# Patient Record
Sex: FEMALE | Race: WHITE | NOT HISPANIC OR LATINO | Employment: OTHER | URBAN - METROPOLITAN AREA
[De-identification: names, ages, dates, MRNs, and addresses within clinical notes are randomized per-mention and may not be internally consistent; named-entity substitution may affect disease eponyms.]

---

## 2017-01-19 ENCOUNTER — GENERIC CONVERSION - ENCOUNTER (OUTPATIENT)
Dept: OTHER | Facility: OTHER | Age: 57
End: 2017-01-19

## 2017-02-08 ENCOUNTER — ALLSCRIPTS OFFICE VISIT (OUTPATIENT)
Dept: OTHER | Facility: OTHER | Age: 57
End: 2017-02-08

## 2017-02-08 DIAGNOSIS — Z00.00 ENCOUNTER FOR GENERAL ADULT MEDICAL EXAMINATION WITHOUT ABNORMAL FINDINGS: ICD-10-CM

## 2017-02-08 DIAGNOSIS — K91.2 POSTSURGICAL MALABSORPTION, NOT ELSEWHERE CLASSIFIED: ICD-10-CM

## 2017-02-08 DIAGNOSIS — Z98.84 BARIATRIC SURGERY STATUS: ICD-10-CM

## 2017-02-08 DIAGNOSIS — E66.01 MORBID (SEVERE) OBESITY DUE TO EXCESS CALORIES (HCC): ICD-10-CM

## 2017-03-27 ENCOUNTER — APPOINTMENT (OUTPATIENT)
Dept: LAB | Facility: CLINIC | Age: 57
End: 2017-03-27
Payer: MEDICARE

## 2017-03-27 ENCOUNTER — TRANSCRIBE ORDERS (OUTPATIENT)
Dept: URGENT CARE | Facility: CLINIC | Age: 57
End: 2017-03-27

## 2017-03-27 DIAGNOSIS — Z98.84 BARIATRIC SURGERY STATUS: ICD-10-CM

## 2017-03-27 DIAGNOSIS — Z00.00 ENCOUNTER FOR GENERAL ADULT MEDICAL EXAMINATION WITHOUT ABNORMAL FINDINGS: ICD-10-CM

## 2017-03-27 DIAGNOSIS — E66.01 MORBID (SEVERE) OBESITY DUE TO EXCESS CALORIES (HCC): ICD-10-CM

## 2017-03-27 DIAGNOSIS — K91.2 POSTSURGICAL MALABSORPTION, NOT ELSEWHERE CLASSIFIED: ICD-10-CM

## 2017-03-27 LAB
25(OH)D3 SERPL-MCNC: 5.1 NG/ML (ref 30–100)
ALBUMIN SERPL BCP-MCNC: 2.4 G/DL (ref 3.5–5)
ALP SERPL-CCNC: 163 U/L (ref 46–116)
ALT SERPL W P-5'-P-CCNC: 17 U/L (ref 12–78)
ANION GAP SERPL CALCULATED.3IONS-SCNC: 8 MMOL/L (ref 4–13)
AST SERPL W P-5'-P-CCNC: 14 U/L (ref 5–45)
BILIRUB SERPL-MCNC: 0.71 MG/DL (ref 0.2–1)
BUN SERPL-MCNC: 8 MG/DL (ref 5–25)
CALCIUM SERPL-MCNC: 8.2 MG/DL (ref 8.3–10.1)
CHLORIDE SERPL-SCNC: 104 MMOL/L (ref 100–108)
CHOLEST SERPL-MCNC: 117 MG/DL (ref 50–200)
CO2 SERPL-SCNC: 27 MMOL/L (ref 21–32)
CREAT SERPL-MCNC: 0.92 MG/DL (ref 0.6–1.3)
ERYTHROCYTE [DISTWIDTH] IN BLOOD BY AUTOMATED COUNT: 12.6 % (ref 11.6–15.1)
FERRITIN SERPL-MCNC: 216 NG/ML (ref 8–388)
FOLATE SERPL-MCNC: 8.4 NG/ML (ref 3.1–17.5)
GFR SERPL CREATININE-BSD FRML MDRD: >60 ML/MIN/1.73SQ M
GLUCOSE P FAST SERPL-MCNC: 73 MG/DL (ref 65–99)
HCT VFR BLD AUTO: 42.6 % (ref 34.8–46.1)
HDLC SERPL-MCNC: 44 MG/DL (ref 40–60)
HGB BLD-MCNC: 14.4 G/DL (ref 11.5–15.4)
LDLC SERPL CALC-MCNC: 54 MG/DL (ref 0–100)
MCH RBC QN AUTO: 33 PG (ref 26.8–34.3)
MCHC RBC AUTO-ENTMCNC: 33.8 G/DL (ref 31.4–37.4)
MCV RBC AUTO: 98 FL (ref 82–98)
PLATELET # BLD AUTO: 273 THOUSANDS/UL (ref 149–390)
PMV BLD AUTO: 13.4 FL (ref 8.9–12.7)
POTASSIUM SERPL-SCNC: 3.6 MMOL/L (ref 3.5–5.3)
PREALB SERPL-MCNC: 10 MG/DL (ref 18–40)
PROT SERPL-MCNC: 6.4 G/DL (ref 6.4–8.2)
PTH-INTACT SERPL-MCNC: 87.2 PG/ML (ref 14–72)
RBC # BLD AUTO: 4.37 MILLION/UL (ref 3.81–5.12)
SODIUM SERPL-SCNC: 139 MMOL/L (ref 136–145)
TRIGL SERPL-MCNC: 95 MG/DL
VIT B12 SERPL-MCNC: 668 PG/ML (ref 100–900)
WBC # BLD AUTO: 7.43 THOUSAND/UL (ref 4.31–10.16)

## 2017-03-27 PROCEDURE — 82746 ASSAY OF FOLIC ACID SERUM: CPT

## 2017-03-27 PROCEDURE — 82607 VITAMIN B-12: CPT

## 2017-03-27 PROCEDURE — 82306 VITAMIN D 25 HYDROXY: CPT

## 2017-03-27 PROCEDURE — 84590 ASSAY OF VITAMIN A: CPT

## 2017-03-27 PROCEDURE — 36415 COLL VENOUS BLD VENIPUNCTURE: CPT

## 2017-03-27 PROCEDURE — 82728 ASSAY OF FERRITIN: CPT

## 2017-03-27 PROCEDURE — 83970 ASSAY OF PARATHORMONE: CPT

## 2017-03-27 PROCEDURE — 80053 COMPREHEN METABOLIC PANEL: CPT

## 2017-03-27 PROCEDURE — 80061 LIPID PANEL: CPT

## 2017-03-27 PROCEDURE — 84134 ASSAY OF PREALBUMIN: CPT

## 2017-03-27 PROCEDURE — 84425 ASSAY OF VITAMIN B-1: CPT

## 2017-03-27 PROCEDURE — 85027 COMPLETE CBC AUTOMATED: CPT

## 2017-03-30 LAB
VIT A SERPL-MCNC: 19 UG/DL (ref 20–65)
VIT B1 BLD-SCNC: 67 NMOL/L (ref 66.5–200)

## 2017-03-31 ENCOUNTER — ALLSCRIPTS OFFICE VISIT (OUTPATIENT)
Dept: OTHER | Facility: OTHER | Age: 57
End: 2017-03-31

## 2017-03-31 DIAGNOSIS — R63.8 OTHER SYMPTOMS AND SIGNS CONCERNING FOOD AND FLUID INTAKE: ICD-10-CM

## 2017-04-10 ENCOUNTER — TRANSCRIBE ORDERS (OUTPATIENT)
Dept: URGENT CARE | Facility: CLINIC | Age: 57
End: 2017-04-10

## 2017-04-10 ENCOUNTER — APPOINTMENT (OUTPATIENT)
Dept: LAB | Facility: CLINIC | Age: 57
End: 2017-04-10
Payer: MEDICARE

## 2017-04-10 DIAGNOSIS — E03.9 UNSPECIFIED HYPOTHYROIDISM: Primary | ICD-10-CM

## 2017-04-10 DIAGNOSIS — E03.9 UNSPECIFIED HYPOTHYROIDISM: ICD-10-CM

## 2017-04-10 DIAGNOSIS — R63.8 OTHER SYMPTOMS AND SIGNS CONCERNING FOOD AND FLUID INTAKE: ICD-10-CM

## 2017-04-10 LAB
PREALB SERPL-MCNC: 11.1 MG/DL (ref 18–40)
T4 FREE SERPL-MCNC: 1.3 NG/DL (ref 0.76–1.46)
TSH SERPL DL<=0.05 MIU/L-ACNC: 0.69 UIU/ML (ref 0.36–3.74)

## 2017-04-10 PROCEDURE — 84439 ASSAY OF FREE THYROXINE: CPT

## 2017-04-10 PROCEDURE — 84443 ASSAY THYROID STIM HORMONE: CPT

## 2017-04-10 PROCEDURE — 36415 COLL VENOUS BLD VENIPUNCTURE: CPT

## 2017-04-10 PROCEDURE — 84134 ASSAY OF PREALBUMIN: CPT

## 2017-05-05 ENCOUNTER — GENERIC CONVERSION - ENCOUNTER (OUTPATIENT)
Dept: OTHER | Facility: OTHER | Age: 57
End: 2017-05-05

## 2017-07-28 ENCOUNTER — ALLSCRIPTS OFFICE VISIT (OUTPATIENT)
Dept: OTHER | Facility: OTHER | Age: 57
End: 2017-07-28

## 2017-08-31 ENCOUNTER — GENERIC CONVERSION - ENCOUNTER (OUTPATIENT)
Dept: OTHER | Facility: OTHER | Age: 57
End: 2017-08-31

## 2017-08-31 DIAGNOSIS — K91.2 POSTSURGICAL MALABSORPTION, NOT ELSEWHERE CLASSIFIED: ICD-10-CM

## 2017-08-31 DIAGNOSIS — E55.9 VITAMIN D DEFICIENCY: ICD-10-CM

## 2017-08-31 DIAGNOSIS — E50.9 VITAMIN A DEFICIENCY: ICD-10-CM

## 2017-08-31 DIAGNOSIS — Z00.00 ENCOUNTER FOR GENERAL ADULT MEDICAL EXAMINATION WITHOUT ABNORMAL FINDINGS: ICD-10-CM

## 2017-08-31 DIAGNOSIS — E21.1 SECONDARY HYPERPARATHYROIDISM, NOT ELSEWHERE CLASSIFIED (HCC): ICD-10-CM

## 2017-08-31 DIAGNOSIS — Z98.84 BARIATRIC SURGERY STATUS: ICD-10-CM

## 2017-09-19 ENCOUNTER — ANESTHESIA EVENT (OUTPATIENT)
Dept: GASTROENTEROLOGY | Facility: HOSPITAL | Age: 57
End: 2017-09-19
Payer: MEDICARE

## 2017-09-19 RX ORDER — MELATONIN
1000 2 TIMES DAILY
COMMUNITY

## 2017-09-19 RX ORDER — SUCRALFATE ORAL 1 G/10ML
1 SUSPENSION ORAL 4 TIMES DAILY
COMMUNITY
End: 2019-06-17 | Stop reason: ALTCHOICE

## 2017-09-19 RX ORDER — PANTOPRAZOLE SODIUM 20 MG/1
20 TABLET, DELAYED RELEASE ORAL DAILY
COMMUNITY
End: 2018-04-26 | Stop reason: SDUPTHER

## 2017-09-19 RX ORDER — OXYCODONE HYDROCHLORIDE 30 MG/1
30 TABLET, FILM COATED, EXTENDED RELEASE ORAL EVERY 12 HOURS SCHEDULED
COMMUNITY
End: 2017-12-22 | Stop reason: HOSPADM

## 2017-09-19 RX ORDER — LEVOTHYROXINE SODIUM 0.1 MG/1
100 TABLET ORAL DAILY
COMMUNITY

## 2017-09-19 RX ORDER — ALPRAZOLAM 1 MG/1
1 TABLET ORAL
COMMUNITY
End: 2019-06-17 | Stop reason: ALTCHOICE

## 2017-09-20 ENCOUNTER — HOSPITAL ENCOUNTER (OUTPATIENT)
Facility: HOSPITAL | Age: 57
Setting detail: OUTPATIENT SURGERY
Discharge: HOME | End: 2017-09-20
Attending: SURGERY | Admitting: SURGERY
Payer: MEDICARE

## 2017-09-20 ENCOUNTER — ANESTHESIA (OUTPATIENT)
Dept: GASTROENTEROLOGY | Facility: HOSPITAL | Age: 57
End: 2017-09-20
Payer: MEDICARE

## 2017-09-20 VITALS
WEIGHT: 184 LBS | HEART RATE: 66 BPM | OXYGEN SATURATION: 100 % | BODY MASS INDEX: 33.86 KG/M2 | HEIGHT: 62 IN | SYSTOLIC BLOOD PRESSURE: 139 MMHG | TEMPERATURE: 96.1 F | RESPIRATION RATE: 16 BRPM | DIASTOLIC BLOOD PRESSURE: 67 MMHG

## 2017-09-20 DIAGNOSIS — Z98.84 BARIATRIC SURGERY STATUS: ICD-10-CM

## 2017-09-20 PROCEDURE — 88305 TISSUE EXAM BY PATHOLOGIST: CPT | Performed by: SURGERY

## 2017-09-20 PROCEDURE — 88342 IMHCHEM/IMCYTCHM 1ST ANTB: CPT | Performed by: SURGERY

## 2017-09-20 RX ORDER — PREDNISONE 20 MG/1
TABLET ORAL DAILY
Status: ON HOLD | COMMUNITY
End: 2017-10-04 | Stop reason: ALTCHOICE

## 2017-09-20 RX ORDER — AZITHROMYCIN 250 MG/1
500 TABLET, FILM COATED ORAL EVERY 24 HOURS
Status: ON HOLD | COMMUNITY
End: 2017-10-04 | Stop reason: ALTCHOICE

## 2017-09-20 RX ORDER — GLYCOPYRROLATE 0.2 MG/ML
INJECTION INTRAMUSCULAR; INTRAVENOUS AS NEEDED
Status: DISCONTINUED | OUTPATIENT
Start: 2017-09-20 | End: 2017-09-20 | Stop reason: SURG

## 2017-09-20 RX ORDER — SODIUM CHLORIDE 9 MG/ML
125 INJECTION, SOLUTION INTRAVENOUS CONTINUOUS
Status: DISCONTINUED | OUTPATIENT
Start: 2017-09-20 | End: 2017-09-20 | Stop reason: HOSPADM

## 2017-09-20 RX ORDER — ONDANSETRON 4 MG/1
4 TABLET, FILM COATED ORAL EVERY 12 HOURS PRN
COMMUNITY

## 2017-09-20 RX ORDER — MECLIZINE HYDROCHLORIDE 25 MG/1
25 TABLET ORAL 3 TIMES DAILY
COMMUNITY
End: 2017-12-07 | Stop reason: ALTCHOICE

## 2017-09-20 RX ORDER — PROPOFOL 10 MG/ML
INJECTION, EMULSION INTRAVENOUS AS NEEDED
Status: DISCONTINUED | OUTPATIENT
Start: 2017-09-20 | End: 2017-09-20 | Stop reason: SURG

## 2017-09-20 RX ORDER — OXYBUTYNIN CHLORIDE 5 MG/1
5 TABLET ORAL DAILY
COMMUNITY
End: 2018-04-26 | Stop reason: SDUPTHER

## 2017-09-20 RX ADMIN — PROPOFOL 50 MG: 10 INJECTION, EMULSION INTRAVENOUS at 07:48

## 2017-09-20 RX ADMIN — SODIUM CHLORIDE 125 ML/HR: 0.9 INJECTION, SOLUTION INTRAVENOUS at 06:33

## 2017-09-20 RX ADMIN — PROPOFOL 50 MG: 10 INJECTION, EMULSION INTRAVENOUS at 07:45

## 2017-09-20 RX ADMIN — GLYCOPYRROLATE 0.4 MG: 0.2 INJECTION, SOLUTION INTRAMUSCULAR; INTRAVENOUS at 07:35

## 2017-09-20 RX ADMIN — PROPOFOL 150 MG: 10 INJECTION, EMULSION INTRAVENOUS at 07:39

## 2017-09-20 RX ADMIN — PROPOFOL 100 MG: 10 INJECTION, EMULSION INTRAVENOUS at 07:50

## 2017-09-29 ENCOUNTER — ALLSCRIPTS OFFICE VISIT (OUTPATIENT)
Dept: OTHER | Facility: OTHER | Age: 57
End: 2017-09-29

## 2017-10-03 ENCOUNTER — ANESTHESIA EVENT (OUTPATIENT)
Dept: GASTROENTEROLOGY | Facility: HOSPITAL | Age: 57
End: 2017-10-03
Payer: MEDICARE

## 2017-10-04 ENCOUNTER — HOSPITAL ENCOUNTER (OUTPATIENT)
Facility: HOSPITAL | Age: 57
Setting detail: OUTPATIENT SURGERY
Discharge: HOME | End: 2017-10-04
Attending: SURGERY | Admitting: SURGERY
Payer: MEDICARE

## 2017-10-04 ENCOUNTER — ANESTHESIA (OUTPATIENT)
Dept: GASTROENTEROLOGY | Facility: HOSPITAL | Age: 57
End: 2017-10-04
Payer: MEDICARE

## 2017-10-04 VITALS
TEMPERATURE: 96.7 F | DIASTOLIC BLOOD PRESSURE: 57 MMHG | HEART RATE: 66 BPM | SYSTOLIC BLOOD PRESSURE: 114 MMHG | RESPIRATION RATE: 18 BRPM | OXYGEN SATURATION: 60 % | BODY MASS INDEX: 35.33 KG/M2 | WEIGHT: 192 LBS | HEIGHT: 62 IN

## 2017-10-04 DIAGNOSIS — E66.01 MORBID OBESITY DUE TO EXCESS CALORIES (HCC): Primary | ICD-10-CM

## 2017-10-04 PROCEDURE — C1726 CATH, BAL DIL, NON-VASCULAR: HCPCS | Performed by: SURGERY

## 2017-10-04 RX ORDER — ONDANSETRON 2 MG/ML
4 INJECTION INTRAMUSCULAR; INTRAVENOUS EVERY 6 HOURS PRN
Status: DISCONTINUED | OUTPATIENT
Start: 2017-10-04 | End: 2017-10-04 | Stop reason: HOSPADM

## 2017-10-04 RX ORDER — SCOLOPAMINE TRANSDERMAL SYSTEM 1 MG/1
1 PATCH, EXTENDED RELEASE TRANSDERMAL ONCE AS NEEDED
Status: DISCONTINUED | OUTPATIENT
Start: 2017-10-04 | End: 2017-10-04 | Stop reason: HOSPADM

## 2017-10-04 RX ORDER — PROPOFOL 10 MG/ML
INJECTION, EMULSION INTRAVENOUS AS NEEDED
Status: DISCONTINUED | OUTPATIENT
Start: 2017-10-04 | End: 2017-10-04 | Stop reason: SURG

## 2017-10-04 RX ORDER — SODIUM CHLORIDE 9 MG/ML
125 INJECTION, SOLUTION INTRAVENOUS CONTINUOUS
Status: DISCONTINUED | OUTPATIENT
Start: 2017-10-04 | End: 2017-10-04 | Stop reason: HOSPADM

## 2017-10-04 RX ADMIN — PROPOFOL 40 MG: 10 INJECTION, EMULSION INTRAVENOUS at 10:12

## 2017-10-04 RX ADMIN — PROPOFOL 40 MG: 10 INJECTION, EMULSION INTRAVENOUS at 10:14

## 2017-10-04 RX ADMIN — SODIUM CHLORIDE 125 ML/HR: 0.9 INJECTION, SOLUTION INTRAVENOUS at 09:28

## 2017-10-04 RX ADMIN — PROPOFOL 40 MG: 10 INJECTION, EMULSION INTRAVENOUS at 10:16

## 2017-10-04 RX ADMIN — LIDOCAINE HYDROCHLORIDE 100 MG: 20 INJECTION, SOLUTION INTRAVENOUS at 10:09

## 2017-10-04 RX ADMIN — SCOPALAMINE 1 PATCH: 1 PATCH, EXTENDED RELEASE TRANSDERMAL at 09:28

## 2017-10-04 RX ADMIN — PROPOFOL 80 MG: 10 INJECTION, EMULSION INTRAVENOUS at 10:09

## 2017-10-04 NOTE — DISCHARGE INSTRUCTIONS
Upper Endoscopy   WHAT YOU NEED TO KNOW:   An upper endoscopy is also called an upper gastrointestinal (GI) endoscopy, or an esophagogastroduodenoscopy (EGD)  You may feel bloated, gassy, or have some abdominal discomfort after your procedure  Your throat may be sore for 24 to 36 hours  You may burp or pass gas from air that is still inside your body  DISCHARGE INSTRUCTIONS:   Call 911 if:   · You have sudden chest pain or trouble breathing  Seek care immediately if:   · You feel dizzy or faint  · You have trouble swallowing  · You have severe throat pain  · Your bowel movements are very dark or black  · Your abdomen is hard and firm and you have severe pain  · You vomit blood  Contact your healthcare provider if:   · You feel full or bloated and cannot burp or pass gas  · You have not had a bowel movement for 3 days after your procedure  · You have neck pain  · You have a fever or chills  · You have nausea or are vomiting  · You have a rash or hives  · You have questions or concerns about your endoscopy  Relieve a sore throat:  Suck on throat lozenges or crushed ice  Gargle with a small amount of warm salt water  Mix 1 teaspoon of salt and 1 cup of warm water to make salt water  Relieve gas and discomfort from bloating:  Lie on your right side with a heating pad on your abdomen  Take short walks to help pass gas  Eat small meals until bloating is relieved  Rest after your procedure:  Do not drive or make important decisions until the day after your procedure  Return to your normal activity as directed  You can usually return to work the day after your procedure  Follow up with your healthcare provider as directed:  Write down your questions so you remember to ask them during your visits  © 2017 [employer]  Information is for End User's use only and may not be sold, redistributed or otherwise used for commercial purposes   All illustrations and images included in Nick are the copyrighted property of A D A M [employer] Inc  or Jose Hartman  The above information is an  only  It is not intended as medical advice for individual conditions or treatments  Talk to your doctor, nurse or pharmacist before following any medical regimen to see if it is safe and effective for you

## 2017-10-04 NOTE — ANESTHESIA PREPROCEDURE EVALUATION
Review of Systems/Medical History  Patient summary reviewed    History of anesthetic complications PONV    Cardiovascular  Hyperlipidemia,    Pulmonary       GI/Hepatic    GERD ,             Endo/Other  History of thyroid disease , hypothyroidism, Arthritis     GYN       Hematology  Negative hematology ROS      Musculoskeletal  Negative musculoskeletal ROS        Neurology  Negative neurology ROS      Psychology   Anxiety, Depression ,            Physical Exam    Airway    Mallampati score: II  TM Distance: >3 FB  Neck ROM: full     Dental   lower dentures and upper dentures,     Cardiovascular  Rhythm: regular, Rate: normal, Cardiovascular exam normal    Pulmonary  Pulmonary exam normal Breath sounds clear to auscultation,     Other Findings        Anesthesia Plan  ASA Score- 2       Anesthesia Type- IV sedation with anesthesia  Comment: History of severe PONV after EGD's  Will order scopolamine patch        Induction-       Informed Consent  Anesthetic plan and risks discussed with patient

## 2017-10-04 NOTE — PERIOPERATIVE NURSING NOTE
Patient no complaints of nausea at present no vomiting  Information and gloves given to patient to remove scopalamine patch in 72 hours

## 2017-10-04 NOTE — H&P
H&P EXAM - Outpatient Endoscopy  [facility] [address] [facility] INTRA   Fernando Fajardo 64 y o  female MRN: [id number]  Unit/Bed#: ENDO POOL Encounter: [id number]        Impression: Morbid obesity with recently diagnosed GJ stricture  s/p Naheed en Y Gastric Bypass    Plan:Upper endoscopy possible dilatation    Chief Complaint: Morbid obesity and GJ stricture  s/p RYGB    Physical Exam: Normal not in acute distress   Chest: Clear to auscultation   Heart: Normal S1 and S2

## 2017-10-04 NOTE — OP NOTE
OPERATIVE REPORT  PATIENT NAME: Kesha Baldwin    :  1960  MRN: [id number]  Pt Location: [facility] ROOM 01    SURGERY DATE: 10/4/2017    Surgeon(s) and Role:     * Carrie Carranza MD - Primary    Preop Diagnosis:  Bariatric surgery status [Z98 84]    Post-Op Diagnosis Codes: * Bariatric surgery status [Z98 84]    Procedure(s) (LRB):  ESOPHAGOGASTRODUODENOSCOPY (EGD) (N/A)    Specimen(s):  * No specimens in log *    Estimated Blood Loss:   Minimal    Drains:       Anesthesia Type:   IV Sedation with Anesthesia    Operative Indications:  Bariatric surgery status [Z98 84]      Operative Findings:  Gastrojejunostomy stricture less than 0 5 cm in size    Complications:   None    Procedure and Technique:  Upper endoscopy and balloon dilatation of gastrojejunostomy stricture   I was present for the entire procedure    Patient Disposition:  hemodynamically stable Indication:   Patient suffers from morbid obesity s/p RYGB presenting with recently diagnosed gastrojejunostomy stricture    Description of the procedure: The patient was brought to the endoscopy suite and was placed in  left lateral decubitus position  A time-out was called and the patient was identified by name and by armband  The patient received IV  Propofol under closed monitoring  by the anesthesiologist and nurse anesthesist     Upper endoscopy was performed under direct visualization  Esophagus was visualized and showed normal findings   The GE junction was normal   The stomach pouch was then inspected and showed normal findings  Gastrojejunostomy was inspected and showed a stricture that measured less than 0 5 cm size, using a balloon dilator I was able to serially dilate the stricture to 1 2 cm    Blind end and Naheed limbs were both inspected and showed normal findings  Biopsy was taken with a punch biopsy forceps from the gastric pouch and sent to pathology to rule out H  Pylori     Gastro-gastric Fistula was not identified      The patient tolerated the procedure well and was transferred to the recovery unit in stable condition           SIGNATURE: Yonathan Dickerson MD  DATE: October 4, 2017  TIME: 10:20 AM

## 2017-10-26 ENCOUNTER — HOSPITAL ENCOUNTER (OUTPATIENT)
Dept: INFUSION CENTER | Facility: CLINIC | Age: 57
Discharge: HOME | DRG: 388 | End: 2017-10-26
Payer: MEDICARE

## 2017-10-26 ENCOUNTER — GENERIC CONVERSION - ENCOUNTER (OUTPATIENT)
Dept: OTHER | Facility: OTHER | Age: 57
End: 2017-10-26

## 2017-10-26 VITALS
HEART RATE: 69 BPM | DIASTOLIC BLOOD PRESSURE: 74 MMHG | RESPIRATION RATE: 18 BRPM | SYSTOLIC BLOOD PRESSURE: 120 MMHG | TEMPERATURE: 96.8 F

## 2017-10-26 DIAGNOSIS — K91.2 POSTSURGICAL MALABSORPTION, NOT ELSEWHERE CLASSIFIED (CODE): ICD-10-CM

## 2017-10-26 DIAGNOSIS — K92.9 DISEASE OF DIGESTIVE SYSTEM: ICD-10-CM

## 2017-10-26 LAB
PREALB SERPL-MCNC: 9.8 MG/DL (ref 18–40)
VIT B12 SERPL-MCNC: 1777 PG/ML (ref 100–900)

## 2017-10-26 PROCEDURE — 96366 THER/PROPH/DIAG IV INF ADDON: CPT

## 2017-10-26 PROCEDURE — 84134 ASSAY OF PREALBUMIN: CPT | Performed by: PHYSICIAN ASSISTANT

## 2017-10-26 PROCEDURE — 82607 VITAMIN B-12: CPT | Performed by: PHYSICIAN ASSISTANT

## 2017-10-26 PROCEDURE — 96368 THER/DIAG CONCURRENT INF: CPT

## 2017-10-26 PROCEDURE — 96365 THER/PROPH/DIAG IV INF INIT: CPT

## 2017-10-26 PROCEDURE — 96372 THER/PROPH/DIAG INJ SC/IM: CPT

## 2017-10-26 PROCEDURE — 84425 ASSAY OF VITAMIN B-1: CPT | Performed by: PHYSICIAN ASSISTANT

## 2017-10-26 RX ORDER — CYANOCOBALAMIN 1000 UG/ML
1000 INJECTION INTRAMUSCULAR; SUBCUTANEOUS ONCE
Status: COMPLETED | OUTPATIENT
Start: 2017-10-26 | End: 2017-10-26

## 2017-10-26 RX ADMIN — ASCORBIC ACID, VITAMIN A PALMITATE, CHOLECALCIFEROL, THIAMINE HYDROCHLORIDE, RIBOFLAVIN-5 PHOSPHATE SODIUM, PYRIDOXINE HYDROCHLORIDE, NIACINAMIDE, DEXPANTHENOL, ALPHA-TOCOPHEROL ACETATE, VITAMIN K1, FOLIC ACID, BIOTIN, CYANOCOBALAMIN: 200; 3300; 200; 6; 3.6; 6; 40; 15; 10; 150; 600; 60; 5 INJECTION, SOLUTION INTRAVENOUS at 12:43

## 2017-10-26 RX ADMIN — CYANOCOBALAMIN 1000 MCG: 1000 INJECTION, SOLUTION INTRAMUSCULAR at 15:15

## 2017-10-26 RX ADMIN — THIAMINE HYDROCHLORIDE: 100 INJECTION, SOLUTION INTRAMUSCULAR; INTRAVENOUS at 12:43

## 2017-10-26 NOTE — PROGRESS NOTES
Patient arrived today experiencing diarrhea  She was having trouble concentrating and very weak  Her bp was 93/61  After her infusion she was able to ambulate without feeling dizzy and her mentation was much better  She is on the schedule for tomorrow and will figure out transportation with her daughters for next week  She was a very difficult stick and requested to keep her iv in  She was advised not to get it wet

## 2017-10-26 NOTE — PLAN OF CARE
Problem: Potential for Falls  Goal: Patient will remain free of falls  INTERVENTIONS:  - Assess patient frequently for physical needs  -  Identify cognitive and physical deficits and behaviors that affect risk of falls    -  [place] fall precautions as indicated by assessment   - Educate patient/family on patient safety including physical limitations  - Instruct patient to call for assistance with activity based on assessment  - Modify environment to reduce risk of injury  - Consider OT/PT consult to assist with strengthening/mobility   Outcome: Progressing

## 2017-10-27 ENCOUNTER — GENERIC CONVERSION - ENCOUNTER (OUTPATIENT)
Dept: OTHER | Facility: OTHER | Age: 57
End: 2017-10-27

## 2017-10-27 ENCOUNTER — HOSPITAL ENCOUNTER (INPATIENT)
Facility: HOSPITAL | Age: 57
LOS: 5 days | Discharge: HOME HEALTH | DRG: 388 | End: 2017-11-01
Attending: SURGERY | Admitting: SURGERY
Payer: MEDICARE

## 2017-10-27 ENCOUNTER — HOSPITAL ENCOUNTER (OUTPATIENT)
Dept: RADIOLOGY | Facility: HOSPITAL | Age: 57
Discharge: HOME | End: 2017-10-27
Attending: SURGERY

## 2017-10-27 ENCOUNTER — HOSPITAL ENCOUNTER (OUTPATIENT)
Dept: INFUSION CENTER | Facility: CLINIC | Age: 57
Discharge: HOME | DRG: 388 | End: 2017-10-27
Payer: MEDICARE

## 2017-10-27 VITALS
RESPIRATION RATE: 16 BRPM | DIASTOLIC BLOOD PRESSURE: 72 MMHG | SYSTOLIC BLOOD PRESSURE: 92 MMHG | TEMPERATURE: 97.2 F | HEART RATE: 73 BPM

## 2017-10-27 DIAGNOSIS — K91.89 ANASTOMOTIC STRICTURE OF GASTROJEJUNOSTOMY: ICD-10-CM

## 2017-10-27 DIAGNOSIS — I48.0 PAROXYSMAL ATRIAL FIBRILLATION (HCC): ICD-10-CM

## 2017-10-27 DIAGNOSIS — E63.8 IMBALANCED NUTRITION: ICD-10-CM

## 2017-10-27 DIAGNOSIS — E03.9 ACQUIRED HYPOTHYROIDISM: ICD-10-CM

## 2017-10-27 DIAGNOSIS — E43 SEVERE PROTEIN-CALORIE MALNUTRITION (HCC): Primary | ICD-10-CM

## 2017-10-27 PROBLEM — E46 PROTEIN CALORIE MALNUTRITION (HCC): Status: ACTIVE | Noted: 2017-10-27

## 2017-10-27 PROCEDURE — 36569 INSJ PICC 5 YR+ W/O IMAGING: CPT

## 2017-10-27 PROCEDURE — C9113 INJ PANTOPRAZOLE SODIUM, VIA: HCPCS | Performed by: SURGERY

## 2017-10-27 PROCEDURE — 96366 THER/PROPH/DIAG IV INF ADDON: CPT

## 2017-10-27 PROCEDURE — 76937 US GUIDE VASCULAR ACCESS: CPT

## 2017-10-27 PROCEDURE — 77001 FLUOROGUIDE FOR VEIN DEVICE: CPT

## 2017-10-27 PROCEDURE — 96365 THER/PROPH/DIAG IV INF INIT: CPT

## 2017-10-27 PROCEDURE — 02HV33Z INSERTION OF INFUSION DEVICE INTO SUPERIOR VENA CAVA, PERCUTANEOUS APPROACH: ICD-10-PCS | Performed by: RADIOLOGY

## 2017-10-27 RX ORDER — SODIUM CHLORIDE 9 MG/ML
100 INJECTION, SOLUTION INTRAVENOUS CONTINUOUS
Status: DISCONTINUED | OUTPATIENT
Start: 2017-10-28 | End: 2017-10-29

## 2017-10-27 RX ORDER — PANTOPRAZOLE SODIUM 40 MG/1
40 INJECTION, POWDER, FOR SOLUTION INTRAVENOUS
Status: DISCONTINUED | OUTPATIENT
Start: 2017-10-27 | End: 2017-11-01 | Stop reason: HOSPADM

## 2017-10-27 RX ADMIN — ASCORBIC ACID, VITAMIN A PALMITATE, CHOLECALCIFEROL, THIAMINE HYDROCHLORIDE, RIBOFLAVIN-5 PHOSPHATE SODIUM, PYRIDOXINE HYDROCHLORIDE, NIACINAMIDE, DEXPANTHENOL, ALPHA-TOCOPHEROL ACETATE, VITAMIN K1, FOLIC ACID, BIOTIN, CYANOCOBALAMIN: 200; 3300; 200; 6; 3.6; 6; 40; 15; 10; 150; 600; 60; 5 INJECTION, SOLUTION INTRAVENOUS at 17:35

## 2017-10-27 RX ADMIN — THIAMINE HYDROCHLORIDE: 100 INJECTION, SOLUTION INTRAMUSCULAR; INTRAVENOUS at 09:41

## 2017-10-27 RX ADMIN — PANTOPRAZOLE SODIUM 40 MG: 40 INJECTION, POWDER, FOR SOLUTION INTRAVENOUS at 17:48

## 2017-10-27 NOTE — PLAN OF CARE
Problem: Potential for Falls  Goal: Patient will remain free of falls  INTERVENTIONS:  - Assess patient frequently for physical needs  -  Identify cognitive and physical deficits and behaviors that affect risk of falls    -  [place] fall precautions as indicated by assessment   - Educate patient/family on patient safety including physical limitations  - Instruct patient to call for assistance with activity based on assessment  - Modify environment to reduce risk of injury  - Consider OT/PT consult to assist with strengthening/mobility   Outcome: Progressing

## 2017-10-27 NOTE — PROGRESS NOTES
Ms Tatiana Babar was instructed to present to [facility] for admission, IV fluid resuscitation, PICC line insertion, and TPN  The patient access center, interventional radiology team, and pharmacy were preemptively contacted

## 2017-10-27 NOTE — PROCEDURES
PICC Line Insertion  Date/Time: 10/27/2017 4:37 PM  Performed by: Judie Nickerson by: Melvin Cerda     Patient location:  IR  Other Assisting Provider: No    Consent:     Consent obtained:  Written    Consent given by:  Patient    Risks discussed:  Arterial puncture, incorrect placement, infection, bleeding, nerve damage and pneumothorax    Alternatives discussed:  Alternative treatment, no treatment, delayed treatment, observation and referral  Universal protocol:     Procedure explained and questions answered to patient or proxy's satisfaction: yes      Relevant documents present and verified: yes      Test results available and properly labeled: yes      Imaging studies available: yes      Required blood products, implants, devices, and special equipment available: yes      Site/side marked: yes      Immediately prior to procedure, a time out was called: yes      Patient identity confirmed:  Verbally with patient  Pre-procedure details:     Hand hygiene: Hand hygiene performed prior to insertion      Sterile barrier technique: All elements of maximal sterile technique followed      Skin preparation:  ChloraPrep  Indications:     PICC line indications: total parenteral nutrition    Anesthesia (see MAR for exact dosages):      Anesthesia method:  Local infiltration    Local anesthetic:  Lidocaine 1% w/o epi  Procedure details:     Location:  Brachial    Vessel type: vein      Laterality:  Right    Approach: percutaneous technique used      Patient position:  Flat    Procedural supplies:  Double lumen    Catheter size:  5 Fr    Landmarks identified: yes      Ultrasound guidance: yes      Sterile ultrasound techniques: Sterile gel and sterile probe covers were used      Number of attempts:  1    Successful placement: yes      Total catheter length (cm):  44    Catheter out on skin (cm):  0    Arm circumference:  29 5  Post-procedure details:     Post-procedure:  Securement device placed    Assessment: Blood return through all ports and free fluid flow    Post-procedure complications: none      Patient tolerance of procedure:   Tolerated well, no immediate complications

## 2017-10-28 PROBLEM — K91.89 ANASTOMOTIC STRICTURE OF GASTROJEJUNOSTOMY: Status: ACTIVE | Noted: 2017-10-28

## 2017-10-28 LAB
ANION GAP SERPL CALCULATED.3IONS-SCNC: 7 MMOL/L (ref 4–13)
BASOPHILS # BLD AUTO: 0.06 THOUSANDS/ΜL (ref 0–0.1)
BASOPHILS NFR BLD AUTO: 1 % (ref 0–1)
BUN SERPL-MCNC: 6 MG/DL (ref 5–25)
CALCIUM SERPL-MCNC: 7.1 MG/DL (ref 8.3–10.1)
CHLORIDE SERPL-SCNC: 104 MMOL/L (ref 100–108)
CO2 SERPL-SCNC: 34 MMOL/L (ref 21–32)
CREAT SERPL-MCNC: 0.76 MG/DL (ref 0.6–1.3)
EOSINOPHIL # BLD AUTO: 0.12 THOUSAND/ΜL (ref 0–0.61)
EOSINOPHIL NFR BLD AUTO: 2 % (ref 0–6)
ERYTHROCYTE [DISTWIDTH] IN BLOOD BY AUTOMATED COUNT: 14.8 % (ref 11.6–15.1)
GFR SERPL CREATININE-BSD FRML MDRD: 88 ML/MIN/1.73SQ M
GLUCOSE SERPL-MCNC: 75 MG/DL (ref 65–140)
HCT VFR BLD AUTO: 34.3 % (ref 34.8–46.1)
HGB BLD-MCNC: 11.8 G/DL (ref 11.5–15.4)
LYMPHOCYTES # BLD AUTO: 3.98 THOUSANDS/ΜL (ref 0.6–4.47)
LYMPHOCYTES NFR BLD AUTO: 55 % (ref 14–44)
MAGNESIUM SERPL-MCNC: 1.8 MG/DL (ref 1.6–2.6)
MCH RBC QN AUTO: 33.9 PG (ref 26.8–34.3)
MCHC RBC AUTO-ENTMCNC: 34.4 G/DL (ref 31.4–37.4)
MCV RBC AUTO: 99 FL (ref 82–98)
MONOCYTES # BLD AUTO: 0.66 THOUSAND/ΜL (ref 0.17–1.22)
MONOCYTES NFR BLD AUTO: 9 % (ref 4–12)
NEUTROPHILS # BLD AUTO: 2.38 THOUSANDS/ΜL (ref 1.85–7.62)
NEUTS SEG NFR BLD AUTO: 33 % (ref 43–75)
NRBC BLD AUTO-RTO: 0 /100 WBCS
PHOSPHATE SERPL-MCNC: 3.5 MG/DL (ref 2.7–4.5)
PLATELET # BLD AUTO: 227 THOUSANDS/UL (ref 149–390)
PMV BLD AUTO: 12.9 FL (ref 8.9–12.7)
POTASSIUM SERPL-SCNC: 2.1 MMOL/L (ref 3.5–5.3)
RBC # BLD AUTO: 3.48 MILLION/UL (ref 3.81–5.12)
SODIUM SERPL-SCNC: 145 MMOL/L (ref 136–145)
WBC # BLD AUTO: 7.2 THOUSAND/UL (ref 4.31–10.16)

## 2017-10-28 PROCEDURE — C9113 INJ PANTOPRAZOLE SODIUM, VIA: HCPCS | Performed by: SURGERY

## 2017-10-28 PROCEDURE — 83735 ASSAY OF MAGNESIUM: CPT | Performed by: SURGERY

## 2017-10-28 PROCEDURE — 84100 ASSAY OF PHOSPHORUS: CPT | Performed by: SURGERY

## 2017-10-28 PROCEDURE — 80048 BASIC METABOLIC PNL TOTAL CA: CPT | Performed by: SURGERY

## 2017-10-28 PROCEDURE — 85025 COMPLETE CBC W/AUTO DIFF WBC: CPT | Performed by: SURGERY

## 2017-10-28 RX ORDER — DIPHENHYDRAMINE HYDROCHLORIDE 50 MG/ML
12.5 INJECTION INTRAMUSCULAR; INTRAVENOUS EVERY 4 HOURS PRN
Status: DISCONTINUED | OUTPATIENT
Start: 2017-10-28 | End: 2017-11-01 | Stop reason: HOSPADM

## 2017-10-28 RX ORDER — DIAZEPAM 5 MG/ML
2.5 INJECTION, SOLUTION INTRAMUSCULAR; INTRAVENOUS EVERY 8 HOURS PRN
Status: DISCONTINUED | OUTPATIENT
Start: 2017-10-28 | End: 2017-11-01 | Stop reason: HOSPADM

## 2017-10-28 RX ORDER — METOCLOPRAMIDE HYDROCHLORIDE 5 MG/ML
10 INJECTION INTRAMUSCULAR; INTRAVENOUS EVERY 6 HOURS PRN
Status: DISCONTINUED | OUTPATIENT
Start: 2017-10-28 | End: 2017-11-01 | Stop reason: HOSPADM

## 2017-10-28 RX ORDER — MORPHINE SULFATE 2 MG/ML
1 INJECTION, SOLUTION INTRAMUSCULAR; INTRAVENOUS EVERY 4 HOURS PRN
Status: DISCONTINUED | OUTPATIENT
Start: 2017-10-28 | End: 2017-11-01 | Stop reason: HOSPADM

## 2017-10-28 RX ORDER — DIAZEPAM 5 MG/ML
2.5 INJECTION, SOLUTION INTRAMUSCULAR; INTRAVENOUS EVERY 8 HOURS PRN
Status: DISCONTINUED | OUTPATIENT
Start: 2017-10-28 | End: 2017-10-28

## 2017-10-28 RX ORDER — POTASSIUM CHLORIDE 29.8 MG/ML
40 INJECTION INTRAVENOUS EVERY 4 HOURS
Status: COMPLETED | OUTPATIENT
Start: 2017-10-28 | End: 2017-10-30

## 2017-10-28 RX ADMIN — MORPHINE SULFATE 1 MG: 2 INJECTION, SOLUTION INTRAMUSCULAR; INTRAVENOUS at 20:27

## 2017-10-28 RX ADMIN — SODIUM CHLORIDE 100 ML/HR: 0.9 INJECTION, SOLUTION INTRAVENOUS at 13:36

## 2017-10-28 RX ADMIN — POTASSIUM CHLORIDE 40 MEQ: 400 INJECTION, SOLUTION INTRAVENOUS at 15:10

## 2017-10-28 RX ADMIN — PANTOPRAZOLE SODIUM 40 MG: 40 INJECTION, POWDER, FOR SOLUTION INTRAVENOUS at 09:32

## 2017-10-28 RX ADMIN — MORPHINE SULFATE 1 MG: 2 INJECTION, SOLUTION INTRAMUSCULAR; INTRAVENOUS at 12:48

## 2017-10-28 RX ADMIN — CALCIUM GLUCONATE: 94 INJECTION, SOLUTION INTRAVENOUS at 21:04

## 2017-10-28 RX ADMIN — THIAMINE HYDROCHLORIDE 500 MG: 100 INJECTION, SOLUTION INTRAMUSCULAR; INTRAVENOUS at 10:26

## 2017-10-28 RX ADMIN — POTASSIUM CHLORIDE 40 MEQ: 400 INJECTION, SOLUTION INTRAVENOUS at 11:00

## 2017-10-28 RX ADMIN — SODIUM CHLORIDE 100 ML/HR: 0.9 INJECTION, SOLUTION INTRAVENOUS at 03:00

## 2017-10-28 NOTE — H&P
Bariatric H&P    CC: malnutrition    HPI:  The patient is a 19-year-old woman who, after a complicated bariatric surgical history reviewed below, has developed a severe anastomotic stricture at her gastrojejunostomy  Her partially obstructive symptoms from this stricture became exacerbated this past June following an anastomotic bleed while on vacation was surgery  Upon return to the [facility] she has been managed with serial dilatations, Carafate, and PPI  However, after being seen in clinic on October 26, 2017 it was clear that she was becoming progressively dehydrated and malnourished, including reporting peripheral neuropathies likely due to vitamin deficiency  Home thiamin infusions were arranged, but the patient's progressive symptoms and low pre-albumin warranted direct admission  She had her PICC line placed yesterday by Interventional Radiology  Overnight she reports moderate ongoing nausea occasional retching, but is able to tolerate some amount of liquids  Bariatric Surgical History:  Patient is status post open Naheed-en-Y gastric bypass that was performed in 2001 at [facility] patient initially was 598 pounds and her lowest was 235 lbs  She experienced weight regain due to large non-excluded fundus and a gastro-gastric fistula that was operatively revised in April, 2016 by Dr Joselyn Barbosa  She developed a post-operative leak that required drainage, antibiotics, and TPN  The drain and TPN were removed/stopped in June, 2016  She subsequently experienced good weight loss but also developed persistent reflux symptoms requiring twice daily PPI that was eventually advanced to Dexilant      ROS  Denies fever or chills  Reports dry scaly skin  Denies skin rashes  Denies hair loss  Reports unintentional weight loss  Reports that solids and pills exacerbate her symptoms more than liquids  Denies shortness of breath or cough  Denies chest pain or palpitations  Reports chronic back pain, mild  Denies abdominal pain or bloating  Decreased urine output and stool frequency  Peripheral paresthesias of hands and feet    Past Medical History:   Diagnosis Date    Anxiety     Back pain     DDD (degenerative disc disease), lumbar     Depression     Edema extremities     Elevated cholesterol     Full dentures     GERD (gastroesophageal reflux disease)     History of gastric ulcer     "Years ago    History of heartburn     Hypothyroid     Hypothyroidism    Morbid obesity ([address] )     Postgastrectomy malabsorption     Skin abnormality     Edema BLE- uses boots at home  Has oozing area back of lower right leg  Was instructed to see PCP prior to surgery for assessment    Stress incontinence     Stricture esophagus     Wears glasses      Past Surgical History:   Procedure Laterality Date    ABDOMINAL SURGERY       SECTION      x3    CHOLECYSTECTOMY      COSMETIC SURGERY      tummy tuck    ESOPHAGOGASTRODUODENOSCOPY N/A 2016    Procedure: ESOPHAGOGASTRODUODENOSCOPY (EGD); Surgeon: Farheen Roy MD;  Location: [facility]; Service:     GASTRIC BYPASS      PANNICULECTOMY      DE EGD TRANSORAL BIOPSY SINGLE/MULTIPLE N/A 2017    Procedure: ESOPHAGOGASTRODUODENOSCOPY (EGD) with biopsy;  Surgeon: Deloris Muniz MD;  Location: [facility]; Service: Bariatrics    DE EGD TRANSORAL BIOPSY SINGLE/MULTIPLE N/A 10/4/2017    Procedure: ESOPHAGOGASTRODUODENOSCOPY (EGD) with balloon dilatation;  Surgeon: Farheen Roy MD;  Location: [facility];   Service: Bariatrics    DE LAP, SAADIA RESTRICT PROC, LONGITUDINAL GASTRECTOMY N/A 2016    Procedure:   LAPAROSCOPIC GASTROGASTIC FISTULA TAKEDOWN, PARTIAL GASTRECTOMY, GASTRO JEJUNOSTOMY, EXTENSIVE LYSIS OF ADHESIONS;  Surgeon: Farheen Roy MD;  Location: [facility];  Service: Bariatrics     Scheduled Meds:  pantoprazole 40 mg Intravenous Q24H [address]   potassium chloride 40 mEq Intravenous Q4H   [START ON 10/29/2017] thiamine 250 mg Intravenous Daily thiamine 500 mg Intravenous Once     Continuous Infusions:  Adult TPN (STANDARD BASE/STANDARD ELECTROLYTE)     sodium chloride 100 mL/hr Last Rate: 100 mL/hr (10/28/17 0300)     Current Facility-Administered Medications on File Prior to Encounter   Medication Dose Route Frequency Provider Last Rate Last Dose    [COMPLETED] thiamine (VITAMIN B1) 500 mg in sodium chloride 0 9 % 1,000 mL IVPB   Intravenous Once Teresa Saul MD   Stopped at 10/27/17 1200     Current Outpatient Prescriptions on File Prior to Encounter   Medication Sig Dispense Refill    ALPRAZolam (XANAX) 1 mg tablet Take 1 mg by mouth daily at bedtime as needed for anxiety      cholecalciferol (VITAMIN D3) 1,000 units tablet Take 1,000 Units by mouth daily      levothyroxine 100 mcg tablet Take 100 mcg by mouth daily      meclizine (ANTIVERT) 25 mg tablet Take 25 mg by mouth 3 (three) times a day      ondansetron (ZOFRAN) 4 mg tablet Take 4 mg by mouth every 12 (twelve) hours as needed for nausea or vomiting      oxybutynin (DITROPAN) 5 mg tablet Take 5 mg by mouth daily      OxyCODONE HCl ER (OxyCONTIN) 30 MG T12A Take 30 mg by mouth every 12 (twelve) hours      pantoprazole (PROTONIX) 20 mg tablet Take 20 mg by mouth daily      sucralfate (CARAFATE) 1 g/10 mL suspension Take 1 g by mouth 4 (four) times a day       Exam  NAD, alert, pleasant  Pale, grey skin  No jaundice, no icterus  No JVD  Normal inspiratory effort  RRR  Abdomen soft, NT/ND  Incisions healed well  Mild peripheral edema    Labs  K 2 1, BMP otherwise normal  Prealbumin 9 8  Vitamin B-12 1777  WBC 7 2  H/H 11 8/34 3    Assessment  64year-old woman with severe protein calorie malnutrition with systemic manifestations secondary to low intake due to her anastomotic stricture    Plan  - appreciate IR placement of PICC line  - replete potassium this morning  - resume thiamine infusion regimen  - TPN - standard order today, appreciate Nutrition recommendations   Once reviewed, will arrange outpatient TPN  - prn Reglan, morphine, valium  - recheck labs in AM

## 2017-10-28 NOTE — NUTRITION
10/28/17 1556   PES Statement   Problem Intake   Oral or Nutritional Support Intake (2) Inadequate oral intake NI-2 1   Related to Inability for PO;Nausea;Vomiting; Increased Energy Needs;GI distress/pain; Other (comment)  (gastric bleed and stricture)   As evidenced by: Per patient interview; Malnutrition; Intake < 25%   Patient Nutrition Goals   Goal tolerate enteral/parenteral goal;other (comment)  (eventually to tolerate po diet)   Goal Status initiated   Timeframe to complete goal by next f/u   Recommendations/Interventions   Summary See [facility] for current TPN  Standard bag will provide only 67% of estimated protein and 60% of estimated kcal   Pt may need to rely solely on PN, d/t inability of po intake  Interventions PN change admixture   Nutrition Recommendations Tube Feeding Recommendation provided  (Recommend goal of 750ml 10% amino acids, 200ml 20% lipid, 600ml 40% dextrose    This will provide 1516kcal/75g protein/ 40g fat and 1 94 mg CHO/kg/min)   Nutrition Complexity Risk   Nutrition complexity level High risk   Nutrition review: 10/30/17  (advancement of PN, toleration, po intake)   Follow up date 11/02/17

## 2017-10-28 NOTE — CONSULTS
Recommend goal of 750ml 10% amino acids, 200ml 20% lipid, 600ml 40% dextrose    This will provide 1516kcal/75g protein/ 40g fat and 1 94 mg CHO/kg/min

## 2017-10-28 NOTE — CASE MANAGEMENT
Initial Clinical Review    Admission: Date/Time/Statement: 10/27/17 @ 1652     Orders Placed This Encounter   Procedures    Inpatient Admission     Standing Status:   Standing     Number of Occurrences:   1     Order Specific Question:   Admitting Physician     Answer:   PRANEETH Umanzor     Order Specific Question:   Level of Care     Answer:   Med Surg [16]     Order Specific Question:   Estimated length of stay     Answer:   More than 2 Midnights     Order Specific Question:   Certification     Answer:   I certify that inpatient services are medically necessary for this patient for a duration of greater than two midnights  See H&P and MD Progress Notes for additional information about the patient's course of treatment  ED: Date/Time/Mode of Arrival:   ED Arrival Information     Patient not seen in ED                       Chief Complaint: No chief complaint on file  History of Illness: The patient is a 80-year-old woman who, after a complicated bariatric surgical history reviewed below, has developed a severe anastomotic stricture at her gastrojejunostomy  Her partially obstructive symptoms from this stricture became exacerbated this past June following an anastomotic bleed while on vacation was surgery  Upon return to the [facility] she has been managed with serial dilatations, Carafate, and PPI  However, after being seen in clinic on October 26, 2017 it was clear that she was becoming progressively dehydrated and malnourished, including reporting peripheral neuropathies likely due to vitamin deficiency  Home thiamin infusions were arranged, but the patient's progressive symptoms and low pre-albumin warranted direct admission  She had her PICC line placed yesterday by Interventional Radiology    Overnight she reports moderate ongoing nausea occasional retching, but is able to tolerate some amount of liquids      Bariatric Surgical History:  Patient is status post open Naheed-en-Y gastric bypass that was performed in 2001 at [facility] patient initially was 598 pounds and her lowest was 235 lbs  She experienced weight regain due to large non-excluded fundus and a gastro-gastric fistula that was operatively revised in April, 2016 by Dr Robbie Vaughn  She developed a post-operative leak that required drainage, antibiotics, and TPN  The drain and TPN were removed/stopped in June, 2016  She subsequently experienced good weight loss but also developed persistent reflux symptoms requiring twice daily PPI that was eventually advanced to Dexilant  ED Vital Signs:   ED Triage Vitals   Temperature Pulse Respirations Blood Pressure SpO2   10/27/17 1656 10/27/17 1656 10/27/17 1656 10/27/17 1656 10/27/17 1656   97 8 °F (36 6 °C) 78 18 113/73 96 %      Temp Source Heart Rate Source Patient Position - Orthostatic VS BP Location FiO2 (%)   10/27/17 1656 10/27/17 2253 10/27/17 1656 10/27/17 1656 --   Tympanic Monitor Sitting Left arm       Pain Score       10/27/17 1703       8        Wt Readings from Last 1 Encounters:   10/27/17 85 8 kg (189 lb 2 5 oz)       Vital Signs (abnormal): Abnormal Labs/Diagnostic Test Results:     ED Treatment:   Medication Administration - No Administrations Displayed (No Start Event Found)     None          Past Medical/Surgical History:    Active Ambulatory Problems     Diagnosis Date Noted    Morbid obesity due to excess calories [address] [address] ) 04/21/2016    Hypercholesterolemia 04/21/2016    Hypothyroidism 04/21/2016    Acquired hypothyroidism 04/21/2016    Hypercholesterolemia 04/21/2016    Lumbar disc disease 04/21/2016    Paroxysmal atrial fibrillation ([facility] [address] ) 04/22/2016    Acquired hypothyroidism 05/02/2016    Hypercholesterolemia 05/02/2016    Gastric leak 05/02/2016     Resolved Ambulatory Problems     Diagnosis Date Noted    No Resolved Ambulatory Problems     Past Medical History:   Diagnosis Date    Anxiety     Back pain     DDD (degenerative disc disease), lumbar  Depression     Edema extremities     Elevated cholesterol     Full dentures     GERD (gastroesophageal reflux disease)     History of gastric ulcer     History of heartburn     Hypothyroid     Morbid obesity (HCC)     Postgastrectomy malabsorption     Skin abnormality     Stress incontinence     Stricture esophagus     Wears glasses        Admitting Diagnosis: [facility] ) [E46]    Age/Sex: 64 y o  female    Assessment/Plan:   Assessment  59-year-old woman with severe protein calorie malnutrition with systemic manifestations secondary to low intake due to her anastomotic stricture     Plan  - appreciate IR placement of PICC line  - replete potassium this morning  - resume thiamine infusion regimen  - TPN - standard order today, appreciate Nutrition recommendations   Once reviewed, will arrange outpatient TPN  - prn Reglan, morphine, valium  - recheck labs in AM  Admission Orders:  [email or web address]  BARIATRIC DIET  PICC LINE    Scheduled Meds:   pantoprazole 40 mg Intravenous Q24H [address]   potassium chloride 40 mEq Intravenous Q4H   [START ON 10/29/2017] thiamine 250 mg Intravenous Daily     Continuous Infusions:   Adult TPN (STANDARD BASE/STANDARD ELECTROLYTE)     sodium chloride 100 mL/hr Last Rate: 100 mL/hr (10/28/17 1336)     PRN Meds: diazepam    diphenhydrAMINE    influenza vaccine    metoclopromide    morphine injection X1

## 2017-10-29 LAB
ANION GAP SERPL CALCULATED.3IONS-SCNC: 1 MMOL/L (ref 4–13)
ANION GAP SERPL CALCULATED.3IONS-SCNC: 4 MMOL/L (ref 4–13)
BUN SERPL-MCNC: 4 MG/DL (ref 5–25)
BUN SERPL-MCNC: 5 MG/DL (ref 5–25)
CALCIUM SERPL-MCNC: 6.5 MG/DL (ref 8.3–10.1)
CALCIUM SERPL-MCNC: 6.8 MG/DL (ref 8.3–10.1)
CHLORIDE SERPL-SCNC: 103 MMOL/L (ref 100–108)
CHLORIDE SERPL-SCNC: 107 MMOL/L (ref 100–108)
CO2 SERPL-SCNC: 32 MMOL/L (ref 21–32)
CO2 SERPL-SCNC: 33 MMOL/L (ref 21–32)
CREAT SERPL-MCNC: 0.65 MG/DL (ref 0.6–1.3)
CREAT SERPL-MCNC: 0.85 MG/DL (ref 0.6–1.3)
GFR SERPL CREATININE-BSD FRML MDRD: 100 ML/MIN/1.73SQ M
GFR SERPL CREATININE-BSD FRML MDRD: 77 ML/MIN/1.73SQ M
GLUCOSE SERPL-MCNC: 104 MG/DL (ref 65–140)
GLUCOSE SERPL-MCNC: 119 MG/DL (ref 65–140)
GLUCOSE SERPL-MCNC: 716 MG/DL (ref 65–140)
MAGNESIUM SERPL-MCNC: 1.6 MG/DL (ref 1.6–2.6)
MAGNESIUM SERPL-MCNC: 2.3 MG/DL (ref 1.6–2.6)
PHOSPHATE SERPL-MCNC: 3 MG/DL (ref 2.7–4.5)
PHOSPHATE SERPL-MCNC: 3.9 MG/DL (ref 2.7–4.5)
POTASSIUM SERPL-SCNC: 2.1 MMOL/L (ref 3.5–5.3)
POTASSIUM SERPL-SCNC: 3.2 MMOL/L (ref 3.5–5.3)
SODIUM SERPL-SCNC: 137 MMOL/L (ref 136–145)
SODIUM SERPL-SCNC: 143 MMOL/L (ref 136–145)

## 2017-10-29 PROCEDURE — 83735 ASSAY OF MAGNESIUM: CPT | Performed by: SURGERY

## 2017-10-29 PROCEDURE — 80048 BASIC METABOLIC PNL TOTAL CA: CPT | Performed by: SURGERY

## 2017-10-29 PROCEDURE — C9113 INJ PANTOPRAZOLE SODIUM, VIA: HCPCS | Performed by: SURGERY

## 2017-10-29 PROCEDURE — 82948 REAGENT STRIP/BLOOD GLUCOSE: CPT

## 2017-10-29 PROCEDURE — 84100 ASSAY OF PHOSPHORUS: CPT | Performed by: SURGERY

## 2017-10-29 RX ORDER — SODIUM CHLORIDE, SODIUM LACTATE, POTASSIUM CHLORIDE, CALCIUM CHLORIDE 600; 310; 30; 20 MG/100ML; MG/100ML; MG/100ML; MG/100ML
100 INJECTION, SOLUTION INTRAVENOUS CONTINUOUS
Status: DISCONTINUED | OUTPATIENT
Start: 2017-10-29 | End: 2017-10-31

## 2017-10-29 RX ORDER — POTASSIUM CHLORIDE 29.8 MG/ML
40 INJECTION INTRAVENOUS EVERY 4 HOURS
Status: COMPLETED | OUTPATIENT
Start: 2017-10-29 | End: 2017-10-29

## 2017-10-29 RX ADMIN — ASCORBIC ACID: 500 INJECTION, SOLUTION INTRAMUSCULAR; INTRAVENOUS; SUBCUTANEOUS at 20:53

## 2017-10-29 RX ADMIN — POTASSIUM CHLORIDE 40 MEQ: 400 INJECTION, SOLUTION INTRAVENOUS at 10:28

## 2017-10-29 RX ADMIN — MORPHINE SULFATE 1 MG: 2 INJECTION, SOLUTION INTRAMUSCULAR; INTRAVENOUS at 18:10

## 2017-10-29 RX ADMIN — SODIUM CHLORIDE, SODIUM LACTATE, POTASSIUM CHLORIDE, AND CALCIUM CHLORIDE 100 ML/HR: .6; .31; .03; .02 INJECTION, SOLUTION INTRAVENOUS at 10:21

## 2017-10-29 RX ADMIN — THIAMINE HYDROCHLORIDE 250 MG: 100 INJECTION, SOLUTION INTRAMUSCULAR; INTRAVENOUS at 08:19

## 2017-10-29 RX ADMIN — SODIUM CHLORIDE 100 ML/HR: 0.9 INJECTION, SOLUTION INTRAVENOUS at 03:04

## 2017-10-29 RX ADMIN — MORPHINE SULFATE 1 MG: 2 INJECTION, SOLUTION INTRAMUSCULAR; INTRAVENOUS at 13:23

## 2017-10-29 RX ADMIN — MORPHINE SULFATE 1 MG: 2 INJECTION, SOLUTION INTRAMUSCULAR; INTRAVENOUS at 02:59

## 2017-10-29 RX ADMIN — PANTOPRAZOLE SODIUM 40 MG: 40 INJECTION, POWDER, FOR SOLUTION INTRAVENOUS at 08:19

## 2017-10-29 RX ADMIN — POTASSIUM CHLORIDE 40 MEQ: 400 INJECTION, SOLUTION INTRAVENOUS at 14:17

## 2017-10-29 RX ADMIN — MORPHINE SULFATE 1 MG: 2 INJECTION, SOLUTION INTRAMUSCULAR; INTRAVENOUS at 08:19

## 2017-10-29 RX ADMIN — MORPHINE SULFATE 1 MG: 2 INJECTION, SOLUTION INTRAMUSCULAR; INTRAVENOUS at 23:43

## 2017-10-29 NOTE — PROGRESS NOTES
Bariatric Progress Note     Subjective  Pain/sleep much improved  Able to tolerate small volumes of thin liquids  + ambulation      Objective  Vitals:    10/29/17 0700   BP: 114/58   Pulse: 62   Resp: 20   Temp: 97 9 °F (36 6 °C)   SpO2: 96%     NAD, alert  Normal inspiratory effort  Abdomen soft, non-distended, non-tender     Labs  K 2 1 (from 2 1 yesterday, received 80 meq IV KCl)  Corrected calcium 7 8     Assessment  64year-old woman with severe protein calorie malnutrition with systemic manifestations secondary to low intake due to her anastomotic stricture     Plan  - continue IVF  - appreciate dietician recommendations, will custom-order TPN today  - case management consult for home TPN and to relocate outpatient thiamine infusions to [address] (closer to her home)  - additional potassium chloride supplementation in addition to TPN   Will replete calcium through TPN  - continue liquid diet, Carafate, and PPI

## 2017-10-29 NOTE — PLAN OF CARE
DISCHARGE PLANNING     Discharge to home or other facility with appropriate resources Progressing        GASTROINTESTINAL - ADULT     Maintains adequate nutritional intake Progressing        INFECTION - ADULT     Absence or prevention of progression during hospitalization Progressing        Knowledge Deficit     Patient/family/caregiver demonstrates understanding of disease process, treatment plan, medications, and discharge instructions Progressing        METABOLIC, FLUID AND ELECTROLYTES - ADULT     Electrolytes maintained within normal limits Progressing     Fluid balance maintained Progressing        MUSCULOSKELETAL - ADULT     Maintain or return mobility to safest level of function Progressing        Nutrition/Hydration-ADULT     Nutrient/Hydration intake appropriate for improving, restoring or maintaining nutritional needs Progressing        PAIN - ADULT     Verbalizes/displays adequate comfort level or baseline comfort level Progressing        Potential for Falls     Patient will remain free of falls Progressing        Prexisting or High Potential for Compromised Skin Integrity     Skin integrity is maintained or improved Progressing        SAFETY ADULT     Maintain or return to baseline ADL function Progressing     Maintain or return mobility status to optimal level Progressing

## 2017-10-30 ENCOUNTER — ANESTHESIA EVENT (OUTPATIENT)
Dept: GASTROENTEROLOGY | Facility: HOSPITAL | Age: 57
End: 2017-10-30

## 2017-10-30 ENCOUNTER — HOSPITAL ENCOUNTER (OUTPATIENT)
Dept: INFUSION CENTER | Facility: CLINIC | Age: 57
Discharge: HOME | End: 2017-10-30
Payer: MEDICARE

## 2017-10-30 ENCOUNTER — ANESTHESIA (OUTPATIENT)
Dept: GASTROENTEROLOGY | Facility: HOSPITAL | Age: 57
End: 2017-10-30

## 2017-10-30 LAB
ANION GAP SERPL CALCULATED.3IONS-SCNC: 2 MMOL/L (ref 4–13)
BUN SERPL-MCNC: 4 MG/DL (ref 5–25)
CALCIUM SERPL-MCNC: 7.3 MG/DL (ref 8.3–10.1)
CHLORIDE SERPL-SCNC: 107 MMOL/L (ref 100–108)
CO2 SERPL-SCNC: 33 MMOL/L (ref 21–32)
CREAT SERPL-MCNC: 0.64 MG/DL (ref 0.6–1.3)
GFR SERPL CREATININE-BSD FRML MDRD: 100 ML/MIN/1.73SQ M
GLUCOSE SERPL-MCNC: 106 MG/DL (ref 65–140)
MAGNESIUM SERPL-MCNC: 1.9 MG/DL (ref 1.6–2.6)
PHOSPHATE SERPL-MCNC: 3 MG/DL (ref 2.7–4.5)
POTASSIUM SERPL-SCNC: 2.9 MMOL/L (ref 3.5–5.3)
SODIUM SERPL-SCNC: 142 MMOL/L (ref 136–145)

## 2017-10-30 PROCEDURE — 84100 ASSAY OF PHOSPHORUS: CPT | Performed by: SURGERY

## 2017-10-30 PROCEDURE — 80048 BASIC METABOLIC PNL TOTAL CA: CPT | Performed by: SURGERY

## 2017-10-30 PROCEDURE — 83735 ASSAY OF MAGNESIUM: CPT | Performed by: SURGERY

## 2017-10-30 PROCEDURE — C9113 INJ PANTOPRAZOLE SODIUM, VIA: HCPCS | Performed by: SURGERY

## 2017-10-30 RX ORDER — POTASSIUM CHLORIDE 29.8 MG/ML
40 INJECTION INTRAVENOUS EVERY 4 HOURS
Status: COMPLETED | OUTPATIENT
Start: 2017-10-30 | End: 2017-10-30

## 2017-10-30 RX ORDER — SODIUM CHLORIDE 9 MG/ML
125 INJECTION, SOLUTION INTRAVENOUS CONTINUOUS
Status: CANCELLED | OUTPATIENT
Start: 2017-10-30

## 2017-10-30 RX ORDER — OXYCODONE HCL 5 MG/5 ML
5 SOLUTION, ORAL ORAL EVERY 4 HOURS PRN
Qty: 100 ML | Refills: 0 | Status: CANCELLED | OUTPATIENT
Start: 2017-10-30 | End: 2017-11-09

## 2017-10-30 RX ORDER — SCOLOPAMINE TRANSDERMAL SYSTEM 1 MG/1
1 PATCH, EXTENDED RELEASE TRANSDERMAL ONCE AS NEEDED
Status: CANCELLED | OUTPATIENT
Start: 2017-10-30

## 2017-10-30 RX ADMIN — THIAMINE HYDROCHLORIDE 250 MG: 100 INJECTION, SOLUTION INTRAMUSCULAR; INTRAVENOUS at 09:08

## 2017-10-30 RX ADMIN — MORPHINE SULFATE 1 MG: 2 INJECTION, SOLUTION INTRAMUSCULAR; INTRAVENOUS at 04:18

## 2017-10-30 RX ADMIN — SODIUM CHLORIDE, SODIUM LACTATE, POTASSIUM CHLORIDE, AND CALCIUM CHLORIDE 100 ML/HR: .6; .31; .03; .02 INJECTION, SOLUTION INTRAVENOUS at 13:00

## 2017-10-30 RX ADMIN — MORPHINE SULFATE 1 MG: 2 INJECTION, SOLUTION INTRAMUSCULAR; INTRAVENOUS at 20:44

## 2017-10-30 RX ADMIN — MORPHINE SULFATE 1 MG: 2 INJECTION, SOLUTION INTRAMUSCULAR; INTRAVENOUS at 14:26

## 2017-10-30 RX ADMIN — MORPHINE SULFATE 1 MG: 2 INJECTION, SOLUTION INTRAMUSCULAR; INTRAVENOUS at 09:07

## 2017-10-30 RX ADMIN — PANTOPRAZOLE SODIUM 40 MG: 40 INJECTION, POWDER, FOR SOLUTION INTRAVENOUS at 09:07

## 2017-10-30 RX ADMIN — POTASSIUM CHLORIDE 40 MEQ: 400 INJECTION, SOLUTION INTRAVENOUS at 09:08

## 2017-10-30 RX ADMIN — ASCORBIC ACID: 500 INJECTION, SOLUTION INTRAMUSCULAR; INTRAVENOUS; SUBCUTANEOUS at 21:11

## 2017-10-30 RX ADMIN — POTASSIUM CHLORIDE 40 MEQ: 400 INJECTION, SOLUTION INTRAVENOUS at 12:58

## 2017-10-30 NOTE — PROGRESS NOTES
TPN Goal Rate:   D40 750 ml, AA10 850 ml, 20% Lipids 250 ml  Provides 22 kcal/kg,1860 kcal, 85 gm protein, 1850 ml total fluid  When pt d/c'd can run TPN cyclic overnight  Suggest 14 hrs @ 132 ml/hr

## 2017-10-30 NOTE — MALNUTRITION/BMI
This medical record reflects one or more clinical indicators suggestive of malnutrition and/or morbid obesity  Please indicate the one diagnosis below which you feel best reflects the clinical picture  Malnutrition Findings:   acute illness  other severe protein calorie malnutrition    Acute Severe Protein Calorie Malnutrition as evidenced by 4% involuntary wt loss in 2weeks, <50% po intake compared to estimated needs for greater than or equal to 2 weeks, and orbitals slightly hollow with dark circles  See Nutrition note dated 10/30/2017 for additional details  Completed nutrition assessment is viewable in the nutrition documentation

## 2017-10-30 NOTE — PROGRESS NOTES
picc assessment  Arm circumference measured 37cm  Initial arm circumference 29 5  No redness no tenderness insertion site and R picc wnl  Will continue to monitor   Rn Elvia notified to continue to monitor

## 2017-10-30 NOTE — PLAN OF CARE
Problem: Potential for Falls  Goal: Patient will remain free of falls  INTERVENTIONS:  - Assess patient frequently for physical needs  -  Identify cognitive and physical deficits and behaviors that affect risk of falls  -  [place] fall precautions as indicated by assessment   - Educate patient/family on patient safety including physical limitations  - Instruct patient to call for assistance with activity based on assessment  - Modify environment to reduce risk of injury  - Consider OT/PT consult to assist with strengthening/mobility   Outcome: Progressing      Problem: Prexisting or High Potential for Compromised Skin Integrity  Goal: Skin integrity is maintained or improved  INTERVENTIONS:  - Identify patients at risk for skin breakdown  - Assess and monitor skin integrity  - Assess and monitor nutrition and hydration status  - Monitor labs (i e  albumin)  - Assess for incontinence   - Turn and reposition patient  - Assist with mobility/ambulation  - Relieve pressure over bony prominences  - Avoid friction and shearing  - Provide appropriate hygiene as needed including keeping skin clean and dry  - Evaluate need for skin moisturizer/barrier cream  - Collaborate with interdisciplinary team (i e  Nutrition, Rehabilitation, etc )   - Patient/family teaching   Outcome: Progressing      Problem: Nutrition/Hydration-ADULT  Goal: Nutrient/Hydration intake appropriate for improving, restoring or maintaining nutritional needs  Monitor and assess patient's nutrition/hydration status for malnutrition (ex- brittle hair, bruises, dry skin, pale skin and conjunctiva, muscle wasting, smooth red tongue, and disorientation)  Collaborate with interdisciplinary team and initiate plan and interventions as ordered  Monitor patient's weight and dietary intake as ordered or per policy  Utilize nutrition screening tool and intervene per policy   Determine patient's food preferences and provide high-protein, high-caloric foods as appropriate       INTERVENTIONS:  - Monitor oral intake, urinary output, labs, and treatment plans  - Assess nutrition and hydration status and recommend course of action  - Evaluate amount of meals eaten  - Assist patient with eating if necessary   - Allow adequate time for meals  - Recommend/ encourage appropriate diets, oral nutritional supplements, and vitamin/mineral supplements  - Order, calculate, and assess calorie counts as needed  - Recommend, monitor, and adjust tube feedings and TPN/PPN based on assessed needs  - Assess need for intravenous fluids  - Provide specific nutrition/hydration education as appropriate  - Include patient/family/caregiver in decisions related to nutrition   Outcome: Progressing      Problem: PAIN - ADULT  Goal: Verbalizes/displays adequate comfort level or baseline comfort level  Interventions:  - Encourage patient to monitor pain and request assistance  - Assess pain using appropriate pain scale  - Administer analgesics based on type and severity of pain and evaluate response  - Implement non-pharmacological measures as appropriate and evaluate response  - Consider cultural and social influences on pain and pain management  - Notify physician/advanced practitioner if interventions unsuccessful or patient reports new pain   Outcome: Progressing      Problem: INFECTION - ADULT  Goal: Absence or prevention of progression during hospitalization  INTERVENTIONS:  - Assess and monitor for signs and symptoms of infection  - Monitor lab/diagnostic results  - Monitor all insertion sites, i e  indwelling lines, tubes, and drains  - Monitor endotracheal (as able) and nasal secretions for changes in amount and color  - Pierce appropriate cooling/warming therapies per order  - Administer medications as ordered  - Instruct and encourage patient and family to use good hand hygiene technique  - Identify and instruct in appropriate isolation precautions for identified infection/condition Outcome: Progressing      Problem: SAFETY ADULT  Goal: Maintain or return to baseline ADL function  INTERVENTIONS:  -  Assess patient's ability to carry out ADLs; assess patient's baseline for ADL function and identify physical deficits which impact ability to perform ADLs (bathing, care of mouth/teeth, toileting, grooming, dressing, etc )  - Assess/evaluate cause of self-care deficits   - Assess range of motion  - Assess patient's mobility; develop plan if impaired  - Assess patient's need for assistive devices and provide as appropriate  - Encourage maximum independence but intervene and supervise when necessary  ¯ Involve family in performance of ADLs  ¯ Assess for home care needs following discharge   ¯ Request OT consult to assist with ADL evaluation and planning for discharge  ¯ Provide patient education as appropriate   Outcome: Progressing    Goal: Maintain or return mobility status to optimal level  INTERVENTIONS:  - Assess patient's baseline mobility status (ambulation, transfers, stairs, etc )    - Identify cognitive and physical deficits and behaviors that affect mobility  - Identify mobility aids required to assist with transfers and/or ambulation (gait belt, sit-to-stand, lift, walker, cane, etc )  - [place] fall precautions as indicated by assessment  - Record patient progress and toleration of activity level on Mobility SBAR; progress patient to next Phase/Stage  - Instruct patient to call for assistance with activity based on assessment  - Request Rehabilitation consult to assist with strengthening/weightbearing, etc    Outcome: Progressing      Problem: DISCHARGE PLANNING  Goal: Discharge to home or other facility with appropriate resources  INTERVENTIONS:  - Identify barriers to discharge w/patient and caregiver  - Arrange for needed discharge resources and transportation as appropriate  - Identify discharge learning needs (meds, wound care, etc )  - Arrange for interpretive services to assist at discharge as needed  - Refer to Case Management Department for coordinating discharge planning if the patient needs post-hospital services based on physician/advanced practitioner order or complex needs related to functional status, cognitive ability, or social support system   Outcome: Progressing      Problem: Knowledge Deficit  Goal: Patient/family/caregiver demonstrates understanding of disease process, treatment plan, medications, and discharge instructions  Complete learning assessment and assess knowledge base    Interventions:  - Provide teaching at level of understanding  - Provide teaching via preferred learning methods   Outcome: Progressing      Problem: GASTROINTESTINAL - ADULT  Goal: Maintains adequate nutritional intake  INTERVENTIONS:  - Monitor percentage of each meal consumed  - Identify factors contributing to decreased intake, treat as appropriate  - Assist with meals as needed  - Monitor I&O, WT and lab values  - Obtain nutrition services referral as needed   Outcome: Progressing      Problem: METABOLIC, FLUID AND ELECTROLYTES - ADULT  Goal: Electrolytes maintained within normal limits  INTERVENTIONS:  - Monitor labs and assess patient for signs and symptoms of electrolyte imbalances  - Administer electrolyte replacement as ordered  - Monitor response to electrolyte replacements, including repeat lab results as appropriate  - Instruct patient on fluid and nutrition as appropriate   Outcome: Progressing    Goal: Fluid balance maintained  INTERVENTIONS:  - Monitor labs and assess for signs and symptoms of volume excess or deficit  - Monitor I/O and WT  - Instruct patient on fluid and nutrition as appropriate   Outcome: Progressing      Problem: MUSCULOSKELETAL - ADULT  Goal: Maintain or return mobility to safest level of function  INTERVENTIONS:  - Assess patient's ability to carry out ADLs; assess patient's baseline for ADL function and identify physical deficits which impact ability to perform ADLs (bathing, care of mouth/teeth, toileting, grooming, dressing, etc )  - Assess/evaluate cause of self-care deficits   - Assess range of motion  - Assess patient's mobility; develop plan if impaired  - Assess patient's need for assistive devices and provide as appropriate  - Encourage maximum independence but intervene and supervise when necessary  - Involve family in performance of ADLs  - Assess for home care needs following discharge   - Request OT consult to assist with ADL evaluation and planning for discharge  - Provide patient education as appropriate   Outcome: Progressing

## 2017-10-30 NOTE — ANESTHESIA PREPROCEDURE EVALUATION
Review of Systems/Medical History  Patient summary reviewed  Chart reviewed  History of anesthetic complications PONV    Cardiovascular  Hyperlipidemia,    Pulmonary       GI/Hepatic    GERD poorly controlled,             Endo/Other  History of thyroid disease , hypothyroidism, Arthritis     GYN       Hematology  Negative hematology ROS      Musculoskeletal  Negative musculoskeletal ROS        Neurology  Negative neurology ROS      Psychology   Anxiety, Depression , being treated for depression,            Physical Exam    Airway    Mallampati score: II  TM Distance: >3 FB  Neck ROM: full     Dental   lower dentures and upper dentures,     Cardiovascular  Rhythm: regular, Rate: normal, Cardiovascular exam normal    Pulmonary  Pulmonary exam normal Breath sounds clear to auscultation,     Other Findings        Anesthesia Plan  ASA Score- 2       Anesthesia Type- IV sedation with anesthesia with ASA Monitors  Additional Monitors:   Airway Plan:     Comment: History of severe PONV after EGD's  Will order scopolamine patch          Induction-       Informed Consent- Anesthetic plan and risks discussed with patient

## 2017-10-30 NOTE — SOCIAL WORK
CM met with patient at bedside to address d/c needs  Patient lives in a home with her daughters and granddaughter  She is independent with ADLs, uses a cane and has a hx of VNA through [employer]  Patient drives and has transportation available at time of d/c  Has support in the home  Has rx coverage and uses [facility]  PCP is Dr Bañuelos December  Consult for home TPN and Picc maintenance- NAYELI Clemente to take over case

## 2017-10-30 NOTE — PROGRESS NOTES
Bariatric Progress Note     Subjective  No acute events overnight  PO tolerance stable  Symptoms controlled      Objective  Vitals:    10/30/17 0657   BP: 111/73   Pulse: 66   Resp: 19   Temp: (!) 96 8 °F (36 °C)   SpO2: 98%     NAD, alert  Normal inspiratory effort  Abdomen soft, non-distended, non-tender     Labs  K 2 9 (from 2 1)  Calcium 7 3 (8 6 correct for low serum albumin)     Assessment  64year-old woman with severe protein calorie malnutrition with systemic manifestations secondary to low intake due to her anastomotic stricture  Hypokalemia also due to above, improving with hydration and electrolyte repletion     Plan  - continue IVF/TPN as ordered  - will defer repeat endoscopy until next week as elective outpatient procedure  - case management consult for home TPN and to relocate outpatient thiamine infusions to [address] [facility] (closer to her home)  Patient appropriate for discharge once these are arranged  - continue liquid diet, Carafate, and PPI  - continue to monitor potassium  No cardiac sequelae from lower levels or from repletion   Expect to normalize as nutrition improves

## 2017-10-30 NOTE — PROGRESS NOTES
Tube feeds are contraindicated due to extremely complex post gastric bypass anatomy, revision anatomy and subtotal gastrectomy and current gastrojejunal anastomotic stricture which would make passage of nasal feeding tube difficult and risk compromising stricture  Patient has tolerated TPN feeds last year to successfully heal gastrogastric fistula and post op anastomotic leak, and this is preferred method of providing supportive nutrition per her bariatric surgeon

## 2017-10-31 ENCOUNTER — HOSPITAL ENCOUNTER (OUTPATIENT)
Dept: INFUSION CENTER | Facility: CLINIC | Age: 57
Discharge: HOME | End: 2017-10-31
Payer: MEDICARE

## 2017-10-31 LAB
ANION GAP SERPL CALCULATED.3IONS-SCNC: 4 MMOL/L (ref 4–13)
BUN SERPL-MCNC: 9 MG/DL (ref 5–25)
CALCIUM SERPL-MCNC: 7.7 MG/DL (ref 8.3–10.1)
CHLORIDE SERPL-SCNC: 108 MMOL/L (ref 100–108)
CO2 SERPL-SCNC: 28 MMOL/L (ref 21–32)
CREAT SERPL-MCNC: 0.63 MG/DL (ref 0.6–1.3)
GFR SERPL CREATININE-BSD FRML MDRD: 101 ML/MIN/1.73SQ M
GLUCOSE SERPL-MCNC: 88 MG/DL (ref 65–140)
POTASSIUM SERPL-SCNC: 5.6 MMOL/L (ref 3.5–5.3)
SODIUM SERPL-SCNC: 140 MMOL/L (ref 136–145)

## 2017-10-31 PROCEDURE — C9113 INJ PANTOPRAZOLE SODIUM, VIA: HCPCS | Performed by: SURGERY

## 2017-10-31 PROCEDURE — 80048 BASIC METABOLIC PNL TOTAL CA: CPT | Performed by: PHYSICIAN ASSISTANT

## 2017-10-31 RX ADMIN — THIAMINE HYDROCHLORIDE 250 MG: 100 INJECTION, SOLUTION INTRAMUSCULAR; INTRAVENOUS at 09:42

## 2017-10-31 RX ADMIN — MORPHINE SULFATE 1 MG: 2 INJECTION, SOLUTION INTRAMUSCULAR; INTRAVENOUS at 06:31

## 2017-10-31 RX ADMIN — MORPHINE SULFATE 1 MG: 2 INJECTION, SOLUTION INTRAMUSCULAR; INTRAVENOUS at 10:58

## 2017-10-31 RX ADMIN — ASCORBIC ACID: 500 INJECTION, SOLUTION INTRAMUSCULAR; INTRAVENOUS; SUBCUTANEOUS at 21:49

## 2017-10-31 RX ADMIN — MORPHINE SULFATE 1 MG: 2 INJECTION, SOLUTION INTRAMUSCULAR; INTRAVENOUS at 01:21

## 2017-10-31 RX ADMIN — MORPHINE SULFATE 1 MG: 2 INJECTION, SOLUTION INTRAMUSCULAR; INTRAVENOUS at 15:14

## 2017-10-31 RX ADMIN — MORPHINE SULFATE 1 MG: 2 INJECTION, SOLUTION INTRAMUSCULAR; INTRAVENOUS at 21:57

## 2017-10-31 RX ADMIN — PANTOPRAZOLE SODIUM 40 MG: 40 INJECTION, POWDER, FOR SOLUTION INTRAVENOUS at 08:06

## 2017-10-31 NOTE — PROGRESS NOTES
Spoke with Dr Miranda Banegas about double order TPN -- to continue to run TPN at 70 1 and LR at 29 9

## 2017-10-31 NOTE — PROGRESS NOTES
Progress Note - General Surgery   Adeline Mao 64 y o  female MRN: [id number]  Unit/Bed#: [place] [id number] Encounter: [id number]      Subjective/Objective     Subjective: Pt is HD #4 for thiamine deficiency, severe protein calorie malnutrition and anastomotic stricture  Today she feels well  She had some diarrhea overnight likely related to TPN initiation  It is not prohibitive of her ambulation or ADLs  She denies abdominal pain  She notes decreased peripheral numbness or tingling  No new issues  Objective:     BP 95/65   Pulse 71   Temp (!) 96 3 °F (35 7 °C) (Tympanic)   Resp 18   Ht 5' 2" (1 575 m)   Wt 85 8 kg (189 lb 2 5 oz)   SpO2 98%   BMI 34 60 kg/m²     No intake or output data in the 24 hours ending 10/31/17 1013    Invasive Devices     Peripherally Inserted Central Catheter Line            PICC Line 10/27/17 Right Brachial 3 days                Physical Exam:       General Appearance:    Alert, cooperative, no distress, appears stated age   Head:    Normocephalic, without obvious abnormality, atraumatic   Nose:   Nares normal   Throat:   Lips, mucosa normal, pharynx clear   Neck:   Supple, symmetrical   Chest/Breast:   Def   Lungs:     Clear to auscultation bilaterally, respirations unlabored   Heart:    Regular rate and rhythm, S1 and S2 normal, no murmur, rub    or gallop   Abdomen:     Soft, non-tender, non distended, bowel sounds active all four quadrants   Back:  Full range of motion, no flank or CVA tenderness B/L   Genitalia:    Def   Rectal:    Def   Extremities:   UE: RUE PICC with some clot at entrance site, no erythema or induration  Extremities normal, atraumatic, no cyanosis or edema   Skin:   Skin color, texture, turgor normal, no rashes or lesions   Neurologic:   CNII-XII grossly intact  Normal strength, sensation                 Lab, Imaging and other studies:I have personally reviewed pertinent lab results   Labs pending this am    VTE Mechanical Prophylaxis:  Sequential compression device (Venodyne)           Assessment/Plan:  #1  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  Symptomatic, causing decreased oral intake  # 2  Severe protein calorie malnutrition:  Continue TPN  #3   Electrolyte deficiency:  Completion of 5 days high dose thiamine replacement protocol (2 outpt, 3 inpt doses)  Will transition to standard bariatric TPN dose of thiamine in anticipation of discharge tomorrow  #4   Bariatric surgery status:  Status post gastric bypass, gastrogastric fistula takedown and revision of GJ anastomosis, history of leak, now healed with postoperative GJ stricture  Plan for optimization of nutrition an outpatient stricture dilation in future  Plan of care was discussed with patient's nurse    This text is generated with voice recognition software  There may be translation, syntax,  or grammatical errors  If you have any questions, please contact the dictating provider

## 2017-10-31 NOTE — SOCIAL WORK
Spoke with Jennifer Marquez about TPN orders; will be updated later today; plan for patient to start TPN this evening and d/c home tomorrow  Will send updated TPN orders to [facility] and [facility] when available

## 2017-11-01 VITALS
WEIGHT: 189.15 LBS | RESPIRATION RATE: 18 BRPM | BODY MASS INDEX: 34.81 KG/M2 | HEART RATE: 82 BPM | HEIGHT: 62 IN | DIASTOLIC BLOOD PRESSURE: 58 MMHG | TEMPERATURE: 97.7 F | OXYGEN SATURATION: 98 % | SYSTOLIC BLOOD PRESSURE: 94 MMHG

## 2017-11-01 LAB
ALBUMIN SERPL BCP-MCNC: 1.7 G/DL (ref 3.5–5)
ALP SERPL-CCNC: 81 U/L (ref 46–116)
ALT SERPL W P-5'-P-CCNC: 18 U/L (ref 12–78)
ANION GAP SERPL CALCULATED.3IONS-SCNC: 4 MMOL/L (ref 4–13)
AST SERPL W P-5'-P-CCNC: 30 U/L (ref 5–45)
BILIRUB SERPL-MCNC: 0.57 MG/DL (ref 0.2–1)
BUN SERPL-MCNC: 11 MG/DL (ref 5–25)
CALCIUM SERPL-MCNC: 7.9 MG/DL (ref 8.3–10.1)
CHLORIDE SERPL-SCNC: 106 MMOL/L (ref 100–108)
CHOLEST SERPL-MCNC: 126 MG/DL (ref 50–200)
CO2 SERPL-SCNC: 27 MMOL/L (ref 21–32)
CREAT SERPL-MCNC: 0.64 MG/DL (ref 0.6–1.3)
GFR SERPL CREATININE-BSD FRML MDRD: 100 ML/MIN/1.73SQ M
GLUCOSE SERPL-MCNC: 95 MG/DL (ref 65–140)
HDLC SERPL-MCNC: 26 MG/DL (ref 40–60)
LDLC SERPL CALC-MCNC: 65 MG/DL (ref 0–100)
MAGNESIUM SERPL-MCNC: 2 MG/DL (ref 1.6–2.6)
PHOSPHATE SERPL-MCNC: 4.3 MG/DL (ref 2.7–4.5)
POTASSIUM SERPL-SCNC: 5.3 MMOL/L (ref 3.5–5.3)
PROT SERPL-MCNC: 5.2 G/DL (ref 6.4–8.2)
SODIUM SERPL-SCNC: 137 MMOL/L (ref 136–145)
TRIGL SERPL-MCNC: 173 MG/DL
VIT B1 BLD-SCNC: 120.8 NMOL/L (ref 66.5–200)

## 2017-11-01 PROCEDURE — 84100 ASSAY OF PHOSPHORUS: CPT | Performed by: PHYSICIAN ASSISTANT

## 2017-11-01 PROCEDURE — C9113 INJ PANTOPRAZOLE SODIUM, VIA: HCPCS | Performed by: SURGERY

## 2017-11-01 PROCEDURE — 80061 LIPID PANEL: CPT | Performed by: PHYSICIAN ASSISTANT

## 2017-11-01 PROCEDURE — 80053 COMPREHEN METABOLIC PANEL: CPT | Performed by: PHYSICIAN ASSISTANT

## 2017-11-01 PROCEDURE — 83735 ASSAY OF MAGNESIUM: CPT | Performed by: PHYSICIAN ASSISTANT

## 2017-11-01 PROCEDURE — G0008 ADMIN INFLUENZA VIRUS VAC: HCPCS | Performed by: SURGERY

## 2017-11-01 PROCEDURE — 90686 IIV4 VACC NO PRSV 0.5 ML IM: CPT | Performed by: SURGERY

## 2017-11-01 RX ADMIN — THIAMINE HYDROCHLORIDE 500 MG: 100 INJECTION, SOLUTION INTRAMUSCULAR; INTRAVENOUS at 09:22

## 2017-11-01 RX ADMIN — INFLUENZA VIRUS VACCINE 0.5 ML: 15; 15; 15; 15 SUSPENSION INTRAMUSCULAR at 14:31

## 2017-11-01 RX ADMIN — PANTOPRAZOLE SODIUM 40 MG: 40 INJECTION, POWDER, FOR SOLUTION INTRAVENOUS at 09:13

## 2017-11-01 RX ADMIN — MORPHINE SULFATE 1 MG: 2 INJECTION, SOLUTION INTRAMUSCULAR; INTRAVENOUS at 04:07

## 2017-11-01 RX ADMIN — MORPHINE SULFATE 1 MG: 2 INJECTION, SOLUTION INTRAMUSCULAR; INTRAVENOUS at 13:58

## 2017-11-01 RX ADMIN — MORPHINE SULFATE 1 MG: 2 INJECTION, SOLUTION INTRAMUSCULAR; INTRAVENOUS at 09:13

## 2017-11-01 NOTE — DISCHARGE INSTRUCTIONS
Check labs as ordered  Parenteral Nutrition   WHAT YOU NEED TO KNOW:   Parenteral nutrition (PN) gives your body nutrients when you are not able to eat or cannot absorb nutrition from the food you eat  PN is given through an IV catheter (thin tube) placed in a vein in your arm, upper chest, or neck  PN provides you with water, protein, sugar, fats, vitamins, minerals, and electrolytes  You may need PN for several days or longer  DISCHARGE INSTRUCTIONS:   Risks:   · You may get an infection at the catheter site, and it can spread throughout your body  A blood clot may form in your catheter, and it can travel through your bloodstream  These problems may become life-threatening  Your catheter may move out of place, or it may break  Your catheter may become blocked if it kinks, or if there is a buildup of PN solution  · You may become dehydrated  Your blood sugar level may rise too high  You may develop problems with your liver, gallbladder, or bones  When you leave the hospital:  If you need PN after you leave the hospital, healthcare providers will teach you or someone close to you how to give PN at home  You will learn how to take care of your catheter, and change the dressing that covers your catheter site  Healthcare providers will make sure the correct supplies are delivered to your home, and remind you to keep your PN solution in the refrigerator  Healthcare providers will visit you regularly to monitor your health while you are getting PN  Self-care while you are using PN:   · Weigh yourself 3 to 4 times a week to monitor any weight changes  · Check your catheter site for signs of infection such as redness, swelling, tenderness, warmth, and discharge  Take your temperature as directed  · Keep track of how much PN and other IV liquids you take in, and how much you urinate  · Test your blood sugar level  · Follow up with your healthcare provider or specialist as directed   You will need to have your blood tested regularly  Blood tests may show how your organs are working, or if you have an infection  Contact your healthcare provider or specialist if:   · You have pain or swelling in your chest     · You have pain or discomfort in your neck or shoulder  · You are urinating less than your healthcare provider says you should  · You weigh more or less than your healthcare provider says you should  · Your skin or the whites of your eyes turn yellow  · Veins appear on your chest that you did not have before  · You have questions or concerns about your condition, care, or PN equipment  Seek immediate care or call 911 if:   · You have a fever, or a fever with chills  · Your catheter site is red, swollen, tender, or warm, or there is a discharge from the catheter site  · Blood or PN solution leaks from your catheter  · You feel lightheaded, short of breath, and you have chest pain  · You cough up blood  · You have pain or swelling in your arm  · Your armpit feels tender  © 2017 [facility] Information is for End User's use only and may not be sold, redistributed or otherwise used for commercial purposes  All illustrations and images included in CareNotes® are the copyrighted property of A D A M , Inc  or Jose Hartman  The above information is an  only  It is not intended as medical advice for individual conditions or treatments  Talk to your doctor, nurse or pharmacist before following any medical regimen to see if it is safe and effective for you

## 2017-11-01 NOTE — SOCIAL WORK
Patient ready to d/c home today; TPN set up with [facility]; [facility] notified of d/c  Daughter will transport home  IMM explained; signed by patient

## 2017-11-01 NOTE — PROGRESS NOTES
Subjective/Objective        Subjective: Pt is HD #5 for thiamine deficiency, severe protein calorie malnutrition and anastomotic stricture  Today she feels well  She still had nocturnal diarrhea likely related to TPN initiation  It is not prohibitive of her ambulation or ADLs  She denies abdominal pain  She notes decreased peripheral numbness or tingling  No new issues      Objective:     BP 95/65   Pulse 71   Temp (!) 96 3 °F (35 7 °C) (Tympanic)   Resp 18   Ht 5' 2" (1 575 m)   Wt 85 8 kg (189 lb 2 5 oz)   SpO2 98%   BMI 34 60 kg/m²      No intake or output data in the 24 hours ending 10/31/17 1013         Invasive Devices            Peripherally Inserted Central Catheter Line                     PICC Line 10/27/17 Right Brachial 3 days                      Physical Exam:         General Appearance:    Alert, cooperative, no distress, appears stated age   Head:    Normocephalic, without obvious abnormality, atraumatic   Nose:   Nares normal   Throat:   Lips, mucosa normal, pharynx clear   Neck:   Supple, symmetrical   Chest/Breast:   Def   Lungs:     Clear to auscultation bilaterally, respirations unlabored   Heart:    Regular rate and rhythm, S1 and S2 normal, no murmur, rub    or gallop   Abdomen:     Soft, non-tender, non distended, bowel sounds active all four quadrants   Back:  Full range of motion, no flank or CVA tenderness B/L   Genitalia:    Def   Rectal:    Def   Extremities:   UE: RUE PICC with some clot at entrance site, no erythema or induration  Extremities normal, atraumatic, no cyanosis or edema   Skin:   Skin color, texture, turgor normal, no rashes or lesions   Neurologic:   CNII-XII grossly intact  Normal strength, sensation                     Lab, Imaging and other studies:I have personally reviewed pertinent lab results  Labs pending this am     VTE Mechanical Prophylaxis:  Sequential compression device (Venodyne)               Assessment/Plan:  #1    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  Symptomatic, causing decreased oral intake  # 2  Severe protein calorie malnutrition:  Continue TPN  #3   Electrolyte deficiency:  Completion of 5 days high dose thiamine replacement protocol (2 outpt, 3 inpt doses)  One more dose 500mg thiamine infused today after discussion with bariatric office PA/dietician Phi Eddy)  #4   Bariatric surgery status:  Status post gastric bypass, gastrogastric fistula takedown and revision of GJ anastomosis, history of leak, now healed with postoperative GJ stricture  Plan for optimization of nutrition an outpatient stricture dilation in future            Plan of care was discussed with patient's nurse     This text is generated with voice recognition software  There may be translation, syntax,  or grammatical errors   If you have any questions, please contact the dictating provider

## 2017-11-01 NOTE — DISCHARGE SUMMARY
Discharge Summary - Karina Hutson 64 y o  female MRN: [id number]    Unit/Bed#: [place] [address] -01 Encounter: [id number]    Admission Date: 10/27/2017     Discharge Date: 11/01/17    Admitting Diagnosis: Malnutrition ([facility] [address] ) [E46]    Secondary Diagnosis:   Past Medical History:   Diagnosis Date    Anxiety     Back pain     DDD (degenerative disc disease), lumbar     Depression     Edema extremities     Elevated cholesterol     Full dentures     GERD (gastroesophageal reflux disease)     History of gastric ulcer     "Years ago    History of heartburn     Hypothyroid     Hypothyroidism    Morbid obesity ([address] )     Postgastrectomy malabsorption     Skin abnormality     Edema BLE- uses boots at home  Has oozing area back of lower right leg  Was instructed to see PCP prior to surgery for assessment    Stress incontinence     Stricture esophagus     Wears glasses        Discharge Diagnosis: Same, and  Hypokalemia- repleted with IV potassium chloride    Procedures Performed: Procedure(s): none    Consults: none    Hospital Course: This is a 64year old female who is well known to the bariatric service for complicated past surgical history including remote RYGB, revision of Bypass who has developed subsequent stricture  This is made it difficult for her to tolerate adequate po nutrients  Secondarily, she developed nutritional deficiencies, especially marked hypokalemia and Vitamin b1 deficiency  She was set up for outpatient thiamine infusion by the bariatric office, and PICC was placed outpt, however her condition worsened and she admitted to the hospital for initiation of TPN to combat her malnutrition and in anticipation of outpt EGD with stricture dilation  Over the next few days she improved in her symptoms and was cleared for discharge on 11/1/17 with home TPN and f/u outpt for EGD/dilation        Disposition: The patient should follow up with Dr Gardenia Valderrama in 2 weeks for a post op check and with Isidro Armenta Jeanette Barrera MD as needed for medical management  The patient should refer to the handout "Discharge Instructions" for further information  Call physician for temperature over 101, wound redness or discharge, vomiting, or intolerance to diet  Discharge Medications:  See after visit summary for reconciled discharge medications provided to patient and family  This text is generated with voice recognition software  There may be translation, syntax,  or grammatical errors  If you have any questions, please contact the dictating provider

## 2017-11-01 NOTE — NURSING NOTE
Patient provided and educated on discharge instructions  All questions answered  Patient verbalized understanding of discharge instructions  Pt brought via wheelchair by nursing staff and exited with daughter in personal car

## 2017-11-01 NOTE — CASE MANAGEMENT
Continued Stay Review    Date: 11/1/2017    Vital Signs: BP 94/58   Pulse 82   Temp 97 7 °F (36 5 °C) (Tympanic)   Resp 18   Ht 5' 2" (1 575 m)   Wt 85 8 kg (189 lb 2 5 oz)   SpO2 98%   BMI 34 60 kg/m²     Medications:   Scheduled Meds:   pantoprazole 40 mg Intravenous Q24H [address]     Thiamine IV x 1    Continuous Infusions:    PRN Meds:   diazepam    diphenhydrAMINE    metoclopramide    morphine injection IV x 3    Abnormal Labs/Diagnostic Results:  Ca 7 9,   t pro 5 2,   Alb 1 7    Age/Sex: 64 y o  female     Assessment/Plan: 1   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  Symptomatic, causing decreased oral intake  # 2Lorraine Covarrubias protein calorie malnutrition:  Continue TPN  #3   Electrolyte deficiency:  Completion of 5 days high dose thiamine replacement protocol (2 outpt, 3 inpt doses)  One more dose 500mg thiamine infused today after discussion with bariatric office PA/dietician Anay Rodriguez)  #4   Bariatric surgery status:  Status post gastric bypass, gastrogastric fistula takedown and revision of GJ anastomosis, history of leak, now healed with postoperative GJ stricture   Plan for optimization of nutrition an outpatient stricture dilation in future        Discharge Plan: per MSW: Patient ready to d/c home today; TPN set up with [facility]; RASHMI GOLD notified of d/c  Daughter will transport home  IMM explained; signed by patient

## 2017-11-02 ENCOUNTER — GENERIC CONVERSION - ENCOUNTER (OUTPATIENT)
Dept: OTHER | Facility: OTHER | Age: 57
End: 2017-11-02

## 2017-11-07 ENCOUNTER — ANESTHESIA EVENT (OUTPATIENT)
Dept: GASTROENTEROLOGY | Facility: HOSPITAL | Age: 57
End: 2017-11-07
Payer: MEDICARE

## 2017-11-07 ENCOUNTER — LAB REQUISITION (OUTPATIENT)
Dept: LAB | Facility: HOSPITAL | Age: 57
End: 2017-11-07
Payer: MEDICARE

## 2017-11-07 DIAGNOSIS — E46 PROTEIN-CALORIE MALNUTRITION (HCC): ICD-10-CM

## 2017-11-07 LAB
ALBUMIN SERPL BCP-MCNC: 1.8 G/DL (ref 3.5–5)
ALBUMIN SERPL BCP-MCNC: 1.8 G/DL (ref 3.5–5)
ALP SERPL-CCNC: 150 U/L (ref 46–116)
ALT SERPL W P-5'-P-CCNC: 73 U/L (ref 12–78)
ANION GAP SERPL CALCULATED.3IONS-SCNC: 10 MMOL/L (ref 4–13)
AST SERPL W P-5'-P-CCNC: 67 U/L (ref 5–45)
BILIRUB SERPL-MCNC: 0.6 MG/DL (ref 0.2–1)
BUN SERPL-MCNC: 12 MG/DL (ref 5–25)
CALCIUM SERPL-MCNC: 8 MG/DL (ref 8.3–10.1)
CHLORIDE SERPL-SCNC: 105 MMOL/L (ref 100–108)
CHOLEST SERPL-MCNC: 149 MG/DL (ref 50–200)
CO2 SERPL-SCNC: 22 MMOL/L (ref 21–32)
CREAT SERPL-MCNC: 0.61 MG/DL (ref 0.6–1.3)
GFR SERPL CREATININE-BSD FRML MDRD: 102 ML/MIN/1.73SQ M
GLUCOSE P FAST SERPL-MCNC: 81 MG/DL (ref 65–99)
HDLC SERPL-MCNC: 49 MG/DL (ref 40–60)
LDLC SERPL CALC-MCNC: 84 MG/DL (ref 0–100)
MAGNESIUM SERPL-MCNC: 1.9 MG/DL (ref 1.6–2.6)
PHOSPHATE SERPL-MCNC: 3.9 MG/DL (ref 2.7–4.5)
POTASSIUM SERPL-SCNC: 4 MMOL/L (ref 3.5–5.3)
PROT SERPL-MCNC: 5.8 G/DL (ref 6.4–8.2)
SODIUM SERPL-SCNC: 137 MMOL/L (ref 136–145)
TRIGL SERPL-MCNC: 79 MG/DL

## 2017-11-07 PROCEDURE — 82040 ASSAY OF SERUM ALBUMIN: CPT | Performed by: SURGERY

## 2017-11-07 PROCEDURE — 84100 ASSAY OF PHOSPHORUS: CPT | Performed by: SURGERY

## 2017-11-07 PROCEDURE — 80061 LIPID PANEL: CPT | Performed by: SURGERY

## 2017-11-07 PROCEDURE — 80053 COMPREHEN METABOLIC PANEL: CPT | Performed by: SURGERY

## 2017-11-07 PROCEDURE — 83735 ASSAY OF MAGNESIUM: CPT | Performed by: SURGERY

## 2017-11-08 ENCOUNTER — HOSPITAL ENCOUNTER (OUTPATIENT)
Facility: HOSPITAL | Age: 57
Setting detail: OUTPATIENT SURGERY
Discharge: HOME | End: 2017-11-08
Attending: SURGERY | Admitting: SURGERY
Payer: MEDICARE

## 2017-11-08 ENCOUNTER — ANESTHESIA (OUTPATIENT)
Dept: GASTROENTEROLOGY | Facility: HOSPITAL | Age: 57
End: 2017-11-08
Payer: MEDICARE

## 2017-11-08 VITALS
HEIGHT: 62 IN | RESPIRATION RATE: 20 BRPM | BODY MASS INDEX: 34.04 KG/M2 | HEART RATE: 80 BPM | TEMPERATURE: 96.6 F | SYSTOLIC BLOOD PRESSURE: 91 MMHG | OXYGEN SATURATION: 99 % | WEIGHT: 185 LBS | DIASTOLIC BLOOD PRESSURE: 60 MMHG

## 2017-11-08 PROCEDURE — C1726 CATH, BAL DIL, NON-VASCULAR: HCPCS | Performed by: SURGERY

## 2017-11-08 RX ORDER — SCOLOPAMINE TRANSDERMAL SYSTEM 1 MG/1
1 PATCH, EXTENDED RELEASE TRANSDERMAL ONCE AS NEEDED
Status: DISCONTINUED | OUTPATIENT
Start: 2017-11-08 | End: 2017-11-08 | Stop reason: HOSPADM

## 2017-11-08 RX ORDER — SCOLOPAMINE TRANSDERMAL SYSTEM 1 MG/1
1 PATCH, EXTENDED RELEASE TRANSDERMAL ONCE AS NEEDED
Status: DISCONTINUED | OUTPATIENT
Start: 2017-11-08 | End: 2017-11-08 | Stop reason: SDUPTHER

## 2017-11-08 RX ORDER — ONDANSETRON 2 MG/ML
INJECTION INTRAMUSCULAR; INTRAVENOUS AS NEEDED
Status: DISCONTINUED | OUTPATIENT
Start: 2017-11-08 | End: 2017-11-08 | Stop reason: SURG

## 2017-11-08 RX ORDER — SODIUM CHLORIDE 9 MG/ML
125 INJECTION, SOLUTION INTRAVENOUS CONTINUOUS
Status: DISCONTINUED | OUTPATIENT
Start: 2017-11-08 | End: 2017-11-08 | Stop reason: SDUPTHER

## 2017-11-08 RX ORDER — LIDOCAINE HYDROCHLORIDE 10 MG/ML
INJECTION, SOLUTION INFILTRATION; PERINEURAL AS NEEDED
Status: DISCONTINUED | OUTPATIENT
Start: 2017-11-08 | End: 2017-11-08 | Stop reason: SURG

## 2017-11-08 RX ORDER — SODIUM CHLORIDE 9 MG/ML
125 INJECTION, SOLUTION INTRAVENOUS CONTINUOUS
Status: DISCONTINUED | OUTPATIENT
Start: 2017-11-08 | End: 2017-11-08 | Stop reason: HOSPADM

## 2017-11-08 RX ORDER — PROPOFOL 10 MG/ML
INJECTION, EMULSION INTRAVENOUS AS NEEDED
Status: DISCONTINUED | OUTPATIENT
Start: 2017-11-08 | End: 2017-11-08 | Stop reason: SURG

## 2017-11-08 RX ORDER — ONDANSETRON 2 MG/ML
4 INJECTION INTRAMUSCULAR; INTRAVENOUS EVERY 6 HOURS PRN
Status: DISCONTINUED | OUTPATIENT
Start: 2017-11-08 | End: 2017-11-08 | Stop reason: HOSPADM

## 2017-11-08 RX ADMIN — SCOPALAMINE 1 PATCH: 1 PATCH, EXTENDED RELEASE TRANSDERMAL at 09:51

## 2017-11-08 RX ADMIN — PROPOFOL 30 MG: 10 INJECTION, EMULSION INTRAVENOUS at 11:33

## 2017-11-08 RX ADMIN — ONDANSETRON HYDROCHLORIDE 4 MG: 2 INJECTION, SOLUTION INTRAVENOUS at 11:53

## 2017-11-08 RX ADMIN — SODIUM CHLORIDE 125 ML/HR: 0.9 INJECTION, SOLUTION INTRAVENOUS at 10:01

## 2017-11-08 RX ADMIN — PROPOFOL 30 MG: 10 INJECTION, EMULSION INTRAVENOUS at 11:46

## 2017-11-08 RX ADMIN — PROPOFOL 100 MG: 10 INJECTION, EMULSION INTRAVENOUS at 11:27

## 2017-11-08 RX ADMIN — PROPOFOL 20 MG: 10 INJECTION, EMULSION INTRAVENOUS at 11:35

## 2017-11-08 RX ADMIN — LIDOCAINE HYDROCHLORIDE 100 MG: 10 INJECTION, SOLUTION INFILTRATION; PERINEURAL at 11:27

## 2017-11-08 RX ADMIN — PROPOFOL 20 MG: 10 INJECTION, EMULSION INTRAVENOUS at 11:31

## 2017-11-08 RX ADMIN — PROPOFOL 10 MG: 10 INJECTION, EMULSION INTRAVENOUS at 11:39

## 2017-11-08 RX ADMIN — PROPOFOL 20 MG: 10 INJECTION, EMULSION INTRAVENOUS at 11:37

## 2017-11-08 NOTE — PRE-PROCEDURE INSTRUCTIONS
Endo process reviewed with pt  And daughter  Call bell in reach  Pt  Comfortable with warm blanket applied

## 2017-11-08 NOTE — ANESTHESIA POSTPROCEDURE EVALUATION
Post-Op Assessment Note      CV Status:  Stable    Mental Status:  Alert and awake    Hydration Status:  Euvolemic    PONV Controlled:  Controlled    Airway Patency:  Patent    Post Op Vitals Reviewed: Yes          Staff: AnesthesiologistVICENTE           BP 94/68 (11/08/17 1204)    Temp     Pulse 80 (11/08/17 1204)   Resp 18 (11/08/17 1204)    SpO2 99 % (11/08/17 1204)

## 2017-11-08 NOTE — ANESTHESIA PREPROCEDURE EVALUATION
Review of Systems/Medical History  Patient summary reviewed  Chart reviewed  History of anesthetic complications PONV    Cardiovascular  No Hyperlipidemia,    Pulmonary  Smoker ex-smoker fewer than 5 packs per year , ,        GI/Hepatic  Dysphagia, Dysphagia type: solids and liquids  GERD poorly controlled,        Negative  ROS        Endo/Other  History of thyroid disease , hypothyroidism, Arthritis     GYN  Negative gynecology ROS          Hematology  Negative hematology ROS      Musculoskeletal  Negative musculoskeletal ROS Back pain , chronic back pain and lumbar pain,        Neurology  Negative neurology ROS      Psychology   Anxiety, Depression ,            Physical Exam    Airway    Mallampati score: II  TM Distance: >3 FB  Neck ROM: limited     Dental   upper dentures and lower dentures,     Cardiovascular  Rhythm: regular, Rate: normal, Cardiovascular exam normal    Pulmonary  Pulmonary exam normal Breath sounds clear to auscultation,     Other Findings        Anesthesia Plan  ASA Score- 3       Anesthesia Type- general and IV sedation with anesthesia with ASA Monitors  Additional Monitors:   Airway Plan:     Comment: Patient states that she suffers from PONV even with Endoscopy  Scopolamine patch ordered  Induction- intravenous  Informed Consent- Anesthetic plan and risks discussed with patient

## 2017-11-13 ENCOUNTER — GENERIC CONVERSION - ENCOUNTER (OUTPATIENT)
Dept: OTHER | Facility: OTHER | Age: 57
End: 2017-11-13

## 2017-11-14 ENCOUNTER — LAB REQUISITION (OUTPATIENT)
Dept: LAB | Facility: HOSPITAL | Age: 57
End: 2017-11-14
Payer: MEDICARE

## 2017-11-14 DIAGNOSIS — Z51.81 ENCOUNTER FOR THERAPEUTIC DRUG LEVEL MONITORING: ICD-10-CM

## 2017-11-14 DIAGNOSIS — K91.89 OTHER POSTPROCEDURAL COMPLICATIONS AND DISORDERS OF DIGESTIVE SYSTEM: ICD-10-CM

## 2017-11-14 DIAGNOSIS — Z79.899 OTHER LONG TERM (CURRENT) DRUG THERAPY: ICD-10-CM

## 2017-11-14 DIAGNOSIS — K95.89 OTHER COMPLICATIONS OF OTHER BARIATRIC PROCEDURE: ICD-10-CM

## 2017-11-14 DIAGNOSIS — E46 PROTEIN-CALORIE MALNUTRITION (HCC): ICD-10-CM

## 2017-11-14 LAB
ALBUMIN SERPL BCP-MCNC: 2.2 G/DL (ref 3.5–5)
ALP SERPL-CCNC: 158 U/L (ref 46–116)
ALT SERPL W P-5'-P-CCNC: 30 U/L (ref 12–78)
ANION GAP SERPL CALCULATED.3IONS-SCNC: 9 MMOL/L (ref 4–13)
AST SERPL W P-5'-P-CCNC: 34 U/L (ref 5–45)
BILIRUB SERPL-MCNC: 0.51 MG/DL (ref 0.2–1)
BUN SERPL-MCNC: 9 MG/DL (ref 5–25)
CALCIUM SERPL-MCNC: 8.4 MG/DL (ref 8.3–10.1)
CHLORIDE SERPL-SCNC: 107 MMOL/L (ref 100–108)
CHOLEST SERPL-MCNC: 149 MG/DL (ref 50–200)
CO2 SERPL-SCNC: 24 MMOL/L (ref 21–32)
CREAT SERPL-MCNC: 0.49 MG/DL (ref 0.6–1.3)
GFR SERPL CREATININE-BSD FRML MDRD: 109 ML/MIN/1.73SQ M
GLUCOSE P FAST SERPL-MCNC: 76 MG/DL (ref 65–99)
HDLC SERPL-MCNC: 41 MG/DL (ref 40–60)
LDLC SERPL CALC-MCNC: 87 MG/DL (ref 0–100)
MAGNESIUM SERPL-MCNC: 2 MG/DL (ref 1.6–2.6)
PHOSPHATE SERPL-MCNC: 4.5 MG/DL (ref 2.7–4.5)
POTASSIUM SERPL-SCNC: 4.7 MMOL/L (ref 3.5–5.3)
PREALB SERPL-MCNC: 19.8 MG/DL (ref 18–40)
PROT SERPL-MCNC: 5.6 G/DL (ref 6.4–8.2)
SODIUM SERPL-SCNC: 140 MMOL/L (ref 136–145)
TRIGL SERPL-MCNC: 104 MG/DL

## 2017-11-14 PROCEDURE — 84100 ASSAY OF PHOSPHORUS: CPT | Performed by: SURGERY

## 2017-11-14 PROCEDURE — 83735 ASSAY OF MAGNESIUM: CPT | Performed by: SURGERY

## 2017-11-14 PROCEDURE — 84134 ASSAY OF PREALBUMIN: CPT | Performed by: SURGERY

## 2017-11-14 PROCEDURE — 80053 COMPREHEN METABOLIC PANEL: CPT | Performed by: SURGERY

## 2017-11-14 PROCEDURE — 80061 LIPID PANEL: CPT | Performed by: SURGERY

## 2017-11-15 ENCOUNTER — GENERIC CONVERSION - ENCOUNTER (OUTPATIENT)
Dept: OTHER | Facility: OTHER | Age: 57
End: 2017-11-15

## 2017-11-21 ENCOUNTER — LAB REQUISITION (OUTPATIENT)
Dept: LAB | Facility: HOSPITAL | Age: 57
End: 2017-11-21
Payer: MEDICARE

## 2017-11-21 DIAGNOSIS — K95.89 OTHER COMPLICATIONS OF OTHER BARIATRIC PROCEDURE: ICD-10-CM

## 2017-11-21 DIAGNOSIS — Z51.81 ENCOUNTER FOR THERAPEUTIC DRUG LEVEL MONITORING: ICD-10-CM

## 2017-11-21 DIAGNOSIS — E46 PROTEIN-CALORIE MALNUTRITION (HCC): ICD-10-CM

## 2017-11-21 DIAGNOSIS — K91.89 OTHER POSTPROCEDURAL COMPLICATIONS AND DISORDERS OF DIGESTIVE SYSTEM: ICD-10-CM

## 2017-11-21 LAB
ALBUMIN SERPL BCP-MCNC: 2.7 G/DL (ref 3.5–5)
ALP SERPL-CCNC: 133 U/L (ref 46–116)
ALT SERPL W P-5'-P-CCNC: 22 U/L (ref 12–78)
ANION GAP SERPL CALCULATED.3IONS-SCNC: 10 MMOL/L (ref 4–13)
AST SERPL W P-5'-P-CCNC: 25 U/L (ref 5–45)
BILIRUB SERPL-MCNC: 0.49 MG/DL (ref 0.2–1)
BUN SERPL-MCNC: 14 MG/DL (ref 5–25)
CALCIUM SERPL-MCNC: 8.8 MG/DL (ref 8.3–10.1)
CHLORIDE SERPL-SCNC: 107 MMOL/L (ref 100–108)
CHOLEST SERPL-MCNC: 158 MG/DL (ref 50–200)
CO2 SERPL-SCNC: 22 MMOL/L (ref 21–32)
CREAT SERPL-MCNC: 0.53 MG/DL (ref 0.6–1.3)
GFR SERPL CREATININE-BSD FRML MDRD: 106 ML/MIN/1.73SQ M
GLUCOSE SERPL-MCNC: 89 MG/DL (ref 65–140)
HDLC SERPL-MCNC: 46 MG/DL (ref 40–60)
LDLC SERPL CALC-MCNC: 97 MG/DL (ref 0–100)
MAGNESIUM SERPL-MCNC: 2.2 MG/DL (ref 1.6–2.6)
PHOSPHATE SERPL-MCNC: 4.9 MG/DL (ref 2.7–4.5)
POTASSIUM SERPL-SCNC: 4.8 MMOL/L (ref 3.5–5.3)
PREALB SERPL-MCNC: 25.7 MG/DL (ref 18–40)
PROT SERPL-MCNC: 6.6 G/DL (ref 6.4–8.2)
SODIUM SERPL-SCNC: 139 MMOL/L (ref 136–145)
TRIGL SERPL-MCNC: 77 MG/DL

## 2017-11-21 PROCEDURE — 80061 LIPID PANEL: CPT | Performed by: SURGERY

## 2017-11-21 PROCEDURE — 80053 COMPREHEN METABOLIC PANEL: CPT | Performed by: SURGERY

## 2017-11-21 PROCEDURE — 84134 ASSAY OF PREALBUMIN: CPT | Performed by: SURGERY

## 2017-11-21 PROCEDURE — 84100 ASSAY OF PHOSPHORUS: CPT | Performed by: SURGERY

## 2017-11-21 PROCEDURE — 83735 ASSAY OF MAGNESIUM: CPT | Performed by: SURGERY

## 2017-11-28 ENCOUNTER — LAB REQUISITION (OUTPATIENT)
Dept: LAB | Facility: HOSPITAL | Age: 57
End: 2017-11-28
Payer: MEDICARE

## 2017-11-28 DIAGNOSIS — E46 PROTEIN-CALORIE MALNUTRITION (HCC): ICD-10-CM

## 2017-11-28 LAB
ALBUMIN SERPL BCP-MCNC: 2.7 G/DL (ref 3.5–5)
ALP SERPL-CCNC: 129 U/L (ref 46–116)
ALT SERPL W P-5'-P-CCNC: 35 U/L (ref 12–78)
ANION GAP SERPL CALCULATED.3IONS-SCNC: 8 MMOL/L (ref 4–13)
AST SERPL W P-5'-P-CCNC: 25 U/L (ref 5–45)
BILIRUB SERPL-MCNC: 0.47 MG/DL (ref 0.2–1)
BUN SERPL-MCNC: 24 MG/DL (ref 5–25)
CALCIUM SERPL-MCNC: 8.7 MG/DL (ref 8.3–10.1)
CHLORIDE SERPL-SCNC: 110 MMOL/L (ref 100–108)
CHOLEST SERPL-MCNC: 127 MG/DL (ref 50–200)
CO2 SERPL-SCNC: 21 MMOL/L (ref 21–32)
CREAT SERPL-MCNC: 0.58 MG/DL (ref 0.6–1.3)
GFR SERPL CREATININE-BSD FRML MDRD: 103 ML/MIN/1.73SQ M
GLUCOSE P FAST SERPL-MCNC: 80 MG/DL (ref 65–99)
HDLC SERPL-MCNC: 44 MG/DL (ref 40–60)
LDLC SERPL CALC-MCNC: 69 MG/DL (ref 0–100)
MAGNESIUM SERPL-MCNC: 2.1 MG/DL (ref 1.6–2.6)
PHOSPHATE SERPL-MCNC: 4 MG/DL (ref 2.7–4.5)
POTASSIUM SERPL-SCNC: 3.8 MMOL/L (ref 3.5–5.3)
PREALB SERPL-MCNC: 18.5 MG/DL (ref 18–40)
PROT SERPL-MCNC: 7 G/DL (ref 6.4–8.2)
SODIUM SERPL-SCNC: 139 MMOL/L (ref 136–145)
TRIGL SERPL-MCNC: 68 MG/DL

## 2017-11-28 PROCEDURE — 84134 ASSAY OF PREALBUMIN: CPT | Performed by: SURGERY

## 2017-11-28 PROCEDURE — 83735 ASSAY OF MAGNESIUM: CPT | Performed by: SURGERY

## 2017-11-28 PROCEDURE — 80061 LIPID PANEL: CPT | Performed by: SURGERY

## 2017-11-28 PROCEDURE — 80053 COMPREHEN METABOLIC PANEL: CPT | Performed by: SURGERY

## 2017-11-28 PROCEDURE — 84100 ASSAY OF PHOSPHORUS: CPT | Performed by: SURGERY

## 2017-12-05 ENCOUNTER — LAB REQUISITION (OUTPATIENT)
Dept: LAB | Facility: HOSPITAL | Age: 57
End: 2017-12-05
Payer: MEDICARE

## 2017-12-05 ENCOUNTER — ALLSCRIPTS OFFICE VISIT (OUTPATIENT)
Dept: OTHER | Facility: OTHER | Age: 57
End: 2017-12-05

## 2017-12-05 DIAGNOSIS — K95.89 OTHER COMPLICATIONS OF OTHER BARIATRIC PROCEDURE: ICD-10-CM

## 2017-12-05 DIAGNOSIS — E46 PROTEIN-CALORIE MALNUTRITION (HCC): ICD-10-CM

## 2017-12-05 DIAGNOSIS — K91.89 OTHER POSTPROCEDURAL COMPLICATIONS AND DISORDERS OF DIGESTIVE SYSTEM: ICD-10-CM

## 2017-12-05 LAB
ALBUMIN SERPL BCP-MCNC: 2.7 G/DL (ref 3.5–5)
ALP SERPL-CCNC: 133 U/L (ref 46–116)
ALT SERPL W P-5'-P-CCNC: 29 U/L (ref 12–78)
ANION GAP SERPL CALCULATED.3IONS-SCNC: 12 MMOL/L (ref 4–13)
AST SERPL W P-5'-P-CCNC: 30 U/L (ref 5–45)
BILIRUB SERPL-MCNC: 0.57 MG/DL (ref 0.2–1)
BUN SERPL-MCNC: 25 MG/DL (ref 5–25)
CALCIUM SERPL-MCNC: 9 MG/DL (ref 8.3–10.1)
CHLORIDE SERPL-SCNC: 108 MMOL/L (ref 100–108)
CHOLEST SERPL-MCNC: 101 MG/DL (ref 50–200)
CO2 SERPL-SCNC: 20 MMOL/L (ref 21–32)
CREAT SERPL-MCNC: 0.63 MG/DL (ref 0.6–1.3)
GFR SERPL CREATININE-BSD FRML MDRD: 100 ML/MIN/1.73SQ M
GLUCOSE SERPL-MCNC: 80 MG/DL (ref 65–140)
HDLC SERPL-MCNC: 40 MG/DL (ref 40–60)
MAGNESIUM SERPL-MCNC: 2 MG/DL (ref 1.6–2.6)
NONHDLC SERPL-MCNC: 61 MG/DL
PHOSPHATE SERPL-MCNC: 5.8 MG/DL (ref 2.7–4.5)
POTASSIUM SERPL-SCNC: 3.9 MMOL/L (ref 3.5–5.3)
PREALB SERPL-MCNC: 16.4 MG/DL (ref 18–40)
PROT SERPL-MCNC: 6.9 G/DL (ref 6.4–8.2)
SODIUM SERPL-SCNC: 140 MMOL/L (ref 136–145)

## 2017-12-05 PROCEDURE — 84134 ASSAY OF PREALBUMIN: CPT | Performed by: SURGERY

## 2017-12-05 PROCEDURE — 83718 ASSAY OF LIPOPROTEIN: CPT | Performed by: SURGERY

## 2017-12-05 PROCEDURE — 82465 ASSAY BLD/SERUM CHOLESTEROL: CPT | Performed by: SURGERY

## 2017-12-05 PROCEDURE — 80053 COMPREHEN METABOLIC PANEL: CPT | Performed by: SURGERY

## 2017-12-05 PROCEDURE — 83735 ASSAY OF MAGNESIUM: CPT | Performed by: SURGERY

## 2017-12-05 PROCEDURE — 84100 ASSAY OF PHOSPHORUS: CPT | Performed by: SURGERY

## 2017-12-06 ENCOUNTER — ANESTHESIA EVENT (OUTPATIENT)
Dept: PERIOP | Facility: HOSPITAL | Age: 57
DRG: 326 | End: 2017-12-06
Payer: MEDICARE

## 2017-12-06 NOTE — OP NOTE
OPERATIVE REPORT  PATIENT NAME: Anastasia Cox    :  1960  MRN: [id number]  Pt Location: [facility] ROOM 01    SURGERY DATE: 2017    Surgeon(s) and Role:     * Garry Cornejo MD - Primary     * Toribio Mccarthy - Assisting    Preop Diagnosis:  Bariatric surgery status [Z98 84]    Post-Op Diagnosis Codes: * Bariatric surgery status [Z98 84]    Procedure(s) (LRB):  ESOPHAGOGASTRODUODENOSCOPY (EGD) with balloon dilation (N/A)    Specimen(s):  * No specimens in log *    Estimated Blood Loss:   Minimal    Drains:       Anesthesia Type:   IV Sedation with Anesthesia    Operative Indications:  Bariatric surgery status [Z98 84]      Operative Findings:  Gastrojejunostomy stricture    Complications:   None    Procedure and Technique:  Upper endoscopy and dilatation   I was present for the entire procedure    Patient Disposition:  hemodynamically stable     Indication:   Patient suffers from morbid obesity s/p RYGB presenting with chronic nausea and vomiting with history of gastrojejunostomy stricture    Description of the procedure: The patient was brought to the endoscopy suite and was placed in  left lateral decubitus position  A time-out was called and the patient was identified by name and by armband  The patient received IV  Propofol under closed monitoring  by the anesthesiologist and nurse anesthesist     Upper endoscopy was performed under direct visualization     Esophagus was visualized and showed normal findings   The GE junction was normal   The stomach pouch was then inspected and showed normal findings  Gastrojejunostomy was inspected and showed gastrojejunostomy stricture at less than 0 5 cm in size, stricture was dilated with a balloon dilator in a serial fashion up to 10 mm in size, gastrojejunostomy appeared to be twisted, results after dilatation were suboptimal    Blind end and Naheed limbs were both inspected and showed normal findings    Gastro-gastric Fistula was not identified      The patient tolerated the procedure well and was transferred to the recovery unit in stable condition   Patient will require most likely a revision of her gastrojejunostomy        SIGNATURE: Nidhi Ardon MD  DATE: December 6, 2017  TIME: 7:46 AM

## 2017-12-06 NOTE — PROGRESS NOTES
Assessment  1  Obesity (278 00) (E66 9)   2  Preop cardiovascular exam (V72 81) (Z01 810)   3  Edema extremities (782 3) (R60 0)    Plan    1  Follow-up PRN Evaluation and Treatment  Follow-up  Status: Complete  Done: [id number]    2  EKG/ECG- POC; Status:Complete;   Done: [id number]    Discussion/Summary  Patient Clearance: Surgical Clearance: She is at a LOW risk from a cardiovascular standpoint at this time without any additional cardiac testing  Reevaluation needed, if she should present with symptoms prior to surgery/procedure  Discussion Summary:   Chronic edema of the lower extremities, with previous evaluation with echocardiogram in April, 2016  No significant abnormalities found at that time  reports that [place] was told she was bradycardic  This was while she was sleeping  She had an evaluation at that time with Cardiology as well  She says repeat testing was done, no significant abnormalities found  She denies any dizziness, lightheadedness, or other symptoms attributable to bradycardia evidence of this on EKG today  continue to follow on an as-needed basis  She has concerns or problems that arise in the future, she can be seen at the [facility], [place], operated by [facility], which is closer to her home  Chief Complaint  Chief Complaint Free Text Note Form: Pt here today for preparative clearance for esophageal surgery with Dr Maira Infante on December 20/2017      History of Present Illness  HPI: 62year old female  Denies any cardiac history, but does have lymphedema in the past has history of morbid obesity, underwent gastric bypass surgery in the past  Then with complications, including a fistula  was seen last by cardiology in march, 2016  that time, an echo was done to evalaute the edema, and there were no significant abnormalities  tolerated the surgery well without any complications  had fortunately now has an esophageal stricture  This was unable to be dilated to EGDs   She is now planned for repeat surgery  she is currently walking with a walker, she denies any cardiac complaints  She is currently requiring TPN through a PICC line to supplement her malnutrition because she is unable to eat or drink  Review of Systems  Cardiology Female ROS:    Cardiac: has swelling in the , but-- no chest pain,-- no rhythm problems,-- no fainting/blackouts,-- no heart murmur present-- and-- no palpitations present  Skin: No complaints of nonhealing sores or skin rash  Genitourinary: No complaints of recurrent urinary tract infections, frequent urination at night, difficult urination, blood in urine, kidney stones, loss of bladder control, kidney problems, denies any birth control or hormone replacement, is not post menopausal, not currently pregnant  Psychological: No complaints of feeling depressed, anxiety, panic attacks, or difficulty concentrating  General: lack of energy/fatigue, but-- no trouble sleeping,-- no appetite changes,-- no changes in weight,-- no fever,-- no night sweats-- and-- no frequent infections  Respiratory: No complaints of shortness of breath, cough with sputum, or wheezing  HEENT: No complaints of serious problems, hearing problems, nose problems, throat problems, or snoring  Gastrointestinal: No complaints of liver problems, nausea, vomiting, heartburn, constipation, bloody stools, diarrhea, problems swallowing, adbominal pain, or rectal bleeding  Hematologic: No complaints of bleeding disorders, anemia, blood clots, or excessive brusing  Neurological: No complaints of numbness, tingling, dizziness, weakness, seizures, headaches, syncope or fainting, AM fatigue, daytime sleepiness, no witnessed apnea episodes  Musculoskeletal: No complaints of arthritis, back pain, or painfull swelling  ROS Reviewed:   ROS reviewed  Active Problems    1  Adjustment disorder with mixed anxiety and depressed mood (309 28) (F43 23)   2  Anxiety disorder (300 00) (F41 9)   3   Arthritis (716 90) (M19 90)   4  Bereavement (V62 82) (Z63 4)   5  Chronic pain (338 29) (G89 29)   6  Decreased oral intake (783 9) (R63 8)   7  Depression (311) (F32 9)   8  Edema extremities (782 3) (R60 0)   9  Gastric anastomotic stricture (786 41,980 13) (K92 9,K31 89)   10  Gastric anastomotic stricture (969 33,070 62) (K92 9,K31 89)   11  Heart burn (787 1) (R12)   12  Hypercholesterolemia (272 0) (E78 00)   13  Hypothyroidism (244 9) (E03 9)   14  Insomnia (780 52) (G47 00)   15  Marginal ulcer (534 90) (K28 9)   16  Morbid obesity (278 01) (E66 01)   17  Obesity (278 00) (E66 9)   18  Postgastrectomy malabsorption (579 3) (K91 2,Z90 3)   19  Preop cardiovascular exam (V72 81) (Z01 810)   20  Pre-op testing (V72 84) (Z01 818)   21  Secondary hyperparathyroidism, non-renal (252 02) (E21 1)   22  Status post gastric bypass for obesity (V45 86) (Z98 84)   23  Vitamin A deficiency (264 9) (E50 9)   24  Vitamin D deficiency (268 9) (E55 9)    Past Medical History  1  History of 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)   2  History of weight gain (V15 89) (Z59 314)  Active Problems And Past Medical History Reviewed: The active problems and past medical history were reviewed and updated today  Surgical History  1  History of Abdominoplasty   2  History of  Section   3  History of Gallbladder Surgery   4  History of Gastric Surgery For Morbid Obesity Bypass With Naheed-en-Y  Surgical History Reviewed: The surgical history was reviewed and updated today  Family History  Mother    1  Family history of Alzheimer's disease (V17 2) (Z82 0)  Father    2  Family history of diabetes mellitus (V18 0) (Z83 3)   3  Family history of hypertension (V17 49) (Z82 49)  Daughter    4  Family history of Anxiety  Brother    5  Family history of Mild alcohol abuse in sustained remission in controlled environment  Family History    6  Family history of diabetes mellitus (V18 0) (Z83 3)   7   No family history of suicide  Family History Reviewed: The family history was reviewed and updated today  Social History     · Bereavement (T21 61) (Z63 4)   · Denied: History of Drug use   · Former smoker (V15 82) (F38 854)   · No alcohol use   · Single  Social History Reviewed: The social history was reviewed and is unchanged  Current Meds   1  ALPRAZolam 1 MG Oral Tablet; TAKE 1 TABLET AT BEDTIME; Therapy: 71DUE8246 to (Evaluate:31Aqs0329); Last Rx:10Nov2015 Ordered   2  Azithromycin 250 MG Oral Tablet; Therapy: ([place]) to Recorded   3  Carafate 1 GM/10ML Oral Suspension; Therapy: ([place]ilah [place]) to Recorded   4  Ditropan XL 5 MG TBCR; Therapy: ([place]) to Recorded   5  Levothyroxine Sodium 100 MCG Oral Tablet; Therapy: (Recorded:04Nov2016) to Recorded   6  Optisource Post Bariatric Surg Oral Tablet Chewable; CHEW AND SWALLOW 1 TABLET DAILY; Therapy: 51UHE0800 to (Evaluate:58Fsm0846); Last Rx:10Nov2015 Ordered   7  Oxybutynin Chloride ER 5 MG Oral Tablet Extended Release 24 Hour; Therapy: ([place] Ivelisse) to Recorded   8  OxyCODONE HCl - 30 MG Oral Tablet; TAKE 1 TABLET EVERY 6 HOURS AS NEEDED FOR PAIN; Therapy: 46ZBT0313 to (Evaluate:64Iws2683); Last Rx:10Nov2015 Ordered   9  Oxycodone-Acetaminophen 5-325 MG Oral Tablet; TAKE 1 TABLET EVERY 4 TO 6 HOURS AS NEEDED FOR PAIN; Therapy: 77DNZ5857 to (Evaluate:01Yun5985); Last Rx:53Lsc0954; Status: ACTIVE - Retrospective By Protocol Authorization Ordered   10  Pantoprazole Sodium 40 MG Oral Tablet Delayed Release; Therapy: ([place]ilah [place]) to Recorded   11  Protonix 20 MG Oral Tablet Delayed Release; Therapy: (Recorded:36Pxr2652) to Recorded   12  TobraDex 0 3-0 1 % Ophthalmic Ointment; Therapy: (Akilah Ivelisse) to Recorded   13  Vitamin D TABS; Therapy: (Recorded:94Sgm2525) to Recorded    Allergies    1   No Known Drug Allergies    Vitals  Signs   Recorded: 57LQW0828 03:21PM   Heart Rate: 95  Systolic: 907, LUE, Sitting  Diastolic: 80, LUE, Sitting  Height: 5 ft 2 5 in  Weight: 197 lb 8 oz  BMI Calculated: 35 55  BSA Calculated: 1 91  O2 Saturation: 97    Physical Exam   Constitutional  General appearance: No acute distress, well appearing and well nourished  Eyes  Conjunctiva and Sclera examination: Conjunctiva pink, sclera anicteric  Ears, Nose, Mouth, and Throat - Oropharynx: Clear, nares are clear, mucous membranes are moist   Neck  Neck and thyroid: Normal, supple, trachea midline, no thyromegaly  Pulmonary  Respiratory effort: No increased work of breathing or signs of respiratory distress  Auscultation of lungs: Clear to auscultation, no rales, no rhonchi, no wheezing, good air movement  Cardiovascular  Auscultation of heart: Normal rate and rhythm, normal S1 and S2, no murmurs  Carotid pulses: Normal, 2+ bilaterally  Peripheral vascular exam: Normal pulses throughout, no tenderness, erythema or swelling  Pedal pulses: Normal, 2+ bilaterally  -- Non pitting edema bilateral lower extremities  Abdomen  Abdomen: Non-tender and no distention  Liver and spleen: No hepatomegaly or splenomegaly  Musculoskeletal Walks with walker  -- Digits and nails: Normal without clubbing or cyanosis  -- Inspection/palpation of joints, bones, and muscles: Normal, ROM normal    Skin - Chronic skin discoloration  Neurologic - Cranial nerves: II - XII intact  -- Speech: Normal    Psychiatric - Orientation to person, place, and time: Normal -- Mood and affect: Normal       Results/Data  ECG Report: Sinus rhythm 86 beats per minute  Nonspecific anterior T-wave abnormality  Poor anterior R-wave progression  Future Appointments    Date/Time Provider Specialty Site   01/04/2018 10:00 AM KIM Olsen  [address] [place] [address] [facility]   12/07/2017 03:00 PM KIM Finnegan   General Surgery [facility]   04/20/2018 10:30 AM Kory Munoz [facility] General Surgery [facility] Signatures   Electronically signed by : KIM Madrid ; Dec  5 2017  3:51PM EST                       (Author)

## 2017-12-07 ENCOUNTER — GENERIC CONVERSION - ENCOUNTER (OUTPATIENT)
Dept: OTHER | Facility: OTHER | Age: 57
End: 2017-12-07

## 2017-12-07 NOTE — ANESTHESIA PREPROCEDURE EVALUATION
Review of Systems/Medical History  Patient summary reviewed  Chart reviewed  History of anesthetic complications PONV    Cardiovascular  No Hyperlipidemia,    Pulmonary  Smoker ex-smoker fewer than 5 packs per year , Shortness of breath, ,        GI/Hepatic  Dysphagia, Dysphagia type: solids and liquids  GERD poorly controlled, Bariatric surgery,        Negative  ROS        Endo/Other  History of thyroid disease , hypothyroidism, Arthritis     GYN  Negative gynecology ROS          Hematology  Negative hematology ROS      Musculoskeletal  Obesity  morbid obesity, Back pain , chronic back pain and lumbar pain, Sciatica,        Neurology  Negative neurology ROS   No neuromuscular disease ,    Psychology   Anxiety, Depression , being treated for depression,            Physical Exam    Airway    Mallampati score: II  TM Distance: >3 FB  Neck ROM: limited     Dental   upper dentures and lower dentures,     Cardiovascular  Rhythm: regular, Rate: normal, Cardiovascular exam normal    Pulmonary  Pulmonary exam normal Breath sounds clear to auscultation,     Other Findings        Anesthesia Plan  ASA Score- 3       Anesthesia Type- general with ASA Monitors  Additional Monitors:   Airway Plan: ETT  Comment:  Scopolamine patch ordered  Induction- intravenous  Informed Consent- Anesthetic plan and risks discussed with patient

## 2017-12-08 ENCOUNTER — GENERIC CONVERSION - ENCOUNTER (OUTPATIENT)
Dept: OTHER | Facility: OTHER | Age: 57
End: 2017-12-08

## 2017-12-11 ENCOUNTER — LAB REQUISITION (OUTPATIENT)
Dept: LAB | Facility: HOSPITAL | Age: 57
End: 2017-12-11
Payer: MEDICARE

## 2017-12-11 DIAGNOSIS — Z79.899 OTHER LONG TERM (CURRENT) DRUG THERAPY: ICD-10-CM

## 2017-12-11 DIAGNOSIS — K91.89 OTHER POSTPROCEDURAL COMPLICATIONS AND DISORDERS OF DIGESTIVE SYSTEM: ICD-10-CM

## 2017-12-11 DIAGNOSIS — K95.89 OTHER COMPLICATIONS OF OTHER BARIATRIC PROCEDURE: ICD-10-CM

## 2017-12-11 DIAGNOSIS — E46 PROTEIN-CALORIE MALNUTRITION (HCC): ICD-10-CM

## 2017-12-11 LAB
ALBUMIN SERPL BCP-MCNC: 2.9 G/DL (ref 3.5–5)
ALP SERPL-CCNC: 108 U/L (ref 46–116)
ALT SERPL W P-5'-P-CCNC: 22 U/L (ref 12–78)
ANION GAP SERPL CALCULATED.3IONS-SCNC: 10 MMOL/L (ref 4–13)
AST SERPL W P-5'-P-CCNC: 21 U/L (ref 5–45)
BILIRUB SERPL-MCNC: 0.46 MG/DL (ref 0.2–1)
BUN SERPL-MCNC: 23 MG/DL (ref 5–25)
CALCIUM SERPL-MCNC: 8.7 MG/DL (ref 8.3–10.1)
CHLORIDE SERPL-SCNC: 108 MMOL/L (ref 100–108)
CHOLEST SERPL-MCNC: 105 MG/DL (ref 50–200)
CO2 SERPL-SCNC: 23 MMOL/L (ref 21–32)
CREAT SERPL-MCNC: 0.6 MG/DL (ref 0.6–1.3)
GFR SERPL CREATININE-BSD FRML MDRD: 102 ML/MIN/1.73SQ M
GLUCOSE SERPL-MCNC: 85 MG/DL (ref 65–140)
HDLC SERPL-MCNC: 42 MG/DL (ref 40–60)
LDLC SERPL CALC-MCNC: 52 MG/DL (ref 0–100)
MAGNESIUM SERPL-MCNC: 2.1 MG/DL (ref 1.6–2.6)
PHOSPHATE SERPL-MCNC: 5.1 MG/DL (ref 2.7–4.5)
POTASSIUM SERPL-SCNC: 4.5 MMOL/L (ref 3.5–5.3)
PREALB SERPL-MCNC: 17.8 MG/DL (ref 18–40)
PROT SERPL-MCNC: 6.6 G/DL (ref 6.4–8.2)
SODIUM SERPL-SCNC: 141 MMOL/L (ref 136–145)
TRIGL SERPL-MCNC: 57 MG/DL

## 2017-12-11 PROCEDURE — 80061 LIPID PANEL: CPT | Performed by: SURGERY

## 2017-12-11 PROCEDURE — 82390 ASSAY OF CERULOPLASMIN: CPT | Performed by: SURGERY

## 2017-12-11 PROCEDURE — 84100 ASSAY OF PHOSPHORUS: CPT | Performed by: SURGERY

## 2017-12-11 PROCEDURE — 84134 ASSAY OF PREALBUMIN: CPT | Performed by: SURGERY

## 2017-12-11 PROCEDURE — 80053 COMPREHEN METABOLIC PANEL: CPT | Performed by: SURGERY

## 2017-12-11 PROCEDURE — 83735 ASSAY OF MAGNESIUM: CPT | Performed by: SURGERY

## 2017-12-11 PROCEDURE — 82525 ASSAY OF COPPER: CPT | Performed by: SURGERY

## 2017-12-12 LAB — CERULOPLASMIN SERPL-MCNC: 26.1 MG/DL (ref 19–39)

## 2017-12-13 ENCOUNTER — GENERIC CONVERSION - ENCOUNTER (OUTPATIENT)
Dept: OTHER | Facility: OTHER | Age: 57
End: 2017-12-13

## 2017-12-13 LAB — COPPER SERPL-MCNC: 112 UG/DL (ref 72–166)

## 2017-12-18 ENCOUNTER — LAB REQUISITION (OUTPATIENT)
Dept: LAB | Facility: HOSPITAL | Age: 57
DRG: 326 | End: 2017-12-18
Payer: MEDICARE

## 2017-12-18 DIAGNOSIS — K95.89 OTHER COMPLICATIONS OF OTHER BARIATRIC PROCEDURE: ICD-10-CM

## 2017-12-18 DIAGNOSIS — K91.89 OTHER POSTPROCEDURAL COMPLICATIONS AND DISORDERS OF DIGESTIVE SYSTEM: ICD-10-CM

## 2017-12-18 DIAGNOSIS — E46 PROTEIN-CALORIE MALNUTRITION (HCC): ICD-10-CM

## 2017-12-18 LAB
ALBUMIN SERPL BCP-MCNC: 2.6 G/DL (ref 3.5–5)
ALP SERPL-CCNC: 106 U/L (ref 46–116)
ALT SERPL W P-5'-P-CCNC: 20 U/L (ref 12–78)
ANION GAP SERPL CALCULATED.3IONS-SCNC: 12 MMOL/L (ref 4–13)
AST SERPL W P-5'-P-CCNC: 24 U/L (ref 5–45)
BILIRUB SERPL-MCNC: 0.5 MG/DL (ref 0.2–1)
BUN SERPL-MCNC: 25 MG/DL (ref 5–25)
CALCIUM SERPL-MCNC: 7.9 MG/DL (ref 8.3–10.1)
CHLORIDE SERPL-SCNC: 105 MMOL/L (ref 100–108)
CHOLEST SERPL-MCNC: 91 MG/DL (ref 50–200)
CO2 SERPL-SCNC: 21 MMOL/L (ref 21–32)
CREAT SERPL-MCNC: 0.55 MG/DL (ref 0.6–1.3)
GFR SERPL CREATININE-BSD FRML MDRD: 104 ML/MIN/1.73SQ M
GLUCOSE SERPL-MCNC: 77 MG/DL (ref 65–140)
HDLC SERPL-MCNC: 31 MG/DL (ref 40–60)
LDLC SERPL CALC-MCNC: 47 MG/DL (ref 0–100)
MAGNESIUM SERPL-MCNC: 2 MG/DL (ref 1.6–2.6)
PHOSPHATE SERPL-MCNC: 3.6 MG/DL (ref 2.7–4.5)
POTASSIUM SERPL-SCNC: 3.5 MMOL/L (ref 3.5–5.3)
PREALB SERPL-MCNC: 11.5 MG/DL (ref 18–40)
PROT SERPL-MCNC: 6 G/DL (ref 6.4–8.2)
SODIUM SERPL-SCNC: 138 MMOL/L (ref 136–145)
TRIGL SERPL-MCNC: 67 MG/DL

## 2017-12-18 PROCEDURE — 83735 ASSAY OF MAGNESIUM: CPT | Performed by: SURGERY

## 2017-12-18 PROCEDURE — 80053 COMPREHEN METABOLIC PANEL: CPT | Performed by: SURGERY

## 2017-12-18 PROCEDURE — 84134 ASSAY OF PREALBUMIN: CPT | Performed by: SURGERY

## 2017-12-18 PROCEDURE — 80061 LIPID PANEL: CPT | Performed by: SURGERY

## 2017-12-18 PROCEDURE — 84100 ASSAY OF PHOSPHORUS: CPT | Performed by: SURGERY

## 2017-12-19 ENCOUNTER — GENERIC CONVERSION - ENCOUNTER (OUTPATIENT)
Dept: OTHER | Facility: OTHER | Age: 57
End: 2017-12-19

## 2017-12-20 ENCOUNTER — HOSPITAL ENCOUNTER (INPATIENT)
Facility: HOSPITAL | Age: 57
LOS: 2 days | Discharge: HOME HEALTH | DRG: 326 | End: 2017-12-22
Attending: SURGERY | Admitting: SURGERY
Payer: MEDICARE

## 2017-12-20 ENCOUNTER — ANESTHESIA (OUTPATIENT)
Dept: PERIOP | Facility: HOSPITAL | Age: 57
DRG: 326 | End: 2017-12-20
Payer: MEDICARE

## 2017-12-20 DIAGNOSIS — K91.89 ANASTOMOTIC STRICTURE OF GASTROJEJUNOSTOMY: Primary | ICD-10-CM

## 2017-12-20 DIAGNOSIS — K92.9 DISEASE OF DIGESTIVE SYSTEM: ICD-10-CM

## 2017-12-20 DIAGNOSIS — R12 HEARTBURN: ICD-10-CM

## 2017-12-20 DIAGNOSIS — E03.9 ACQUIRED HYPOTHYROIDISM: ICD-10-CM

## 2017-12-20 DIAGNOSIS — Z98.84 BARIATRIC SURGERY STATUS: ICD-10-CM

## 2017-12-20 DIAGNOSIS — E43 SEVERE PROTEIN-CALORIE MALNUTRITION (HCC): ICD-10-CM

## 2017-12-20 DIAGNOSIS — K28.9 GASTROJEJUNAL ULCER WITHOUT HEMORRHAGE OR PERFORATION: ICD-10-CM

## 2017-12-20 DIAGNOSIS — K31.89 OTHER DISEASES OF STOMACH AND DUODENUM: ICD-10-CM

## 2017-12-20 LAB
BASE EXCESS BLDA CALC-SCNC: -10 MMOL/L (ref -2–3)
CA-I BLD-SCNC: 1.16 MMOL/L (ref 1.12–1.32)
GLUCOSE SERPL-MCNC: 142 MG/DL (ref 65–140)
HCO3 BLDA-SCNC: 17 MMOL/L (ref 24–30)
HCT VFR BLD CALC: 32 % (ref 34.8–46.1)
HGB BLDA-MCNC: 10.9 G/DL (ref 11.5–15.4)
PCO2 BLD: 18 MMOL/L (ref 21–32)
PCO2 BLD: 41.8 MM HG (ref 42–50)
PH BLD: 7.22 [PH] (ref 7.3–7.4)
PO2 BLD: 76 MM HG (ref 35–45)
POTASSIUM BLD-SCNC: 3.4 MMOL/L (ref 3.5–5.3)
SAO2 % BLD FROM PO2: 92 % (ref 95–98)
SODIUM BLD-SCNC: 142 MMOL/L (ref 136–145)
SPECIMEN SOURCE: ABNORMAL

## 2017-12-20 PROCEDURE — 0D164ZA BYPASS STOMACH TO JEJUNUM, PERCUTANEOUS ENDOSCOPIC APPROACH: ICD-10-PCS | Performed by: SURGERY

## 2017-12-20 PROCEDURE — 0DJ08ZZ INSPECTION OF UPPER INTESTINAL TRACT, VIA NATURAL OR ARTIFICIAL OPENING ENDOSCOPIC: ICD-10-PCS | Performed by: SURGERY

## 2017-12-20 PROCEDURE — 0WBF4ZZ EXCISION OF ABDOMINAL WALL, PERCUTANEOUS ENDOSCOPIC APPROACH: ICD-10-PCS | Performed by: SURGERY

## 2017-12-20 PROCEDURE — 85014 HEMATOCRIT: CPT

## 2017-12-20 PROCEDURE — 82330 ASSAY OF CALCIUM: CPT

## 2017-12-20 PROCEDURE — 0DB64ZZ EXCISION OF STOMACH, PERCUTANEOUS ENDOSCOPIC APPROACH: ICD-10-PCS | Performed by: SURGERY

## 2017-12-20 PROCEDURE — 82947 ASSAY GLUCOSE BLOOD QUANT: CPT

## 2017-12-20 PROCEDURE — 82803 BLOOD GASES ANY COMBINATION: CPT

## 2017-12-20 PROCEDURE — 84295 ASSAY OF SERUM SODIUM: CPT

## 2017-12-20 PROCEDURE — 84132 ASSAY OF SERUM POTASSIUM: CPT

## 2017-12-20 PROCEDURE — 88307 TISSUE EXAM BY PATHOLOGIST: CPT | Performed by: SURGERY

## 2017-12-20 PROCEDURE — 0DNW4ZZ RELEASE PERITONEUM, PERCUTANEOUS ENDOSCOPIC APPROACH: ICD-10-PCS | Performed by: SURGERY

## 2017-12-20 RX ORDER — DIAZEPAM 5 MG/ML
5 INJECTION, SOLUTION INTRAMUSCULAR; INTRAVENOUS EVERY 12 HOURS
Status: DISCONTINUED | OUTPATIENT
Start: 2017-12-20 | End: 2017-12-22 | Stop reason: HOSPADM

## 2017-12-20 RX ORDER — EPHEDRINE SULFATE 50 MG/ML
INJECTION, SOLUTION INTRAVENOUS AS NEEDED
Status: DISCONTINUED | OUTPATIENT
Start: 2017-12-20 | End: 2017-12-20 | Stop reason: SURG

## 2017-12-20 RX ORDER — ROCURONIUM BROMIDE 10 MG/ML
INJECTION, SOLUTION INTRAVENOUS AS NEEDED
Status: DISCONTINUED | OUTPATIENT
Start: 2017-12-20 | End: 2017-12-20 | Stop reason: SURG

## 2017-12-20 RX ORDER — FENTANYL CITRATE 50 UG/ML
INJECTION, SOLUTION INTRAMUSCULAR; INTRAVENOUS AS NEEDED
Status: DISCONTINUED | OUTPATIENT
Start: 2017-12-20 | End: 2017-12-20 | Stop reason: SURG

## 2017-12-20 RX ORDER — MIDAZOLAM HYDROCHLORIDE 1 MG/ML
INJECTION INTRAMUSCULAR; INTRAVENOUS AS NEEDED
Status: DISCONTINUED | OUTPATIENT
Start: 2017-12-20 | End: 2017-12-20 | Stop reason: SURG

## 2017-12-20 RX ORDER — BUPIVACAINE HYDROCHLORIDE AND EPINEPHRINE 5; 5 MG/ML; UG/ML
INJECTION, SOLUTION PERINEURAL AS NEEDED
Status: DISCONTINUED | OUTPATIENT
Start: 2017-12-20 | End: 2017-12-20 | Stop reason: HOSPADM

## 2017-12-20 RX ORDER — HYDROMORPHONE HYDROCHLORIDE 2 MG/ML
INJECTION, SOLUTION INTRAMUSCULAR; INTRAVENOUS; SUBCUTANEOUS AS NEEDED
Status: DISCONTINUED | OUTPATIENT
Start: 2017-12-20 | End: 2017-12-20 | Stop reason: SURG

## 2017-12-20 RX ORDER — METOCLOPRAMIDE HYDROCHLORIDE 5 MG/ML
10 INJECTION INTRAMUSCULAR; INTRAVENOUS EVERY 8 HOURS
Status: DISCONTINUED | OUTPATIENT
Start: 2017-12-20 | End: 2017-12-22 | Stop reason: HOSPADM

## 2017-12-20 RX ORDER — SCOLOPAMINE TRANSDERMAL SYSTEM 1 MG/1
1 PATCH, EXTENDED RELEASE TRANSDERMAL ONCE AS NEEDED
Status: DISCONTINUED | OUTPATIENT
Start: 2017-12-20 | End: 2017-12-20

## 2017-12-20 RX ORDER — PROPOFOL 10 MG/ML
INJECTION, EMULSION INTRAVENOUS AS NEEDED
Status: DISCONTINUED | OUTPATIENT
Start: 2017-12-20 | End: 2017-12-20 | Stop reason: SURG

## 2017-12-20 RX ORDER — GLYCOPYRROLATE 0.2 MG/ML
INJECTION INTRAMUSCULAR; INTRAVENOUS AS NEEDED
Status: DISCONTINUED | OUTPATIENT
Start: 2017-12-20 | End: 2017-12-20 | Stop reason: SURG

## 2017-12-20 RX ORDER — PANTOPRAZOLE SODIUM 40 MG/1
40 INJECTION, POWDER, FOR SOLUTION INTRAVENOUS
Status: DISCONTINUED | OUTPATIENT
Start: 2017-12-21 | End: 2017-12-22 | Stop reason: HOSPADM

## 2017-12-20 RX ORDER — FENTANYL CITRATE 50 UG/ML
50 INJECTION, SOLUTION INTRAMUSCULAR; INTRAVENOUS
Status: DISCONTINUED | OUTPATIENT
Start: 2017-12-20 | End: 2017-12-20 | Stop reason: HOSPADM

## 2017-12-20 RX ORDER — MAGNESIUM HYDROXIDE 1200 MG/15ML
LIQUID ORAL AS NEEDED
Status: DISCONTINUED | OUTPATIENT
Start: 2017-12-20 | End: 2017-12-20 | Stop reason: HOSPADM

## 2017-12-20 RX ORDER — PROMETHAZINE HYDROCHLORIDE 25 MG/ML
12.5 INJECTION, SOLUTION INTRAMUSCULAR; INTRAVENOUS EVERY 4 HOURS PRN
Status: DISCONTINUED | OUTPATIENT
Start: 2017-12-20 | End: 2017-12-20 | Stop reason: HOSPADM

## 2017-12-20 RX ORDER — ACETAMINOPHEN 160 MG/5ML
325 SUSPENSION, ORAL (FINAL DOSE FORM) ORAL EVERY 4 HOURS PRN
Status: DISCONTINUED | OUTPATIENT
Start: 2017-12-20 | End: 2017-12-22 | Stop reason: HOSPADM

## 2017-12-20 RX ORDER — ONDANSETRON 2 MG/ML
4 INJECTION INTRAMUSCULAR; INTRAVENOUS EVERY 6 HOURS PRN
Status: DISCONTINUED | OUTPATIENT
Start: 2017-12-20 | End: 2017-12-20 | Stop reason: HOSPADM

## 2017-12-20 RX ORDER — OXYCODONE HCL 5 MG/5 ML
10 SOLUTION, ORAL ORAL EVERY 4 HOURS PRN
Status: DISCONTINUED | OUTPATIENT
Start: 2017-12-20 | End: 2017-12-22 | Stop reason: HOSPADM

## 2017-12-20 RX ORDER — SODIUM CHLORIDE, SODIUM LACTATE, POTASSIUM CHLORIDE, CALCIUM CHLORIDE 600; 310; 30; 20 MG/100ML; MG/100ML; MG/100ML; MG/100ML
125 INJECTION, SOLUTION INTRAVENOUS CONTINUOUS
Status: DISCONTINUED | OUTPATIENT
Start: 2017-12-20 | End: 2017-12-22

## 2017-12-20 RX ORDER — SODIUM CHLORIDE 9 MG/ML
125 INJECTION, SOLUTION INTRAVENOUS CONTINUOUS
Status: DISCONTINUED | OUTPATIENT
Start: 2017-12-20 | End: 2017-12-20 | Stop reason: SDUPTHER

## 2017-12-20 RX ORDER — HEPARIN SODIUM 5000 [USP'U]/ML
5000 INJECTION, SOLUTION INTRAVENOUS; SUBCUTANEOUS ONCE
Status: COMPLETED | OUTPATIENT
Start: 2017-12-20 | End: 2017-12-20

## 2017-12-20 RX ORDER — MORPHINE SULFATE 4 MG/ML
4 INJECTION, SOLUTION INTRAMUSCULAR; INTRAVENOUS EVERY 2 HOUR PRN
Status: DISCONTINUED | OUTPATIENT
Start: 2017-12-20 | End: 2017-12-22 | Stop reason: HOSPADM

## 2017-12-20 RX ORDER — ONDANSETRON 2 MG/ML
INJECTION INTRAMUSCULAR; INTRAVENOUS AS NEEDED
Status: DISCONTINUED | OUTPATIENT
Start: 2017-12-20 | End: 2017-12-20 | Stop reason: SURG

## 2017-12-20 RX ORDER — LIDOCAINE HYDROCHLORIDE 10 MG/ML
INJECTION, SOLUTION INFILTRATION; PERINEURAL AS NEEDED
Status: DISCONTINUED | OUTPATIENT
Start: 2017-12-20 | End: 2017-12-20 | Stop reason: SURG

## 2017-12-20 RX ADMIN — MORPHINE SULFATE 4 MG: 4 INJECTION, SOLUTION INTRAMUSCULAR; INTRAVENOUS at 21:37

## 2017-12-20 RX ADMIN — FENTANYL CITRATE 100 MCG: 50 INJECTION INTRAMUSCULAR; INTRAVENOUS at 15:27

## 2017-12-20 RX ADMIN — SODIUM CHLORIDE 125 ML/HR: 0.9 INJECTION, SOLUTION INTRAVENOUS at 09:22

## 2017-12-20 RX ADMIN — SODIUM CHLORIDE: 0.9 INJECTION, SOLUTION INTRAVENOUS at 19:21

## 2017-12-20 RX ADMIN — EPHEDRINE SULFATE 15 MG: 50 INJECTION, SOLUTION INTRAMUSCULAR; INTRAVENOUS; SUBCUTANEOUS at 15:33

## 2017-12-20 RX ADMIN — NEOSTIGMINE METHYLSULFATE 4 MG: 1 INJECTION, SOLUTION INTRAMUSCULAR; INTRAVENOUS; SUBCUTANEOUS at 19:21

## 2017-12-20 RX ADMIN — SODIUM CHLORIDE 125 ML/HR: 0.9 INJECTION, SOLUTION INTRAVENOUS at 15:10

## 2017-12-20 RX ADMIN — FENTANYL CITRATE 50 MCG: 50 INJECTION INTRAMUSCULAR; INTRAVENOUS at 17:06

## 2017-12-20 RX ADMIN — HYDROMORPHONE HYDROCHLORIDE 0.5 MG: 2 INJECTION, SOLUTION INTRAMUSCULAR; INTRAVENOUS; SUBCUTANEOUS at 19:38

## 2017-12-20 RX ADMIN — MIDAZOLAM HYDROCHLORIDE 4 MG: 1 INJECTION, SOLUTION INTRAMUSCULAR; INTRAVENOUS at 15:22

## 2017-12-20 RX ADMIN — SODIUM CHLORIDE, SODIUM LACTATE, POTASSIUM CHLORIDE, AND CALCIUM CHLORIDE 125 ML/HR: .6; .31; .03; .02 INJECTION, SOLUTION INTRAVENOUS at 20:31

## 2017-12-20 RX ADMIN — ONDANSETRON HYDROCHLORIDE 4 MG: 2 INJECTION, SOLUTION INTRAVENOUS at 19:21

## 2017-12-20 RX ADMIN — CEFAZOLIN SODIUM 2000 MG: 2 SOLUTION INTRAVENOUS at 15:34

## 2017-12-20 RX ADMIN — HYDROMORPHONE HYDROCHLORIDE 0.5 MG: 1 INJECTION, SOLUTION INTRAMUSCULAR; INTRAVENOUS; SUBCUTANEOUS at 20:27

## 2017-12-20 RX ADMIN — FENTANYL CITRATE 50 MCG: 50 INJECTION INTRAMUSCULAR; INTRAVENOUS at 17:47

## 2017-12-20 RX ADMIN — SCOPALAMINE 1 PATCH: 1 PATCH, EXTENDED RELEASE TRANSDERMAL at 09:08

## 2017-12-20 RX ADMIN — ROCURONIUM BROMIDE 10 MG: 10 INJECTION INTRAVENOUS at 17:47

## 2017-12-20 RX ADMIN — ROCURONIUM BROMIDE 10 MG: 10 INJECTION INTRAVENOUS at 16:34

## 2017-12-20 RX ADMIN — DEXAMETHASONE SODIUM PHOSPHATE 4 MG: 10 INJECTION INTRAMUSCULAR; INTRAVENOUS at 19:21

## 2017-12-20 RX ADMIN — SODIUM CHLORIDE: 0.9 INJECTION, SOLUTION INTRAVENOUS at 17:51

## 2017-12-20 RX ADMIN — EPHEDRINE SULFATE 15 MG: 50 INJECTION, SOLUTION INTRAMUSCULAR; INTRAVENOUS; SUBCUTANEOUS at 15:40

## 2017-12-20 RX ADMIN — FENTANYL CITRATE 50 MCG: 50 INJECTION INTRAMUSCULAR; INTRAVENOUS at 20:16

## 2017-12-20 RX ADMIN — HYDROMORPHONE HYDROCHLORIDE 0.5 MG: 1 INJECTION, SOLUTION INTRAMUSCULAR; INTRAVENOUS; SUBCUTANEOUS at 20:37

## 2017-12-20 RX ADMIN — PROPOFOL 150 MG: 10 INJECTION, EMULSION INTRAVENOUS at 15:27

## 2017-12-20 RX ADMIN — ROCURONIUM BROMIDE 50 MG: 10 INJECTION INTRAVENOUS at 15:28

## 2017-12-20 RX ADMIN — MIDAZOLAM HYDROCHLORIDE 2 MG: 1 INJECTION, SOLUTION INTRAMUSCULAR; INTRAVENOUS at 19:38

## 2017-12-20 RX ADMIN — FENTANYL CITRATE 50 MCG: 50 INJECTION INTRAMUSCULAR; INTRAVENOUS at 20:05

## 2017-12-20 RX ADMIN — SODIUM CHLORIDE: 0.9 INJECTION, SOLUTION INTRAVENOUS at 16:41

## 2017-12-20 RX ADMIN — LIDOCAINE HYDROCHLORIDE 80 MG: 10 INJECTION, SOLUTION INFILTRATION; PERINEURAL at 15:27

## 2017-12-20 RX ADMIN — HEPARIN SODIUM 5000 UNITS: 5000 INJECTION, SOLUTION INTRAVENOUS; SUBCUTANEOUS at 13:28

## 2017-12-20 RX ADMIN — FENTANYL CITRATE 50 MCG: 50 INJECTION INTRAMUSCULAR; INTRAVENOUS at 16:25

## 2017-12-20 RX ADMIN — GLYCOPYRROLATE 0.6 MG: 0.2 INJECTION, SOLUTION INTRAMUSCULAR; INTRAVENOUS at 19:21

## 2017-12-21 ENCOUNTER — APPOINTMENT (INPATIENT)
Dept: RADIOLOGY | Facility: HOSPITAL | Age: 57
DRG: 326 | End: 2017-12-21
Payer: MEDICARE

## 2017-12-21 LAB
ANION GAP SERPL CALCULATED.3IONS-SCNC: 11 MMOL/L (ref 4–13)
BUN SERPL-MCNC: 17 MG/DL (ref 5–25)
CALCIUM SERPL-MCNC: 8.1 MG/DL (ref 8.3–10.1)
CHLORIDE SERPL-SCNC: 110 MMOL/L (ref 100–108)
CO2 SERPL-SCNC: 18 MMOL/L (ref 21–32)
CREAT SERPL-MCNC: 0.81 MG/DL (ref 0.6–1.3)
ERYTHROCYTE [DISTWIDTH] IN BLOOD BY AUTOMATED COUNT: 12.3 % (ref 11.6–15.1)
GFR SERPL CREATININE-BSD FRML MDRD: 81 ML/MIN/1.73SQ M
GLUCOSE SERPL-MCNC: 176 MG/DL (ref 65–140)
HCT VFR BLD AUTO: 33.8 % (ref 34.8–46.1)
HGB BLD-MCNC: 11.3 G/DL (ref 11.5–15.4)
MCH RBC QN AUTO: 33 PG (ref 26.8–34.3)
MCHC RBC AUTO-ENTMCNC: 33.4 G/DL (ref 31.4–37.4)
MCV RBC AUTO: 99 FL (ref 82–98)
PLATELET # BLD AUTO: 148 THOUSANDS/UL (ref 149–390)
PMV BLD AUTO: 13.8 FL (ref 8.9–12.7)
POTASSIUM SERPL-SCNC: 3.7 MMOL/L (ref 3.5–5.3)
RBC # BLD AUTO: 3.42 MILLION/UL (ref 3.81–5.12)
SODIUM SERPL-SCNC: 139 MMOL/L (ref 136–145)
WBC # BLD AUTO: 10.48 THOUSAND/UL (ref 4.31–10.16)

## 2017-12-21 PROCEDURE — C9113 INJ PANTOPRAZOLE SODIUM, VIA: HCPCS | Performed by: SURGERY

## 2017-12-21 PROCEDURE — 74240 X-RAY XM UPR GI TRC 1CNTRST: CPT

## 2017-12-21 PROCEDURE — 80048 BASIC METABOLIC PNL TOTAL CA: CPT | Performed by: SURGERY

## 2017-12-21 PROCEDURE — 85027 COMPLETE CBC AUTOMATED: CPT | Performed by: SURGERY

## 2017-12-21 RX ORDER — LEVOTHYROXINE SODIUM 0.1 MG/1
100 TABLET ORAL
Status: DISCONTINUED | OUTPATIENT
Start: 2017-12-22 | End: 2017-12-22 | Stop reason: HOSPADM

## 2017-12-21 RX ORDER — MORPHINE SULFATE 2 MG/ML
INJECTION, SOLUTION INTRAMUSCULAR; INTRAVENOUS
Status: COMPLETED
Start: 2017-12-21 | End: 2017-12-21

## 2017-12-21 RX ORDER — SUCRALFATE ORAL 1 G/10ML
1000 SUSPENSION ORAL
Status: DISCONTINUED | OUTPATIENT
Start: 2017-12-21 | End: 2017-12-22 | Stop reason: HOSPADM

## 2017-12-21 RX ORDER — OXYBUTYNIN CHLORIDE 5 MG/1
5 TABLET ORAL DAILY
Status: DISCONTINUED | OUTPATIENT
Start: 2017-12-21 | End: 2017-12-22 | Stop reason: HOSPADM

## 2017-12-21 RX ORDER — ALPRAZOLAM 0.5 MG/1
1 TABLET ORAL
Status: DISCONTINUED | OUTPATIENT
Start: 2017-12-21 | End: 2017-12-22 | Stop reason: HOSPADM

## 2017-12-21 RX ADMIN — SUCRALFATE 1000 MG: 1 SUSPENSION ORAL at 21:30

## 2017-12-21 RX ADMIN — SODIUM CHLORIDE 1000 ML: 0.9 INJECTION, SOLUTION INTRAVENOUS at 07:52

## 2017-12-21 RX ADMIN — MORPHINE SULFATE 4 MG: 4 INJECTION, SOLUTION INTRAMUSCULAR; INTRAVENOUS at 11:17

## 2017-12-21 RX ADMIN — METOCLOPRAMIDE 10 MG: 5 INJECTION, SOLUTION INTRAMUSCULAR; INTRAVENOUS at 21:36

## 2017-12-21 RX ADMIN — SODIUM CHLORIDE, SODIUM LACTATE, POTASSIUM CHLORIDE, AND CALCIUM CHLORIDE 125 ML/HR: .6; .31; .03; .02 INJECTION, SOLUTION INTRAVENOUS at 21:26

## 2017-12-21 RX ADMIN — IOHEXOL 40 ML: 300 INJECTION, SOLUTION INTRAVENOUS at 08:44

## 2017-12-21 RX ADMIN — MORPHINE SULFATE 4 MG: 4 INJECTION, SOLUTION INTRAMUSCULAR; INTRAVENOUS at 08:48

## 2017-12-21 RX ADMIN — ACETAMINOPHEN 325 MG: 160 SUSPENSION ORAL at 13:32

## 2017-12-21 RX ADMIN — OXYCODONE HYDROCHLORIDE 10 MG: 5 SOLUTION ORAL at 17:41

## 2017-12-21 RX ADMIN — SODIUM CHLORIDE, SODIUM LACTATE, POTASSIUM CHLORIDE, AND CALCIUM CHLORIDE 125 ML/HR: .6; .31; .03; .02 INJECTION, SOLUTION INTRAVENOUS at 13:15

## 2017-12-21 RX ADMIN — SODIUM CHLORIDE, SODIUM LACTATE, POTASSIUM CHLORIDE, AND CALCIUM CHLORIDE 125 ML/HR: .6; .31; .03; .02 INJECTION, SOLUTION INTRAVENOUS at 04:57

## 2017-12-21 RX ADMIN — MORPHINE SULFATE 4 MG: 4 INJECTION, SOLUTION INTRAMUSCULAR; INTRAVENOUS at 05:32

## 2017-12-21 RX ADMIN — ACETAMINOPHEN 325 MG: 160 SUSPENSION ORAL at 17:39

## 2017-12-21 RX ADMIN — DIAZEPAM 5 MG: 5 INJECTION, SOLUTION INTRAMUSCULAR; INTRAVENOUS at 08:51

## 2017-12-21 RX ADMIN — DIAZEPAM 5 MG: 5 INJECTION, SOLUTION INTRAMUSCULAR; INTRAVENOUS at 21:30

## 2017-12-21 RX ADMIN — OXYCODONE HYDROCHLORIDE 10 MG: 5 SOLUTION ORAL at 21:40

## 2017-12-21 RX ADMIN — OXYBUTYNIN CHLORIDE 5 MG: 5 TABLET ORAL at 13:37

## 2017-12-21 RX ADMIN — METOCLOPRAMIDE 10 MG: 5 INJECTION, SOLUTION INTRAMUSCULAR; INTRAVENOUS at 04:56

## 2017-12-21 RX ADMIN — ACETAMINOPHEN 325 MG: 160 SUSPENSION ORAL at 17:42

## 2017-12-21 RX ADMIN — PANTOPRAZOLE SODIUM 40 MG: 40 INJECTION, POWDER, FOR SOLUTION INTRAVENOUS at 08:51

## 2017-12-21 RX ADMIN — Medication 2 SPRAY: at 04:57

## 2017-12-21 RX ADMIN — OXYCODONE HYDROCHLORIDE 10 MG: 5 SOLUTION ORAL at 13:32

## 2017-12-21 RX ADMIN — MORPHINE SULFATE 2 MG: 2 INJECTION, SOLUTION INTRAMUSCULAR; INTRAVENOUS at 02:27

## 2017-12-21 RX ADMIN — ASCORBIC ACID: 500 INJECTION, SOLUTION INTRAMUSCULAR; INTRAVENOUS; SUBCUTANEOUS at 21:49

## 2017-12-21 RX ADMIN — ACETAMINOPHEN 325 MG: 160 SUSPENSION ORAL at 21:41

## 2017-12-21 RX ADMIN — SUCRALFATE 1000 MG: 1 SUSPENSION ORAL at 16:36

## 2017-12-21 RX ADMIN — METOCLOPRAMIDE 10 MG: 5 INJECTION, SOLUTION INTRAMUSCULAR; INTRAVENOUS at 13:37

## 2017-12-21 NOTE — PLAN OF CARE
Problem: SKIN/TISSUE INTEGRITY - ADULT  Goal: Incision(s), wounds(s) or drain site(s) healing without S/S of infection  INTERVENTIONS  - Assess and document risk factors for skin impairment   - Assess and document dressing, incision, wound bed, drain sites and surrounding tissue  - Initiate Nutrition services consult and/or wound management as needed  Outcome: Progressing

## 2017-12-21 NOTE — OP NOTE
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 [K28 9]  Heartburn [R12]  Bariatric surgery status [Z98 84]    Post-Op Diagnosis Codes:     * Disease of digestive system [K92 9]     * Other diseases of stomach and duodenum [K31 89]     * Gastrojejunal ulcer without hemorrhage or perforation [K28 9]     * Heartburn [R12]     * Bariatric surgery status [Z98 84]    Procedure(s) (LRB):  ESOPHAGOGASTRODUODENOSCOPY (EGD) (N/A)  SUBTOTAL GASTRECTOMY, ESOPHAGEAL JEJUNOSTOMY (N/A)  EXTENSIVE LYSIS ADHESIONS (N/A)    Specimen(s):  ID Type Source Tests Collected by Time Destination   1 : small bowel and stomach Tissue Small Bowel, NOS TISSUE EXAM Nidhi Ardon MD 2017 0742        Estimated Blood Loss:   100 mL    Drains:  Closed/Suction Drain Midline Abdomen Bulb 19 Fr  (Active)   Dressing Status Dry; Intact; New drainage 2017  2:00 AM   Drainage Appearance Serosanguineous 2017  2:00 AM   Status To bulb suction 2017  2:00 AM   Output (mL) 50 mL 2017 12:00 AM   Number of days: 1       [REMOVED] Urethral Catheter Latex;Straight-tip 16 Fr   (Removed)   Removed 17 0711   Site Assessment Clean;Skin intact 2017  7:48 AM   Collection Container Standard drainage bag 2017  7:48 AM   Securement Method Securing device (Describe) 2017  7:48 AM   Output (mL) 100 mL 2017  7:48 AM   Number of days: 1       Anesthesia Type:   * No anesthesia type entered *    Operative Indications:  Disease of digestive system [K92 9]  Other diseases of stomach and duodenum [K31 89]  Gastrojejunal ulcer without hemorrhage or perforation [K28 9]  Heartburn [R12]  Bariatric surgery status [Z98 84]      Operative Findings:  Anastomotic breakdown  Subhepatic abscess  Gastrogastric fistula    Complications:   None    Procedure and Technique:  1  Laparoscopic extensive lysis of adhesions  2  Laparoscopic subtotal gastrectomy  3  Laparoscopic esophagojejunostomy with intraop endoscopy  4  Laparoscopic takedown of gastrogastric fistula    I was present for the entire procedure    Patient Disposition:  hemodynamically stable     Indication for the procedure: The patient is well known to me she is 15-year-old female with history of gastric bypass that was performed in [place] and developed a gastrogastric fistula, almost a year ago we attempted to take the fistula down and ended up re-doing her anastomosis but we were not really able to localize her gastrogastric fistula site, she then developed a  followed by a chronic marginal ulcer and stricture and was managed with medication and parental nutrition  Patient was consented for a redo of her anastomosis and takedown of her fistula  Procedure:  Patient was brought to the operating room and placed in spine position she received a dose of SQ heparin  and IV antibiotics prior to the procedure  Abdominal cavity was accessed using a Veress needle and insufflated to a pressure of 15 mm initially and then the pressure was increased to 20 because of her previous abdominoplasty and lower abdominal wall compliance  Access was obtained in the midline using 12 mm trocar and Optiview technique  Two 12 mm trocars were placed on the right side of the abdominal cavity and 2 other 12 mm trocars were placed on the left side all on the direct visualization  We started by taking the adhesions down there was evidence of dense intra-abdominal adhesions   After taking all the adhesions down the patient was placed in reverse Trendelenburg position there was evidence of a large phlegmon and abscess cavity below the left lobe of the liver, using the Harmonic scalpel and blunt dissection we were able to unroof the abscess cavity and identify the caudate lobe of the liver, this part of the dissection was very tedious and took a significant amount of time because of the absence of anatomical planes and severe inflammatory reaction and scar tissue, after identifying the left lobe of the liver and the caudate lobe we continued our dissection until the entire GJ anastomosis which had previously broken down was dissected from the surrounding tissue  We then mobilized the gastric remnant all the way up to the left keyanna and we also  the remnant from the gastric pouch, of note there was evidence of gastrogastric fistula at the level of the GE junction therefore when the remnant was  from the gastric pouch the fistula was identified and measured around 2 cm in diameter  At that point the decision was made to mobilize  the rest of the gastric pouch and also open the hiatus and mobilize the lower part of the esophagus  Given the presence of the fistula site at the level of the GE junction and the fact that the gastric pouch itself was severely inflamed and markedly thickened  with no healthy viable tissue I did not feel that the gastric pouch can be preserved to be able to perform a gastrojejunostomy while excluding the site of the gastrogastric fistula so I decided to perform an esophagojejunostomy instead  The left gastric bundle was taken down with a 45 stapler using vascular load, right wrist and left keyanna were dissected away from the esophagus lower part of the esophagus was dissected circumferential fashion and then the lower esophagus was stapled with a 60 mm purple load  At that point the retrogastric Naheed limb was dissected from the surrounding adhesions and freed to be able to reach esophagus without any tension   And 21 over was brought down through the mouth and positioned in the lower part of the esophagus after making an esophagotomy  Using the open end of the Naheed limb a 21 circular stapler was brought thru the left side of the abdominal wall through the enterotomy into the Naheed limb and the spike brought thru the anti-mesenteric side of the small bowel and connected in a [facility] f[facility]  the 21 envil  At that point  the esophagojejunostomy was created, the mesentery of the overhang was taken down with the Harmonic scalpel and then the overhang transected using a 60 mm white load  The anastomosis was reinforced with multiple separate interrupted Lembert sutures using 2 0 Vicryl sutures, the lower part of the esophagus was also fixated to the right keyanna and left keyanna respectively using 2 0 Vicryl sutures and the Naheed limb was sutured to the retroperitoneum to take any tension off the anastomosis  Bowel clamp was placed around the Naheed limb upper endoscopy was performed was no evidence of bleeding and no evidence of bubbles to suggest any leak  The stomach and small bowel specimen were removed using an Endo-Catch bag abdominal cavity was irrigated surgical site was hemostatic, 15 [place] Jus drain was left below the left lobe of the liver for drainage and secured to skin with 2 nylon suture  All the 12 mm trocars were closed using #1  Vicryl suture using suture Passer  Skin edges were approximated using 4-0 Monocryl  Patient was extubated and transferred to step-down unit in stable condition      SIGNATURE: Suellen Juarez MD  DATE: December 21, 2017  TIME: 8:53 AM

## 2017-12-21 NOTE — PLAN OF CARE
Problem: GASTROINTESTINAL - ADULT  Goal: Maintains adequate nutritional intake  INTERVENTIONS:  - Monitor percentage of each meal consumed  - Identify factors contributing to decreased intake, treat as appropriate  - Assist with meals as needed  - Monitor I&O, WT and lab values  - Obtain nutrition services referral as needed  Outcome: Not Progressing

## 2017-12-21 NOTE — PLAN OF CARE
Problem: METABOLIC, FLUID AND ELECTROLYTES - ADULT  Goal: Electrolytes maintained within normal limits  INTERVENTIONS:  - Monitor labs and assess patient for signs and symptoms of electrolyte imbalances  - Administer electrolyte replacement as ordered  - Monitor response to electrolyte replacements, including repeat lab results as appropriate  - Instruct patient on fluid and nutrition as appropriate  Outcome: Progressing

## 2017-12-21 NOTE — INTERIM OP NOTE
ESOPHAGOGASTRODUODENOSCOPY (EGD), SUBTOTAL GASTRECTOMY, ESOPHAGEAL JEJUNOSTOMY, EXTENSIVE LYSIS ADHESIONS  Postoperative Note  PATIENT NAME: Jeanette Mcclelland  : 1960  MRN: [id number]  [facility]    Surgery Date: 2017    Preop Diagnosis:  Disease of digestive system [K92 9]  Other diseases of stomach and duodenum [K31 89]  Gastrojejunal ulcer without hemorrhage or perforation [K28 9]  Heartburn [R12]  Bariatric surgery status [Z98 84]    Post-Op Diagnosis Codes:     * Disease of digestive system [K92 9]     * Other diseases of stomach and duodenum [K31 89]     * Gastrojejunal ulcer without hemorrhage or perforation [K28 9]     * Heartburn [R12]     * Bariatric surgery status [Z98 84]    Procedure(s) (LRB):  ESOPHAGOGASTRODUODENOSCOPY (EGD) (N/A)  SUBTOTAL GASTRECTOMY, ESOPHAGEAL JEJUNOSTOMY (N/A)  EXTENSIVE LYSIS ADHESIONS (N/A)    Surgeon(s) and Role:     * Jeb Levine MD - Fellow     * Mindy Marcial MD - Primary    Specimens:  ID Type Source Tests Collected by Time Destination   1 : small bowel and stomach Tissue Small Bowel, NOS TISSUE EXAM Mindy Marcial MD 2017 1843        Estimated Blood Loss:   100 mL    Anesthesia Type:   * No anesthesia type entered *     Findings:   Severe scar tissue involving gastric remnant, pouch, and anastomosis requiring excision of affected tissue  New anastomosis performed without tension and incorporating healthy tissue      Complications:   None    SIGNATURE: Jeb Levine MD   DATE: 2017   TIME: 7:37 AM

## 2017-12-21 NOTE — PLAN OF CARE
CARDIOVASCULAR - ADULT     Maintains optimal cardiac output and hemodynamic stability Progressing     Absence of cardiac dysrhythmias or at baseline rhythm Progressing        GASTROINTESTINAL - ADULT     Maintains adequate nutritional intake Progressing     Minimal or absence of nausea and/or vomiting Progressing     Maintains or returns to baseline bowel function Progressing        GENITOURINARY - ADULT     Maintains or returns to baseline urinary function Progressing     Absence of urinary retention Progressing        HEMATOLOGIC - ADULT     Maintains hematologic stability Progressing        METABOLIC, FLUID AND ELECTROLYTES - ADULT     Electrolytes maintained within normal limits Progressing     Fluid balance maintained Progressing        MUSCULOSKELETAL - ADULT     Maintain or return mobility to safest level of function Progressing     Maintain proper alignment of affected body part Progressing        Nutrition/Hydration-ADULT     Nutrient/Hydration intake appropriate for improving, restoring or maintaining nutritional needs Progressing        PAIN - ADULT     Verbalizes/displays adequate comfort level or baseline comfort level Progressing        Potential for Falls     Patient will remain free of falls Progressing        Prexisting or High Potential for Compromised Skin Integrity     Skin integrity is maintained or improved Progressing        RESPIRATORY - ADULT     Achieves optimal ventilation and oxygenation Progressing        SKIN/TISSUE INTEGRITY - ADULT     Incision(s), wounds(s) or drain site(s) healing without S/S of infection Progressing     Skin integrity remains intact Progressing     Oral mucous membranes remain intact Progressing

## 2017-12-21 NOTE — CASE MANAGEMENT
Initial Clinical Review    Age/Sex: 62 y o  female    Surgery Date:   12/21/2017    Procedure:   Preop Diagnosis:  Disease of digestive system [K92 9]  Other diseases of stomach and duodenum [K31 89]  Gastrojejunal ulcer without hemorrhage or perforation [K28 9]  Heartburn [R12]  Bariatric surgery status [Z98 84]     Post-Op Diagnosis Codes:     * Disease of digestive system [K92 9]     * Other diseases of stomach and duodenum [K31 89]     * Gastrojejunal ulcer without hemorrhage or perforation [K28 9]     * Heartburn [R12]     * Bariatric surgery status [Z98 84]     Procedure(s) (LRB):  ESOPHAGOGASTRODUODENOSCOPY (EGD) (N/A)  SUBTOTAL GASTRECTOMY, ESOPHAGEAL JEJUNOSTOMY (N/A)  EXTENSIVE LYSIS ADHESIONS (N/A)    Anesthesia:    No type  entered    Admission Orders: Date/Time/Statement: 12/20/17 @ 1952     Orders Placed This Encounter   Procedures    Inpatient Admission     Standing Status:   Standing     Number of Occurrences:   1     Order Specific Question:   Admitting Physician     Answer:   PRANEETH Lynn [[id number]]     Order Specific Question:   Level of Care     Answer:   Med Surg [16]     Order Specific Question:   Estimated length of stay     Answer:   More than 2 Midnights     Order Specific Question:   Certification     Answer:   I certify that inpatient services are medically necessary for this patient for a duration of greater than two midnights  See H&P and MD Progress Notes for additional information about the patient's course of treatment  Vital Signs: /75   Pulse 86   Temp 97 8 °F (36 6 °C) (Temporal)   Resp 18   Ht 5' 2" (1 575 m)   Wt 92 5 kg (203 lb 14 8 oz)   SpO2 97%   BMI 37 30 kg/m²     Diet:        Diet Orders            Start     Ordered    12/20/17 2121  Diet NPO; Ice chips  Diet effective now     Question Answer Comment   Diet Type NPO    NPO Except: Ice chips    RD to adjust diet per protocol?  No        12/20/17 2120          Mobility:   As porsche    DVT Prophylaxis: SCD'S    Pain Control:   Pain Medications             OxyCODONE HCl ER (OxyCONTIN) 30 MG T12A Take 30 mg by mouth every 12 (twelve) hours      PROGRESS  NOTE    12/21    Upper GI negative for leak     Assessment  57F POD 1 s/p subtotal gastrectomy, excision of gastro-jejunostomy, creation of esophago-jejunostomy     Plan  - step-down to floor  - keep NPO except meds  - continue IVF  - remove zepeda catheter  - start TPN per home regimen today  - encourage incentive spirometry, ambulation, clear liquids    Post  Op    Orders  IV  MSO4  PRN (  X 2  12/21  Thus far)   NPO  UGI

## 2017-12-21 NOTE — PROGRESS NOTES
Bariatric Surgery Progress Note    Subjective  No adverse events  Advancing ambulation, spirometry  Pain/nausea control adequate      Objective  Afebrile  HR 60-90  -120    Drain 170 cc serosanguinous    NAD, alert  Normal inspiratory effort  Abdomen soft, non-distended, appropriately tender  Incisions c/d/i  Skin warm, dry    Labs  Cr 0 8 (baseline 0 6)  WBC 10 48  H/H 11 3/33 8    Upper GI negative for leak    Assessment  57F POD 1 s/p subtotal gastrectomy, excision of gastro-jejunostomy, creation of esophago-jejunostomy    Plan  - step-down to floor  - keep NPO except meds  - continue IVF  - remove zepeda catheter  - start TPN per home regimen today  - encourage incentive spirometry, ambulation

## 2017-12-21 NOTE — PROGRESS NOTES
Verified with Dr Johny Lombardi to keep pt NPO except meds with sips of water  [id number] Lisset Benitez for few ice chips intermittently  Pt to restart TPN on 12/21/2017 at 21:00

## 2017-12-22 ENCOUNTER — APPOINTMENT (INPATIENT)
Dept: RADIOLOGY | Facility: HOSPITAL | Age: 57
DRG: 326 | End: 2017-12-22
Payer: MEDICARE

## 2017-12-22 VITALS
HEIGHT: 62 IN | TEMPERATURE: 100.1 F | RESPIRATION RATE: 18 BRPM | SYSTOLIC BLOOD PRESSURE: 93 MMHG | WEIGHT: 203.93 LBS | OXYGEN SATURATION: 96 % | BODY MASS INDEX: 37.53 KG/M2 | DIASTOLIC BLOOD PRESSURE: 50 MMHG | HEART RATE: 87 BPM

## 2017-12-22 LAB
ALBUMIN SERPL BCP-MCNC: 2 G/DL (ref 3.5–5)
ALP SERPL-CCNC: 88 U/L (ref 46–116)
ALT SERPL W P-5'-P-CCNC: 30 U/L (ref 12–78)
ANION GAP SERPL CALCULATED.3IONS-SCNC: 11 MMOL/L (ref 4–13)
AST SERPL W P-5'-P-CCNC: 35 U/L (ref 5–45)
BASOPHILS # BLD AUTO: 0.02 THOUSANDS/ΜL (ref 0–0.1)
BASOPHILS NFR BLD AUTO: 0 % (ref 0–1)
BILIRUB SERPL-MCNC: 0.57 MG/DL (ref 0.2–1)
BUN SERPL-MCNC: 12 MG/DL (ref 5–25)
CALCIUM SERPL-MCNC: 8 MG/DL (ref 8.3–10.1)
CHLORIDE SERPL-SCNC: 108 MMOL/L (ref 100–108)
CHOLEST SERPL-MCNC: 79 MG/DL (ref 50–200)
CO2 SERPL-SCNC: 21 MMOL/L (ref 21–32)
CREAT SERPL-MCNC: 0.69 MG/DL (ref 0.6–1.3)
EOSINOPHIL # BLD AUTO: 0.2 THOUSAND/ΜL (ref 0–0.61)
EOSINOPHIL NFR BLD AUTO: 2 % (ref 0–6)
ERYTHROCYTE [DISTWIDTH] IN BLOOD BY AUTOMATED COUNT: 12.4 % (ref 11.6–15.1)
GFR SERPL CREATININE-BSD FRML MDRD: 97 ML/MIN/1.73SQ M
GLUCOSE SERPL-MCNC: 117 MG/DL (ref 65–140)
HCT VFR BLD AUTO: 29.2 % (ref 34.8–46.1)
HCT VFR BLD AUTO: 29.5 % (ref 34.8–46.1)
HDLC SERPL-MCNC: 28 MG/DL (ref 40–60)
HGB BLD-MCNC: 9.8 G/DL (ref 11.5–15.4)
HGB BLD-MCNC: 9.9 G/DL (ref 11.5–15.4)
LDLC SERPL CALC-MCNC: 41 MG/DL (ref 0–100)
LYMPHOCYTES # BLD AUTO: 1.24 THOUSANDS/ΜL (ref 0.6–4.47)
LYMPHOCYTES NFR BLD AUTO: 13 % (ref 14–44)
MAGNESIUM SERPL-MCNC: 1.5 MG/DL (ref 1.6–2.6)
MCH RBC QN AUTO: 33.1 PG (ref 26.8–34.3)
MCHC RBC AUTO-ENTMCNC: 33.6 G/DL (ref 31.4–37.4)
MCV RBC AUTO: 99 FL (ref 82–98)
MONOCYTES # BLD AUTO: 0.56 THOUSAND/ΜL (ref 0.17–1.22)
MONOCYTES NFR BLD AUTO: 6 % (ref 4–12)
NEUTROPHILS # BLD AUTO: 7.32 THOUSANDS/ΜL (ref 1.85–7.62)
NEUTS SEG NFR BLD AUTO: 79 % (ref 43–75)
NRBC BLD AUTO-RTO: 0 /100 WBCS
PHOSPHATE SERPL-MCNC: 2.4 MG/DL (ref 2.7–4.5)
PLATELET # BLD AUTO: 154 THOUSANDS/UL (ref 149–390)
PMV BLD AUTO: 13.7 FL (ref 8.9–12.7)
POTASSIUM SERPL-SCNC: 3 MMOL/L (ref 3.5–5.3)
PREALB SERPL-MCNC: 7.1 MG/DL (ref 18–40)
PROT SERPL-MCNC: 5.5 G/DL (ref 6.4–8.2)
RBC # BLD AUTO: 2.99 MILLION/UL (ref 3.81–5.12)
SODIUM SERPL-SCNC: 140 MMOL/L (ref 136–145)
TRIGL SERPL-MCNC: 49 MG/DL
WBC # BLD AUTO: 9.34 THOUSAND/UL (ref 4.31–10.16)

## 2017-12-22 PROCEDURE — 85018 HEMOGLOBIN: CPT | Performed by: PHYSICIAN ASSISTANT

## 2017-12-22 PROCEDURE — 84134 ASSAY OF PREALBUMIN: CPT | Performed by: PHYSICIAN ASSISTANT

## 2017-12-22 PROCEDURE — 84100 ASSAY OF PHOSPHORUS: CPT | Performed by: PHYSICIAN ASSISTANT

## 2017-12-22 PROCEDURE — 80061 LIPID PANEL: CPT | Performed by: PHYSICIAN ASSISTANT

## 2017-12-22 PROCEDURE — 83735 ASSAY OF MAGNESIUM: CPT | Performed by: PHYSICIAN ASSISTANT

## 2017-12-22 PROCEDURE — 71010 HB CHEST X-RAY 1 VIEW FRONTAL: CPT

## 2017-12-22 PROCEDURE — 80053 COMPREHEN METABOLIC PANEL: CPT | Performed by: PHYSICIAN ASSISTANT

## 2017-12-22 PROCEDURE — 85025 COMPLETE CBC W/AUTO DIFF WBC: CPT | Performed by: PHYSICIAN ASSISTANT

## 2017-12-22 PROCEDURE — C9113 INJ PANTOPRAZOLE SODIUM, VIA: HCPCS | Performed by: SURGERY

## 2017-12-22 PROCEDURE — 85014 HEMATOCRIT: CPT | Performed by: PHYSICIAN ASSISTANT

## 2017-12-22 RX ORDER — OXYCODONE HCL 5 MG/5 ML
5 SOLUTION, ORAL ORAL EVERY 4 HOURS PRN
Qty: 100 ML | Refills: 0 | Status: SHIPPED | OUTPATIENT
Start: 2017-12-22 | End: 2018-04-26 | Stop reason: SDUPTHER

## 2017-12-22 RX ORDER — POTASSIUM CHLORIDE 20MEQ/15ML
20 LIQUID (ML) ORAL ONCE
Status: COMPLETED | OUTPATIENT
Start: 2017-12-22 | End: 2017-12-22

## 2017-12-22 RX ORDER — ACETAMINOPHEN 160 MG/5ML
320 SUSPENSION, ORAL (FINAL DOSE FORM) ORAL EVERY 4 HOURS PRN
Qty: 237 ML | Refills: 0 | Status: SHIPPED | OUTPATIENT
Start: 2017-12-22

## 2017-12-22 RX ORDER — MAGNESIUM SULFATE HEPTAHYDRATE 40 MG/ML
2 INJECTION, SOLUTION INTRAVENOUS ONCE
Status: COMPLETED | OUTPATIENT
Start: 2017-12-22 | End: 2017-12-22

## 2017-12-22 RX ADMIN — OXYBUTYNIN CHLORIDE 5 MG: 5 TABLET ORAL at 09:39

## 2017-12-22 RX ADMIN — SUCRALFATE 1000 MG: 1 SUSPENSION ORAL at 15:27

## 2017-12-22 RX ADMIN — PANTOPRAZOLE SODIUM 40 MG: 40 INJECTION, POWDER, FOR SOLUTION INTRAVENOUS at 09:41

## 2017-12-22 RX ADMIN — LEVOTHYROXINE SODIUM 100 MCG: 100 TABLET ORAL at 06:29

## 2017-12-22 RX ADMIN — SUCRALFATE 1000 MG: 1 SUSPENSION ORAL at 11:55

## 2017-12-22 RX ADMIN — POTASSIUM CHLORIDE 20 MEQ: 20 SOLUTION ORAL at 09:39

## 2017-12-22 RX ADMIN — SUCRALFATE 1000 MG: 1 SUSPENSION ORAL at 06:29

## 2017-12-22 RX ADMIN — MAGNESIUM SULFATE HEPTAHYDRATE 2 G: 40 INJECTION, SOLUTION INTRAVENOUS at 09:31

## 2017-12-22 RX ADMIN — ACETAMINOPHEN 325 MG: 160 SUSPENSION ORAL at 13:34

## 2017-12-22 RX ADMIN — OXYCODONE HYDROCHLORIDE 10 MG: 5 SOLUTION ORAL at 04:32

## 2017-12-22 RX ADMIN — POTASSIUM PHOSPHATE, MONOBASIC AND POTASSIUM PHOSPHATE, DIBASIC 12 MMOL: 224; 236 INJECTION, SOLUTION INTRAVENOUS at 12:00

## 2017-12-22 RX ADMIN — DIAZEPAM 5 MG: 5 INJECTION, SOLUTION INTRAMUSCULAR; INTRAVENOUS at 09:39

## 2017-12-22 RX ADMIN — METOCLOPRAMIDE 10 MG: 5 INJECTION, SOLUTION INTRAMUSCULAR; INTRAVENOUS at 13:40

## 2017-12-22 RX ADMIN — OXYCODONE HYDROCHLORIDE 10 MG: 5 SOLUTION ORAL at 13:34

## 2017-12-22 RX ADMIN — METOCLOPRAMIDE 10 MG: 5 INJECTION, SOLUTION INTRAMUSCULAR; INTRAVENOUS at 06:29

## 2017-12-22 NOTE — DISCHARGE SUMMARY
Discharge Summary - Cathy Johnson 62 y o  female MRN: [id number]    Unit/Bed#: E5 -01 Encounter: [id number]    Admission Date: 12/20/2017     Discharge Date: 12/22/17    Admitting Diagnosis: Disease of digestive system [K92 9]  Other diseases of stomach and duodenum [K31 89]  Gastrojejunal ulcer without hemorrhage or perforation [K28 9]  Heartburn [R12]  Bariatric surgery status [Z98 84]    Secondary Diagnosis:   Past Medical History:   Diagnosis Date    Anxiety     Back pain     Back pain at L4-L5 level     Chronic pain disorder     back    DDD (degenerative disc disease), lumbar     Depression     Edema of both legs     Fall at home     Full dentures     GERD (gastroesophageal reflux disease)     History of gastric ulcer     "Years ago    History of heartburn     Hypothyroid     Hypothyroidism    Morbid obesity (HCC)     Motion sickness     Numbness and tingling in both hands     Numbness and tingling of both feet     PICC (peripherally inserted central catheter) in place     Right arm    PONV (postoperative nausea and vomiting)     Postgastrectomy malabsorption     Sciatica     Shortness of breath     Skin abnormality     Edema BLE- uses boots at home  skin on shins dark and rough    Stress incontinence     Stricture esophagus     Use of cane as ambulatory aid     Wears glasses        Discharge Diagnosis: Same    Procedures Performed: Procedure(s):  ESOPHAGOGASTRODUODENOSCOPY (EGD)  SUBTOTAL GASTRECTOMY, ESOPHAGEAL JEJUNOSTOMY  EXTENSIVE LYSIS ADHESIONS    Consults: none    Hospital Course: Patient with gastrojejunal stricture was admitted to the hospital on 12/20/2017 for elective Procedure(s):  ESOPHAGOGASTRODUODENOSCOPY (EGD)  SUBTOTAL GASTRECTOMY, ESOPHAGEAL JEJUNOSTOMY  EXTENSIVE LYSIS ADHESIONS  Postoperatively, the patient was stable and transferred to the [address] for further observation  Postop day one, she underwent UGI which was negative for leak   Her TPN was restarted  By POD #2 the patient was doing well and tolerating po medications  The patient was ambulating without assistance, vital signs and lab work were stable  The patient was cleared for discharge on 12/22/17  Disposition: The patient should follow up with Ava Ulrich MD in 1 week for a post op check and with Tristin Orozco MD as needed for medical management  The patient should refer to the handout "Discharge Instructions" for further information  She will be on TPN with limited oral intake until seen in office  Call physician for temperature over 101, wound redness or discharge, vomiting, or intolerance to diet  Discharge Medications:  See after visit summary for reconciled discharge medications provided to patient and family  This text is generated with voice recognition software  There may be translation, syntax,  or grammatical errors  If you have any questions, please contact the dictating provider

## 2017-12-22 NOTE — NURSING NOTE
Pt educated on discharge medication regimen  Given scripts for pain medications  Discussed follow up appointment and when to notify physician  Pt denied questions/concerns at this time  Will continue to monitor until discharge

## 2017-12-22 NOTE — DISCHARGE INSTRUCTIONS
Bariatric/Weight Loss Surgery  Hospital Discharge Instructions  1  ACTIVITY:  a  Progress as feels comfortable - a good rule is:  if you are doing something and it begins to hurt, stop doing the activity  Walk at least 3 times per day at home  b  Ryan Baez may walk stairs if you do so slowly  c  You may shower 48 hours after surgery  d  Use your incentive spirometer 10 times per hour while awake for 1 week  e  No driving if you are still taking certain prescription pain medication  Examples of such medication are Percocet, Darvocet, Oxycodone, Tylenol #3, and Tylenol with Codeine  Follow your pharmacists orders  2  DIET  a  Nothing by mouth except meds with sips of liquids until seen in office on Jan 4th  3  MEDICATIONS:  a  Start vitamins and minerals when you get home  b  Pain and Anti-acid Medication as per prescription  c  Other medications as indicated on the Physician Patient Discharge Instructions form given to you at the time of discharge  d  Make sure that you are splitting your pill or tablet medications in halves or fourths or even crushing them before you take them  Capsules should be opened and mixed with water or jello  You need to do this for at least 2 to 4 weeks after surgery  Eventually you will be able to take your medications the regular way as they were prescribed  Ask your nurse prior to discharge about your medications  e  Ryan Baez will need to consult with your Family Doctor in regards to all your prescribed medication, particularly those for blood pressure and diabetes  As you lose weight, medical conditions may change, requiring an alteration or elimination of the drug dose  4  INCISION CARE  a  You may shower and get incisions wet 2 days after surgery  b  If you have a drain, empty the drain as the nurses instructed  5  FOLLOW-UP APPOINTMENT should be made for one week after discharge  Call surgeons office at [phone] to schedule an appointment      6  CALL YOUR DOCTOR FOR: pain not controlled by pain medications, a temperature greater than 101 5° F, any increase or change in drainage or redness from any incision, any vomiting or inability to keep liquids down, shortness of breath, shoulder pain, or bleeding

## 2017-12-29 ENCOUNTER — LAB REQUISITION (OUTPATIENT)
Dept: LAB | Facility: HOSPITAL | Age: 57
End: 2017-12-29
Payer: MEDICARE

## 2017-12-29 DIAGNOSIS — E46 PROTEIN-CALORIE MALNUTRITION (HCC): ICD-10-CM

## 2017-12-29 DIAGNOSIS — Z98.84 BARIATRIC SURGERY STATUS: ICD-10-CM

## 2017-12-29 DIAGNOSIS — Z45.2 ENCOUNTER FOR ADJUSTMENT OR MANAGEMENT OF VASCULAR ACCESS DEVICE: ICD-10-CM

## 2017-12-29 LAB
ALBUMIN SERPL BCP-MCNC: 2.5 G/DL (ref 3.5–5)
ALP SERPL-CCNC: 289 U/L (ref 46–116)
ALT SERPL W P-5'-P-CCNC: 109 U/L (ref 12–78)
ANION GAP SERPL CALCULATED.3IONS-SCNC: 9 MMOL/L (ref 4–13)
AST SERPL W P-5'-P-CCNC: 63 U/L (ref 5–45)
BILIRUB SERPL-MCNC: 0.42 MG/DL (ref 0.2–1)
BUN SERPL-MCNC: 19 MG/DL (ref 5–25)
CALCIUM SERPL-MCNC: 8.1 MG/DL (ref 8.3–10.1)
CHLORIDE SERPL-SCNC: 103 MMOL/L (ref 100–108)
CHOLEST SERPL-MCNC: 111 MG/DL (ref 50–200)
CO2 SERPL-SCNC: 26 MMOL/L (ref 21–32)
CREAT SERPL-MCNC: 0.46 MG/DL (ref 0.6–1.3)
GFR SERPL CREATININE-BSD FRML MDRD: 111 ML/MIN/1.73SQ M
GLUCOSE SERPL-MCNC: 119 MG/DL (ref 65–140)
HDLC SERPL-MCNC: 29 MG/DL (ref 40–60)
LDLC SERPL CALC-MCNC: 64 MG/DL (ref 0–100)
MAGNESIUM SERPL-MCNC: 2.2 MG/DL (ref 1.6–2.6)
PHOSPHATE SERPL-MCNC: 3.6 MG/DL (ref 2.7–4.5)
POTASSIUM SERPL-SCNC: 4.2 MMOL/L (ref 3.5–5.3)
PREALB SERPL-MCNC: 10.9 MG/DL (ref 18–40)
PROT SERPL-MCNC: 6.5 G/DL (ref 6.4–8.2)
SODIUM SERPL-SCNC: 138 MMOL/L (ref 136–145)
TRIGL SERPL-MCNC: 88 MG/DL

## 2017-12-29 PROCEDURE — 84134 ASSAY OF PREALBUMIN: CPT | Performed by: SURGERY

## 2017-12-29 PROCEDURE — 80061 LIPID PANEL: CPT | Performed by: SURGERY

## 2017-12-29 PROCEDURE — 80053 COMPREHEN METABOLIC PANEL: CPT | Performed by: SURGERY

## 2017-12-29 PROCEDURE — 84100 ASSAY OF PHOSPHORUS: CPT | Performed by: SURGERY

## 2017-12-29 PROCEDURE — 83735 ASSAY OF MAGNESIUM: CPT | Performed by: SURGERY

## 2018-01-02 ENCOUNTER — LAB REQUISITION (OUTPATIENT)
Dept: LAB | Facility: HOSPITAL | Age: 58
End: 2018-01-02
Payer: MEDICARE

## 2018-01-02 DIAGNOSIS — Z48.815 ENCOUNTER FOR SURGICAL AFTERCARE FOLLOWING SURGERY OF DIGESTIVE SYSTEM: ICD-10-CM

## 2018-01-02 DIAGNOSIS — Z51.81 ENCOUNTER FOR THERAPEUTIC DRUG LEVEL MONITORING: ICD-10-CM

## 2018-01-02 DIAGNOSIS — Z98.84 BARIATRIC SURGERY STATUS: ICD-10-CM

## 2018-01-02 DIAGNOSIS — Z79.899 OTHER LONG TERM (CURRENT) DRUG THERAPY: ICD-10-CM

## 2018-01-02 DIAGNOSIS — E46 PROTEIN-CALORIE MALNUTRITION (HCC): ICD-10-CM

## 2018-01-02 LAB
ALBUMIN SERPL BCP-MCNC: 2.6 G/DL (ref 3.5–5)
ALP SERPL-CCNC: 316 U/L (ref 46–116)
ALT SERPL W P-5'-P-CCNC: 138 U/L (ref 12–78)
ANION GAP SERPL CALCULATED.3IONS-SCNC: 13 MMOL/L (ref 4–13)
AST SERPL W P-5'-P-CCNC: 84 U/L (ref 5–45)
BILIRUB SERPL-MCNC: 0.6 MG/DL (ref 0.2–1)
BUN SERPL-MCNC: 27 MG/DL (ref 5–25)
CALCIUM SERPL-MCNC: 8.4 MG/DL (ref 8.3–10.1)
CHLORIDE SERPL-SCNC: 97 MMOL/L (ref 100–108)
CHOLEST SERPL-MCNC: 99 MG/DL (ref 50–200)
CO2 SERPL-SCNC: 23 MMOL/L (ref 21–32)
CREAT SERPL-MCNC: 0.63 MG/DL (ref 0.6–1.3)
GFR SERPL CREATININE-BSD FRML MDRD: 100 ML/MIN/1.73SQ M
GLUCOSE SERPL-MCNC: 95 MG/DL (ref 65–140)
HDLC SERPL-MCNC: 36 MG/DL (ref 40–60)
LDLC SERPL CALC-MCNC: 55 MG/DL (ref 0–100)
MAGNESIUM SERPL-MCNC: 1.9 MG/DL (ref 1.6–2.6)
PHOSPHATE SERPL-MCNC: 4.1 MG/DL (ref 2.7–4.5)
POTASSIUM SERPL-SCNC: 4.5 MMOL/L (ref 3.5–5.3)
PREALB SERPL-MCNC: 14.9 MG/DL (ref 18–40)
PROT SERPL-MCNC: 6.6 G/DL (ref 6.4–8.2)
SODIUM SERPL-SCNC: 133 MMOL/L (ref 136–145)
TRIGL SERPL-MCNC: 40 MG/DL

## 2018-01-02 PROCEDURE — 83735 ASSAY OF MAGNESIUM: CPT | Performed by: SURGERY

## 2018-01-02 PROCEDURE — 80061 LIPID PANEL: CPT | Performed by: SURGERY

## 2018-01-02 PROCEDURE — 80053 COMPREHEN METABOLIC PANEL: CPT | Performed by: SURGERY

## 2018-01-02 PROCEDURE — 84100 ASSAY OF PHOSPHORUS: CPT | Performed by: SURGERY

## 2018-01-02 PROCEDURE — 84134 ASSAY OF PREALBUMIN: CPT | Performed by: SURGERY

## 2018-01-04 ENCOUNTER — GENERIC CONVERSION - ENCOUNTER (OUTPATIENT)
Dept: OTHER | Facility: OTHER | Age: 58
End: 2018-01-04

## 2018-01-05 ENCOUNTER — GENERIC CONVERSION - ENCOUNTER (OUTPATIENT)
Dept: OTHER | Facility: OTHER | Age: 58
End: 2018-01-05

## 2018-01-09 ENCOUNTER — LAB REQUISITION (OUTPATIENT)
Dept: LAB | Facility: HOSPITAL | Age: 58
End: 2018-01-09
Payer: MEDICARE

## 2018-01-09 DIAGNOSIS — E46 PROTEIN-CALORIE MALNUTRITION (HCC): ICD-10-CM

## 2018-01-09 DIAGNOSIS — Z51.81 ENCOUNTER FOR THERAPEUTIC DRUG LEVEL MONITORING: ICD-10-CM

## 2018-01-09 DIAGNOSIS — Z98.84 BARIATRIC SURGERY STATUS: ICD-10-CM

## 2018-01-09 DIAGNOSIS — Z48.815 ENCOUNTER FOR SURGICAL AFTERCARE FOLLOWING SURGERY OF DIGESTIVE SYSTEM: ICD-10-CM

## 2018-01-09 DIAGNOSIS — Z79.899 OTHER LONG TERM (CURRENT) DRUG THERAPY: ICD-10-CM

## 2018-01-09 LAB
ALBUMIN SERPL BCP-MCNC: 2.6 G/DL (ref 3.5–5)
ALP SERPL-CCNC: 419 U/L (ref 46–116)
ALT SERPL W P-5'-P-CCNC: 180 U/L (ref 12–78)
ANION GAP SERPL CALCULATED.3IONS-SCNC: 11 MMOL/L (ref 4–13)
AST SERPL W P-5'-P-CCNC: 63 U/L (ref 5–45)
BILIRUB SERPL-MCNC: 0.49 MG/DL (ref 0.2–1)
BUN SERPL-MCNC: 26 MG/DL (ref 5–25)
CALCIUM SERPL-MCNC: 8.6 MG/DL (ref 8.3–10.1)
CHLORIDE SERPL-SCNC: 104 MMOL/L (ref 100–108)
CHOLEST SERPL-MCNC: 108 MG/DL (ref 50–200)
CO2 SERPL-SCNC: 23 MMOL/L (ref 21–32)
CREAT SERPL-MCNC: 0.54 MG/DL (ref 0.6–1.3)
GFR SERPL CREATININE-BSD FRML MDRD: 105 ML/MIN/1.73SQ M
GLUCOSE SERPL-MCNC: 100 MG/DL (ref 65–140)
HDLC SERPL-MCNC: 26 MG/DL (ref 40–60)
LDLC SERPL CALC-MCNC: 71 MG/DL (ref 0–100)
MAGNESIUM SERPL-MCNC: 2.1 MG/DL (ref 1.6–2.6)
PHOSPHATE SERPL-MCNC: 4.6 MG/DL (ref 2.7–4.5)
POTASSIUM SERPL-SCNC: 4.1 MMOL/L (ref 3.5–5.3)
PREALB SERPL-MCNC: 9.3 MG/DL (ref 18–40)
PROT SERPL-MCNC: 6.9 G/DL (ref 6.4–8.2)
SODIUM SERPL-SCNC: 138 MMOL/L (ref 136–145)
TRIGL SERPL-MCNC: 54 MG/DL

## 2018-01-09 PROCEDURE — 84100 ASSAY OF PHOSPHORUS: CPT | Performed by: SURGERY

## 2018-01-09 PROCEDURE — 83735 ASSAY OF MAGNESIUM: CPT | Performed by: SURGERY

## 2018-01-09 PROCEDURE — 80053 COMPREHEN METABOLIC PANEL: CPT | Performed by: SURGERY

## 2018-01-09 PROCEDURE — 84134 ASSAY OF PREALBUMIN: CPT | Performed by: SURGERY

## 2018-01-09 PROCEDURE — 80061 LIPID PANEL: CPT | Performed by: SURGERY

## 2018-01-09 NOTE — MISCELLANEOUS
Message  Pharmacist called and said pt brought in script for Dr Filomena Urias and when she went to fill it she got a red flag  Pt just had a script filled the end of March for a large number of narcotics  Script can not be filled  Pharmacy will make pt aware  Active Problems    1  Adjustment disorder with mixed anxiety and depressed mood (309 28) (F43 23)   2  Anxiety disorder (300 00) (F41 9)   3  Arthritis (716 90) (M19 90)   4  Bereavement (V62 82) (Z63 4)   5  Chronic pain (338 29) (G89 29)   6  Depression (311) (F32 9)   7  Edema extremities (782 3) (R60 0)   8  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)   9  Hypercholesterolemia (272 0) (E78 0)   10  Hypothyroidism (244 9) (E03 9)   11  Insomnia (780 52) (G47 00)   12  Obesity (278 00) (E66 9)   13  Postgastrectomy malabsorption (579 3) (K91 2,Z90 3)   14  Preop cardiovascular exam (V72 81) (Z01 810)   15  Pre-op testing (V72 84) (Z01 818)   16  Status post gastric bypass for obesity (V45 86) (Z98 84)   17  Weight gain (783 1) (R63 5)    Current Meds   1  ALPRAZolam 1 MG Oral Tablet; TAKE 1 TABLET AT BEDTIME; Therapy: 09ZCV0293 to (Evaluate:73Nnr0626); Last Rx:10Nov2015 Ordered   2  Cyclobenzaprine HCl - 10 MG Oral Tablet; TAKE 1 TABLET AT BEDTIME; Therapy: 55ROE3700 to (Evaluate:87Kyo6160); Last Rx:10Nov2015 Ordered   3  Furosemide 40 MG Oral Tablet; TAKE 1 TABLET DAILY; Therapy: 92UFO8319 to (Evaluate:28Qfh9809); Last Rx:10Nov2015 Ordered   4  Levothyroxine Sodium 125 MCG Oral Tablet; take 2 tablet daily; Therapy: 39ALR4989 to (Last Rx:10Nov2015) Ordered   5  Optisource Post Bariatric Surg Oral Tablet Chewable; CHEW AND SWALLOW 1 TABLET   DAILY; Therapy: 35ZSN5145 to (Evaluate:35Kxr4895); Last Rx:10Nov2015 Ordered   6  OxyCODONE HCl - 30 MG Oral Tablet; TAKE 1 TABLET EVERY 6 HOURS AS NEEDED   FOR PAIN;   Therapy: 49OFY2406 to (Evaluate:23Qtr9384); Last Rx:47Sjg8616 Ordered   7   Oxycodone-Acetaminophen 5-325 MG Oral Tablet; TAKE 1 TO 2 TABLETS EVERY 4 TO   6 HOURS AS NEEDED; Therapy: (Recorded:07Apr2016) to Recorded   8  Potassium Chloride ER 20 MEQ Oral Tablet Extended Release; Take 1 tablet daily; Therapy: 39MLA9885 to (Last Rx:10Nov2015) Ordered   9  Pravastatin Sodium 10 MG Oral Tablet; TAKE 1 TABLET DAILY; Therapy: 00EZZ1924 to (Evaluate:67Czm0263); Last Rx:10Nov2015 Ordered    Allergies    1   No Known Drug Allergies    Signatures   Electronically signed by : Timothy Strnog LPN; Apr 18 5543  8:77EI EST                       (Author)

## 2018-01-09 NOTE — PROGRESS NOTES
Message  Attended 9 month follow up meeting  Addressed both behavioral and dietary issues  Group discussion included the impact of tobacco use, alcohol use, psychiatric medications, relationships, body image and self esteem issues as it pertains to the weight loss surgery patient  During group discussion, reviewed vitamin recommendations, protein recommendations, portion sizes, balancing diet to include healthy carbohydrates, importance of exercise, and the bariatric rules for success  Overall pleased with weight loss and improved quality of life         Active Problems    1  Adjustment disorder with mixed anxiety and depressed mood (309 28) (F43 23)   2  Anxiety disorder (300 00) (F41 9)   3  Arthritis (716 90) (M19 90)   4  Bereavement (V62 82) (Z63 4)   5  Chronic pain (338 29) (G89 29)   6  Depression (311) (F32 9)   7  Edema extremities (782 3) (R60 0)   8  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)   9  Hypercholesterolemia (272 0) (E78 00)   10  Hypothyroidism (244 9) (E03 9)   11  Insomnia (780 52) (G47 00)   12  Morbid obesity (278 01) (E66 01)   13  Obesity (278 00) (E66 9)   14  Postgastrectomy malabsorption (579 3) (K91 2,Z90 3)   15  Preop cardiovascular exam (V72 81) (Z01 810)   16  Pre-op testing (V72 84) (Z01 818)   17  Status post gastric bypass for obesity (V45 86) (Z98 84)   18  Weight gain (783 1) (R63 5)    Current Meds   1  ALPRAZolam 1 MG Oral Tablet; TAKE 1 TABLET AT BEDTIME; Therapy: 89GPR5982 to (Evaluate:11Mgu7118); Last Rx:10Nov2015 Ordered   2  Levothyroxine Sodium 100 MCG Oral Tablet; Therapy: (Recorded:04Nov2016) to Recorded   3  Omeprazole 20 MG Oral Capsule Delayed Release; TAKE 1 CAPSULE DAILY; Therapy: 10Jwd4709 to (Evaluate:93Glm1102)  Requested for: 40HJK4554; Last   Rx:26Rdq2249 Ordered   4  Omeprazole 20 MG Oral Capsule Delayed Release; TAKE 1 CAPSULE TWICE DAILY; Therapy: 50NYX9161 to (Evaluate:14Jan2017)  Requested for: 41OMT1353; Last   Rx:48Tzt9282 Ordered   5   Optisource Post Bariatric Surg Oral Tablet Chewable; CHEW AND SWALLOW 1 TABLET   DAILY; Therapy: 79CLE4449 to (Evaluate:10Dec2015); Last Rx:10Nov2015 Ordered   6  OxyCODONE HCl - 30 MG Oral Tablet; TAKE 1 TABLET EVERY 6 HOURS AS NEEDED   FOR PAIN;   Therapy: 73TCJ5347 to (Evaluate:10Dec2015); Last Rx:10Nov2015 Ordered    Allergies    1   No Known Drug Allergies    Signatures   Electronically signed by : MARY Prince; Jan 19 2017  3:10PM EST                       (Author)

## 2018-01-10 ENCOUNTER — GENERIC CONVERSION - ENCOUNTER (OUTPATIENT)
Dept: OTHER | Facility: OTHER | Age: 58
End: 2018-01-10

## 2018-01-10 NOTE — RESULT NOTES
Verified Results  (1) [phone] 01:01PM Zeferino Osman     Test Name Result Flag Reference   PREALBUMIN 9 8 mg/dL L 18 0-40 0     (1) VITAMIN B12 26Oct2017 01:01PM [facility]     Test Name Result Flag Reference   VITAMIN B12 1777 pg/mL H 100-900

## 2018-01-10 NOTE — PROGRESS NOTES
Message  Patient called to reschedule with SW and Rd; RD not available but I will meet with patient on [email or web address] prior to appointment with Dr Glenda Bedoya  Patient has surgery date for 4/20/16; continues to work on weight loss  NV      Active Problems    1  Adjustment disorder with mixed anxiety and depressed mood (309 28) (F43 23)   2  Anxiety disorder (300 00) (F41 9)   3  Arthritis (716 90) (M19 90)   4  Bereavement (V62 82) (Z63 4)   5  Chronic pain (338 29) (G89 29)   6  Depression (311) (F32 9)   7  Hypercholesterolemia (272 0) (E78 0)   8  Hypothyroidism (244 9) (E03 9)   9  Insomnia (780 52) (G47 00)   10  Obesity (278 00) (E66 9)   11  Postgastrectomy malabsorption (579 3) (K91 2,Z90 3)   12  Status post gastric bypass for obesity (V45 86) (Z98 84)   13  Weight gain (783 1) (R63 5)    Current Meds   1  ALPRAZolam 1 MG Oral Tablet; TAKE 1 TABLET AT BEDTIME; Therapy: 17MLC2903 to (Evaluate:09Xwi8539); Last Rx:10Nov2015 Ordered   2  Cyclobenzaprine HCl - 10 MG Oral Tablet; TAKE 1 TABLET AT BEDTIME; Therapy: 75OMI9636 to (Evaluate:44Psu8031); Last Rx:10Nov2015 Ordered   3  Furosemide 40 MG Oral Tablet; TAKE 1 TABLET DAILY; Therapy: 94AIM3420 to (Evaluate:44Lmk2108); Last Rx:10Nov2015 Ordered   4  Levothyroxine Sodium 125 MCG Oral Tablet; take 2 tablet daily; Therapy: 98HTM1573 to (Last Rx:10Nov2015) Ordered   5  Optisource Post Bariatric Surg Oral Tablet Chewable; CHEW AND SWALLOW 1 TABLET   DAILY; Therapy: 84WTX2320 to (Evaluate:07Fek2757); Last Rx:10Nov2015 Ordered   6  OxyCODONE HCl - 30 MG Oral Tablet; TAKE 1 TABLET EVERY 6 HOURS AS NEEDED   FOR PAIN;   Therapy: 50LNY3446 to (Evaluate:76Ilb5365); Last Rx:10Nov2015 Ordered   7  Oxycodone-Acetaminophen  MG Oral Tablet (Percocet); 1-2 tablets Q6 hrs PRN   pain; Therapy: 66WTY6490 to (Last Rx:10Nov2015) Ordered   8  Potassium Chloride ER 20 MEQ Oral Tablet Extended Release; Take 1 tablet daily;    Therapy: 79XMD1464 to (Last Rx:10Nov2015) Ordered   9  Pravastatin Sodium 10 MG Oral Tablet; TAKE 1 TABLET DAILY; Therapy: 32ACH2319 to (Evaluate:43Zxf7521); Last Rx:10Nov2015 Ordered    Allergies    1   No Known Drug Allergies    Signatures   Electronically signed by : RYAN Lara; Mar 24 2016  2:31PM EST                       (Author)

## 2018-01-10 NOTE — PROGRESS NOTES
Active Problems    1  Adjustment disorder with mixed anxiety and depressed mood (309 28) (F43 23)   2  Anxiety disorder (300 00) (F41 9)   3  Arthritis (716 90) (M19 90)   4  Bereavement (V62 82) (Z63 4)   5  Chronic pain (338 29) (G89 29)   6  Depression (311) (F32 9)   7  Edema extremities (782 3) (R60 0)   8  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)   9  Hypercholesterolemia (272 0) (E78 0)   10  Hypothyroidism (244 9) (E03 9)   11  Insomnia (780 52) (G47 00)   12  Obesity (278 00) (E66 9)   13  Postgastrectomy malabsorption (579 3) (K91 2,Z90 3)   14  Preop cardiovascular exam (V72 81) (Z01 810)   15  Pre-op testing (V72 84) (Z01 818)   16  Status post gastric bypass for obesity (V45 86) (Z98 84)   17  Weight gain (783 1) (R63 5)    Surgical History    1  History of Abdominoplasty   2  History of  Section   3  History of Gallbladder Surgery   4  History of Gastric Surgery For Morbid Obesity Bypass With [facility]    Family History  Mother    1  Family history of Alzheimer's disease (V17 2) (Z82 0)  Father    2  Family history of diabetes mellitus (V18 0) (Z83 3)   3  Family history of hypertension (V17 49) (Z82 49)  Daughter    4  Family history of Anxiety  Brother    5  Family history of Mild alcohol abuse in sustained remission in controlled environment  Family History    6  Family history of diabetes mellitus (V18 0) (Z83 3)   7  No family history of suicide    Social History    · Bereavement (Z02 50) (Z63 4)   · Denied: History of Drug use   · Former smoker (V15 82) (J00 772)   · No alcohol use   · Single    Current Meds   1  ALPRAZolam 1 MG Oral Tablet; TAKE 1 TABLET AT BEDTIME; Therapy: 12YMM2799 to (Evaluate:79Pjn7084); Last Rx:2015 Ordered   2  Cyclobenzaprine HCl - 10 MG Oral Tablet; TAKE 1 TABLET AT BEDTIME; Therapy: 89ANL7627 to (Evaluate:06Lxu1455); Last Rx:2015 Ordered   3  Levothyroxine Sodium 125 MCG Oral Tablet; take 2 tablet daily;    Therapy: 84RNO7288 to (Last Rx:2015) Ordered   4  Omeprazole 20 MG Oral Capsule Delayed Release; TAKE 1 CAPSULE DAILY; Therapy: 22Apr2016 to (Evaluate:97Ecy5515)  Requested for: 27Apr2016; Last   Rx:22Apr2016 Ordered   5  Optisource Post Bariatric Surg Oral Tablet Chewable; CHEW AND SWALLOW 1 TABLET   DAILY; Therapy: 53JEW6888 to (Evaluate:89Zlb3548); Last Rx:10Nov2015 Ordered   6  OxyCODONE HCl - 30 MG Oral Tablet; TAKE 1 TABLET EVERY 6 HOURS AS NEEDED   FOR PAIN;   Therapy: 73PEB7938 to (Evaluate:82Rup4104); Last Rx:10Nov2015 Ordered   7  Oxycodone-Acetaminophen 5-325 MG Oral Tablet; TAKE 1 TO 2 TABLETS EVERY 4 TO 6   HOURS AS NEEDED; Therapy: (Recorded:07Apr2016) to Recorded   8  Pravastatin Sodium 10 MG Oral Tablet; TAKE 1 TABLET DAILY; Therapy: 98PVV5858 to (Evaluate:10Dec2015); Last Rx:10Nov2015 Ordered    Allergies    1  No Known Drug Allergies     Note   Note:   I visited patient at hospital  Patient's mood presented as hopeful  Her daughter and granddaughter were visiting  Patient said she trust Dr Mando York and the medical staff and knows she is in excellent hands  I reminded patient to reach out to SW and RD if we can provide any support  NV      Signatures   Electronically signed by : RYAN Rhodes; May  3 2016 10:11AM EST                       (Author)    Electronically signed by :  KIM Chacon ; May  5 2016  1:26PM EST

## 2018-01-10 NOTE — PROGRESS NOTES
Message  Patient had to go to ER for stitches so she was unable to meet with RD   Provided her with Bariatric Fusion tub to start protein shakes as her PICC line was pulled  She continues to tolerate po and fluids  Will f/u by phone  Active Problems    1  Adjustment disorder with mixed anxiety and depressed mood (309 28) (F43 23)   2  Anxiety disorder (300 00) (F41 9)   3  Arthritis (716 90) (M19 90)   4  Bereavement (V62 82) (Z63 4)   5  Chronic pain (338 29) (G89 29)   6  Depression (311) (F32 9)   7  Edema extremities (782 3) (R60 0)   8  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)   9  Hypercholesterolemia (272 0) (E78 0)   10  Hypothyroidism (244 9) (E03 9)   11  Insomnia (780 52) (G47 00)   12  Obesity (278 00) (E66 9)   13  Postgastrectomy malabsorption (579 3) (K91 2,Z90 3)   14  Preop cardiovascular exam (V72 81) (Z01 810)   15  Pre-op testing (V72 84) (Z01 818)   16  Status post gastric bypass for obesity (V45 86) (Z98 84)   17  Weight gain (783 1) (R63 5)    Current Meds   1  ALPRAZolam 1 MG Oral Tablet; TAKE 1 TABLET AT BEDTIME; Therapy: 31WAE1340 to (Evaluate:51Jra5779); Last Rx:10Nov2015 Ordered   2  Lasix 20 MG Oral Tablet (Furosemide); Therapy: (Recorded:16Jun2016) to Recorded   3  Levothyroxine Sodium 150 MCG Oral Tablet; Therapy: (Recorded:67Tkw9917) to Recorded   4  Omeprazole 20 MG Oral Capsule Delayed Release; TAKE 1 CAPSULE DAILY; Therapy: 22Apr2016 to (Evaluate:79Cpc6790)  Requested for: 27Apr2016; Last   Rx:22Apr2016 Ordered   5  Optisource Post Bariatric Surg Oral Tablet Chewable; CHEW AND SWALLOW 1 TABLET   DAILY; Therapy: 96IJO0791 to (Evaluate:01Lmz1366); Last Rx:10Nov2015 Ordered   6  OxyCODONE HCl - 30 MG Oral Tablet; TAKE 1 TABLET EVERY 6 HOURS AS NEEDED   FOR PAIN;   Therapy: 89EAQ0312 to (Evaluate:48Scn1925); Last Rx:10Nov2015 Ordered    Allergies    1   No Known Drug Allergies    Signatures   Electronically signed by : MARY Mayfield; Jun 16 2016 10:58AM EST (Author)

## 2018-01-10 NOTE — MISCELLANEOUS
Message  PT from [facility] called to make us aware pt will be receiving PT twice a week  Active Problems    1  Adjustment disorder with mixed anxiety and depressed mood (309 28) (F43 23)   2  Anxiety disorder (300 00) (F41 9)   3  Arthritis (716 90) (M19 90)   4  Bereavement (V62 82) (Z63 4)   5  Chronic pain (338 29) (G89 29)   6  Decreased oral intake (783 9) (R63 8)   7  Depression (311) (F32 9)   8  Edema extremities (782 3) (R60 0)   9  Gastric anastomotic stricture (213 14,181 16) (K92 9,K31 89)   10  Gastric anastomotic stricture (096 70,756 38) (K92 9,K31 89)   11  Heart burn (787 1) (R12)   12  Hypercholesterolemia (272 0) (E78 00)   13  Hypothyroidism (244 9) (E03 9)   14  Insomnia (780 52) (G47 00)   15  Marginal ulcer (534 90) (K28 9)   16  Morbid obesity (278 01) (E66 01)   17  Obesity (278 00) (E66 9)   18  Postgastrectomy malabsorption (579 3) (K91 2,Z90 3)   19  Preop cardiovascular exam (V72 81) (Z01 810)   20  Pre-op testing (V72 84) (Z01 818)   21  Secondary hyperparathyroidism, non-renal (252 02) (E21 1)   22  Status post gastric bypass for obesity (V45 86) (Z98 84)   23  Vitamin A deficiency (264 9) (E50 9)   24  Vitamin D deficiency (268 9) (E55 9)    Current Meds   1  ALPRAZolam 1 MG Oral Tablet; TAKE 1 TABLET AT BEDTIME; Therapy: 13LQW1000 to (Evaluate:95Cbw8417); Last Rx:10Nov2015 Ordered   2  Azithromycin 250 MG Oral Tablet; Therapy: (Deetta Pel) to Recorded   3  Carafate 1 GM/10ML Oral Suspension; Therapy: (Deetta Pel) to Recorded   4  Ditropan XL 5 MG TBCR; Therapy: (Deetta Pel) to Recorded   5  Levothyroxine Sodium 100 MCG Oral Tablet; Therapy: (Recorded:04Nov2016) to Recorded   6  Optisource Post Bariatric Surg Oral Tablet Chewable; CHEW AND SWALLOW 1 TABLET   DAILY; Therapy: 46KPD3279 to (Evaluate:16Own6573); Last Rx:10Nov2015 Ordered   7  Oxybutynin Chloride ER 5 MG Oral Tablet Extended Release 24 Hour;    Therapy: (Recorded:29Sep2017) to Recorded   8  OxyCODONE HCl - 30 MG Oral Tablet; TAKE 1 TABLET EVERY 6 HOURS AS NEEDED   FOR PAIN;   Therapy: 44QTL4679 to (Evaluate:22Ncl1870); Last Rx:10Nov2015 Ordered   9  Pantoprazole Sodium 40 MG Oral Tablet Delayed Release; Therapy: (Sami Velazco) to Recorded   10  Protonix 20 MG Oral Tablet Delayed Release (Pantoprazole Sodium); Therapy: (Recorded:23Gqw2385) to Recorded   11  TobraDex 0 3-0 1 % Ophthalmic Ointment; Therapy: (Sami Velazco) to Recorded   12  Vitamin D TABS; Therapy: (Recorded:65Ztu7328) to Recorded    Allergies    1   No Known Drug Allergies    Signatures   Electronically signed by : Joellen Nicole LPN; Nov 13 6438  7:21RI EST                       (Author)

## 2018-01-10 NOTE — RESULT NOTES
Message  Pt with elevated LFTS and decreased prealbumin  Phosphorous levels WNL Decreased dextrose in TPN from 280 grams to 210 grams due to elevated LFTs  If her LFTs continue to be elevated, may need to cycle over 18 to 20 hours instead  Faxed over updated orders   Will continue to follow      Signatures   Electronically signed by : MARY Pina; May 31 2016  4:20PM EST                       (Author)

## 2018-01-10 NOTE — PSYCH
Provider Comments  Provider Comments:   Pt called and CX for today's 1:15pm israel't, the Support Staff was notifying me of this ,but the computer still has pt as "arrived"---pt never arrived for israel't  Jad Leija  (Computer and [employer] have been malfunctioning intermittently throughout the day,though )---Tami Roberson, MS, APRN, [address] [place] [address]      Signatures   Electronically signed by : Gabino Eduardo MSAPRNPMHCNS-BC; Mar 25 2016  7:04PM EST                       (Author)

## 2018-01-10 NOTE — PROGRESS NOTES
Message  Returned [employer] call  She went to see her pcp and her medication was adjusted from 250 to 200 mg  ( levothyroxine)  She has her cardiology clearance appointment scheduled for 3/29/2016  She wanted to change her f/u appointment with RD and LCSW from April 1 to April 8th  As I am off that day, transferred to LCSW who can see her after her appointment with Dr Rocio Cornell on April 8th  Pt will also see RD at her first post op appointment  She has my contact information for any further questions or concerns  Reports her weight at the pcp office was 248 pounds  Active Problems    1  Adjustment disorder with mixed anxiety and depressed mood (309 28) (F43 23)   2  Anxiety disorder (300 00) (F41 9)   3  Arthritis (716 90) (M19 90)   4  Bereavement (V62 82) (Z63 4)   5  Chronic pain (338 29) (G89 29)   6  Depression (311) (F32 9)   7  Hypercholesterolemia (272 0) (E78 0)   8  Hypothyroidism (244 9) (E03 9)   9  Insomnia (780 52) (G47 00)   10  Obesity (278 00) (E66 9)   11  Postgastrectomy malabsorption (579 3) (K91 2,Z90 3)   12  Status post gastric bypass for obesity (V45 86) (Z98 84)   13  Weight gain (783 1) (R63 5)    Current Meds   1  ALPRAZolam 1 MG Oral Tablet; TAKE 1 TABLET AT BEDTIME; Therapy: 60QIZ3805 to (Evaluate:10Dec2015); Last Rx:10Nov2015 Ordered   2  Cyclobenzaprine HCl - 10 MG Oral Tablet; TAKE 1 TABLET AT BEDTIME; Therapy: 77KJL9018 to (Evaluate:76Udh5228); Last Rx:10Nov2015 Ordered   3  Furosemide 40 MG Oral Tablet; TAKE 1 TABLET DAILY; Therapy: 90SKM3497 to (Evaluate:28Cwy8322); Last Rx:10Nov2015 Ordered   4  Levothyroxine Sodium 125 MCG Oral Tablet; take 2 tablet daily; Therapy: 06LTU7573 to (Last Rx:10Nov2015) Ordered   5  Optisource Post Bariatric Surg Oral Tablet Chewable; CHEW AND SWALLOW 1 TABLET   DAILY; Therapy: 34JUG5992 to (Evaluate:07Nbg8444); Last Rx:10Nov2015 Ordered   6   OxyCODONE HCl - 30 MG Oral Tablet; TAKE 1 TABLET EVERY 6 HOURS AS NEEDED   FOR PAIN; Therapy: 86FPK1787 to (Evaluate:10Dec2015); Last Rx:10Nov2015 Ordered   7  Oxycodone-Acetaminophen  MG Oral Tablet (Percocet); 1-2 tablets Q6 hrs PRN   pain; Therapy: 12UCC0690 to (Last Rx:10Nov2015) Ordered   8  Potassium Chloride ER 20 MEQ Oral Tablet Extended Release; Take 1 tablet daily; Therapy: 74BWS9447 to (Last Rx:10Nov2015) Ordered   9  Pravastatin Sodium 10 MG Oral Tablet; TAKE 1 TABLET DAILY; Therapy: 11XSA1623 to (Evaluate:10Dec2015); Last Rx:10Nov2015 Ordered    Allergies    1   No Known Drug Allergies    Signatures   Electronically signed by : MARY Peralta; Mar 24 2016  2:30PM EST                       (Author)

## 2018-01-11 ENCOUNTER — GENERIC CONVERSION - ENCOUNTER (OUTPATIENT)
Dept: OTHER | Facility: OTHER | Age: 58
End: 2018-01-11

## 2018-01-11 NOTE — RESULT NOTES
Dear Kunal Brand,   Your test results have returned and are listed below:      Discussion/Summary  Your results show some abnormalities  Your thyroid stimulating hormone (TSH) is decreased, which can indicate that your thyroid medications need to be adjusted  Recommend that you follow up with your primary care provider to further evaluate your thyroid studies  After your appointment with your primary care provider, please have the evaluation and treatment plan forwarded to the bariatric office  I have included a copy of your blood work to take to your primary care provider  Please keep your regularly scheduled follow-up appointment  If you do not have a follow-up scheduled, please call the office to schedule a follow-up visit  If you have any questions, please don't hesitate to call the office  Sincerely,      Signatures   Electronically signed by : MARY Leggett; Feb 10 2016  4:08PM EST                       (Author)    Electronically signed by :  KIM Chacon ; Feb 12 2016  8:23AM EST

## 2018-01-11 NOTE — MISCELLANEOUS
Message  5/9/2016 @ 1310- Post op follow up phone call attempted  No answer obtained on listed phone number  Generic message left requesting call back with an update on progress  Active Problems    1  Adjustment disorder with mixed anxiety and depressed mood (309 28) (F43 23)   2  Anxiety disorder (300 00) (F41 9)   3  Arthritis (716 90) (M19 90)   4  Bereavement (V62 82) (Z63 4)   5  Chronic pain (338 29) (G89 29)   6  Depression (311) (F32 9)   7  Edema extremities (782 3) (R60 0)   8  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)   9  Hypercholesterolemia (272 0) (E78 0)   10  Hypothyroidism (244 9) (E03 9)   11  Insomnia (780 52) (G47 00)   12  Obesity (278 00) (E66 9)   13  Postgastrectomy malabsorption (579 3) (K91 2,Z90 3)   14  Preop cardiovascular exam (V72 81) (Z01 810)   15  Pre-op testing (V72 84) (Z01 818)   16  Status post gastric bypass for obesity (V45 86) (Z98 84)   17  Weight gain (783 1) (R63 5)    Current Meds   1  ALPRAZolam 1 MG Oral Tablet; TAKE 1 TABLET AT BEDTIME; Therapy: 81KMV3368 to (Evaluate:34Pax1410); Last Rx:10Nov2015 Ordered   2  Cyclobenzaprine HCl - 10 MG Oral Tablet; TAKE 1 TABLET AT BEDTIME; Therapy: 48XKT7103 to (Evaluate:67Icl4445); Last Rx:10Nov2015 Ordered   3  Levothyroxine Sodium 125 MCG Oral Tablet; take 2 tablet daily; Therapy: 87XZL0732 to (Last Rx:10Nov2015) Ordered   4  Omeprazole 20 MG Oral Capsule Delayed Release; TAKE 1 CAPSULE DAILY; Therapy: 14Ify7503 to (Evaluate:44Rcv3086)  Requested for: 27Apr2016; Last   Rx:94Byn5299 Ordered   5  Optisource Post Bariatric Surg Oral Tablet Chewable; CHEW AND SWALLOW 1 TABLET   DAILY; Therapy: 24HLZ0452 to (Evaluate:58Sui3022); Last Rx:10Nov2015 Ordered   6  OxyCODONE HCl - 30 MG Oral Tablet; TAKE 1 TABLET EVERY 6 HOURS AS NEEDED   FOR PAIN;   Therapy: 46BFH5849 to (Evaluate:63Fek4017); Last Rx:41Naj5912 Ordered   7  Oxycodone-Acetaminophen 5-325 MG Oral Tablet; TAKE 1 TO 2 TABLETS EVERY 4 TO   6 HOURS AS NEEDED;    Therapy: (Recorded:07Apr2016) to Recorded   8  Pravastatin Sodium 10 MG Oral Tablet; TAKE 1 TABLET DAILY; Therapy: 78FIH2919 to (Evaluate:00Cpo9785); Last Rx:10Nov2015 Ordered    Allergies    1   No Known Drug Allergies    Signatures   Electronically signed by : Kelsea Corcoran, ; May  9 2016  1:24PM EST                       (Author)

## 2018-01-11 NOTE — RESULT NOTES
Message  Prealbumin level 18 mg/dl 5/11/2016  Other labs within acceptable limits  Will continue current TPN  Orders faxed to [facility]   Electronically signed by : MARY Prescott; May 17 2016  3:07PM EST                       (Author)

## 2018-01-11 NOTE — PROGRESS NOTES
Message  Called patient to check on her po intake  She is eating 3 to 4 mini meals per day of approximately 4 Tbsp  She is tolerating the Bariatric Fusion protein shake and drinking one per day mixed with milk for 33 grams protein total  She is walking more and hydrating well  Pt will contact me if she has any questions or concerns  F/U with Dr Robbie Vaughn on June 30th  Active Problems    1  Adjustment disorder with mixed anxiety and depressed mood (309 28) (F43 23)   2  Anxiety disorder (300 00) (F41 9)   3  Arthritis (716 90) (M19 90)   4  Bereavement (V62 82) (Z63 4)   5  Chronic pain (338 29) (G89 29)   6  Depression (311) (F32 9)   7  Edema extremities (782 3) (R60 0)   8  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)   9  Hypercholesterolemia (272 0) (E78 0)   10  Hypothyroidism (244 9) (E03 9)   11  Insomnia (780 52) (G47 00)   12  Obesity (278 00) (E66 9)   13  Postgastrectomy malabsorption (579 3) (K91 2,Z90 3)   14  Preop cardiovascular exam (V72 81) (Z01 810)   15  Pre-op testing (V72 84) (Z01 818)   16  Status post gastric bypass for obesity (V45 86) (Z98 84)   17  Weight gain (783 1) (R63 5)    Current Meds   1  ALPRAZolam 1 MG Oral Tablet; TAKE 1 TABLET AT BEDTIME; Therapy: 19DJE1531 to (Evaluate:53Omp3833); Last Rx:10Nov2015 Ordered   2  Lasix 20 MG Oral Tablet (Furosemide); Therapy: (Recorded:16Jun2016) to Recorded   3  Levothyroxine Sodium 150 MCG Oral Tablet; Therapy: (Recorded:13Onn0912) to Recorded   4  Omeprazole 20 MG Oral Capsule Delayed Release; TAKE 1 CAPSULE DAILY; Therapy: 22Apr2016 to (Evaluate:44Jiq3333)  Requested for: 27Apr2016; Last   Rx:22Apr2016 Ordered   5  Optisource Post Bariatric Surg Oral Tablet Chewable; CHEW AND SWALLOW 1 TABLET   DAILY; Therapy: 19MZW8247 to (Evaluate:79Las8533); Last Rx:10Nov2015 Ordered   6  OxyCODONE HCl - 30 MG Oral Tablet; TAKE 1 TABLET EVERY 6 HOURS AS NEEDED   FOR PAIN;   Therapy: 69GGA2477 to (Evaluate:06Xmf1565);  Last Rx:10Nov2015 Ordered    Allergies    1   No Known Drug Allergies    Signatures   Electronically signed by : MARY Pitts; Jun 21 2016 12:11PM EST                       (Author)

## 2018-01-11 NOTE — PROGRESS NOTES
Message  Patient called to reschedule appointment( car in shop)  Appointment with [place] and [place] rescheduled for [email or web address] prior to appointment with surgeon  [place]      Active Problems    1  Adjustment disorder with mixed anxiety and depressed mood (309 28) (F43 23)   2  Anxiety disorder (300 00) (F41 9)   3  Arthritis (716 90) (M19 90)   4  Bereavement (V62 82) (Z63 4)   5  Chronic pain (338 29) (G89 29)   6  Depression (311) (F32 9)   7  Hypercholesterolemia (272 0) (E78 0)   8  Hypothyroidism (244 9) (E03 9)   9  Insomnia (780 52) (G47 00)   10  Obesity (278 00) (E66 9)   11  Postgastrectomy malabsorption (579 3) (K91 2,Z90 3)   12  Status post gastric bypass for obesity (V45 86) (Z98 84)   13  Weight gain (783 1) (R63 5)    Current Meds   1  ALPRAZolam 1 MG Oral Tablet; TAKE 1 TABLET AT BEDTIME; Therapy: 90RXD6645 to (Evaluate:10Dec2015); Last Rx:10Nov2015 Ordered   2  Cyclobenzaprine HCl - 10 MG Oral Tablet; TAKE 1 TABLET AT BEDTIME; Therapy: 26BYY7675 to (Evaluate:10Dec2015); Last Rx:10Nov2015 Ordered   3  Furosemide 40 MG Oral Tablet; TAKE 1 TABLET DAILY; Therapy: 79BOC7551 to (Evaluate:10Dec2015); Last Rx:10Nov2015 Ordered   4  Levothyroxine Sodium 125 MCG Oral Tablet; take 2 tablet daily; Therapy: 30XJZ2228 to (Last Rx:10Nov2015) Ordered   5  Optisource Post Bariatric Surg Oral Tablet Chewable; CHEW AND SWALLOW 1 TABLET   DAILY; Therapy: 96PNM7058 to (Evaluate:10Dec2015); Last Rx:10Nov2015 Ordered   6  OxyCODONE HCl - 30 MG Oral Tablet; TAKE 1 TABLET EVERY 6 HOURS AS NEEDED   FOR PAIN;   Therapy: 86EWL6899 to (Evaluate:10Dec2015); Last Rx:10Nov2015 Ordered   7  Oxycodone-Acetaminophen  MG Oral Tablet (Percocet); 1-2 tablets Q6 hrs PRN   pain; Therapy: 44PHG4889 to (Last Rx:10Nov2015) Ordered   8  Potassium Chloride ER 20 MEQ Oral Tablet Extended Release; Take 1 tablet daily; Therapy: 86CVI8747 to (Last Rx:10Nov2015) Ordered   9   Pravastatin Sodium 10 MG Oral Tablet; TAKE 1 TABLET DAILY; Therapy: 77FLJ7266 to (Evaluate:17Olu6934); Last Rx:89Njt5422 Ordered    Allergies    1   No Known Drug Allergies    Signatures   Electronically signed by : RYAN Cadet; Feb 23 2016  1:26PM EST                       (Author)

## 2018-01-12 NOTE — PROGRESS NOTES
Active Problems    1  Adjustment disorder with mixed anxiety and depressed mood (309 28) (F43 23)   2  Anxiety disorder (300 00) (F41 9)   3  Arthritis (716 90) (M19 90)   4  Bereavement (V62 82) (Z63 4)   5  Chronic pain (338 29) (G89 29)   6  Depression (311) (F32 9)   7  Hypercholesterolemia (272 0) (E78 0)   8  Hypothyroidism (244 9) (E03 9)   9  Insomnia (780 52) (G47 00)   10  Obesity (278 00) (E66 9)   11  Postgastrectomy malabsorption (579 3) (K91 2)   12  Status post gastric bypass for obesity (V45 86) (Z98 84)   13  Weight gain (783 1) (R63 5)    Surgical History    1  History of Abdominoplasty   2  History of  Section   3  History of Gallbladder Surgery   4  History of Gastric Surgery For Morbid Obesity Bypass With Naheed-en-Y    Family History    1  Family history of Alzheimer's disease (V17 2) (Z82 0)    2  Family history of Anxiety    3  Family history of Mild alcohol abuse in sustained remission in controlled environment    4  No family history of suicide    Social History    · Bereavement (W71 69) (Z63 4)   · Denied: History of Drug use   · Former smoker (V15 82) (R13 288)   · No alcohol use    Current Meds   1  ALPRAZolam 1 MG Oral Tablet; TAKE 1 TABLET AT BEDTIME; Therapy: 81OSF2482 to (Evaluate:2015); Last Rx:2015 Ordered   2  Cyclobenzaprine HCl - 10 MG Oral Tablet; TAKE 1 TABLET AT BEDTIME; Therapy: 98BDT8379 to (Evaluate:98Iit0352); Last Rx:2015 Ordered   3  Furosemide 40 MG Oral Tablet; TAKE 1 TABLET DAILY; Therapy: 29UCP7394 to (Evaluate:27Wkj6396); Last Rx:2015 Ordered   4  Levothyroxine Sodium 125 MCG Oral Tablet; take 2 tablet daily; Therapy: 96FDZ0721 to (Last Rx:2015) Ordered   5  Optisource Post Bariatric Surg Oral Tablet Chewable; CHEW AND SWALLOW 1 TABLET   DAILY; Therapy: 19JVD6260 to (Evaluate:46Sqo7547); Last Rx:2015 Ordered   6   OxyCODONE HCl - 30 MG Oral Tablet; TAKE 1 TABLET EVERY 6 HOURS AS NEEDED   FOR PAIN;   Therapy: 14RTI2456 to (Evaluate:86Eer7756); Last Rx:10Nov2015 Ordered   7  Oxycodone-Acetaminophen  MG Oral Tablet (Percocet); 1-2 tablets Q6 hrs PRN   pain; Therapy: 02XLJ2260 to (Last Rx:10Nov2015) Ordered   8  Potassium Chloride ER 20 MEQ Oral Tablet Extended Release; Take 1 tablet daily; Therapy: 38NVO5918 to (Last Rx:10Nov2015) Ordered   9  Pravastatin Sodium 10 MG Oral Tablet; TAKE 1 TABLET DAILY; Therapy: 28JYX7715 to (Evaluate:10Dec2015); Last Rx:10Nov2015 Ordered    Allergies    1  No Known Drug Allergies     Note   Note:   Met with patient and reviewed the following; patient's meeting with surgeon Dr Gigi Kilgore  Patient understands she must complete all pre-testing and then surgeon will discuss options  Patient continues to lose weight and practices the following; moves more around the house, drives to the store and keeps monthly appointment with therapist  Patient agreed to meet monthly til she learns the direction of treatment  Next appointment 2/25/16 @ 1:00pm  [place]      Future Appointments    Date/Time Provider Specialty Site   03/04/2016 01:00 PM KIM Copeland  General Surgery [facility]   03/29/2016 03:20 PM Pedro Cleary MD Psychiatry [facility]     Signatures   Electronically signed by : MARIELA PappasLCSW; Jan 14 2016  1:34PM EST                       (Author)    Electronically signed by :  KIM Parsons ; Tariq 15 2016 11:25AM EST

## 2018-01-12 NOTE — PROGRESS NOTES
Message  Called patient as her TSH was elevated at 26 060  Pt had just returned from her pcp and her levothyroxine was increased from 125 mcg/day to 150 mcg/day  Her pcp will continue to monitor  Pt is taking her oral medication with small sips of water  Overall doing well  Active Problems    1  Adjustment disorder with mixed anxiety and depressed mood (309 28) (F43 23)   2  Anxiety disorder (300 00) (F41 9)   3  Arthritis (716 90) (M19 90)   4  Bereavement (V62 82) (Z63 4)   5  Chronic pain (338 29) (G89 29)   6  Depression (311) (F32 9)   7  Edema extremities (782 3) (R60 0)   8  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)   9  Hypercholesterolemia (272 0) (E78 0)   10  Hypothyroidism (244 9) (E03 9)   11  Insomnia (780 52) (G47 00)   12  Obesity (278 00) (E66 9)   13  Postgastrectomy malabsorption (579 3) (K91 2,Z90 3)   14  Preop cardiovascular exam (V72 81) (Z01 810)   15  Pre-op testing (V72 84) (Z01 818)   16  Status post gastric bypass for obesity (V45 86) (Z98 84)   17  Weight gain (783 1) (R63 5)    Current Meds   1  ALPRAZolam 1 MG Oral Tablet; TAKE 1 TABLET AT BEDTIME; Therapy: 80GQK8482 to (Evaluate:44Byf5535); Last Rx:10Nov2015 Ordered   2  Cyclobenzaprine HCl - 10 MG Oral Tablet; TAKE 1 TABLET AT BEDTIME; Therapy: 63HET9442 to (Evaluate:50Zhw0018); Last Rx:10Nov2015 Ordered   3  Levothyroxine Sodium 125 MCG Oral Tablet; take 2 tablet daily; Therapy: 76PBC4772 to (Last Rx:10Nov2015) Ordered   4  Omeprazole 20 MG Oral Capsule Delayed Release; TAKE 1 CAPSULE DAILY; Therapy: 08Pnz0028 to (Evaluate:15Wcr2179)  Requested for: 27Apr2016; Last   Rx:22Apr2016 Ordered   5  Optisource Post Bariatric Surg Oral Tablet Chewable; CHEW AND SWALLOW 1 TABLET   DAILY; Therapy: 90ITO4584 to (Evaluate:68Kzi2885); Last Rx:10Nov2015 Ordered   6  OxyCODONE HCl - 30 MG Oral Tablet; TAKE 1 TABLET EVERY 6 HOURS AS NEEDED   FOR PAIN;   Therapy: 52WCT1241 to (Evaluate:48Nnx1659); Last Rx:10Nov2015 Ordered   7  Oxycodone-Acetaminophen 5-325 MG Oral Tablet; TAKE 1 TO 2 TABLETS EVERY 4 TO   6 HOURS AS NEEDED; Therapy: (Recorded:07Apr2016) to Recorded   8  Pravastatin Sodium 10 MG Oral Tablet; TAKE 1 TABLET DAILY; Therapy: 74HSG9822 to (Evaluate:54Gyc6480); Last Rx:10Nov2015 Ordered    Allergies    1   No Known Drug Allergies    Signatures   Electronically signed by : MARY Leggett; May 10 2016  1:19PM EST                       (Author)

## 2018-01-12 NOTE — PROGRESS NOTES
Active Problems    1  Adjustment disorder with mixed anxiety and depressed mood (309 28) (F43 23)   2  Anxiety disorder (300 00) (F41 9)   3  Arthritis (716 90) (M19 90)   4  Bereavement (V62 82) (Z63 4)   5  Chronic pain (338 29) (G89 29)   6  Depression (311) (F32 9)   7  Edema extremities (782 3) (R60 0)   8  Hypercholesterolemia (272 0) (E78 0)   9  Hypothyroidism (244 9) (E03 9)   10  Insomnia (780 52) (G47 00)   11  Obesity (278 00) (E66 9)   12  Postgastrectomy malabsorption (579 3) (K91 2,Z90 3)   13  Preop cardiovascular exam (V72 81) (Z01 810)   14  Pre-op testing (V72 84) (Z01 818)   15  Status post gastric bypass for obesity (V45 86) (Z98 84)   16  Weight gain (783 1) (R63 5)    Surgical History    1  History of Abdominoplasty   2  History of  Section   3  History of Gallbladder Surgery   4  History of Gastric Surgery For Morbid Obesity Bypass With Naheed-en-Y    Family History    1  Family history of Alzheimer's disease (V17 2) (Z82 0)    2  Family history of diabetes mellitus (V18 0) (Z83 3)   3  Family history of hypertension (V17 49) (Z82 49)    4  Family history of Anxiety    5  Family history of Mild alcohol abuse in sustained remission in controlled environment    6  Family history of diabetes mellitus (V18 0) (Z83 3)   7  No family history of suicide    Social History    · Bereavement (S84 33) (Z63 4)   · Denied: History of Drug use   · Former smoker (V15 82) (A90 395)   · No alcohol use   · Single    Current Meds   1  ALPRAZolam 1 MG Oral Tablet; TAKE 1 TABLET AT BEDTIME; Therapy: 77LXS9527 to (Evaluate:41Idj6960); Last Rx:2015 Ordered   2  Cyclobenzaprine HCl - 10 MG Oral Tablet; TAKE 1 TABLET AT BEDTIME; Therapy: 59KJS0226 to (Evaluate:11Xsw1601); Last Rx:2015 Ordered   3  Furosemide 40 MG Oral Tablet; TAKE 1 TABLET DAILY; Therapy: 31CTE7903 to (Evaluate:22Qhe2202); Last Rx:2015 Ordered   4  Levothyroxine Sodium 125 MCG Oral Tablet; take 2 tablet daily;    Therapy: 49OUK1834 to (Last Rx:10Nov2015) Ordered   5  Optisource Post Bariatric Surg Oral Tablet Chewable; CHEW AND SWALLOW 1 TABLET   DAILY; Therapy: 06WDF5301 to (Evaluate:10Dec2015); Last Rx:10Nov2015 Ordered   6  OxyCODONE HCl - 30 MG Oral Tablet; TAKE 1 TABLET EVERY 6 HOURS AS NEEDED   FOR PAIN;   Therapy: 13MTC5065 to (Evaluate:31Ceh1858); Last Rx:10Nov2015 Ordered   7  Oxycodone-Acetaminophen  MG Oral Tablet (Percocet); 1-2 tablets Q6 hrs PRN   pain; Therapy: 34DIR9149 to (Last Rx:10Nov2015) Ordered   8  Oxycodone-Acetaminophen 5-325 MG Oral Tablet; TAKE 1 TO 2 TABLETS EVERY 4 TO 6   HOURS AS NEEDED; Therapy: (Recorded:07Apr2016) to Recorded   9  Potassium Chloride ER 20 MEQ Oral Tablet Extended Release; Take 1 tablet daily; Therapy: 94QSN7668 to (Last Rx:10Nov2015) Ordered   10  Pravastatin Sodium 10 MG Oral Tablet; TAKE 1 TABLET DAILY; Therapy: 70NAO4335 to (Evaluate:10Dec2015); Last Rx:10Nov2015 Ordered    Allergies    1  No Known Drug Allergies     Note   Note:   Met with patient for weight check and just check in to see how patient is coming along in anticipation of surgery date on 4/20/16  Patient continues to lose weight and acknowledges the support provided by RD has helped her lose weight  She is somewhat anxious because Dr Yessica Galvan is requiring an Echo-" why"- although he did explain this to her  Patient has worked very hard and diligently to get to surgery date  I strongly encouraged she continue to meet with therapist for support  I also encouraged patient to stop by office on 4/29/16 after post-op visit  [place]      Future Appointments    Date/Time Provider Specialty Site   04/29/2016 01:00 PM Norlin Babinski, M D  General Surgery [facility]     Signatures   Electronically signed by : RYAN Mike; Apr 8 2016  2:11PM EST                       (Author)    Electronically signed by :  KIM Suresh ; Apr 12 2016  9:37AM EST

## 2018-01-13 VITALS
DIASTOLIC BLOOD PRESSURE: 70 MMHG | HEART RATE: 71 BPM | BODY MASS INDEX: 43.32 KG/M2 | SYSTOLIC BLOOD PRESSURE: 102 MMHG | WEIGHT: 244.5 LBS | RESPIRATION RATE: 18 BRPM | HEIGHT: 63 IN | TEMPERATURE: 98.6 F

## 2018-01-13 VITALS
TEMPERATURE: 98.6 F | HEIGHT: 63 IN | HEART RATE: 89 BPM | SYSTOLIC BLOOD PRESSURE: 100 MMHG | RESPIRATION RATE: 14 BRPM | DIASTOLIC BLOOD PRESSURE: 70 MMHG | BODY MASS INDEX: 34.02 KG/M2 | WEIGHT: 192 LBS

## 2018-01-13 VITALS
HEIGHT: 63 IN | SYSTOLIC BLOOD PRESSURE: 124 MMHG | HEART RATE: 66 BPM | DIASTOLIC BLOOD PRESSURE: 70 MMHG | WEIGHT: 226.5 LBS | BODY MASS INDEX: 40.13 KG/M2 | RESPIRATION RATE: 22 BRPM | TEMPERATURE: 98 F

## 2018-01-13 NOTE — RESULT NOTES
Message  TPN orders faxed to [facility]  Current orders provide 1852 kcal ( 12 kcal/kg actual body weight)  125 grams protein ( 2 0 grams/kg IBW)  2150 ml ( 14 ml/kg actual body weight)  Labs within acceptable limits   Last prealbumin 6 3- ordered once per week  Will reassess protein needs next week based on prealbumin level        Signatures   Electronically signed by : MARY Vinson; May 10 2016  1:05PM EST                       (Author)

## 2018-01-13 NOTE — PROGRESS NOTES
Message  Conferred with inpatient RD Agapito Schofield concerning TPN recs  Will follow ASPEN guidelines of 11 kcal/kg actual body weight  2 gm/kg IBW for protein  Using above guidelines patient will receive approximately 1800 calories and 100 grams of protein  Pt currently receiving standard bag of TPN- which is lower in CHO, as pt is at risk for refeeding syndrome  Will increase TPN  Upon d/c will keep TPN over 20 hours and gradually decrease to 12 hours  wt in hospital 157 kg  Active Problems    1  Adjustment disorder with mixed anxiety and depressed mood (309 28) (F43 23)   2  Anxiety disorder (300 00) (F41 9)   3  Arthritis (716 90) (M19 90)   4  Bereavement (V62 82) (Z63 4)   5  Chronic pain (338 29) (G89 29)   6  Depression (311) (F32 9)   7  Edema extremities (782 3) (R60 0)   8  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)   9  Hypercholesterolemia (272 0) (E78 0)   10  Hypothyroidism (244 9) (E03 9)   11  Insomnia (780 52) (G47 00)   12  Obesity (278 00) (E66 9)   13  Postgastrectomy malabsorption (579 3) (K91 2,Z90 3)   14  Preop cardiovascular exam (V72 81) (Z01 810)   15  Pre-op testing (V72 84) (Z01 818)   16  Status post gastric bypass for obesity (V45 86) (Z98 84)   17  Weight gain (783 1) (R63 5)    Current Meds   1  ALPRAZolam 1 MG Oral Tablet; TAKE 1 TABLET AT BEDTIME; Therapy: 05XKX2735 to (Evaluate:32Uhs4121); Last Rx:10Nov2015 Ordered   2  Cyclobenzaprine HCl - 10 MG Oral Tablet; TAKE 1 TABLET AT BEDTIME; Therapy: 62DFO1736 to (Evaluate:93Lna9002); Last Rx:10Nov2015 Ordered   3  Levothyroxine Sodium 125 MCG Oral Tablet; take 2 tablet daily; Therapy: 59SDM9993 to (Last Rx:10Nov2015) Ordered   4  Omeprazole 20 MG Oral Capsule Delayed Release; TAKE 1 CAPSULE DAILY; Therapy: 22Apr2016 to (Evaluate:95Xql1125)  Requested for: 27Apr2016; Last   Rx:22Apr2016 Ordered   5  Optisource Post Bariatric Surg Oral Tablet Chewable; CHEW AND SWALLOW 1 TABLET   DAILY;    Therapy: 44RDS4023 to (Evaluate:10Dec2015); Last Rx:10Nov2015 Ordered   6  OxyCODONE HCl - 30 MG Oral Tablet; TAKE 1 TABLET EVERY 6 HOURS AS NEEDED   FOR PAIN;   Therapy: 92FBL3742 to (Evaluate:10Dec2015); Last Rx:10Nov2015 Ordered   7  Oxycodone-Acetaminophen 5-325 MG Oral Tablet; TAKE 1 TO 2 TABLETS EVERY 4 TO   6 HOURS AS NEEDED; Therapy: (Recorded:07Apr2016) to Recorded   8  Pravastatin Sodium 10 MG Oral Tablet; TAKE 1 TABLET DAILY; Therapy: 35RHJ2673 to (Evaluate:10Dec2015); Last Rx:10Nov2015 Ordered    Allergies    1   No Known Drug Allergies    Signatures   Electronically signed by : MARY Shepherd; May  2 2016  3:34PM EST                       (Author)

## 2018-01-13 NOTE — MISCELLANEOUS
Message  5/9/2016 @  - post op follow up phone call completed  Pt feeling well and ambulating about home without difficulty  Pain controlled with duragesic patch  VNA in to see pt today  Noticed increase in arm circumference and instructed to call office where pt was seen earlier today with no issues at this time  Drain continues with moderate amt of drainage  Pt stated understanding about discharge instructions and medication adjustments  Pt educated on [id number] [facility]  Follow up appt with surgeon scheduled for next week  Instructed to call with any additional questions or concerns      Active Problems    1  Adjustment disorder with mixed anxiety and depressed mood (309 28) (F43 23)   2  Anxiety disorder (300 00) (F41 9)   3  Arthritis (716 90) (M19 90)   4  Bereavement (V62 82) (Z63 4)   5  Chronic pain (338 29) (G89 29)   6  Depression (311) (F32 9)   7  Edema extremities (782 3) (R60 0)   8  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)   9  Hypercholesterolemia (272 0) (E78 0)   10  Hypothyroidism (244 9) (E03 9)   11  Insomnia (780 52) (G47 00)   12  Obesity (278 00) (E66 9)   13  Postgastrectomy malabsorption (579 3) (K91 2,Z90 3)   14  Preop cardiovascular exam (V72 81) (Z01 810)   15  Pre-op testing (V72 84) (Z01 818)   16  Status post gastric bypass for obesity (V45 86) (Z98 84)   17  Weight gain (783 1) (R63 5)    Current Meds   1  ALPRAZolam 1 MG Oral Tablet; TAKE 1 TABLET AT BEDTIME; Therapy: 05PJG0617 to (Evaluate:40Uxn9164); Last Rx:10Nov2015 Ordered   2  Cyclobenzaprine HCl - 10 MG Oral Tablet; TAKE 1 TABLET AT BEDTIME; Therapy: 29ZSE4131 to (Evaluate:46Umh9980); Last Rx:10Nov2015 Ordered   3  Levothyroxine Sodium 125 MCG Oral Tablet; take 2 tablet daily; Therapy: 63WKA8331 to (Last Rx:10Nov2015) Ordered   4  Omeprazole 20 MG Oral Capsule Delayed Release; TAKE 1 CAPSULE DAILY; Therapy: 02Ygw4039 to (Evaluate:16Odc1581)  Requested for: 27Apr2016; Last   Rx:22Apr2016 Ordered   5   Optisource Post Bariatric Surg Oral Tablet Chewable; CHEW AND SWALLOW 1 TABLET   DAILY; Therapy: 49COD3761 to (Evaluate:10Dec2015); Last Rx:10Nov2015 Ordered   6  OxyCODONE HCl - 30 MG Oral Tablet; TAKE 1 TABLET EVERY 6 HOURS AS NEEDED   FOR PAIN;   Therapy: 93YJL4187 to (Evaluate:10Dec2015); Last Rx:10Nov2015 Ordered   7  Oxycodone-Acetaminophen 5-325 MG Oral Tablet; TAKE 1 TO 2 TABLETS EVERY 4 TO   6 HOURS AS NEEDED; Therapy: (Recorded:07Apr2016) to Recorded   8  Pravastatin Sodium 10 MG Oral Tablet; TAKE 1 TABLET DAILY; Therapy: 86BIR5537 to (Evaluate:10Dec2015); Last Rx:10Nov2015 Ordered    Allergies    1   No Known Drug Allergies    Signatures   Electronically signed by : Meli Yusuf, ; May 10 2016  7:00AM EST                       (Author)

## 2018-01-13 NOTE — MISCELLANEOUS
Message  4/25/2016 @ 1430- Post op follow up phone call attempted  No answer obtained on listed phone number  Generic message left requesting call back with an update on progress  Active Problems     1  Adjustment disorder with mixed anxiety and depressed mood (309 28) (F43 23)   2  Anxiety disorder (300 00) (F41 9)   3  Arthritis (716 90) (M19 90)   4  Bereavement (V62 82) (Z63 4)   5  Chronic pain (338 29) (G89 29)   6  Depression (311) (F32 9)   7  Edema extremities (782 3) (R60 0)   8  Hypercholesterolemia (272 0) (E78 0)   9  Hypothyroidism (244 9) (E03 9)   10  Insomnia (780 52) (G47 00)   11  Preop cardiovascular exam (V72 81) (Z01 810)   12  Pre-op testing (V72 84) (Z01 818)   13  Weight gain (783 1) (R63 5)    Obesity (278 00) (E66 9)       Postgastrectomy malabsorption (579 3) (K91 2)       Status post gastric bypass for obesity (V45 86) (Z98 84)       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)          Current Meds   1  ALPRAZolam 1 MG Oral Tablet; TAKE 1 TABLET AT BEDTIME; Therapy: 77DFD5013 to (Evaluate:46Kow8317); Last Rx:10Nov2015 Ordered   2  Cyclobenzaprine HCl - 10 MG Oral Tablet; TAKE 1 TABLET AT BEDTIME; Therapy: 57KKX3932 to (Evaluate:75Rbr2572); Last Rx:10Nov2015 Ordered   3  Furosemide 40 MG Oral Tablet; TAKE 1 TABLET DAILY; Therapy: 91MHO2081 to (Evaluate:12Ixk3813); Last Rx:10Nov2015 Ordered   4  Levothyroxine Sodium 125 MCG Oral Tablet; take 2 tablet daily; Therapy: 62EVQ9938 to (Last Rx:10Nov2015) Ordered   5  Omeprazole 20 MG Oral Capsule Delayed Release; TAKE 1 CAPSULE DAILY; Therapy: 68Ivq4239 to (Evaluate:07Adk2899)  Requested for: 22Apr2016; Last   Rx:65Yfm5942; Status: ACTIVE - Retrospective By Protocol Authorization Ordered   6  Optisource Post Bariatric Surg Oral Tablet Chewable; CHEW AND SWALLOW 1 TABLET   DAILY; Therapy: 36WUR6167 to (Evaluate:80Ive4871); Last Rx:10Nov2015 Ordered   7   OxyCODONE HCl - 30 MG Oral Tablet; TAKE 1 TABLET EVERY 6 HOURS AS NEEDED   FOR PAIN; Therapy: 14LXZ1812 to (Evaluate:82Pal5091); Last Rx:10Nov2015 Ordered   8  Oxycodone-Acetaminophen 5-325 MG Oral Tablet; TAKE 1 TO 2 TABLETS EVERY 4 TO   6 HOURS AS NEEDED; Therapy: (Recorded:07Apr2016) to Recorded   9  Potassium Chloride ER 20 MEQ Oral Tablet Extended Release; Take 1 tablet daily; Therapy: 68VNY4021 to (Last Rx:10Nov2015) Ordered   10  Pravastatin Sodium 10 MG Oral Tablet; TAKE 1 TABLET DAILY; Therapy: 97HHF3684 to (Evaluate:10Dec2015); Last Rx:10Nov2015 Ordered    Allergies    1   No Known Drug Allergies    Signatures   Electronically signed by : Nicola Lee, ; May  9 2016  1:54PM EST                       (Author)

## 2018-01-14 NOTE — PROGRESS NOTES
Message  Spoke with patient She is tolerating soft diet- 4 Tablespoons per meal She is eating 3 meals per day  Instructed patient to increase her meals to 4 per day  She is drinking about 48 ounces of water or ice per day, instructed patient to increase her fluid intake to 64 ounces  Pt only had Boost high protein in home which has 27 grams of sugar  I will provide patient with protein powder when she comes to the office on Thursday  She agreed to drink 1 protein shake per day  Prealbumin dropped with decrease in TPN to 16 1  As pt is tolerating diet and fluids, will d/c TPN  Pt will run TPN tonight and Wed night, and she has an appointment with Dr Dhara Reid on Thursday  Dr Dhara Reid will pull the PICC line at that time  Active Problems    1  Adjustment disorder with mixed anxiety and depressed mood (309 28) (F43 23)   2  Anxiety disorder (300 00) (F41 9)   3  Arthritis (716 90) (M19 90)   4  Bereavement (V62 82) (Z63 4)   5  Chronic pain (338 29) (G89 29)   6  Depression (311) (F32 9)   7  Edema extremities (782 3) (R60 0)   8  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)   9  Hypercholesterolemia (272 0) (E78 0)   10  Hypothyroidism (244 9) (E03 9)   11  Insomnia (780 52) (G47 00)   12  Obesity (278 00) (E66 9)   13  Postgastrectomy malabsorption (579 3) (K91 2,Z90 3)   14  Preop cardiovascular exam (V72 81) (Z01 810)   15  Pre-op testing (V72 84) (Z01 818)   16  Status post gastric bypass for obesity (V45 86) (Z98 84)   17  Weight gain (783 1) (R63 5)    Current Meds   1  ALPRAZolam 1 MG Oral Tablet; TAKE 1 TABLET AT BEDTIME; Therapy: 32ZJJ9490 to (Evaluate:43Wyi1610); Last Rx:10Nov2015 Ordered   2  Levothyroxine Sodium 150 MCG Oral Tablet; Therapy: (Recorded:49Dke3501) to Recorded   3  Omeprazole 20 MG Oral Capsule Delayed Release; TAKE 1 CAPSULE DAILY; Therapy: 22Apr2016 to (Evaluate:49Aow0679)  Requested for: 27Apr2016; Last   Rx:22Apr2016 Ordered   4   Optisource Post Bariatric Surg Oral Tablet Chewable; CHEW AND SWALLOW 1 TABLET   DAILY; Therapy: 85FGQ9524 to (Evaluate:67Cvb0260); Last Rx:10Nov2015 Ordered   5  OxyCODONE HCl - 30 MG Oral Tablet; TAKE 1 TABLET EVERY 6 HOURS AS NEEDED   FOR PAIN;   Therapy: 44EVQ2054 to (Evaluate:73Fmu4999); Last Rx:10Nov2015 Ordered    Allergies    1   No Known Drug Allergies    Signatures   Electronically signed by : MARY Barbour; Jun 14 2016  4:30PM EST                       (Author)

## 2018-01-15 VITALS
WEIGHT: 256.5 LBS | TEMPERATURE: 96.8 F | HEIGHT: 63 IN | HEART RATE: 56 BPM | BODY MASS INDEX: 45.45 KG/M2 | RESPIRATION RATE: 14 BRPM | SYSTOLIC BLOOD PRESSURE: 102 MMHG | DIASTOLIC BLOOD PRESSURE: 60 MMHG

## 2018-01-15 NOTE — MISCELLANEOUS
Message  4/25/2016 @ 1450 - post op follow up phone call completed  Pt is sipping liquids, using IS as instructed, reinforced importance of using IS to help prevent pneumonia  Ambulating about home without difficulty  Pain controlled with analgesia  Reaffirmed examples of liquid diet over the next week  Pt stated understanding about discharge instructions and medication adjustments  Pt educated on [address] [facility]  Follow up appt with surgeon scheduled for the end of this week  Instructed to call with any additional questions or concerns  Active Problems    1  Adjustment disorder with mixed anxiety and depressed mood (309 28) (F43 23)   2  Anxiety disorder (300 00) (F41 9)   3  Arthritis (716 90) (M19 90)   4  Bereavement (V62 82) (Z63 4)   5  Chronic pain (338 29) (G89 29)   6  Depression (311) (F32 9)   7  Edema extremities (782 3) (R60 0)   8  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)   9  Hypercholesterolemia (272 0) (E78 0)   10  Hypothyroidism (244 9) (E03 9)   11  Insomnia (780 52) (G47 00)   12  Obesity (278 00) (E66 9)   13  Postgastrectomy malabsorption (579 3) (K91 2,Z90 3)   14  Preop cardiovascular exam (V72 81) (Z01 810)   15  Pre-op testing (V72 84) (Z01 818)   16  Status post gastric bypass for obesity (V45 86) (Z98 84)   17  Weight gain (783 1) (R63 5)    Current Meds   1  ALPRAZolam 1 MG Oral Tablet; TAKE 1 TABLET AT BEDTIME; Therapy: 41YHG5159 to (Evaluate:29Ilr2668); Last Rx:10Nov2015 Ordered   2  Cyclobenzaprine HCl - 10 MG Oral Tablet; TAKE 1 TABLET AT BEDTIME; Therapy: 26CVC3384 to (Evaluate:39Fmi6162); Last Rx:10Nov2015 Ordered   3  Furosemide 40 MG Oral Tablet; TAKE 1 TABLET DAILY; Therapy: 10INO5511 to (Evaluate:80Kbo1760); Last Rx:10Nov2015 Ordered   4  Levothyroxine Sodium 125 MCG Oral Tablet; take 2 tablet daily; Therapy: 20GXR7914 to (Last Rx:10Nov2015) Ordered   5  Omeprazole 20 MG Oral Capsule Delayed Release; TAKE 1 CAPSULE DAILY;    Therapy: 22Apr2016 to (Evaluate:99Exy0911)  Requested for: 22Apr2016; Last   Rx:22Apr2016; Status: ACTIVE - Retrospective By Protocol Authorization Ordered   6  Optisource Post Bariatric Surg Oral Tablet Chewable; CHEW AND SWALLOW 1 TABLET   DAILY; Therapy: 32AOD7343 to (Evaluate:24Qtx8856); Last Rx:10Nov2015 Ordered   7  OxyCODONE HCl - 30 MG Oral Tablet; TAKE 1 TABLET EVERY 6 HOURS AS NEEDED   FOR PAIN;   Therapy: 24YLF6361 to (Evaluate:10Dec2015); Last Rx:10Nov2015 Ordered   8  Oxycodone-Acetaminophen 5-325 MG Oral Tablet; TAKE 1 TO 2 TABLETS EVERY 4 TO   6 HOURS AS NEEDED; Therapy: (Recorded:07Apr2016) to Recorded   9  Potassium Chloride ER 20 MEQ Oral Tablet Extended Release; Take 1 tablet daily; Therapy: 90TCZ8509 to (Last Rx:10Nov2015) Ordered   10  Pravastatin Sodium 10 MG Oral Tablet; TAKE 1 TABLET DAILY; Therapy: 17ZGP3754 to (Evaluate:10Dec2015); Last Rx:10Nov2015 Ordered    Allergies    1   No Known Drug Allergies    Signatures   Electronically signed by : Delfina Xavier, ; Apr 25 2016  2:50PM EST                       (Author)

## 2018-01-15 NOTE — RESULT NOTES
Message  Prealbumin level 19 5  phos slightly elevated- decreased level in TPN  Faxed update orders to [facility] [employer] signed by : Claudette Abler, LD; May 24 2016  1:36PM EST                       (Author)

## 2018-01-16 ENCOUNTER — LAB REQUISITION (OUTPATIENT)
Dept: LAB | Facility: HOSPITAL | Age: 58
End: 2018-01-16
Payer: MEDICARE

## 2018-01-16 DIAGNOSIS — Z51.81 ENCOUNTER FOR THERAPEUTIC DRUG LEVEL MONITORING: ICD-10-CM

## 2018-01-16 DIAGNOSIS — Z48.815 ENCOUNTER FOR SURGICAL AFTERCARE FOLLOWING SURGERY OF DIGESTIVE SYSTEM: ICD-10-CM

## 2018-01-16 DIAGNOSIS — Z79.899 OTHER LONG TERM (CURRENT) DRUG THERAPY: ICD-10-CM

## 2018-01-16 DIAGNOSIS — E46 PROTEIN-CALORIE MALNUTRITION (HCC): ICD-10-CM

## 2018-01-16 LAB
ALBUMIN SERPL BCP-MCNC: 2.5 G/DL (ref 3.5–5)
ALP SERPL-CCNC: 469 U/L (ref 46–116)
ALT SERPL W P-5'-P-CCNC: 187 U/L (ref 12–78)
ANION GAP SERPL CALCULATED.3IONS-SCNC: 9 MMOL/L (ref 4–13)
AST SERPL W P-5'-P-CCNC: 58 U/L (ref 5–45)
BILIRUB SERPL-MCNC: 0.52 MG/DL (ref 0.2–1)
BUN SERPL-MCNC: 24 MG/DL (ref 5–25)
CALCIUM SERPL-MCNC: 8.4 MG/DL (ref 8.3–10.1)
CHLORIDE SERPL-SCNC: 104 MMOL/L (ref 100–108)
CHOLEST SERPL-MCNC: 108 MG/DL (ref 50–200)
CO2 SERPL-SCNC: 23 MMOL/L (ref 21–32)
CREAT SERPL-MCNC: 0.51 MG/DL (ref 0.6–1.3)
GFR SERPL CREATININE-BSD FRML MDRD: 107 ML/MIN/1.73SQ M
GLUCOSE SERPL-MCNC: 88 MG/DL (ref 65–140)
HDLC SERPL-MCNC: 28 MG/DL (ref 40–60)
LDLC SERPL CALC-MCNC: 67 MG/DL (ref 0–100)
MAGNESIUM SERPL-MCNC: 2.1 MG/DL (ref 1.6–2.6)
PHOSPHATE SERPL-MCNC: 4.9 MG/DL (ref 2.7–4.5)
POTASSIUM SERPL-SCNC: 3.9 MMOL/L (ref 3.5–5.3)
PREALB SERPL-MCNC: 10.5 MG/DL (ref 18–40)
PROT SERPL-MCNC: 6.9 G/DL (ref 6.4–8.2)
SODIUM SERPL-SCNC: 136 MMOL/L (ref 136–145)
TRIGL SERPL-MCNC: 64 MG/DL

## 2018-01-16 PROCEDURE — 80061 LIPID PANEL: CPT | Performed by: SURGERY

## 2018-01-16 PROCEDURE — 83735 ASSAY OF MAGNESIUM: CPT | Performed by: SURGERY

## 2018-01-16 PROCEDURE — 80053 COMPREHEN METABOLIC PANEL: CPT | Performed by: SURGERY

## 2018-01-16 PROCEDURE — 84100 ASSAY OF PHOSPHORUS: CPT | Performed by: SURGERY

## 2018-01-16 PROCEDURE — 84134 ASSAY OF PREALBUMIN: CPT | Performed by: SURGERY

## 2018-01-16 NOTE — RESULT NOTES
Verified Results  (1) PREALBUMIN 61Qxh5403 09:58AM Mohinder Valeria Order Number: [id number]     Test Name Result Flag Reference   PREALBUMIN 11 1 mg/dL L 18 0-40 0   This bloodwork is non-fasting  Please drink two glasses of water morning ofbloodwork

## 2018-01-16 NOTE — PROGRESS NOTES
Discussion/Summary  Discussion Summary:   Assess: Pt lost one pound since last appointment Her car is being serviced and her food log and bariatric booklet is in her car  Pt is still eating similar to last visit except she is adding protein to all her meals  She is still trying to be more active- added one day a week where she walks at a department store for extra steps  Her daughter is purchasing a fit bit for her to track her steps at home  She is drinking approximately 10 cups of water per day  Encouraged attendance at [employer] , but her daughters use the cars in the evening to go to school Dx Weight regain related to lack of education/poor dietary intake as evidenced of weight regain from her original procedure Intervention: Provided with handout on seated exercises  Provided with information and dates for [place] [employer] [place]  Provided with more forms to keep food journal as pt does not use a smart phone  Pt will meet with surgeon today and it will be determined if she will get revisional surgery or if she will be managed medically  F/U with LCSW and RD in one month on 2016  Active Problems    1  Adjustment disorder with mixed anxiety and depressed mood (309 28) (F43 23)   2  Anxiety disorder (300 00) (F41 9)   3  Arthritis (716 90) (M19 90)   4  Bereavement (V62 82) (Z63 4)   5  Chronic pain (338 29) (G89 29)   6  Depression (311) (F32 9)   7  Hypercholesterolemia (272 0) (E78 0)   8  Hypothyroidism (244 9) (E03 9)   9  Insomnia (780 52) (G47 00)   10  Obesity (278 00) (E66 9)   11  Postgastrectomy malabsorption (579 3) (K91 2,Z90 3)   12  Status post gastric bypass for obesity (V45 86) (Z98 84)   13  Weight gain (783 1) (R63 5)    Surgical History    1  History of Abdominoplasty   2  History of  Section   3  History of Gallbladder Surgery   4  History of Gastric Surgery For Morbid Obesity Bypass With Naheed-en-Y    Family History    1   Family history of Alzheimer's disease (V17 2) (Z82 0)    2  Family history of Anxiety    3  Family history of Mild alcohol abuse in sustained remission in controlled environment    4  No family history of suicide    Social History    · Bereavement (W68 62) (Z63 4)   · Denied: History of Drug use   · Former smoker (V15 82) (M71 347)   · No alcohol use    Current Meds   1  ALPRAZolam 1 MG Oral Tablet; TAKE 1 TABLET AT BEDTIME; Therapy: 73ACQ5976 to (Evaluate:10Dec2015); Last Rx:10Nov2015 Ordered   2  Cyclobenzaprine HCl - 10 MG Oral Tablet; TAKE 1 TABLET AT BEDTIME; Therapy: 69FAU4012 to (Evaluate:10Dec2015); Last Rx:10Nov2015 Ordered   3  Furosemide 40 MG Oral Tablet; TAKE 1 TABLET DAILY; Therapy: 46WZF9877 to (Evaluate:10Dec2015); Last Rx:10Nov2015 Ordered   4  Levothyroxine Sodium 125 MCG Oral Tablet; take 2 tablet daily; Therapy: 00HYZ0699 to (Last Rx:10Nov2015) Ordered   5  Optisource Post Bariatric Surg Oral Tablet Chewable; CHEW AND SWALLOW 1 TABLET   DAILY; Therapy: 28PHH5132 to (Evaluate:01Wws5672); Last Rx:10Nov2015 Ordered   6  OxyCODONE HCl - 30 MG Oral Tablet; TAKE 1 TABLET EVERY 6 HOURS AS NEEDED   FOR PAIN;   Therapy: 91VUR7968 to (Evaluate:10Dec2015); Last Rx:10Nov2015 Ordered   7  Oxycodone-Acetaminophen  MG Oral Tablet (Percocet); 1-2 tablets Q6 hrs PRN   pain; Therapy: 82UTQ8956 to (Last Rx:10Nov2015) Ordered   8  Potassium Chloride ER 20 MEQ Oral Tablet Extended Release; Take 1 tablet daily; Therapy: 44NOF2006 to (Last Rx:10Nov2015) Ordered   9  Pravastatin Sodium 10 MG Oral Tablet; TAKE 1 TABLET DAILY; Therapy: 64DFB2716 to (Evaluate:10Dec2015); Last Rx:10Nov2015 Ordered    Allergies    1   No Known Drug Allergies    Future Appointments    Date/Time Provider Specialty Site   03/29/2016 03:20 PM Ayah Salazar MD Psychiatry [place] [address]   03/25/2016 01:15 PM Juli Roberson Lu[address] Papa 87, APRN, [facility]   Susan Hunter 1   Electronically signed by : MARY Peralta; Mar  4 2016 12: 25PM EST                       (Author)    Electronically signed by :  KIM Reyna ; Mar 17 2016 12:05PM EST

## 2018-01-16 NOTE — PROGRESS NOTES
Message  Pt is tolerating 3 meals per day of soft food  Had one episode of vomiting while wearing her false teeth  She tolerates food well without her teeth  She can eat 2 to 4 Tbsp- ate fish, tuna fish, eggs, yogurt  Also drinking 48-64 ounces of fluid  Will f/u on Tuesday concerning tolerance, if po intake still good will d/c TPN  Pt has f/u on 6/18 with Dr Turner Lanier, , she can have her PICC pulled if she continues to tolerate diet and liquids  Will f/u with patient after her appointment with Dr Shree Redmond    1  Adjustment disorder with mixed anxiety and depressed mood (309 28) (F43 23)   2  Anxiety disorder (300 00) (F41 9)   3  Arthritis (716 90) (M19 90)   4  Bereavement (V62 82) (Z63 4)   5  Chronic pain (338 29) (G89 29)   6  Depression (311) (F32 9)   7  Edema extremities (782 3) (R60 0)   8  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)   9  Hypercholesterolemia (272 0) (E78 0)   10  Hypothyroidism (244 9) (E03 9)   11  Insomnia (780 52) (G47 00)   12  Obesity (278 00) (E66 9)   13  Postgastrectomy malabsorption (579 3) (K91 2,Z90 3)   14  Preop cardiovascular exam (V72 81) (Z01 810)   15  Pre-op testing (V72 84) (Z01 818)   16  Status post gastric bypass for obesity (V45 86) (Z98 84)   17  Weight gain (783 1) (R63 5)    Current Meds   1  ALPRAZolam 1 MG Oral Tablet; TAKE 1 TABLET AT BEDTIME; Therapy: 10XDQ0604 to (Evaluate:45Ior0427); Last Rx:10Nov2015 Ordered   2  Levothyroxine Sodium 150 MCG Oral Tablet; Therapy: (Recorded:68Nnu5446) to Recorded   3  Omeprazole 20 MG Oral Capsule Delayed Release; TAKE 1 CAPSULE DAILY; Therapy: 22Apr2016 to (Evaluate:92Pxt8243)  Requested for: 27Apr2016; Last   Rx:22Apr2016 Ordered   4  Optisource Post Bariatric Surg Oral Tablet Chewable; CHEW AND SWALLOW 1 TABLET   DAILY; Therapy: 10NGL8795 to (Evaluate:68Kag4309); Last Rx:10Nov2015 Ordered   5   OxyCODONE HCl - 30 MG Oral Tablet; TAKE 1 TABLET EVERY 6 HOURS AS NEEDED   FOR PAIN;   Therapy: 02TNL0861 to (Evaluate:66Wzo8422); Last Rx:10Nov2015 Ordered    Allergies    1   No Known Drug Allergies    Signatures   Electronically signed by : MARY Prince; Mich 10 2016  1:41PM EST                       (Author)

## 2018-01-16 NOTE — PROGRESS NOTES
Message  Called and spoke with patient  Told her that her CT scan was normal per Dr Marie Navarro email  She will start the pureed diet today  I will f/u with her on Thursday to assess tolerance  If tolerating pureed diet for 3 days, will advance to soft  Will cut TPN in 1/2 tomorrow ( orders are due every Tuesday)  If doing well with soft diet next week, will d/c TPN next Tuesday  Also emailed copy of diet progression  Active Problems    1  Adjustment disorder with mixed anxiety and depressed mood (309 28) (F43 23)   2  Anxiety disorder (300 00) (F41 9)   3  Arthritis (716 90) (M19 90)   4  Bereavement (V62 82) (Z63 4)   5  Chronic pain (338 29) (G89 29)   6  Depression (311) (F32 9)   7  Edema extremities (782 3) (R60 0)   8  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)   9  Hypercholesterolemia (272 0) (E78 0)   10  Hypothyroidism (244 9) (E03 9)   11  Insomnia (780 52) (G47 00)   12  Obesity (278 00) (E66 9)   13  Postgastrectomy malabsorption (579 3) (K91 2,Z90 3)   14  Preop cardiovascular exam (V72 81) (Z01 810)   15  Pre-op testing (V72 84) (Z01 818)   16  Status post gastric bypass for obesity (V45 86) (Z98 84)   17  Weight gain (783 1) (R63 5)    Current Meds   1  ALPRAZolam 1 MG Oral Tablet; TAKE 1 TABLET AT BEDTIME; Therapy: 32RHK7951 to (Evaluate:00Ico9794); Last Rx:10Nov2015 Ordered   2  Levothyroxine Sodium 150 MCG Oral Tablet; Therapy: (Recorded:61Ogz6043) to Recorded   3  Omeprazole 20 MG Oral Capsule Delayed Release; TAKE 1 CAPSULE DAILY; Therapy: 22Apr2016 to (Evaluate:86Vsw7214)  Requested for: 27Apr2016; Last   Rx:22Apr2016 Ordered   4  Optisource Post Bariatric Surg Oral Tablet Chewable; CHEW AND SWALLOW 1 TABLET   DAILY; Therapy: 44RYR9755 to (Evaluate:67Mkp3944); Last Rx:10Nov2015 Ordered   5  OxyCODONE HCl - 30 MG Oral Tablet; TAKE 1 TABLET EVERY 6 HOURS AS NEEDED   FOR PAIN;   Therapy: 91ZZN0243 to (Evaluate:61Vuh7326); Last Rx:10Nov2015 Ordered    Allergies    1   No Known Drug Allergies    Signatures   Electronically signed by : MARY Mcfarland; Jun 6 2016  3:58PM EST                       (Author)

## 2018-01-17 ENCOUNTER — GENERIC CONVERSION - ENCOUNTER (OUTPATIENT)
Dept: OTHER | Facility: OTHER | Age: 58
End: 2018-01-17

## 2018-01-17 NOTE — PSYCH
Progress Note  Psychotherapy Provided [facility]: Individual Psychotherapy 45 minutes provided today  Goals addressed in session:   Goal #1  D: " I've lost 42 lbs so far, so I'm down to 356lbs now", with the diet, and high water-intake that the nutritionist told me about"   " And, I saw Dr Hai Harman, at [address] [place] [address]   he said that the stomach pouch inside me is too big, so I'm going to get an Endoscopy on Feb 17th"   " I get this sadness (she 's crying), when I think of having to go out  Renteria Buddy Renteria Buddy I've been bullied and put down, since I was a child, and people just stare at me, and make rude faces at me, even Pharmacists do that when I  my meds"   " I just want to look and feel like a normal person, and not have these leg sores and leg-pain, and have the energy to do things with my Grandkids   "   Pt has a tearful, moderately depressed affect and mood  She has low self-esteem, which ties-in with her morbid obesity, low mobility, and depressive features  Pt denies SI / HI /AH / VH  Pt is [facility] specialists at [facility], and also making progress with weight-loss, with loss of 42 lbs so far  This therapist gives her affirmation, and pt is assisted with giving self-affirmation  Pt has automatic-negative thoughts about herself, especially when she has to face going out in public  pt was bullied in childhood  We do cognitive-reframing exercises, re: pt's self-worth as a person, and the positives re: raising her children  Both daughters are in college, one becoming a Dental hygienist, the other in [employer]  Pt to do positive cognitive-reframing at home, also, and deep-breathing  Pt is not on an antidepressant, just an anxiolytic (Alprazolam)  Pt's psychiatrist is away on maternity leave  This Therapist gives psychoeducation to pt, and she is agreeable to trying an SSRI antidepressant---info to be shared with psychiatrist who is here  A: Depression NOS F32 9;  Anxiety NOS F41 9, and Morbid obesity and other conditions  P: Continue treatment Plan, Therapy, and perhaps an antidepressant would help pt before and after Bariatric surgery  Pain Scale and Suicide Risk [facility]: Current Pain Assessment: moderate to severe   On a scale of 0 to 10, the patient rates current pain at 8   Current suicide risk is low   Behavioral Health Treatment Plan [facility]: Diagnosis and Treatment Plan explained to patient, patient relates understanding diagnosis and is agreeable to Treatment Plan  Assessment    1  Depression (311) (F32 9)   2   Anxiety disorder (300 00) (F41 9)    Signatures   Electronically signed by : Hilarie Duverney, MSAPRNPMHCNS-BC; Jan 22 2016  2:41PM EST                       (Author)

## 2018-01-17 NOTE — PROGRESS NOTES
Discussion/Summary  Discussion Summary:   Pt here for monthly weigh in  Pt lost 13 3 pounds since last month, and 33 7 pounds since initial appointment She has been working on the lesson plans in her bariatric booklet and with RAVI Astorga  Pt had her initial consult with the surgeon today and is officially enrolled in the [facility] program for possible revisional surgery  Pt does not report any significant changes in diagnosis or medication  Pt has accomplished the following goals: 1  She is seeing a therapist monthly and has been decreasing stress eating 2  She is walking more- doing more in the home, washing dishes, cleaning (previously did not even leave the house) 3  She is following the 30/60 rule 4  She reports she is food journaling but forgot her journal today 5  She purchased the SpeakUp vitamins at [place] and is taking 4 per day Per 24 hour recall: B: high protein cereal with 4 oz fairlife milk L: Tossed salad D: baked/grilled lean protein, and 2 non starchy vegetables  S: peanut butter and low sodium crackers, or lite whipped Yoplait yogurt Nutrition Diagnosis: Weight regain related to stress eating as evidenced of weight gain from her liberty weight   Intervention: Reviewed surgeon consult  pt is scheduled for egd to determine if revisional surgery is needed  Per upper GI pt had the bypass  Encouraged pt to increase her activity and to include protein at lunch  Also encouraged pt to attend [place] [employer] and provided her with the information  Next meet is 2/3/2016 Monitoring/evaluation: Patient will work on the following goals 1  Food journal and bring to next appointment 2  Attend pep rally, weather permitting 3  Continue to increase physical activity  Pt was receptive and verbalized understanding  Pt will lose 4-8 pounds by next appointment Follow up scheduled for 4 weeks with RAVI and RD  Active Problems    1   Adjustment disorder with mixed anxiety and depressed mood (309 28) (F43 23)   2  Anxiety disorder (300 00) (F41 9)   3  Arthritis (716 90) (M19 90)   4  Bereavement (V62 82) (Z63 4)   5  Chronic pain (338 29) (G89 29)   6  Depression (311) (F32 9)   7  Hypercholesterolemia (272 0) (E78 0)   8  Hypothyroidism (244 9) (E03 9)   9  Insomnia (780 52) (G47 00)   10  Obesity (278 00) (E66 9)   11  Postgastrectomy malabsorption (579 3) (K91 2)   12  Status post gastric bypass for obesity (V45 86) (Z98 84)   13  Weight gain (783 1) (R63 5)    Surgical History    1  History of Abdominoplasty   2  History of  Section   3  History of Gallbladder Surgery   4  History of Gastric Surgery For Morbid Obesity Bypass With Naheed-en-Y    Family History    1  Family history of Alzheimer's disease (V17 2) (Z82 0)    2  Family history of Anxiety    3  Family history of Mild alcohol abuse in sustained remission in controlled environment    4  No family history of suicide    Social History    · Bereavement (Z36 81) (Z63 4)   · Denied: History of Drug use   · Former smoker (V15 82) (X15 926)   · No alcohol use    Current Meds   1  ALPRAZolam 1 MG Oral Tablet; TAKE 1 TABLET AT BEDTIME; Therapy: 11RXH9750 to (Evaluate:64Pmn3378); Last Rx:2015 Ordered   2  Cyclobenzaprine HCl - 10 MG Oral Tablet; TAKE 1 TABLET AT BEDTIME; Therapy: 32WXB2720 to (Evaluate:09Luy3664); Last Rx:2015 Ordered   3  Furosemide 40 MG Oral Tablet; TAKE 1 TABLET DAILY; Therapy: 33OAA7443 to (Evaluate:99Vke0212); Last Rx:2015 Ordered   4  Levothyroxine Sodium 125 MCG Oral Tablet; take 2 tablet daily; Therapy: 14JLK6540 to (Last Rx:2015) Ordered   5  Optisource Post Bariatric Surg Oral Tablet Chewable; CHEW AND SWALLOW 1 TABLET   DAILY; Therapy: 66UUG0816 to (Evaluate:46Nsw8094); Last Rx:2015 Ordered   6  OxyCODONE HCl - 30 MG Oral Tablet; TAKE 1 TABLET EVERY 6 HOURS AS NEEDED   FOR PAIN;   Therapy: 37ESE4097 to (Evaluate:28Tqz6602); Last Rx:2015 Ordered   7   Oxycodone-Acetaminophen  MG Oral Tablet (Percocet); 1-2 tablets Q6 hrs PRN   pain; Therapy: 93RMH6166 to (Last Rx:10Nov2015) Ordered   8  Potassium Chloride ER 20 MEQ Oral Tablet Extended Release; Take 1 tablet daily; Therapy: 22SGU8377 to (Last Rx:10Nov2015) Ordered   9  Pravastatin Sodium 10 MG Oral Tablet; TAKE 1 TABLET DAILY; Therapy: 02XEY4269 to (Evaluate:41Tns7224); Last Rx:10Nov2015 Ordered    Allergies    1  No Known Drug Allergies    Future Appointments    Date/Time Provider Specialty Site   03/04/2016 01:00 PM KIM Feliciano  General Surgery [facility] WEIGHT MANAGEMENT [place]   03/29/2016 03:20 PM Jonelle Vee MD Psychiatry [place] [address]     Signatures   Electronically signed by : MARY Mirza; Jan 14 2016  2:10PM EST                       (Author)    Electronically signed by :  KIM Elizabeth ; Tariq 15 2016 11:26AM EST

## 2018-01-17 NOTE — PROCEDURES
Procedures by Tony Dixon RN at 10/27/2017  4:37 PM      Author:  Tony Dixon RN Service:  Interventional Radiology  Author Type:  Registered Nurse    Filed:  10/27/2017  4:39 PM Date of Service:  10/27/2017  4:37 PM Status:  Attested    :  Tony Dixon RN (Registered Nurse)  Cosigner:  Alexia Masterson MD at 10/30/2017  8:14 AM      Procedure Orders:       1  Insert PICC line [id number] ordered by Tony Dixon RN at 10/27/17 1637               Attestation signed by Alexia Masterson MD at 10/30/2017  8:14 AM      I performed the procedure  I have reviewed the images, documentation and am in agreement  PICC Line Insertion  Date/Time: 10/27/2017 4:37 PM  Performed by: Lisa Estrella by: Tracey Negrete     Patient location:  IR  Other Assisting Provider: No     Consent:     Consent obtained:  Written    Consent given by:  Patient    Risks discussed:  Arterial puncture, incorrect placement, infection, bleeding, nerve damage and pneumothorax    Alternatives discussed:  Alternative treatment, no treatment, delayed treatment, observation and referral  Universal protocol:     Procedure explained and questions answered to patient or proxy's satisfaction: yes      Relevant documents present and verified: yes      Test results available and properly labeled: yes       Imaging studies available: yes      Required blood products, implants, devices, and special equipment available: yes      Site/side marked: yes      Immediately prior to procedure, a time out was called: yes      Patient identity confirmed:  Verbally with patient  Pre-procedure details:     Hand hygiene: Hand hygiene performed prior to insertion      Sterile barrier technique: All elements of maximal sterile technique followed      Skin preparation:  ChloraPrep  Indications:     PICC line indications: total parenteral nutrition    Anesthesia (see MAR for exact dosages):      Anesthesia method:  Local infiltration    Local anesthetic:  Lidocaine 1% w/o epi  Procedure details:     Location:  Brachial    Vessel type: vein      Laterality:  Right    Approach: percutaneous technique used      Patient position:  Flat    Procedural supplies:  Double lumen    Catheter size:  5 Fr    Landmarks identified: yes      Ultrasound guidance: yes      Sterile ultrasound techniques: Sterile gel and sterile probe covers were used      Number of attempts:  1    Successful placement: yes      Total catheter length (cm):  44    Catheter out on skin (cm):  0    Arm circumference:  29 5  Post-procedure details:     Post-procedure:  Securement device placed    Assessment:  Blood return through all ports and free fluid flow    Post-procedure complications: none      Patient tolerance of procedure:   Tolerated well, no immediate complications                     Received for:Provider  EPIC   Oct 30 2017  8:14AM [facility] Standard Time

## 2018-01-18 ENCOUNTER — GENERIC CONVERSION - ENCOUNTER (OUTPATIENT)
Dept: OTHER | Facility: OTHER | Age: 58
End: 2018-01-18

## 2018-01-18 NOTE — PROGRESS NOTES
Active Problems    1  Adjustment disorder with mixed anxiety and depressed mood (309 28) (F43 23)   2  Anxiety disorder (300 00) (F41 9)   3  Arthritis (716 90) (M19 90)   4  Bereavement (V62 82) (Z63 4)   5  Chronic pain (338 29) (G89 29)   6  Decreased oral intake (783 9) (R63 8)   7  Depression (311) (F32 9)   8  Edema extremities (782 3) (R60 0)   9  Gastric anastomotic stricture (009 52,066 72) (K92 9,K31 89)   10  Gastric anastomotic stricture (540 92,772 16) (K92 9,K31 89)   11  Heart burn (787 1) (R12)   12  Hypercholesterolemia (272 0) (E78 00)   13  Hypothyroidism (244 9) (E03 9)   14  Insomnia (780 52) (G47 00)   15  Marginal ulcer (534 90) (K28 9)   16  Morbid obesity (278 01) (E66 01)   17  Obesity (278 00) (E66 9)   18  Postgastrectomy malabsorption (579 3) (K91 2,Z90 3)   19  Preop cardiovascular exam (V72 81) (Z01 810)   20  Pre-op testing (V72 84) (Z01 818)   21  Secondary hyperparathyroidism, non-renal (252 02) (E21 1)   22  Status post gastric bypass for obesity (V45 86) (Z98 84)   23  Vitamin A deficiency (264 9) (E50 9)   24  Vitamin D deficiency (268 9) (E55 9)    Current Meds   1  ALPRAZolam 1 MG Oral Tablet; TAKE 1 TABLET AT BEDTIME; Therapy: 07ZCL3139 to (Evaluate:52Lsz7182); Last Rx:10Nov2015 Ordered   2  Azithromycin 250 MG Oral Tablet; Therapy: (Machelle Billy) to Recorded   3  Carafate 1 GM/10ML Oral Suspension; Therapy: (Machelle Billy) to Recorded   4  Ditropan XL 5 MG TBCR; Therapy: (Machelle Billy) to Recorded   5  Levothyroxine Sodium 100 MCG Oral Tablet; Therapy: (Recorded:04Nov2016) to Recorded   6  Optisource Post Bariatric Surg Oral Tablet Chewable; CHEW AND SWALLOW 1 TABLET   DAILY; Therapy: 32TSJ8168 to (Evaluate:77Lqb8473); Last Rx:10Nov2015 Ordered   7  Oxybutynin Chloride ER 5 MG Oral Tablet Extended Release 24 Hour; Therapy: (Machelle Billy) to Recorded   8   OxyCODONE HCl - 30 MG Oral Tablet; TAKE 1 TABLET EVERY 6 HOURS AS NEEDED   FOR PAIN;   Therapy: 02EDP3435 to (Evaluate:10Jxn5154); Last Rx:10Nov2015 Ordered   9  Pantoprazole Sodium 40 MG Oral Tablet Delayed Release; Therapy: (Lorilee Sor) to Recorded   10  Protonix 20 MG Oral Tablet Delayed Release (Pantoprazole Sodium); Therapy: (Recorded:85Irf1395) to Recorded   11  TobraDex 0 3-0 1 % Ophthalmic Ointment; Therapy: (Lorilee Sor) to Recorded   12  Vitamin D TABS; Therapy: (Recorded:92Lfh7426) to Recorded    Allergies    1  No Known Drug Allergies    Discussion/Summary    Received TPN orders late yesterday afternoon  Current orders provide 1605 kcal and 85 grams protein  Based on [place] [facility]- for maintenance patient requires 1400kcal and 94 grams protein ( 1 5 gm/kg IBW) Patient's calcium is low but corrects for low albumin level ( albumin 1 8, last prealbumin 8 8)  Adjusted orders to the following:  Dex 20% 900 ml  AA 10% 950 ml  Lipids 20% 200 ml  total kcal of calories equal 1392 kcal and 95 grams of protein  patient is also receiving IV thiamin, multivitamin and minerals, vitamin C and folic acid  will continue to follow        Signatures   Electronically signed by : MARY Espinoza; Nov 15 2017 10:10AM EST                       (Author)

## 2018-01-18 NOTE — PROGRESS NOTES
Message  Called and spoke with patient  She is tolerating about 4 Tbsp or 1/4 cup of pureed food  She has been able to tolerate egg, applesauce, yogurt, baby food with out any issues  pt reports no pain or vomiting with eating  Tolerating liquids well   Reviewed soft diet and instructed patient to increase to soft diet tomorrow  Will f/u on Friday to assess how much patient is eating and to start calorie count  Pending patient tolerance, will d/c TPN next Wednesday ( pt's TPN delivery is every Wed)      Active Problems    1  Adjustment disorder with mixed anxiety and depressed mood (309 28) (F43 23)   2  Anxiety disorder (300 00) (F41 9)   3  Arthritis (716 90) (M19 90)   4  Bereavement (V62 82) (Z63 4)   5  Chronic pain (338 29) (G89 29)   6  Depression (311) (F32 9)   7  Edema extremities (782 3) (R60 0)   8  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)   9  Hypercholesterolemia (272 0) (E78 0)   10  Hypothyroidism (244 9) (E03 9)   11  Insomnia (780 52) (G47 00)   12  Obesity (278 00) (E66 9)   13  Postgastrectomy malabsorption (579 3) (K91 2,Z90 3)   14  Preop cardiovascular exam (V72 81) (Z01 810)   15  Pre-op testing (V72 84) (Z01 818)   16  Status post gastric bypass for obesity (V45 86) (Z98 84)   17  Weight gain (783 1) (R63 5)    Current Meds   1  ALPRAZolam 1 MG Oral Tablet; TAKE 1 TABLET AT BEDTIME; Therapy: 38CHK9058 to (Evaluate:25Fah3965); Last Rx:10Nov2015 Ordered   2  Levothyroxine Sodium 150 MCG Oral Tablet; Therapy: (Recorded:77Css9178) to Recorded   3  Omeprazole 20 MG Oral Capsule Delayed Release; TAKE 1 CAPSULE DAILY; Therapy: 22Apr2016 to (Evaluate:17Tgk3746)  Requested for: 27Apr2016; Last   Rx:22Apr2016 Ordered   4  Optisource Post Bariatric Surg Oral Tablet Chewable; CHEW AND SWALLOW 1 TABLET   DAILY; Therapy: 80BLZ4434 to (Evaluate:97Hkh5265); Last Rx:10Nov2015 Ordered   5   OxyCODONE HCl - 30 MG Oral Tablet; TAKE 1 TABLET EVERY 6 HOURS AS NEEDED   FOR PAIN;   Therapy: 56THH3213 to (Evaluate:53Ajt5897); Last Rx:10Nov2015 Ordered    Allergies    1   No Known Drug Allergies    Signatures   Electronically signed by : MARY Jaquez; Jun 8 2016  3:45PM EST                       (Author)

## 2018-01-22 VITALS
HEIGHT: 63 IN | HEART RATE: 93 BPM | DIASTOLIC BLOOD PRESSURE: 80 MMHG | TEMPERATURE: 97 F | RESPIRATION RATE: 14 BRPM | SYSTOLIC BLOOD PRESSURE: 100 MMHG | WEIGHT: 185 LBS | BODY MASS INDEX: 32.78 KG/M2

## 2018-01-22 VITALS
HEIGHT: 63 IN | TEMPERATURE: 96.5 F | SYSTOLIC BLOOD PRESSURE: 140 MMHG | RESPIRATION RATE: 22 BRPM | BODY MASS INDEX: 37.03 KG/M2 | DIASTOLIC BLOOD PRESSURE: 90 MMHG | HEART RATE: 68 BPM | WEIGHT: 209 LBS

## 2018-01-23 VITALS
WEIGHT: 197.5 LBS | BODY MASS INDEX: 34.99 KG/M2 | DIASTOLIC BLOOD PRESSURE: 80 MMHG | HEART RATE: 95 BPM | SYSTOLIC BLOOD PRESSURE: 120 MMHG | HEIGHT: 63 IN | OXYGEN SATURATION: 97 %

## 2018-01-23 NOTE — PROGRESS NOTES
Message  Outreach phone call; no response but left message  How is patient coming along? I know she is scheduled for surgery on 12/20/17  I just want to extend my support during this process  Contact information provided  [place]      Active Problems    1  Adjustment disorder with mixed anxiety and depressed mood (309 28) (F43 23)   2  Anxiety disorder (300 00) (F41 9)   3  Arthritis (716 90) (M19 90)   4  Bereavement (V62 82) (Z63 4)   5  Chronic pain (338 29) (G89 29)   6  Decreased oral intake (783 9) (R63 8)   7  Depression (311) (F32 9)   8  Edema extremities (782 3) (R60 0)   9  Gastric anastomotic stricture (464 99,302 24) (K92 9,K31 89)   10  Gastric anastomotic stricture (324 17,681 53) (K92 9,K31 89)   11  Heart burn (787 1) (R12)   12  Hypercholesterolemia (272 0) (E78 00)   13  Hypothyroidism (244 9) (E03 9)   14  Insomnia (780 52) (G47 00)   15  Marginal ulcer (534 90) (K28 9)   16  Morbid obesity (278 01) (E66 01)   17  Obesity (278 00) (E66 9)   18  Postgastrectomy malabsorption (579 3) (K91 2,Z90 3)   19  Preop cardiovascular exam (V72 81) (Z01 810)   20  Pre-op testing (V72 84) (Z01 818)   21  Secondary hyperparathyroidism, non-renal (252 02) (E21 1)   22  Status post gastric bypass for obesity (V45 86) (Z98 84)   23  Vitamin A deficiency (264 9) (E50 9)   24  Vitamin D deficiency (268 9) (E55 9)    Current Meds   1  ALPRAZolam 1 MG Oral Tablet; TAKE 1 TABLET AT BEDTIME; Therapy: 62TJP9565 to (Evaluate:69Kug2767); Last Rx:10Nov2015 Ordered   2  Azithromycin 250 MG Oral Tablet; Therapy: (Kennedy Shallow) to Recorded   3  Carafate 1 GM/10ML Oral Suspension; Therapy: (Kennedy Shallow) to Recorded   4  Ditropan XL 5 MG TBCR; Therapy: (Kennedy Shallow) to Recorded   5  Levothyroxine Sodium 100 MCG Oral Tablet; Therapy: (Recorded:04Nov2016) to Recorded   6  Optisource Post Bariatric Surg Oral Tablet Chewable; CHEW AND SWALLOW 1 TABLET   DAILY;    Therapy: 82OLO6207 to (Evaluate:79Fwf9004); Last Rx:10Nov2015 Ordered   7  Oxybutynin Chloride ER 5 MG Oral Tablet Extended Release 24 Hour; Therapy: (Elieser Moses) to Recorded   8  OxyCODONE HCl - 30 MG Oral Tablet; TAKE 1 TABLET EVERY 6 HOURS AS NEEDED   FOR PAIN;   Therapy: 63AOQ2547 to (Evaluate:47Nou9275); Last Rx:10Nov2015 Ordered   9  Oxycodone-Acetaminophen 5-325 MG Oral Tablet (Percocet); TAKE 1 TABLET EVERY 4   TO 6 HOURS AS NEEDED FOR PAIN;   Therapy: 59UZT2682 to (Evaluate:53Hcn0217); Last Rx:34Cgo4534; Status: ACTIVE -   Retrospective By Protocol Authorization Ordered   10  Pantoprazole Sodium 40 MG Oral Tablet Delayed Release; Therapy: (Elieser Moses) to Recorded   11  Protonix 20 MG Oral Tablet Delayed Release (Pantoprazole Sodium); Therapy: (Recorded:15Tuv3997) to Recorded   12  TobraDex 0 3-0 1 % Ophthalmic Ointment; Therapy: (Elieser Moses) to Recorded   13  Vitamin D TABS; Therapy: (Recorded:80Ejm9658) to Recorded    Allergies    1   No Known Drug Allergies    Signatures   Electronically signed by : MARIELA HurdLCAZ; Dec 19 2017 10:10AM EST                       (Author)

## 2018-01-23 NOTE — PROGRESS NOTES
Active Problems    1  Adjustment disorder with mixed anxiety and depressed mood (309 28) (F43 23)   2  Anxiety disorder (300 00) (F41 9)   3  Arthritis (716 90) (M19 90)   4  Bereavement (V62 82) (Z63 4)   5  Chronic pain (338 29) (G89 29)   6  Decreased oral intake (783 9) (R63 8)   7  Depression (311) (F32 9)   8  Edema extremities (782 3) (R60 0)   9  Gastric anastomotic stricture (018 26,323 42) (K92 9,K31 89)   10  Gastric anastomotic stricture (711 25,571 31) (K92 9,K31 89)   11  Heart burn (787 1) (R12)   12  Hypercholesterolemia (272 0) (E78 00)   13  Hypothyroidism (244 9) (E03 9)   14  Insomnia (780 52) (G47 00)   15  Marginal ulcer (534 90) (K28 9)   16  Morbid obesity (278 01) (E66 01)   17  Obesity (278 00) (E66 9)   18  Postgastrectomy malabsorption (579 3) (K91 2,Z90 3)   19  Preop cardiovascular exam (V72 81) (Z01 810)   20  Pre-op testing (V72 84) (Z01 818)   21  Secondary hyperparathyroidism, non-renal (252 02) (E21 1)   22  Status post gastric bypass for obesity (V45 86) (Z98 84)   23  Vitamin A deficiency (264 9) (E50 9)   24  Vitamin D deficiency (268 9) (E55 9)    Current Meds   1  ALPRAZolam 1 MG Oral Tablet; TAKE 1 TABLET AT BEDTIME; Therapy: 98LBD3997 to (Evaluate:47Bdk8834); Last Rx:10Nov2015 Ordered   2  Azithromycin 250 MG Oral Tablet; Therapy: (Marques Crocker) to Recorded   3  Carafate 1 GM/10ML Oral Suspension; Therapy: (Marques Crocker) to Recorded   4  Ditropan XL 5 MG TBCR; Therapy: (Marques Crocker) to Recorded   5  Levothyroxine Sodium 100 MCG Oral Tablet; Therapy: (Recorded:04Nov2016) to Recorded   6  Optisource Post Bariatric Surg Oral Tablet Chewable; CHEW AND SWALLOW 1 TABLET   DAILY; Therapy: 69RIR8563 to (Evaluate:49Nqm0031); Last Rx:68Eim6057 Ordered   7  Oxybutynin Chloride ER 5 MG Oral Tablet Extended Release 24 Hour; Therapy: (Marques Crocker) to Recorded   8   OxyCODONE HCl - 30 MG Oral Tablet; TAKE 1 TABLET EVERY 6 HOURS AS NEEDED   FOR PAIN;   Therapy: 71IGX8580 to (Evaluate:60Uzb8989); Last Rx:10Nov2015 Ordered   9  Oxycodone-Acetaminophen 5-325 MG Oral Tablet (Percocet); TAKE 1 TABLET EVERY 4   TO 6 HOURS AS NEEDED FOR PAIN;   Therapy: 03GNW8198 to (Evaluate:84Mpv5045); Last Rx:79Shu4295 Ordered   10  Pantoprazole Sodium 40 MG Oral Tablet Delayed Release; Therapy: (Ahmed Slider) to Recorded   11  Protonix 20 MG Oral Tablet Delayed Release (Pantoprazole Sodium); Therapy: (Recorded:81Bgn6210) to Recorded   12  TobraDex 0 3-0 1 % Ophthalmic Ointment; Therapy: (Ahmed Slider) to Recorded   13  Vitamin D TABS; Therapy: (Recorded:40Oqf3552) to Recorded    Allergies    1  No Known Drug Allergies    Discussion/Summary    Contacted patient to see if she started taking liquids  Patient stated that she was not to take anything po until after her first post op appointment, which has been rescheduled until tomorrow  She is not having any issues with the TPN  Will f/u next concerning tolerance of po diet        Signatures   Electronically signed by : MARY Kerns; Jan 4 2018  2:22PM EST                       (Author)

## 2018-01-23 NOTE — PROGRESS NOTES
Message  Received TPN orders late ( faxed on Wed 12/6/2017- received 12/8/2017) patient's prealbumin level low-16 4, phos moderately elevated at 5 8mg/dl  Adjusted TPN to the following:  Dex 20% 900 ml, AA10% 1200 ml  lipids 20% 150 ml  total= 1392 kcal, 120 grams of protein ( 1 75 gm/kg IBW)  adjusted lytes per pharmacy recommendations: 15 mmol K acetate ( from 20 mMole of K phos)  will continue to follow and monitor  Ordered labs to be drawn again on Monday to recheck phosphorous levels  Also ordered copper level as requested by KRISTINE      Active Problems    1  Adjustment disorder with mixed anxiety and depressed mood (309 28) (F43 23)   2  Anxiety disorder (300 00) (F41 9)   3  Arthritis (716 90) (M19 90)   4  Bereavement (V62 82) (Z63 4)   5  Chronic pain (338 29) (G89 29)   6  Decreased oral intake (783 9) (R63 8)   7  Depression (311) (F32 9)   8  Edema extremities (782 3) (R60 0)   9  Gastric anastomotic stricture (860 17,745 56) (K92 9,K31 89)   10  Gastric anastomotic stricture (996 61,692 40) (K92 9,K31 89)   11  Heart burn (787 1) (R12)   12  Hypercholesterolemia (272 0) (E78 00)   13  Hypothyroidism (244 9) (E03 9)   14  Insomnia (780 52) (G47 00)   15  Marginal ulcer (534 90) (K28 9)   16  Morbid obesity (278 01) (E66 01)   17  Obesity (278 00) (E66 9)   18  Postgastrectomy malabsorption (579 3) (K91 2,Z90 3)   19  Preop cardiovascular exam (V72 81) (Z01 810)   20  Pre-op testing (V72 84) (Z01 818)   21  Secondary hyperparathyroidism, non-renal (252 02) (E21 1)   22  Status post gastric bypass for obesity (V45 86) (Z98 84)   23  Vitamin A deficiency (264 9) (E50 9)   24  Vitamin D deficiency (268 9) (E55 9)    Current Meds   1  ALPRAZolam 1 MG Oral Tablet; TAKE 1 TABLET AT BEDTIME; Therapy: 10HBH3113 to (Evaluate:41Xxo4399); Last Rx:10Nov2015 Ordered   2  Azithromycin 250 MG Oral Tablet; Therapy: (Kat Fletcher) to Recorded   3  Carafate 1 GM/10ML Oral Suspension;    Therapy: (Ck Escamilla) to Recorded   4  Ditropan XL 5 MG TBCR; Therapy: (Ck Escamilla) to Recorded   5  Levothyroxine Sodium 100 MCG Oral Tablet; Therapy: (Recorded:04Nov2016) to Recorded   6  Optisource Post Bariatric Surg Oral Tablet Chewable; CHEW AND SWALLOW 1 TABLET   DAILY; Therapy: 47GAH4899 to (Evaluate:44Spa3084); Last Rx:10Nov2015 Ordered   7  Oxybutynin Chloride ER 5 MG Oral Tablet Extended Release 24 Hour; Therapy: (Ck Escamilla) to Recorded   8  OxyCODONE HCl - 30 MG Oral Tablet; TAKE 1 TABLET EVERY 6 HOURS AS NEEDED   FOR PAIN;   Therapy: 59KKK1584 to (Evaluate:52Lpn6314); Last Rx:10Nov2015 Ordered   9  Oxycodone-Acetaminophen 5-325 MG Oral Tablet (Percocet); TAKE 1 TABLET EVERY 4   TO 6 HOURS AS NEEDED FOR PAIN;   Therapy: 52NOP5559 to (Evaluate:44Twe1409); Last Rx:25Tvu7407; Status: ACTIVE -   Retrospective By Protocol Authorization Ordered   10  Pantoprazole Sodium 40 MG Oral Tablet Delayed Release; Therapy: (Ck Escamilla) to Recorded   11  Protonix 20 MG Oral Tablet Delayed Release (Pantoprazole Sodium); Therapy: (Recorded:98Dya8935) to Recorded   12  TobraDex 0 3-0 1 % Ophthalmic Ointment; Therapy: (Ck Escamilla) to Recorded   13  Vitamin D TABS; Therapy: (Recorded:90Ovf6949) to Recorded    Allergies    1   No Known Drug Allergies    Signatures   Electronically signed by : MARY Garnett; Dec  8 2017  2:14PM EST                       (Author)

## 2018-01-23 NOTE — PROGRESS NOTES
Message  Received orders and labs from [facility]  Prealbumin level 9 3  LFTS elevated, phos slightly elevated  Increased calories and protein in orders:  1580 kcal ( 18 kcal /kg bw)  125 grams protein ( 2 0 grams/kg IBW)  2450 ml fluid  Will continue to follow      Active Problems    1  Adjustment disorder with mixed anxiety and depressed mood (309 28) (F43 23)   2  Anxiety disorder (300 00) (F41 9)   3  Arthritis (716 90) (M19 90)   4  Bereavement (V62 82) (Z63 4)   5  Chronic pain (338 29) (G89 29)   6  Decreased oral intake (783 9) (R63 8)   7  Depression (311) (F32 9)   8  Edema extremities (782 3) (R60 0)   9  Gastric anastomotic stricture (128 06,054 01) (K92 9,K31 89)   10  Gastric anastomotic stricture (324 58,978 27) (K92 9,K31 89)   11  Heart burn (787 1) (R12)   12  Hypercholesterolemia (272 0) (E78 00)   13  Hypothyroidism (244 9) (E03 9)   14  Insomnia (780 52) (G47 00)   15  Marginal ulcer (534 90) (K28 9)   16  Morbid obesity (278 01) (E66 01)   17  Obesity (278 00) (E66 9)   18  Postgastrectomy malabsorption (579 3) (K91 2,Z90 3)   19  Preop cardiovascular exam (V72 81) (Z01 810)   20  Pre-op testing (V72 84) (Z01 818)   21  Secondary hyperparathyroidism, non-renal (252 02) (E21 1)   22  Status post gastric bypass for obesity (V45 86) (Z98 84)   23  Vitamin A deficiency (264 9) (E50 9)   24  Vitamin D deficiency (268 9) (E55 9)    Current Meds   1  ALPRAZolam 1 MG Oral Tablet; TAKE 1 TABLET AT BEDTIME; Therapy: 97DII6951 to (Evaluate:54Bno2723); Last Rx:10Nov2015 Ordered   2  Azithromycin 250 MG Oral Tablet; Therapy: (Alli Maxin) to Recorded   3  Carafate 1 GM/10ML Oral Suspension; Therapy: (Alli Maxin) to Recorded   4  Ditropan XL 5 MG TBCR; Therapy: (Alli Maxin) to Recorded   5  Levothyroxine Sodium 100 MCG Oral Tablet; Therapy: (Recorded:04Nov2016) to Recorded   6  Optisource Post Bariatric Surg Oral Tablet Chewable; CHEW AND SWALLOW 1 TABLET   DAILY;    Therapy: 58YGG4631 to (Evaluate:26Axq7665); Last Rx:10Nov2015 Ordered   7  Oxybutynin Chloride ER 5 MG Oral Tablet Extended Release 24 Hour; Therapy: (Roshan Orellana) to Recorded   8  OxyCODONE HCl - 30 MG Oral Tablet; TAKE 1 TABLET EVERY 6 HOURS AS NEEDED   FOR PAIN;   Therapy: 17LHW4224 to (Evaluate:26Lma5457); Last Rx:10Nov2015 Ordered   9  Oxycodone-Acetaminophen 5-325 MG Oral Tablet (Percocet); TAKE 1 TABLET EVERY 4   TO 6 HOURS AS NEEDED FOR PAIN;   Therapy: 26QIT5761 to (Evaluate:25Ulw7373); Last Rx:20Pfb6411 Ordered   10  Pantoprazole Sodium 40 MG Oral Tablet Delayed Release; Therapy: (Roshan Orellana) to Recorded   11  Protonix 20 MG Oral Tablet Delayed Release (Pantoprazole Sodium); Therapy: (Recorded:05Qww7494) to Recorded   12  TobraDex 0 3-0 1 % Ophthalmic Ointment; Therapy: (Roshan Orellana) to Recorded   13  Vitamin D TABS; Therapy: (Recorded:80Yzt2451) to Recorded    Allergies    1   No Known Drug Allergies    Signatures   Electronically signed by : MARY Leung; Tariq 10 2018  2:29PM EST                       (Author)

## 2018-01-23 NOTE — PROGRESS NOTES
Message  Called patient  She is tolerating 2 Tbsp of pureed food ( protein foods) and one protein shake per day ( 25 to 30 grams each) She is hydrating well  Her TPN was cut in half last week  She is averaging 500 to 600 calories and 60 grams of protein  Patient has f/u appointment tomorrow for PICC to be pulled I will see her to review soft diet advancement  patient will continue to slowly advance her portions and diet  [facility] made aware that PICC will be pulled  Active Problems    1  Adjustment disorder with mixed anxiety and depressed mood (309 28) (F43 23)   2  Anxiety disorder (300 00) (F41 9)   3  Arthritis (716 90) (M19 90)   4  Bereavement (V62 82) (Z63 4)   5  Chronic pain (338 29) (G89 29)   6  Decreased oral intake (783 9) (R63 8)   7  Depression (311) (F32 9)   8  Edema extremities (782 3) (R60 0)   9  Gastric anastomotic stricture (091 59,560 86) (K92 9,K31 89)   10  Gastric anastomotic stricture (844 21,663 01) (K92 9,K31 89)   11  Heart burn (787 1) (R12)   12  Hypercholesterolemia (272 0) (E78 00)   13  Hypothyroidism (244 9) (E03 9)   14  Insomnia (780 52) (G47 00)   15  Marginal ulcer (534 90) (K28 9)   16  Morbid obesity (278 01) (E66 01)   17  Obesity (278 00) (E66 9)   18  Postgastrectomy malabsorption (579 3) (K91 2,Z90 3)   19  Preop cardiovascular exam (V72 81) (Z01 810)   20  Pre-op testing (V72 84) (Z01 818)   21  Secondary hyperparathyroidism, non-renal (252 02) (E21 1)   22  Status post gastric bypass for obesity (V45 86) (Z98 84)   23  Vitamin A deficiency (264 9) (E50 9)   24  Vitamin D deficiency (268 9) (E55 9)    Current Meds   1  ALPRAZolam 1 MG Oral Tablet; TAKE 1 TABLET AT BEDTIME; Therapy: 52ELE2163 to (Evaluate:37Kak1307); Last Rx:10Nov2015 Ordered   2  Azithromycin 250 MG Oral Tablet; Therapy: (Marilyn Bushy) to Recorded   3  Carafate 1 GM/10ML Oral Suspension; Therapy: (Marilyn Bushy) to Recorded   4  Ditropan XL 5 MG TBCR;    Therapy: (Asim Dhillon) to Recorded   5  Levothyroxine Sodium 100 MCG Oral Tablet; Therapy: (Recorded:04Nov2016) to Recorded   6  Optisource Post Bariatric Surg Oral Tablet Chewable; CHEW AND SWALLOW 1 TABLET   DAILY; Therapy: 34FCK0862 to (Evaluate:39Tty0120); Last Rx:10Nov2015 Ordered   7  Oxybutynin Chloride ER 5 MG Oral Tablet Extended Release 24 Hour; Therapy: (Asim Dhillon) to Recorded   8  OxyCODONE HCl - 30 MG Oral Tablet; TAKE 1 TABLET EVERY 6 HOURS AS NEEDED   FOR PAIN;   Therapy: 20YGM8982 to (Evaluate:81Ubi9447); Last Rx:10Nov2015 Ordered   9  Oxycodone-Acetaminophen 5-325 MG Oral Tablet (Percocet); TAKE 1 TABLET EVERY 4   TO 6 HOURS AS NEEDED FOR PAIN;   Therapy: 33LET8096 to (Evaluate:37Nel2916); Last Rx:20Qli4970 Ordered   10  Pantoprazole Sodium 40 MG Oral Tablet Delayed Release; Therapy: (Asim Dhillon) to Recorded   11  Protonix 20 MG Oral Tablet Delayed Release (Pantoprazole Sodium); Therapy: (Recorded:66Qzg8184) to Recorded   12  TobraDex 0 3-0 1 % Ophthalmic Ointment; Therapy: (Asim Dhillon) to Recorded   13  Vitamin D TABS; Therapy: (Recorded:12Myd8325) to Recorded    Allergies    1   No Known Drug Allergies    Signatures   Electronically signed by : MARY Rabago; Jan 17 2018 11:05AM EST                       (Author)

## 2018-01-23 NOTE — RESULT NOTES
Message  [facility] as fax not received   Phosphorous level improved , prealbumin level 17 8 ( improving) ceruloplasmin level normal  Will continue with current orders      Signatures   Electronically signed by : MARY Swan; Dec 13 2017  2:06PM EST                       (Author)

## 2018-01-23 NOTE — PROGRESS NOTES
Message  Received TPN orders  1/4/2018 1:29:00 PM - Nirmal Leyva  Patient is receiving 1393 kcal per day ( 15 6 kcal/kg BW)  She is receiving 1 9 gm/kg IBW protein  2100 ml fluid per day with added multivitamins, folate, thiamine, vitamin C, zinc    Liver function tests elevated- most likely from trauma of surgery  Prealbumin level 14 9 ( normal range 18-40) will keep current protein level as there is most likely some inflammation from recent surgery affecting accuracy of level  ( prealbumin level on 12/29/2017 was 10 9) Patient had revisional surgery on 12/20/2017  Continue with current orders- faxed over renewal to [facility]  Will continue to follow  Active Problems    1  Adjustment disorder with mixed anxiety and depressed mood (309 28) (F43 23)   2  Anxiety disorder (300 00) (F41 9)   3  Arthritis (716 90) (M19 90)   4  Bereavement (V62 82) (Z63 4)   5  Chronic pain (338 29) (G89 29)   6  Decreased oral intake (783 9) (R63 8)   7  Depression (311) (F32 9)   8  Edema extremities (782 3) (R60 0)   9  Gastric anastomotic stricture (004 24,065 54) (K92 9,K31 89)   10  Gastric anastomotic stricture (960 16,022 14) (K92 9,K31 89)   11  Heart burn (787 1) (R12)   12  Hypercholesterolemia (272 0) (E78 00)   13  Hypothyroidism (244 9) (E03 9)   14  Insomnia (780 52) (G47 00)   15  Marginal ulcer (534 90) (K28 9)   16  Morbid obesity (278 01) (E66 01)   17  Obesity (278 00) (E66 9)   18  Postgastrectomy malabsorption (579 3) (K91 2,Z90 3)   19  Preop cardiovascular exam (V72 81) (Z01 810)   20  Pre-op testing (V72 84) (Z01 818)   21  Secondary hyperparathyroidism, non-renal (252 02) (E21 1)   22  Status post gastric bypass for obesity (V45 86) (Z98 84)   23  Vitamin A deficiency (264 9) (E50 9)   24  Vitamin D deficiency (268 9) (E55 9)    Current Meds   1  ALPRAZolam 1 MG Oral Tablet; TAKE 1 TABLET AT BEDTIME; Therapy: 99OSU6046 to (Evaluate:13Qwy3881); Last Rx:10Nov2015 Ordered   2   Azithromycin 250 MG Oral Tablet; Therapy: (Elisa Rich) to Recorded   3  Carafate 1 GM/10ML Oral Suspension; Therapy: (Elisa Rich) to Recorded   4  Ditropan XL 5 MG TBCR; Therapy: (Elisa Rich) to Recorded   5  Levothyroxine Sodium 100 MCG Oral Tablet; Therapy: (Recorded:04Nov2016) to Recorded   6  Optisource Post Bariatric Surg Oral Tablet Chewable; CHEW AND SWALLOW 1 TABLET   DAILY; Therapy: 23CSY1910 to (Evaluate:54Ish9156); Last Rx:10Nov2015 Ordered   7  Oxybutynin Chloride ER 5 MG Oral Tablet Extended Release 24 Hour; Therapy: (Elisa Rich) to Recorded   8  OxyCODONE HCl - 30 MG Oral Tablet; TAKE 1 TABLET EVERY 6 HOURS AS NEEDED   FOR PAIN;   Therapy: 98IKM9805 to (Evaluate:05Gom7801); Last Rx:10Nov2015 Ordered   9  Oxycodone-Acetaminophen 5-325 MG Oral Tablet (Percocet); TAKE 1 TABLET EVERY 4   TO 6 HOURS AS NEEDED FOR PAIN;   Therapy: 41AMH3223 to (Evaluate:02Foc0759); Last Rx:64Ccg9743 Ordered   10  Pantoprazole Sodium 40 MG Oral Tablet Delayed Release; Therapy: (Elisa Rich) to Recorded   11  Protonix 20 MG Oral Tablet Delayed Release (Pantoprazole Sodium); Therapy: (Recorded:45Iny6490) to Recorded   12  TobraDex 0 3-0 1 % Ophthalmic Ointment; Therapy: (Elisa Rich) to Recorded   13  Vitamin D TABS; Therapy: (Recorded:01Cpe9342) to Recorded    Allergies    1   No Known Drug Allergies    Signatures   Electronically signed by : MARY Kerns; Jan 4 2018  2:29PM EST                       (Author)

## 2018-01-23 NOTE — PROGRESS NOTES
Discussion/Summary  Discussion Summary:   Assess: Patient had PICC line pulled today  She is tolerating her pureed diet and drinking one protein shake per day  No issues with vomiting  Dr Marianne Loo wanted instruction on diet advancement and vitamins to be reviewed with patient  DX: Patient with protein malnutrition related to severe stricture and revisional surgery as evidenced by prealbumin level of 10 and need for TPN prior to surgery   Intervene:Reviewed the soft diet  Provided with samples of premeir protein and vitamin  Provided with bottle of BA sublingual B12 as patient is till experiencing tingling in hands as patient PICC pulled today, she will need to start taking po vitamins/minerals  Goals:  2Tbsp 5 to 6 times per day of soft foods  One protein shake and one protein water per day ( samples and coupons provided)  One procure vitamin per day and vitamin B12 ( provide with samples)  After 2 weeks, switch to Optisource vitamins 4 per day  Gradually add in extra vitamin D and calcium  Materials provided: protein sample( orgain and premier) vitamin samples ( procure and BA vitamin B12) and coupons  Pt verbalized understanding, no further questions at present, expect good compliance  Monitor/Evaluate will monitor po intake via phone call  Follow up scheduled: 3 weeks at post op class with education on regular diet  Patient has my contact information for any further questions or concerns  Active Problems    1  Adjustment disorder with mixed anxiety and depressed mood (309 28) (F43 23)   2  Anxiety disorder (300 00) (F41 9)   3  Arthritis (716 90) (M19 90)   4  Bereavement (V62 82) (Z63 4)   5  Chronic pain (338 29) (G89 29)   6  Decreased oral intake (783 9) (R63 8)   7  Depression (311) (F32 9)   8  Edema extremities (782 3) (R60 0)   9  Gastric anastomotic stricture (258 00,718 40) (K92 9,K31 89)   10  Gastric anastomotic stricture (840 74,943 76) (K92 9,K31 89)   11  Heart burn (787 1) (R12)   12  Hypercholesterolemia (272 0) (E78 00)   13  Hypothyroidism (244 9) (E03 9)   14  Insomnia (780 52) (G47 00)   15  Marginal ulcer (534 90) (K28 9)   16  Morbid obesity (278 01) (E66 01)   17  Obesity (278 00) (E66 9)   18  Postgastrectomy malabsorption (579 3) (K91 2,Z90 3)   19  Preop cardiovascular exam (V72 81) (Z01 810)   20  Pre-op testing (V72 84) (Z01 818)   21  Secondary hyperparathyroidism, non-renal (252 02) (E21 1)   22  Status post gastric bypass for obesity (V45 86) (Z98 84)   23  Vitamin A deficiency (264 9) (E50 9)   24  Vitamin D deficiency (268 9) (E55 9)    Past Medical History    1  History of 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)   2  History of weight gain (V15 89) (E38 828)    Surgical History    1  History of Abdominoplasty   2  History of  Section   3  History of Gallbladder Surgery   4  History of Gastric Surgery For Morbid Obesity Bypass With Naheed-en-Y    Family History  Mother    1  Family history of Alzheimer's disease (V17 2) (Z82 0)  Father    2  Family history of diabetes mellitus (V18 0) (Z83 3)   3  Family history of hypertension (V17 49) (Z82 49)  Daughter    4  Family history of Anxiety  Brother    5  Family history of Mild alcohol abuse in sustained remission in controlled environment  Family History    6  Family history of diabetes mellitus (V18 0) (Z83 3)   7  No family history of suicide    Social History    · Bereavement (G89 36) (Z63 4)   · Denied: History of Drug use   · Former smoker (V15 82) (H26 348)   · No alcohol use   · Single    Current Meds   1  ALPRAZolam 1 MG Oral Tablet; TAKE 1 TABLET AT BEDTIME; Therapy: 01EAA9370 to (Evaluate:86Dhk3247); Last Rx:2015 Ordered   2  Azithromycin 250 MG Oral Tablet; Therapy: (Irma Cheatham) to Recorded   3  Carafate 1 GM/10ML Oral Suspension; Therapy: (Irma Cheatham) to Recorded   4  Ditropan XL 5 MG TBCR; Therapy: (Irma Cheatham) to Recorded   5  Levothyroxine Sodium 100 MCG Oral Tablet;    Therapy: (Recorded:04Nov2016) to Recorded   6  Optisource Post Bariatric Surg Oral Tablet Chewable; CHEW AND SWALLOW 1 TABLET   DAILY; Therapy: 14IKM3810 to (Evaluate:50Toi8372); Last Rx:10Nov2015 Ordered   7  Oxybutynin Chloride ER 5 MG Oral Tablet Extended Release 24 Hour; Therapy: (Pino [place]) to Recorded   8  OxyCODONE HCl - 30 MG Oral Tablet; TAKE 1 TABLET EVERY 6 HOURS AS NEEDED   FOR PAIN;   Therapy: 17RBL9986 to (Evaluate:10Dec2015); Last Rx:10Nov2015 Ordered   9  Oxycodone-Acetaminophen 5-325 MG Oral Tablet (Percocet); TAKE 1 TABLET EVERY 4   TO 6 HOURS AS NEEDED FOR PAIN;   Therapy: 83SXA0434 to (Evaluate:00Nmi4410); Last Rx:05Dec2017 Ordered   10  Pantoprazole Sodium 40 MG Oral Tablet Delayed Release; Therapy: (Pino [place]) to Recorded   11  Protonix 20 MG Oral Tablet Delayed Release (Pantoprazole Sodium); Therapy: (Recorded:72Tjs1867) to Recorded   12  TobraDex 0 3-0 1 % Ophthalmic Ointment; Therapy: (Pino Parma) to Recorded   13  Vitamin D TABS; Therapy: (Recorded:34Rya1333) to Recorded    Allergies    1  No Known Drug Allergies    Future Appointments    Date/Time Provider Specialty Site   02/07/2018 03:00 PM MARY Alexander, RD Dietitian [facility]     Signatures   Electronically signed by : MARY Champagne; Jan 18 2018  2:47PM EST                       (Author)    Electronically signed by :  KIM Crisostomo ; Jan 20 2018  8:40AM EST

## 2018-01-24 ENCOUNTER — GENERIC CONVERSION - ENCOUNTER (OUTPATIENT)
Dept: OTHER | Facility: OTHER | Age: 58
End: 2018-01-24

## 2018-01-24 VITALS
TEMPERATURE: 98.2 F | HEIGHT: 63 IN | WEIGHT: 195 LBS | BODY MASS INDEX: 34.55 KG/M2 | HEART RATE: 88 BPM | RESPIRATION RATE: 20 BRPM | DIASTOLIC BLOOD PRESSURE: 70 MMHG | SYSTOLIC BLOOD PRESSURE: 108 MMHG

## 2018-01-24 VITALS
DIASTOLIC BLOOD PRESSURE: 70 MMHG | WEIGHT: 195.5 LBS | SYSTOLIC BLOOD PRESSURE: 104 MMHG | RESPIRATION RATE: 18 BRPM | BODY MASS INDEX: 34.64 KG/M2 | HEART RATE: 80 BPM | HEIGHT: 63 IN | TEMPERATURE: 98 F

## 2018-01-24 VITALS
HEART RATE: 82 BPM | SYSTOLIC BLOOD PRESSURE: 104 MMHG | RESPIRATION RATE: 18 BRPM | HEIGHT: 63 IN | WEIGHT: 196 LBS | BODY MASS INDEX: 34.73 KG/M2 | TEMPERATURE: 98.2 F | DIASTOLIC BLOOD PRESSURE: 62 MMHG

## 2018-01-24 VITALS
SYSTOLIC BLOOD PRESSURE: 108 MMHG | HEART RATE: 92 BPM | DIASTOLIC BLOOD PRESSURE: 70 MMHG | HEIGHT: 63 IN | WEIGHT: 194 LBS | RESPIRATION RATE: 18 BRPM | BODY MASS INDEX: 34.38 KG/M2 | TEMPERATURE: 99.4 F

## 2018-01-24 NOTE — PROGRESS NOTES
Discussion/Summary  Discussion Summary:   Assess: patient referred by Dr aGbino Barr  Patient s/p gastrojejuneal revision due to marginal ulcer and stricture  patient is currently on TPN  Has been tolerating liquid since last Friday  Reports she is drinking broth, some Ensure, water  Sips, cannot tolerate a large amount of liquid  Daughter present and supportive  Patient had drain pulled today  DX: Protein malnutrition related to stricture and inability to tolerate po , s/p revision of gastrojejunal anastamosis as evidenced by prealbumin level of 9 3  Intervene: Educated patient on pureed diet  Instructed to advance slowly  TPN will be cut in 1/2   Goals: minimum of 32 ounces of water by PO ( rest with TPN)  2 protein shakes per day ( sample provided)  tbsp  of pureed food every 2 hours as tolerated  Materials provided: copy of pureed diet and protein samples  Pt verbalized understanding, no further questions at present, expect good compliance  Monitor/Evaluate  Follow up scheduled: 1 week after surgeon appointment  Active Problems    1  Adjustment disorder with mixed anxiety and depressed mood (309 28) (F43 23)   2  Anxiety disorder (300 00) (F41 9)   3  Arthritis (716 90) (M19 90)   4  Bereavement (V62 82) (Z63 4)   5  Chronic pain (338 29) (G89 29)   6  Decreased oral intake (783 9) (R63 8)   7  Depression (311) (F32 9)   8  Edema extremities (782 3) (R60 0)   9  Gastric anastomotic stricture (537 44,229 71) (K92 9,K31 89)   10  Gastric anastomotic stricture (945 82,531 81) (K92 9,K31 89)   11  Heart burn (787 1) (R12)   12  Hypercholesterolemia (272 0) (E78 00)   13  Hypothyroidism (244 9) (E03 9)   14  Insomnia (780 52) (G47 00)   15  Marginal ulcer (534 90) (K28 9)   16  Morbid obesity (278 01) (E66 01)   17  Obesity (278 00) (E66 9)   18  Postgastrectomy malabsorption (579 3) (K91 2,Z90 3)   19  Preop cardiovascular exam (V72 81) (Z01 810)   20  Pre-op testing (V72 84) (Z01 818)   21   Secondary hyperparathyroidism, non-renal (252 02) (E21 1)   22  Status post gastric bypass for obesity (V45 86) (Z98 84)   23  Vitamin A deficiency (264 9) (E50 9)   24  Vitamin D deficiency (268 9) (E55 9)    Past Medical History    1  History of 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)   2  History of weight gain (V15 89) (S12 737)    Surgical History    1  History of Abdominoplasty   2  History of  Section   3  History of Gallbladder Surgery   4  History of Gastric Surgery For Morbid Obesity Bypass With Naheed-en-Y    Family History  Mother    1  Family history of Alzheimer's disease (V17 2) (Z82 0)  Father    2  Family history of diabetes mellitus (V18 0) (Z83 3)   3  Family history of hypertension (V17 49) (Z82 49)  Daughter    4  Family history of Anxiety  Brother    5  Family history of Mild alcohol abuse in sustained remission in controlled environment  Family History    6  Family history of diabetes mellitus (V18 0) (Z83 3)   7  No family history of suicide    Social History    · Bereavement (E54 24) (Z63 4)   · Denied: History of Drug use   · Former smoker (V15 82) (Q30 812)   · No alcohol use   · Single    Current Meds   1  ALPRAZolam 1 MG Oral Tablet; TAKE 1 TABLET AT BEDTIME; Therapy: 20KZE0666 to (Evaluate:58Qgs0831); Last Rx:2015 Ordered   2  Azithromycin 250 MG Oral Tablet; Therapy: (Blanche Pisano) to Recorded   3  Carafate 1 GM/10ML Oral Suspension; Therapy: (Blanche Pisano) to Recorded   4  Ditropan XL 5 MG TBCR; Therapy: (Blanche Pisano) to Recorded   5  Levothyroxine Sodium 100 MCG Oral Tablet; Therapy: (Recorded:2016) to Recorded   6  Optisource Post Bariatric Surg Oral Tablet Chewable; CHEW AND SWALLOW 1 TABLET   DAILY; Therapy: 51AUF5154 to (Evaluate:23Nqf3138); Last Rx:2015 Ordered   7  Oxybutynin Chloride ER 5 MG Oral Tablet Extended Release 24 Hour; Therapy: (Blanche Pisano) to Recorded   8   OxyCODONE HCl - 30 MG Oral Tablet; TAKE 1 TABLET EVERY 6 HOURS AS NEEDED   FOR PAIN;   Therapy: 64RWS3480 to (Evaluate:11Bsq2573); Last Rx:10Nov2015 Ordered   9  Oxycodone-Acetaminophen 5-325 MG Oral Tablet (Percocet); TAKE 1 TABLET EVERY 4   TO 6 HOURS AS NEEDED FOR PAIN;   Therapy: 66EHL6231 to (Evaluate:65Klh9305); Last Rx:06Kdy0455 Ordered   10  Pantoprazole Sodium 40 MG Oral Tablet Delayed Release; Therapy: (Ximena Board) to Recorded   11  Protonix 20 MG Oral Tablet Delayed Release (Pantoprazole Sodium); Therapy: (Recorded:83Sgd2012) to Recorded   12  TobraDex 0 3-0 1 % Ophthalmic Ointment; Therapy: (Ximena Board) to Recorded   13  Vitamin D TABS; Therapy: (Recorded:04Avo8109) to Recorded    Allergies    1  No Known Drug Allergies    Future Appointments    Date/Time Provider Specialty Site   01/18/2018 10:30 AM KIM Mclaughlin  General Surgery [facility]   04/05/2018 10:30 AM [facility] General Surgery [facility]   [facility]20[facility]2018 10[facility]   02/07/2018 03:00 PM MARY Barbosa, RD Dietitian [facility]     Signatures   Electronically signed by : MARY Prieto; Jan 11 2018  2:25PM EST                       (Author)    Electronically signed by :  KIM Godfrey ; Jan 20 2018  8:40AM EST

## 2018-02-05 ENCOUNTER — TELEPHONE (OUTPATIENT)
Dept: BARIATRICS | Facility: CLINIC | Age: 58
End: 2018-02-05

## 2018-02-07 ENCOUNTER — TELEPHONE (OUTPATIENT)
Dept: BARIATRICS | Facility: CLINIC | Age: 58
End: 2018-02-07

## 2018-02-12 NOTE — RESULT NOTES
Verified Results  (1) MAGNESIUM [id number] 03:52PM Vianne Fanta     Test Name Result Flag Reference   MAGNESIUM 1 9 mg/dL  1 6-2 6     (1) PHOSPHORUS [id number] 03:52PM Vianne Fanta     Test Name Result Flag Reference   PHOSPHORUS 3 9 mg/dL  2 7-4 5     (1) LIPID PANEL, FASTING [id number] 03:52PM Vianne Ironwood     Test Name Result Flag Reference   CHOLESTEROL 149 mg/dL     HDL,DIRECT 49 mg/dL  40-60   Specimen collection should occur prior to Metamizole administration due to the potential for falsley depressed results  LDL CHOLESTEROL CALCULATED 84 mg/dL  0-100   This is a patient instruction: This is a fasting test  Water, black tea or black coffee only after 9:00pm the night before the test  Drink 2 glasses of water the morning of the test       Triglyceride:        Normal <150 mg/dl   Borderline High 150-199 mg/dl   High 200-499 mg/dl   Very High >499 mg/dl      Cholesterol:       Desirable <200 mg/dl    Borderline High 200-239 mg/dl    High >239 mg/dl      HDL Cholesterol:       High>59 mg/dL    Low <41 mg/dL      This screening LDL is a calculated result  It does not have the accuracy of the Direct Measured LDL in the monitoring of patients with hyperlipidemia and/or statin therapy  Direct Measure LDL (BUV433) must be ordered separately in these patients  TRIGLYCERIDES 79 mg/dL  <=150   Specimen collection should occur prior to N-Acetylcysteine or Metamizole administration due to the potential for falsely depressed results       (1) COMPREHENSIVE METABOLIC PANEL [id number] 90:31EA Viaandrewe Ironwood     Test Name Result Flag Reference   SODIUM 137 mmol/L  136-145   POTASSIUM 4 0 mmol/L  3 5-5 3   CHLORIDE 105 mmol/L  100-108   CARBON DIOXIDE 22 mmol/L  21-32   ANION GAP (CALC) 10 mmol/L  4-13   BLOOD UREA NITROGEN 12 mg/dL  5-25   CREATININE 0 61 mg/dL  0 60-1 30   Standardized to IDMS reference method   CALCIUM 8 0 mg/dL L 8 3-10 1   BILI, TOTAL 0 60 mg/dL  0 20-1 00   ALK PHOSPHATAS 150 U/L H    ALT (SGPT) 73 U/L  12-78   Specimen collection should occur prior to Sulfasalazine administration due to the potential for falsely depressed results  AST(SGOT) 67 U/L H 5-45   Specimen collection should occur prior to Sulfasalazine administration due to the potential for falsely depressed results  ALBUMIN 1 8 g/dL L 3 5-5 0   TOTAL PROTEIN 5 8 g/dL L 6 4-8 2   eGFR 102 ml/min/1 73sq m     1      National Kidney Disease Education Program recommendations are as follows:  GFR calculation is accurate only with a steady state creatinine  Chronic Kidney disease less than 60 ml/min/1 73 sq  meters  Kidney failure less than 15 ml/min/1 73 sq  meters  GLUCOSE FASTING 81 mg/dL  65-99   Specimen collection should occur prior to Sulfasalazine administration due to the potential for falsely depressed results  Specimen collection should occur prior to Sulfapyridine administration due to the potential for falsely elevated results  (1) ALBUMIN [id number] 03:52PM Ivone Roche     Test Name Result Flag Reference   ALBUMIN 1 8 g/dL L 3 5-5 0     IR PICC LINE [id number] 04:20PM Ivone Roche     Test Name Result Flag Reference   IR PICC LINE (Report)     Procedure: Pressure injectable PICC placement  History: 49-year-old female patient with a history of bariatric surgery  She presents with abdominal pain and dehydration  A PICC is needed for IV therapy  Comments: The patient was identified  The procedure, risks, benefits and alternatives were explained to the patient who expressed understanding and signed an informed consent  The patient was placed in the supine position  Real-time ultrasound examination of the right arm identified a patent, compressible brachial vein suitable for access and placement of a pressure injectable PICC  A permanent ultrasound image was recorded  Maximum sterile barrier technique including cap, mask, gown and gloves, as well as a large sterile sheet was used  Appropriate hand hygiene was performed  The right arm was prepped using 2% chlorhexidine for cutaneous antisepsis and draped in the usual    sterile fashion  The ultrasound probe was introduced to the field in a sterile sleeve  Timeout verification, with all participants present, was performed prior to any intervention  1% lidocaine (3 mL) was used for local anesthesia  Under real-time ultrasound guidance, venous access was gained to the right vein using a 21 gauge    micropuncture needle  Needle tip position within the targeted vein was demonstrated by ultrasound  Using standard needle, guidewire and catheter exchange techniques, a 5 Zambian peel-away sheath was placed  A 5 [place]ian, double lumen, 44 cm pressure    injectable PICC was placed via the peel-away sheath  Under fluoroscopic guidance, the tip of the catheter was positioned at approximately the junction of the superior vena cava and the right atrium  Both ports had good forward flush and return  The    catheter was secured at the skin exit site and a sterile dressing was applied  A permanent fluoroscopic image was recorded documenting the position of the PICC  The patient tolerated the procedure well  Fluoroscopy time: 1 minute  Image count: Four  Contrast: None  Estimated blood loss: None  VTe prophylaxis: Short duration procedure not requiring VTe prophylaxis  Complications: None  IMPRESSION:   Impression: Uncomplicated placement of right arm, 5 [place]ian, double lumen, 44 cm pressure injectable PICC via the brachial vein under ultrasound and fluoroscopy guidance  The PICC may be used immediately         Workstation performed: [id number]     Signed by:   Audra Up MD   10/30/17

## 2018-02-14 ENCOUNTER — EVALUATION (OUTPATIENT)
Dept: PHYSICAL THERAPY | Facility: CLINIC | Age: 58
End: 2018-02-14
Payer: MEDICARE

## 2018-02-14 ENCOUNTER — TRANSCRIBE ORDERS (OUTPATIENT)
Dept: PHYSICAL THERAPY | Facility: CLINIC | Age: 58
End: 2018-02-14

## 2018-02-14 DIAGNOSIS — E03.9 ACQUIRED HYPOTHYROIDISM: ICD-10-CM

## 2018-02-14 DIAGNOSIS — R53.81 DEBILITY: Primary | ICD-10-CM

## 2018-02-14 DIAGNOSIS — R53.81 PHYSICAL DECONDITIONING: Primary | ICD-10-CM

## 2018-02-14 DIAGNOSIS — R26.2 DIFFICULTY WALKING: ICD-10-CM

## 2018-02-14 PROCEDURE — 97163 PT EVAL HIGH COMPLEX 45 MIN: CPT | Performed by: PHYSICAL THERAPIST

## 2018-02-14 PROCEDURE — 97535 SELF CARE MNGMENT TRAINING: CPT | Performed by: PHYSICAL THERAPIST

## 2018-02-14 PROCEDURE — G8979 MOBILITY GOAL STATUS: HCPCS | Performed by: PHYSICAL THERAPIST

## 2018-02-14 PROCEDURE — G8978 MOBILITY CURRENT STATUS: HCPCS | Performed by: PHYSICAL THERAPIST

## 2018-02-14 NOTE — LETTER
2018    Alexa Arevalo MD  [address] [place] [place] [address]    Patient: Tommy Somers   YOB: 1960   Date of Visit: 2018     Encounter Diagnosis     ICD-10-CM    1  Physical deconditioning R53 81    2  Difficulty walking R26 2    3  Acquired hypothyroidism E03 9        Dear Dr Sherryle Borne:    Please review the attached Plan of Care from SURGERY [facility] recent visit  Please verify that you agree therapy should continue by signing the attached document and sending it back to our office  If you have any questions or concerns, please don't hesitate to call  Sincerely,    Shelly [place], PT      Referring Provider:      I certify that I have read the below Plan of Care and certify the need for these services furnished under this plan of treatment while under my care  Alexa Arevalo MD  [address] [place] [address] Avenue: [address] [place] [address]          PT Evaluation     Today's date: 2018  Patient name: Tommy Somers  : 1960  MRN: [id number]  Referring provider: Carmen Serna MD  Dx:   Encounter Diagnoses   Name Primary?  Physical deconditioning Yes    Difficulty walking                   Assessment  Impairments: abnormal coordination, abnormal gait, abnormal muscle firing, abnormal muscle tone, abnormal or restricted ROM, abnormal movement, activity intolerance, impaired balance, impaired physical strength, pain with function and safety issue    Assessment details: Tommy Somers is a 62 y o  female who presents to outpatient PT with a dx of Physical deconditioning, and   Difficulty walking  No further referral appears necessary at this time based upon examination results  Pt presents with decreased strength, ROM, balance, functional activity tolerance, and pain with movement in her bilateral lower extremities, which is  limiting her ability to perform the aforementioned functional activities    Etiologic factors include repetitive poor body mechanics  Pt presents with decreased muscular endurance during the TUG and 6 minute walk test  Pt is 25 degrees short of TKE on both of her knees, which is essential for safe ambulation  Prognosis is good given HEP compliance and PT 3/wk  Please contact me if you have any questions or recommendations  Thank you for the opportunity to share in  [place] care  Understanding of Dx/Px/POC: good   Prognosis: good    Goals  1  In 4-6 weeks, patient will demonstrate a decrease in pain to 0/10 during functional activities  2  In 4-6 weeks, patient will demonstrate an increase in range of motion by 10 degrees  3  In 4-6 weeks, patient will demonstrate an increase in strength by 1/2 grade on MMT    1  In 6-8 weeks, patient will be independent with their home exercise program  2  In 6-8 weeks, patient will have zero limitations with strength  3  In 6-8 weeks, patient will have zero limitations with ROM  4  In 6-8 weeks, patient will have zero limitations with ambulation  5  In 6-8 weeks, patient will have zero limitations with stair negotiation    Plan  Patient would benefit from: skilled PT  Planned therapy interventions: home exercise program, gait training, functional ROM exercises, therapeutic exercise and manual therapy  Frequency: 3x week  Duration in weeks: 4  Treatment plan discussed with: patient  Plan details: Patient was provided a home exercise program and demonstrated an understanding of exercises  Patient was advised to stop performing home exercise program if symptoms increase or new complaints developed  Verbal understanding demonstrated regarding home exercise program instructions  Subjective Evaluation    History of Present Illness  Date of onset: 6/14/2017  Mechanism of injury: The patient reports that she first began to experience symptoms last June, after she had difficulty walking, and her legs just gave out   She reports that she had trouble with eating, and with keeping food down  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  She reports that ever since then she has become weaker, and has trouble doing everyday activities  Pain  Current pain rating: 3  At best pain ratin  Location: Right side of back  Quality: pressure and pulling  Relieving factors: medications, relaxation and rest  Progression: no change    Social Support  Steps to enter house: yes  Stairs in house: yes   Lives in: multiple-level home  Lives with: young children    not workingTreatments  Previous treatment: medication and physical therapy  Current treatment: physical therapy  Patient Goals  Patient goals for therapy: increased motion, increased strength and decreased pain  Patient goal: Drive again, More independence  Objective     Palpation     Additional Palpation Details  Pain and tenderness to Right paraspinal, into right gastroc  Tightness in right gastroc during palpation  Active Range of Motion   Left Hip   Flexion: WFL  Abduction: WFL  Adduction: WFL    Right Hip   Flexion: WFL  Abduction: WFL  Adduction: WFL  Left Knee   Flexion: 85 degrees   Extension: -30 degrees     Right Knee   Flexion: 65 degrees   Extension: -25 degrees     Strength/Myotome Testing     Left Hip   Planes of Motion   Flexion: 3+  Abduction: 3+  Adduction: 3+    Right Hip   Planes of Motion   Flexion: 2  Abduction: 3+  Adduction: 3+    Left Knee   Flexion: 3+  Extension: 3+ (Through Available Range )    Right Knee   Flexion: 3+  Extension: 3+ (Through Available Range)    Left Ankle/Foot   Dorsiflexion: 4  Plantar flexion: 4    Right Ankle/Foot   Dorsiflexion: 4  Plantar flexion: 4    Ambulation     Observational Gait   Decreased walking speed, stride length, left stance time, right stance time, left swing time and right swing time     Left foot contact pattern: foot flat  Right foot contact pattern: foot flat  Right arm swing: decreased  Base of support: decreased    Additional Observational Gait Details  TUG 40 seconds fall risk  6MWT 120 feet in 3:30       Precautions:     Daily Treatment Diary     Manual              B HS              B Hip Flexor              BGastroc                                           Exercise Diary              Nu Step             Quad Set              Hip Add with Ball             Clamshell             Bridges              SLR 4 way              Step Up              SLS             HR/TR              Lunges              CP with LLLDE             PPT                Supine marching              P ball abs              Self HS              Self Hip flexor              Self Gastroc                                                           Modalities

## 2018-02-14 NOTE — PROGRESS NOTES
PT Evaluation     Today's date: 2018  Patient name: Shahnaz Junior  : 1960  MRN: [id number]  Referring provider: Susan Sahu MD  Dx:   Encounter Diagnoses   Name Primary?  Physical deconditioning Yes    Difficulty walking                   Assessment  Impairments: abnormal coordination, abnormal gait, abnormal muscle firing, abnormal muscle tone, abnormal or restricted ROM, abnormal movement, activity intolerance, impaired balance, impaired physical strength, pain with function and safety issue    Assessment details: Shahnaz Junior is a 62 y o  female who presents to outpatient PT with a dx of Physical deconditioning, and   Difficulty walking  No further referral appears necessary at this time based upon examination results  Pt presents with decreased strength, ROM, balance, functional activity tolerance, and pain with movement in her bilateral lower extremities, which is  limiting her ability to perform the aforementioned functional activities  Etiologic factors include repetitive poor body mechanics  Pt presents with decreased muscular endurance during the TUG and 6 minute walk test  Pt is 25 degrees short of TKE on both of her knees, which is essential for safe ambulation  Prognosis is good given HEP compliance and PT 3/wk  Please contact me if you have any questions or recommendations  Thank you for the opportunity to share in  [place] care  Understanding of Dx/Px/POC: good   Prognosis: good    Goals  1  In 4-6 weeks, patient will demonstrate a decrease in pain to 0/10 during functional activities  2  In 4-6 weeks, patient will demonstrate an increase in range of motion by 10 degrees  3  In 4-6 weeks, patient will demonstrate an increase in strength by 1/2 grade on MMT    1  In 6-8 weeks, patient will be independent with their home exercise program  2  In 6-8 weeks, patient will have zero limitations with strength  3    In 6-8 weeks, patient will have zero limitations with ROM  4   In 6-8 weeks, patient will have zero limitations with ambulation  5  In 6-8 weeks, patient will have zero limitations with stair negotiation    Plan  Patient would benefit from: skilled PT  Planned therapy interventions: home exercise program, gait training, functional ROM exercises, therapeutic exercise and manual therapy  Frequency: 3x week  Duration in weeks: 4  Treatment plan discussed with: patient  Plan details: Patient was provided a home exercise program and demonstrated an understanding of exercises  Patient was advised to stop performing home exercise program if symptoms increase or new complaints developed  Verbal understanding demonstrated regarding home exercise program instructions  Subjective Evaluation    History of Present Illness  Date of onset: 2017  Mechanism of injury: The patient reports that she first began to experience symptoms last , after she had difficulty walking, and her legs just gave out  She reports that she had trouble with eating, and with keeping food down  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  She reports that ever since then she has become weaker, and has trouble doing everyday activities  Pain  Current pain rating: 3  At best pain ratin  Location: Right side of back  Quality: pressure and pulling  Relieving factors: medications, relaxation and rest  Progression: no change    Social Support  Steps to enter house: yes  Stairs in house: yes   Lives in: multiple-level home  Lives with: young children    not workingTreatments  Previous treatment: medication and physical therapy  Current treatment: physical therapy  Patient Goals  Patient goals for therapy: increased motion, increased strength and decreased pain  Patient goal: Drive again, More independence  Objective     Palpation     Additional Palpation Details  Pain and tenderness to Right paraspinal, into right gastroc       Tightness in right gastroc during palpation  Active Range of Motion   Left Hip   Flexion: WFL  Abduction: WFL  Adduction: WFL    Right Hip   Flexion: WFL  Abduction: WFL  Adduction: WFL  Left Knee   Flexion: 85 degrees   Extension: -30 degrees     Right Knee   Flexion: 65 degrees   Extension: -25 degrees     Strength/Myotome Testing     Left Hip   Planes of Motion   Flexion: 3+  Abduction: 3+  Adduction: 3+    Right Hip   Planes of Motion   Flexion: 2  Abduction: 3+  Adduction: 3+    Left Knee   Flexion: 3+  Extension: 3+ (Through Available Range )    Right Knee   Flexion: 3+  Extension: 3+ (Through Available Range)    Left Ankle/Foot   Dorsiflexion: 4  Plantar flexion: 4    Right Ankle/Foot   Dorsiflexion: 4  Plantar flexion: 4    Ambulation     Observational Gait   Decreased walking speed, stride length, left stance time, right stance time, left swing time and right swing time  Left foot contact pattern: foot flat  Right foot contact pattern: foot flat  Right arm swing: decreased  Base of support: decreased    Additional Observational Gait Details  TUG 40 seconds fall risk  6MWT 120 feet in 3:30       Precautions:     Daily Treatment Diary     Manual              B HS              B Hip Flexor              BGastroc                                           Exercise Diary              Nu Step             Quad Set              Hip Add with Ball             Clamshell             Bridges              SLR 4 way              Step Up              SLS             HR/TR              Lunges              CP with LLLDE             PPT                Supine marching              P ball abs              Self HS              Self Hip flexor              Self Gastroc                                                           Modalities

## 2018-02-15 ENCOUNTER — OFFICE VISIT (OUTPATIENT)
Dept: PHYSICAL THERAPY | Facility: CLINIC | Age: 58
End: 2018-02-15
Payer: MEDICARE

## 2018-02-15 DIAGNOSIS — R26.2 DIFFICULTY WALKING: ICD-10-CM

## 2018-02-15 DIAGNOSIS — R53.81 PHYSICAL DECONDITIONING: Primary | ICD-10-CM

## 2018-02-15 PROCEDURE — 97110 THERAPEUTIC EXERCISES: CPT | Performed by: PHYSICAL THERAPIST

## 2018-02-15 PROCEDURE — 97140 MANUAL THERAPY 1/> REGIONS: CPT | Performed by: PHYSICAL THERAPIST

## 2018-02-15 NOTE — PROGRESS NOTES
Daily Note     Today's date: 2/15/2018  Patient name: Lizet Baez  : 1960  MRN: [id number]  Referring provider: Alli Oneal MD  Dx:   Encounter Diagnoses   Name Primary?  Physical deconditioning Yes    Difficulty walking                   Subjective: Pt reports that she is feeling well prior to the start of therapy  Objective: See treatment diary below     Daily Treatment Diary      Manual   2/15                     B HS                        B Hip Flexor                        BGastroc                       Knee extension PROM                          15min                            Exercise Diary   2/15                     Nu Step  10min L1                      Quad Set                        Hip Add with Ball  10''10x                     Clamshell  3x10 red                     Bridges   5''20x                     SLR 4 way                        Step Up                        SLS                       HR/TR                        Lunges                        Cervical hot pack  with LLLDE end of session   10 min 3#                     PPT      10'10x                     Supine marching                        P ball abs                        Self HS   30''3x                     Self Hip flexor   30''3x                     Self Gastroc    30''3x                                                                                                   Modalities                                                                                                           Assessment: Tolerated treatment well  Patient demonstrated fatigue post treatment and would benefit from continued PT, Pt presents with significant contractures in her bilateral hip flexors, and hamstrings during manual stretching  Focused on table exercises this session, and patient tolerated them well  Difficulty with PPT in supine, but good tolerance to table exercises performed above   Pt ambulates with antalgic, crouched gait, and uses a SPC  Plan: Continue per plan of care

## 2018-02-19 ENCOUNTER — OFFICE VISIT (OUTPATIENT)
Dept: PHYSICAL THERAPY | Facility: CLINIC | Age: 58
End: 2018-02-19
Payer: MEDICARE

## 2018-02-19 DIAGNOSIS — K91.89 GASTRIC LEAK: ICD-10-CM

## 2018-02-19 DIAGNOSIS — R26.2 DIFFICULTY WALKING: ICD-10-CM

## 2018-02-19 DIAGNOSIS — R53.81 PHYSICAL DECONDITIONING: Primary | ICD-10-CM

## 2018-02-19 DIAGNOSIS — K91.89 ANASTOMOTIC STRICTURE OF GASTROJEJUNOSTOMY: ICD-10-CM

## 2018-02-19 DIAGNOSIS — E03.9 ACQUIRED HYPOTHYROIDISM: ICD-10-CM

## 2018-02-19 DIAGNOSIS — E78.00 HYPERCHOLESTEROLEMIA: ICD-10-CM

## 2018-02-19 PROCEDURE — 97150 GROUP THERAPEUTIC PROCEDURES: CPT

## 2018-02-19 PROCEDURE — 97140 MANUAL THERAPY 1/> REGIONS: CPT

## 2018-02-21 ENCOUNTER — OFFICE VISIT (OUTPATIENT)
Dept: PHYSICAL THERAPY | Facility: CLINIC | Age: 58
End: 2018-02-21
Payer: MEDICARE

## 2018-02-21 DIAGNOSIS — R26.2 DIFFICULTY WALKING: ICD-10-CM

## 2018-02-21 DIAGNOSIS — K91.89 GASTRIC LEAK: ICD-10-CM

## 2018-02-21 DIAGNOSIS — E78.00 HYPERCHOLESTEROLEMIA: Primary | ICD-10-CM

## 2018-02-21 DIAGNOSIS — E03.9 ACQUIRED HYPOTHYROIDISM: ICD-10-CM

## 2018-02-21 DIAGNOSIS — K91.89 ANASTOMOTIC STRICTURE OF GASTROJEJUNOSTOMY: ICD-10-CM

## 2018-02-21 DIAGNOSIS — R53.81 PHYSICAL DECONDITIONING: ICD-10-CM

## 2018-02-21 PROCEDURE — 97110 THERAPEUTIC EXERCISES: CPT

## 2018-02-21 PROCEDURE — 97140 MANUAL THERAPY 1/> REGIONS: CPT

## 2018-02-21 NOTE — PROGRESS NOTES
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    6  Gastric leak K91 89                   Subjective: Pt reports that she feels "okay" today  Pt denies pain  Objective: See treatment diary below  Daily Treatment Diary      Manual                        B HS                        B Hip Flexor                       BGastroc                       Knee extension PROM                          15min                            Exercise Diary                        Nu Step  10min L2                     Quad Set   10" x10 B/L                     Hip Add with Ball  10''10x                     Clamshell  3x10 red                     Bridges   5''20x                     SLR 4 way                        Step Up                        SLS                       HR/TR                        Lunges                        Cervical hot pack  with LLLDE end of session   10 min 3#                     PPT      10'10x                     Supine marching                        P ball abs                        Self HS   30''3x                     Self Hip flexor   30''3x                     Self Gastroc    30''3x                                                                                                   Modalities                                                                                                    Assessment: Tolerated treatment well  Patient demonstrated fatigue post treatment, exhibited good technique with therapeutic exercises and would benefit from continued PT  Limited ROM of B/L LE during manuals  Initiated Xcerion to increase TKE         Plan: Continue per plan of care   Progress treatment as tolerated

## 2018-02-23 ENCOUNTER — OFFICE VISIT (OUTPATIENT)
Dept: PHYSICAL THERAPY | Facility: CLINIC | Age: 58
End: 2018-02-23
Payer: MEDICARE

## 2018-02-23 DIAGNOSIS — R53.81 PHYSICAL DECONDITIONING: ICD-10-CM

## 2018-02-23 DIAGNOSIS — E03.9 ACQUIRED HYPOTHYROIDISM: ICD-10-CM

## 2018-02-23 DIAGNOSIS — K91.89 ANASTOMOTIC STRICTURE OF GASTROJEJUNOSTOMY: ICD-10-CM

## 2018-02-23 DIAGNOSIS — E78.00 HYPERCHOLESTEROLEMIA: Primary | ICD-10-CM

## 2018-02-23 DIAGNOSIS — R26.2 DIFFICULTY WALKING: ICD-10-CM

## 2018-02-23 DIAGNOSIS — K91.89 GASTRIC LEAK: ICD-10-CM

## 2018-02-23 PROCEDURE — 97110 THERAPEUTIC EXERCISES: CPT

## 2018-02-23 PROCEDURE — 97140 MANUAL THERAPY 1/> REGIONS: CPT

## 2018-02-23 NOTE — PROGRESS NOTES
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    6  Gastric leak K91 89                   Subjective: pt reports that she feels she is getting stronger  Pt reports that she "walked around the market" Wednesday after her session without issue  Pt denies all pain  Objective: See treatment diary below  Daily Treatment Diary      Manual                        B HS                        B Hip Flexor                       BGastroc                       Knee extension PROM                          20min                            Exercise Diary                        Nu Step  10min L2                     Quad Set   10" x10 B/L                     Hip Add with Ball  10''10x                     Clamshell  3x10 red                     Bridges   5''20x                     SLR 4 way                        Step Up                        SLS                       HR/TR                        Lunges                        Cervical hot pack  with LLLDE end of session   10 min 3#                     PPT      10'10x                     Supine marching                        P ball abs                        Self HS   30''3x                     Self Hip flexor   30''3x                     Self Gastroc    30''3x                                                                                                   Modalities                                                                                                    Assessment: Tolerated treatment well  Patient demonstrated fatigue post treatment, exhibited good technique with therapeutic exercises and would benefit from continued PT   Pt continues to lack ROM in B/L Le  Plan: Continue per plan of care  Progress treatment as tolerated

## 2018-02-26 ENCOUNTER — OFFICE VISIT (OUTPATIENT)
Dept: PHYSICAL THERAPY | Facility: CLINIC | Age: 58
End: 2018-02-26
Payer: MEDICARE

## 2018-02-26 DIAGNOSIS — E03.9 ACQUIRED HYPOTHYROIDISM: ICD-10-CM

## 2018-02-26 DIAGNOSIS — K91.89 GASTRIC LEAK: ICD-10-CM

## 2018-02-26 DIAGNOSIS — R26.2 DIFFICULTY WALKING: ICD-10-CM

## 2018-02-26 DIAGNOSIS — R53.81 PHYSICAL DECONDITIONING: Primary | ICD-10-CM

## 2018-02-26 DIAGNOSIS — E78.00 HYPERCHOLESTEROLEMIA: ICD-10-CM

## 2018-02-26 DIAGNOSIS — K91.89 ANASTOMOTIC STRICTURE OF GASTROJEJUNOSTOMY: ICD-10-CM

## 2018-02-26 PROCEDURE — 97140 MANUAL THERAPY 1/> REGIONS: CPT

## 2018-02-26 PROCEDURE — 97110 THERAPEUTIC EXERCISES: CPT

## 2018-02-26 NOTE — PROGRESS NOTES
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    5  Gastric leak K91 89    6  Hypercholesterolemia E78 00                   Subjective: Pt reports that she feels therapy is helping her  Pt denies all pain  Objective: See treatment diary below  Daily Treatment Diary      Manual                        B HS                        B Hip Flexor                       BGastroc                       Knee extension PROM                          20min                            Exercise Diary                        Nu Step  10min L2                     Quad Set   10" x10 B/L                     Hip Add with Ball  10''10x                     Clamshell  3x10 red                     Bridges   5''20x                     SLR 4 way                        Step Up                        SLS                       HR/TR                        Lunges                        Cervical hot pack  with LLLDE end of session   10 min 3#                     PPT      10'10x                     Supine marching                        P ball abs                        Self HS   30''3x                     Self Hip flexor   30''3x                     Self Gastroc    30''3x                                                                                                   Modalities                                                                                                  Assessment: Tolerated treatment well  Patient demonstrated fatigue post treatment, exhibited good technique with therapeutic exercises and would benefit from continued PT  Pt has no adverse affect to [facility]  Pt continues to be very limited in ROM of B Le         Plan: Continue per plan of care  Progress treatment as tolerated

## 2018-02-28 ENCOUNTER — OFFICE VISIT (OUTPATIENT)
Dept: PHYSICAL THERAPY | Facility: CLINIC | Age: 58
End: 2018-02-28
Payer: MEDICARE

## 2018-02-28 DIAGNOSIS — E03.9 ACQUIRED HYPOTHYROIDISM: ICD-10-CM

## 2018-02-28 DIAGNOSIS — R26.2 DIFFICULTY WALKING: ICD-10-CM

## 2018-02-28 DIAGNOSIS — E78.00 HYPERCHOLESTEROLEMIA: ICD-10-CM

## 2018-02-28 DIAGNOSIS — K91.89 ANASTOMOTIC STRICTURE OF GASTROJEJUNOSTOMY: ICD-10-CM

## 2018-02-28 DIAGNOSIS — K91.89 GASTRIC LEAK: ICD-10-CM

## 2018-02-28 DIAGNOSIS — R53.81 PHYSICAL DECONDITIONING: Primary | ICD-10-CM

## 2018-02-28 PROCEDURE — 97150 GROUP THERAPEUTIC PROCEDURES: CPT

## 2018-02-28 NOTE — PROGRESS NOTES
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    5  Gastric leak K91 89    6  Hypercholesterolemia E78 00                   Subjective: Pt reports that she has L knee pain that started yesterday  "it feels like a tooth ache "      Objective: See treatment diary below    Daily Treatment Diary      Manual                        B HS                        B Hip Flexor                       BGastroc                       Knee extension PROM                          15min                            Exercise Diary                        Nu Step  10min L2                     Quad Set   10" x10 B/L                     Hip Add with Ball  10''10x                     Clamshell  3x10 blue                     Bridges   5''20x                     SLR 4 way                        Step Up                        SLS                       HR/TR                        Lunges                        Cervical hot pack  with LLLDE end of session   10 min 3#                     PPT      10'10x                     Supine marching                        P ball abs                        Self HS   30''3x                     Self Hip flexor   30''3x                     Self Gastroc    30''3x                                                                                                   Modalities                                                                                                  Assessment: Tolerated treatment fair  Pt still very limited in ROM of B/L Le  Patient demonstrated fatigue post treatment, exhibited good technique with therapeutic exercises and would benefit from continued PT  No adverse affect to [facility] this date  Plan: Continue per plan of care  Progress treatment as tolerated

## 2018-03-02 ENCOUNTER — OFFICE VISIT (OUTPATIENT)
Dept: PHYSICAL THERAPY | Facility: CLINIC | Age: 58
End: 2018-03-02
Payer: MEDICARE

## 2018-03-02 DIAGNOSIS — R26.2 DIFFICULTY WALKING: ICD-10-CM

## 2018-03-02 DIAGNOSIS — R53.81 PHYSICAL DECONDITIONING: Primary | ICD-10-CM

## 2018-03-02 PROCEDURE — 97140 MANUAL THERAPY 1/> REGIONS: CPT | Performed by: PHYSICAL THERAPIST

## 2018-03-02 PROCEDURE — 97150 GROUP THERAPEUTIC PROCEDURES: CPT | Performed by: PHYSICAL THERAPIST

## 2018-03-02 NOTE — PROGRESS NOTES
Daily Note     Today's date: 3/2/2018  Patient name: Chino Monge  : 1960  MRN: [id number]  Referring provider: Jessa Osorio MD  Dx:   Encounter Diagnosis     ICD-10-CM    1  Physical deconditioning R53 81    2  Difficulty walking R26 2                   Subjective: Pt reports that she feels good prior to the start of therapy  She feels good when she leaves therapy  Objective: See treatment diary below    Daily Treatment Diary      Manual  3/2                     B HS                        B Hip Flexor                        BGastroc                       Knee extension PROM                          15min                            Exercise Diary   3/2                     Nu Step  10min L2                     Quad Set   10" x10 B/L                     Hip Add with Ball  10''10x                     Clamshell  3x10 blue                     Bridges   5''20x                     SLR 4 way                        Step Up                        SLS                       HR/TR                        Lunges                        Cervical hot pack  with LLLDE end of session   10 min 3#                     PPT      10'10x                     Supine marching                        P ball abs                        Self HS   30''3x                     Self Hip flexor   30''3x                     Self Gastroc    30''3x                                                                                                   Modalities                                                                                                     Assessment: Tolerated treatment well  Patient demonstrated fatigue post treatment and would benefit from continued PT  Consider adding exercises in standing next session to help with lower extremity strengthening  Plan: Continue per plan of care

## 2018-03-05 ENCOUNTER — OFFICE VISIT (OUTPATIENT)
Dept: PHYSICAL THERAPY | Facility: CLINIC | Age: 58
End: 2018-03-05
Payer: MEDICARE

## 2018-03-05 DIAGNOSIS — R53.81 PHYSICAL DECONDITIONING: Primary | ICD-10-CM

## 2018-03-05 DIAGNOSIS — K91.89 ANASTOMOTIC STRICTURE OF GASTROJEJUNOSTOMY: ICD-10-CM

## 2018-03-05 DIAGNOSIS — E03.9 ACQUIRED HYPOTHYROIDISM: ICD-10-CM

## 2018-03-05 DIAGNOSIS — E78.00 HYPERCHOLESTEROLEMIA: ICD-10-CM

## 2018-03-05 DIAGNOSIS — K91.89 GASTRIC LEAK: ICD-10-CM

## 2018-03-05 DIAGNOSIS — R26.2 DIFFICULTY WALKING: ICD-10-CM

## 2018-03-05 PROCEDURE — 97140 MANUAL THERAPY 1/> REGIONS: CPT

## 2018-03-05 PROCEDURE — 97150 GROUP THERAPEUTIC PROCEDURES: CPT

## 2018-03-05 NOTE — PROGRESS NOTES
Daily Note     Today's date: 3/5/2018  Patient name: Tommy Somers  : 1960  MRN: [id number]  Referring provider: Carmen Serna MD  Dx:   Encounter Diagnosis     ICD-10-CM    1  Physical deconditioning R53 81    2  Hypercholesterolemia E78 00    3  Difficulty walking R26 2    4  Acquired hypothyroidism E03 9    5  Anastomotic stricture of gastrojejunostomy K91 89    6  Gastric leak K91 89                   Subjective: Pt reports that she feels she is improving a little  Pt reports her L knee is better and denies pain  Objective: See treatment diary below  Daily Treatment Diary      Manual  3/5                     B HS                        B Hip Flexor                        BGastroc                       Knee extension PROM                          15min                            Exercise Diary   3/5                     Nu Step  10min L2                     Quad Set   10" x10 B/L                     Hip Add with Ball  10''10x                     Clamshell  3x10 blue                     Bridges   5''20x                     SLR 4 way                        Step Up                        SLS                       HR/TR                        Lunges                        Cervical hot pack  with LLLDE end of session   10 min 3#                     PPT      10'10x                     Supine marching                        P ball abs                        Self HS   30''3x                     Self Hip flexor   30''3x                     Self Gastroc    30''3x                                                                                                   Modalities                                                                            Assessment: Tolerated treatment well  Pt has limited PROM of B LE this date  No adverse affect to MHP  Patient demonstrated fatigue post treatment and would benefit from continued PT      Plan: Continue per plan of care  Progress treatment as tolerated

## 2018-03-06 ENCOUNTER — OFFICE VISIT (OUTPATIENT)
Dept: PHYSICAL THERAPY | Facility: CLINIC | Age: 58
End: 2018-03-06
Payer: MEDICARE

## 2018-03-06 DIAGNOSIS — R53.81 PHYSICAL DECONDITIONING: Primary | ICD-10-CM

## 2018-03-06 DIAGNOSIS — R26.2 DIFFICULTY WALKING: ICD-10-CM

## 2018-03-06 PROCEDURE — 97140 MANUAL THERAPY 1/> REGIONS: CPT | Performed by: PHYSICAL THERAPIST

## 2018-03-06 PROCEDURE — 97150 GROUP THERAPEUTIC PROCEDURES: CPT | Performed by: PHYSICAL THERAPIST

## 2018-03-06 NOTE — PROGRESS NOTES
Daily Note     Today's date: 3/6/2018  Patient name: Juan Delaney  : 1960  MRN: [id number]  Referring provider: Justin Nuno MD  Dx:   Encounter Diagnosis     ICD-10-CM    1  Physical deconditioning R53 81    2  Difficulty walking R26 2                   Subjective: Pt reports that she feels much better since she's started PT  Objective: See treatment diary below    Manual  3/6                     B HS                        B Hip Flexor                        BGastroc                       Knee extension PROM                          15min                            Exercise Diary   3/6                     Nu Step  10min L2                     Quad Set   10" x10 B/L                     Hip Add with Ball  10''10x                     Clamshell  3x10 blue                     Bridges   5''20x                     SLR flexion   10x                     Step Up                        SLS                       HR/TR                        Lunges                        Cervical hot pack  with LLLDE end of session   10 min 3#                     PPT      10'10x                     Supine marching                        P ball abs                        Self HS   30''3x                     Self Hip flexor   30''3x                     Self Gastroc    30''3x                                                                                                   Modalities                                                                              Assessment: Tolerated treatment well  Patient exhibited good technique with therapeutic exercises  Added SLR flexion in supine this session, to help work on lower extremity strength and stability  Pt tolerated all newly added increases without any increased pain  Pt continues to ambulate with crouched gait  Plan: Continue per plan of care

## 2018-03-07 ENCOUNTER — APPOINTMENT (OUTPATIENT)
Dept: PHYSICAL THERAPY | Facility: CLINIC | Age: 58
End: 2018-03-07
Payer: MEDICARE

## 2018-03-09 ENCOUNTER — OFFICE VISIT (OUTPATIENT)
Dept: PHYSICAL THERAPY | Facility: CLINIC | Age: 58
End: 2018-03-09
Payer: MEDICARE

## 2018-03-09 DIAGNOSIS — R53.81 PHYSICAL DECONDITIONING: Primary | ICD-10-CM

## 2018-03-09 DIAGNOSIS — R26.2 DIFFICULTY WALKING: ICD-10-CM

## 2018-03-09 PROCEDURE — 97140 MANUAL THERAPY 1/> REGIONS: CPT | Performed by: PHYSICAL THERAPIST

## 2018-03-09 PROCEDURE — 97110 THERAPEUTIC EXERCISES: CPT | Performed by: PHYSICAL THERAPIST

## 2018-03-09 NOTE — PROGRESS NOTES
Daily Note     Today's date: 3/9/2018  Patient name: Juan Delaney  : 1960  MRN: [id number]  Referring provider: Justin Nuno MD  Dx:   Encounter Diagnosis     ICD-10-CM    1  Physical deconditioning R53 81    2  Difficulty walking R26 2                   Subjective: Pt reports that she is doing well prior to the start of PT  She reports that she is feeling better but its going slow  Objective: See treatment diary below    Manual  3/9                     B HS                        B Hip Flexor                        BGastroc                       Knee extension PROM                          15min                            Exercise Diary   3/9                     Nu Step  10min L2                     Quad Set   10" x10 B/L                     Hip Add with Ball  10''10x                     Clamshell  3x10 blue                     Bridges   5''20x                     SLR flexion   10x                     Step Up   A 3x10                      SLS                       HR/TR   30x                     Lunges                        Cervical hot pack  with LLLDE end of session   10 min 3#                     PPT      10'10x                     Supine marching                        P ball abs                        Self HS   30''3x                     Self Hip flexor   30''3x                     Self Gastroc    30''3x                                                                                                   Modalities                                                                                Assessment: Tolerated treatment well  Patient would benefit from continued PT to help work on lower extremity strengthening    Added HR/TR and step ups this session, to help work on lower extremity strengthening in standing  Plan: Continue per plan of care

## 2018-03-12 ENCOUNTER — OFFICE VISIT (OUTPATIENT)
Dept: PHYSICAL THERAPY | Facility: CLINIC | Age: 58
End: 2018-03-12
Payer: MEDICARE

## 2018-03-12 DIAGNOSIS — E03.9 ACQUIRED HYPOTHYROIDISM: ICD-10-CM

## 2018-03-12 DIAGNOSIS — E78.00 HYPERCHOLESTEROLEMIA: ICD-10-CM

## 2018-03-12 DIAGNOSIS — K91.89 GASTRIC LEAK: ICD-10-CM

## 2018-03-12 DIAGNOSIS — K91.89 ANASTOMOTIC STRICTURE OF GASTROJEJUNOSTOMY: ICD-10-CM

## 2018-03-12 DIAGNOSIS — R53.81 PHYSICAL DECONDITIONING: Primary | ICD-10-CM

## 2018-03-12 DIAGNOSIS — R26.2 DIFFICULTY WALKING: ICD-10-CM

## 2018-03-12 PROCEDURE — 97140 MANUAL THERAPY 1/> REGIONS: CPT

## 2018-03-12 PROCEDURE — 97110 THERAPEUTIC EXERCISES: CPT

## 2018-03-14 ENCOUNTER — EVALUATION (OUTPATIENT)
Dept: PHYSICAL THERAPY | Facility: CLINIC | Age: 58
End: 2018-03-14
Payer: MEDICARE

## 2018-03-14 ENCOUNTER — TRANSCRIBE ORDERS (OUTPATIENT)
Dept: PHYSICAL THERAPY | Facility: CLINIC | Age: 58
End: 2018-03-14

## 2018-03-14 DIAGNOSIS — R26.2 DIFFICULTY WALKING: ICD-10-CM

## 2018-03-14 DIAGNOSIS — R53.81 PHYSICAL DECONDITIONING: Primary | ICD-10-CM

## 2018-03-14 PROCEDURE — G8979 MOBILITY GOAL STATUS: HCPCS | Performed by: PHYSICAL THERAPIST

## 2018-03-14 PROCEDURE — 97110 THERAPEUTIC EXERCISES: CPT | Performed by: PHYSICAL THERAPIST

## 2018-03-14 PROCEDURE — G8978 MOBILITY CURRENT STATUS: HCPCS | Performed by: PHYSICAL THERAPIST

## 2018-03-14 PROCEDURE — 97164 PT RE-EVAL EST PLAN CARE: CPT | Performed by: PHYSICAL THERAPIST

## 2018-03-14 PROCEDURE — 97140 MANUAL THERAPY 1/> REGIONS: CPT | Performed by: PHYSICAL THERAPIST

## 2018-03-14 PROCEDURE — 97116 GAIT TRAINING THERAPY: CPT | Performed by: PHYSICAL THERAPIST

## 2018-03-14 NOTE — LETTER
2018    Jennifer Chand[address]    Patient: Santhosh Wheeler   YOB: 1960   Date of Visit: 3/14/2018     Encounter Diagnosis     ICD-10-CM    1  Physical deconditioning R53 81    2  Difficulty walking R26 2        Dear Dr Kev Cash:    Please review the attached Plan of Care from [facility] recent visit  Please verify that you agree therapy should continue by signing the attached document and sending it back to our office  If you have any questions or concerns, please don't hesitate to call  Sincerely,    Kranthi Bustos PT      Referring Provider:      I certify that I have read the below Plan of Care and certify the need for these services furnished under this plan of treatment while under my care  Jennifer Chand MD  [address] [place] [address] Avenue: [phone]            Today's date: 2018  Patient name: Santhosh Wheeler  : 1960  MRN: [id number]  Referring provider: Antony Carrasco MD  Dx:   Encounter Diagnoses   Name Primary?  Physical deconditioning Yes    Difficulty walking                   Assessment  Impairments: abnormal coordination, abnormal gait, abnormal muscle firing, abnormal muscle tone, abnormal or restricted ROM, abnormal movement, activity intolerance, impaired balance, impaired physical strength, pain with function and safety issue    Assessment details: Santhosh Wheeler has demonstrated decreased pain, increased strength, increased range of motion, and increased activity tolerance since starting physical therapy services  They report an overall improvement of 35% thus far  She continues to present with pain, decreased strength, decreased range of motion, and decreased activity tolerance and would benefit from additional skilled physical therapy interventions to address impairments and maximize function   Pt has improved her TUG score from 40 seconds to 26 seconds, and improved her 6 MWT scores from 120 feet in 3:30 to 330 feet in 5:45  B TKE continues to be limited but has improved since the start of therapy  Pt continues to ambulate with a decreased veronika, velocity, and displays a steppage gait pattern  Pt would benefit from continued PT to help improve strength, ROM, balance, endurance, and functional activity tolerance  Understanding of Dx/Px/POC: good  Prognosis: good    Goals  1  In 4-6 weeks, patient will demonstrate a decrease in pain to 0/10 during functional activities MET   2  In 4-6 weeks, patient will demonstrate an increase in range of motion by 10 degrees  Partially MET   3  In 4-6 weeks, patient will demonstrate an increase in strength by 1/2 grade on MMT  Partially MET    1  In 6-8 weeks, patient will be independent with their home exercise program MET   2  In 6-8 weeks, patient will have zero limitations with strength Partially MET   3  In 6-8 weeks, patient will have zero limitations with ROM Partially MET   4  In 6-8 weeks, patient will have zero limitations with ambulation Partially MET   5  In 6-8 weeks, patient will have zero limitations with stair negotiation Not Met  Plan  Patient would benefit from: skilled PT  Planned therapy interventions: home exercise program, gait training, functional ROM exercises, therapeutic exercise and manual therapy  Frequency: 3x week  Duration in weeks: 4  Treatment plan discussed with: patient  Plan details: Patient was provided a home exercise program and demonstrated an understanding of exercises  Patient was advised to stop performing home exercise program if symptoms increase or new complaints developed  Verbal understanding demonstrated regarding home exercise program instructions  Subjective Evaluation    History of Present Illness  Date of onset: 6/14/2017  Mechanism of injury: Pt reports that she feels 35 percent better since she's started physical therapy   The patient reports that it is easier for her to walk longer distances  She reports continued difficulty with stair negotiation, and lifting her leg up to place her foot over things  Pt reports that her legs still feel weak, but her strength has improved since starting therapy  Pain  Current pain rating: 3  At best pain ratin  Location: Right side of back  Quality: pressure and pulling  Relieving factors: medications, relaxation and rest  Progression: no change    Social Support  Steps to enter house: yes  Stairs in house: yes   Lives in: multiple-level home  Lives with: young children    not workingTreatments  Previous treatment: medication and physical therapy  Current treatment: physical therapy  Patient Goals  Patient goals for therapy: increased motion, increased strength and decreased pain  Patient goal: Drive again, More independence  Objective     Palpation     Additional Palpation Details  Pain and tenderness to Right paraspinal, into right gastroc  Tightness in right gastroc during palpation  Active Range of Motion   Left Hip   Flexion: WFL  Abduction: WFL  Adduction: WFL    Right Hip   Flexion: WFL  Abduction: WFL  Adduction: WFL  Left Knee   Flexion: 85 degrees      RA 3/14 85 degrees   Extension: -30 degrees RA 3/14 -20 degrees       Right Knee   Flexion: 65 degrees   RA 3/14  75 degrees   Extension: -25 degrees RA 3/14 -20 degrees    Strength/Myotome Testing     Left Hip   Planes of Motion   Flexion: 3+  Abduction: 3+  Adduction: 3+    Right Hip   Planes of Motion   Flexion: 2  Abduction: 3+  Adduction: 3+    Left Knee   Flexion: 3+  Extension: 3+ (Through Available Range )    Right Knee   Flexion: 3+  Extension: 3+ (Through Available Range)    Left Ankle/Foot   Dorsiflexion: 4  Plantar flexion: 4    Right Ankle/Foot   Dorsiflexion: 4  Plantar flexion: 4    Ambulation     Observational Gait   Decreased walking speed, stride length, left stance time, right stance time, left swing time and right swing time    Left foot contact pattern: foot flat  Right foot contact pattern: foot flat  Right arm swing: decreased  Base of support: decreased    Additional Observational Gait Details  TUG 40 seconds fall risk  RA 3/14 26 seconds fall risk  6MWT 120 feet in 3:30   RA 3/14 330 feet in 5:45 seconds         Precautions:     Manual  3/14                     B HS                        B Hip Flexor                        BGastroc                       Knee extension PROM                          15min                            Exercise Diary   3/14                     Nu Step  10min L3                     Quad Set   10" x10 B/L                     Hip Add with Ball  10''10x                     Clamshell  3x10 blue                     Bridges   5''20x                     SLR flexion   10x                     Step Up   A 3x10                      SLS                       HR/TR   30x                     Lunges                        Cervical hot pack  with LLLDE end of session   10 min 3#                     PPT      10'10x                     Supine marching                        P ball abs                        Self HS   30''3x                     Self Hip flexor   30''3x                     Self Gastroc    30''3x                                                                                                   Modalities

## 2018-03-14 NOTE — PROGRESS NOTES
Today's date: 2018  Patient name: Deepika Lim  : 1960  MRN: [id number]  Referring provider: Benedict De La Cruz MD  Dx:   Encounter Diagnoses   Name Primary?  Physical deconditioning Yes    Difficulty walking                   Assessment  Impairments: abnormal coordination, abnormal gait, abnormal muscle firing, abnormal muscle tone, abnormal or restricted ROM, abnormal movement, activity intolerance, impaired balance, impaired physical strength, pain with function and safety issue    Assessment details: Deepika Lim has demonstrated decreased pain, increased strength, increased range of motion, and increased activity tolerance since starting physical therapy services  They report an overall improvement of 35% thus far  She continues to present with pain, decreased strength, decreased range of motion, and decreased activity tolerance and would benefit from additional skilled physical therapy interventions to address impairments and maximize function  Pt has improved her TUG score from 40 seconds to 26 seconds, and improved her 6 MWT scores from 120 feet in 3:30 to 330 feet in 5:45  B TKE continues to be limited but has improved since the start of therapy  Pt continues to ambulate with a decreased veronika, velocity, and displays a steppage gait pattern  Pt would benefit from continued PT to help improve strength, ROM, balance, endurance, and functional activity tolerance  Understanding of Dx/Px/POC: good  Prognosis: good    Goals  1  In 4-6 weeks, patient will demonstrate a decrease in pain to 0/10 during functional activities MET   2  In 4-6 weeks, patient will demonstrate an increase in range of motion by 10 degrees  Partially MET   3  In 4-6 weeks, patient will demonstrate an increase in strength by 1/2 grade on MMT  Partially MET    1  In 6-8 weeks, patient will be independent with their home exercise program MET   2    In 6-8 weeks, patient will have zero limitations with strength Partially MET   3  In 6-8 weeks, patient will have zero limitations with ROM Partially MET   4  In 6-8 weeks, patient will have zero limitations with ambulation Partially MET   5  In 6-8 weeks, patient will have zero limitations with stair negotiation Not Met  Plan  Patient would benefit from: skilled PT  Planned therapy interventions: home exercise program, gait training, functional ROM exercises, therapeutic exercise and manual therapy  Frequency: 3x week  Duration in weeks: 4  Treatment plan discussed with: patient  Plan details: Patient was provided a home exercise program and demonstrated an understanding of exercises  Patient was advised to stop performing home exercise program if symptoms increase or new complaints developed  Verbal understanding demonstrated regarding home exercise program instructions  Subjective Evaluation    History of Present Illness  Date of onset: 2017  Mechanism of injury: Pt reports that she feels 35 percent better since she's started physical therapy  The patient reports that it is easier for her to walk longer distances  She reports continued difficulty with stair negotiation, and lifting her leg up to place her foot over things  Pt reports that her legs still feel weak, but her strength has improved since starting therapy  Pain  Current pain rating: 3  At best pain ratin  Location: Right side of back  Quality: pressure and pulling  Relieving factors: medications, relaxation and rest  Progression: no change    Social Support  Steps to enter house: yes  Stairs in house: yes   Lives in: multiple-level home  Lives with: young children    not workingTreatments  Previous treatment: medication and physical therapy  Current treatment: physical therapy  Patient Goals  Patient goals for therapy: increased motion, increased strength and decreased pain  Patient goal: Drive again, More independence           Objective     Palpation     Additional Palpation Details  Pain and tenderness to Right paraspinal, into right gastroc  Tightness in right gastroc during palpation  Active Range of Motion   Left Hip   Flexion: WFL  Abduction: WFL  Adduction: WFL    Right Hip   Flexion: WFL  Abduction: WFL  Adduction: WFL  Left Knee   Flexion: 85 degrees      RA 3/14 85 degrees   Extension: -30 degrees RA 3/14 -20 degrees  Right Knee   Flexion: 65 degrees   RA 3/14  75 degrees   Extension: -25 degrees RA 3/14 -20 degrees    Strength/Myotome Testing     Left Hip   Planes of Motion   Flexion: 3+  Abduction: 3+  Adduction: 3+    Right Hip   Planes of Motion   Flexion: 2  Abduction: 3+  Adduction: 3+    Left Knee   Flexion: 3+  Extension: 3+ (Through Available Range )    Right Knee   Flexion: 3+  Extension: 3+ (Through Available Range)    Left Ankle/Foot   Dorsiflexion: 4  Plantar flexion: 4    Right Ankle/Foot   Dorsiflexion: 4  Plantar flexion: 4    Ambulation     Observational Gait   Decreased walking speed, stride length, left stance time, right stance time, left swing time and right swing time  Left foot contact pattern: foot flat  Right foot contact pattern: foot flat  Right arm swing: decreased  Base of support: decreased    Additional Observational Gait Details  TUG 40 seconds fall risk  RA 3/14 26 seconds fall risk  6MWT 120 feet in 3:30   RA 3/14 330 feet in 5:45 seconds         Precautions:     Manual  3/14                     B HS                        B Hip Flexor                        BGastroc                       Knee extension PROM                          15min                            Exercise Diary   3/14                     Nu Step  10min L3                     Quad Set   10" x10 B/L                     Hip Add with Ball  10''10x                     Clamshell  3x10 blue                     Bridges   5''20x                     SLR flexion   10x                     Step Up   A 3x10                      SLS                       HR/TR   30x                     Regino                        Cervical hot pack  with LLLDE end of session   10 min 3#                     PPT      10'10x                     Supine marching                        P ball abs                        Self HS   30''3x                     Self Hip flexor   30''3x                     Self Gastroc    30''3x                                                                                                   Modalities

## 2018-03-16 ENCOUNTER — OFFICE VISIT (OUTPATIENT)
Dept: PHYSICAL THERAPY | Facility: CLINIC | Age: 58
End: 2018-03-16
Payer: MEDICARE

## 2018-03-16 DIAGNOSIS — R26.2 DIFFICULTY WALKING: ICD-10-CM

## 2018-03-16 DIAGNOSIS — R53.81 PHYSICAL DECONDITIONING: Primary | ICD-10-CM

## 2018-03-16 PROCEDURE — 97110 THERAPEUTIC EXERCISES: CPT | Performed by: PHYSICAL THERAPIST

## 2018-03-16 PROCEDURE — 97140 MANUAL THERAPY 1/> REGIONS: CPT | Performed by: PHYSICAL THERAPIST

## 2018-03-16 NOTE — PROGRESS NOTES
Daily Note     Today's date: 3/16/2018  Patient name: Woody Conner  : 1960  MRN: [id number]  Referring provider: Dior Campbell MD  Dx:   Encounter Diagnosis     ICD-10-CM    1  Physical deconditioning R53 81    2  Difficulty walking R26 2                   Subjective: Pt reports that she is doing well today  Objective: See treatment diary below    Precautions:     Manual  3/16                     B HS                        B Hip Flexor                        BGastroc                       Knee extension PROM                          15min                            Exercise Diary   3/16                     Nu Step  10min L3                     Quad Set   10" x10 B/L                     Hip Add with Ball  10''10x                     Clamshell  3x10 blue                     Bridges   5''20x                     SLR flexion   10x                     Step Up   A 3x10                      SLS                       HR/TR   30x                     Lunges                        Cervical hot pack  with LLLDE end of session   10 min 3#                     PPT      10'10x                     Supine marching                        P ball abs                        Self HS   30''3x NP                     Self Hip flexor   30''3x NP                      Self Gastroc    30''3x NP                       Lunge   10x each                                                                            Modalities                                                                                Assessment: Tolerated treatment well  Patient exhibited good technique with therapeutic exercises  Added lunges this session, to help work on lower extremity strengthening  And closed chain stability  No c/o of increased pain during lunges  Plan: Continue per plan of care

## 2018-03-19 ENCOUNTER — OFFICE VISIT (OUTPATIENT)
Dept: PHYSICAL THERAPY | Facility: CLINIC | Age: 58
End: 2018-03-19
Payer: MEDICARE

## 2018-03-19 DIAGNOSIS — R53.81 PHYSICAL DECONDITIONING: Primary | ICD-10-CM

## 2018-03-19 DIAGNOSIS — R26.2 DIFFICULTY WALKING: ICD-10-CM

## 2018-03-19 PROCEDURE — 97110 THERAPEUTIC EXERCISES: CPT | Performed by: PHYSICAL THERAPIST

## 2018-03-19 PROCEDURE — 97140 MANUAL THERAPY 1/> REGIONS: CPT | Performed by: PHYSICAL THERAPIST

## 2018-03-19 NOTE — PROGRESS NOTES
Daily Note     Today's date: 3/19/2018  Patient name: Tasia Juan  : 1960  MRN: [id number]  Referring provider: Roland Flower MD  Dx:   Encounter Diagnosis     ICD-10-CM    1  Physical deconditioning R53 81    2  Difficulty walking R26 2                   Subjective: Pt reports that she is doing well today, but she had a stomach bug over the weekend  Objective: See treatment diary below           Manual  3/19                      B HS                        B Hip Flexor                        BGastroc                       Knee extension PROM                          15min                            Exercise Diary   3/19                     Nu Step  10min L3                     Quad Set   10" x10 B/L                     Hip Add with Ball  10''10x                     Clamshell  3x10 blue                     Bridges   5''20x                     SLR flexion   10x                     Step Up   A 3x10                      SLS                       HR/TR   30x                     Lunges                        Cervical hot pack  with LLLDE end of session   10 min 3#                     PPT      10'10x                     Supine marching                        P ball abs                        Self HS   30''3x NP                     Self Hip flexor   30''3x NP                      Self Gastroc    30''3x NP                       Lunge   10x each                                                                            Modalities                                                                              Assessment: Tolerated treatment well  Patient demonstrated fatigue post treatment, exhibited good technique with therapeutic exercises and would benefit from continued PT  Pt has Mild difficulty with SLS balance in the parallel bars  Initiate Mini squats next session, to help with lower extremity strengthening  Plan: Continue per plan of care

## 2018-03-21 ENCOUNTER — APPOINTMENT (OUTPATIENT)
Dept: PHYSICAL THERAPY | Facility: CLINIC | Age: 58
End: 2018-03-21
Payer: MEDICARE

## 2018-03-22 ENCOUNTER — OFFICE VISIT (OUTPATIENT)
Dept: PHYSICAL THERAPY | Facility: CLINIC | Age: 58
End: 2018-03-22
Payer: MEDICARE

## 2018-03-22 DIAGNOSIS — R53.81 PHYSICAL DECONDITIONING: Primary | ICD-10-CM

## 2018-03-22 DIAGNOSIS — R26.2 DIFFICULTY WALKING: ICD-10-CM

## 2018-03-22 PROCEDURE — 97140 MANUAL THERAPY 1/> REGIONS: CPT | Performed by: PHYSICAL THERAPIST

## 2018-03-22 PROCEDURE — 97110 THERAPEUTIC EXERCISES: CPT | Performed by: PHYSICAL THERAPIST

## 2018-03-22 NOTE — PROGRESS NOTES
Daily Note     Today's date: 3/22/2018  Patient name: Grayson Woodson  : 1960  MRN: [id number]  Referring provider: Lionel Arndt MD  Dx:   Encounter Diagnosis     ICD-10-CM    1  Physical deconditioning R53 81    2  Difficulty walking R26 2                   Subjective: Pt reports that she is doing well today  Objective: See treatment diary below    Manual  3/22                     B HS                        B Hip Flexor                        BGastroc                       Knee extension PROM                          15min                            Exercise Diary   3/22                     Nu Step  10min L3                     Quad Set   10" x10 B/L                     Hip Add with Ball  10''10x                     Clamshell  3x10 blue                     Bridges   5''20x                     SLR flexion   10x                     Step Up   B 3x10                      SLS                       HR/TR   30x                     Lunges                        Cervical hot pack  with LLLDE end of session   10 min 3#                     PPT      10'10x                     Supine marching                        P ball abs                        Self HS   30''3x NP                     Self Hip flexor   30''3x NP                      Self Gastroc    30''3x NP                       Lunge   10x each                                                                            Modalities                                                                                    Assessment: Tolerated treatment well  Patient exhibited good technique with therapeutic exercises  Progressed step ups to the B step this session, to help with lower extremity strengthening  Pt reports fatigue after performing step ups on the B step  Plan: Continue per plan of care

## 2018-03-23 ENCOUNTER — OFFICE VISIT (OUTPATIENT)
Dept: PHYSICAL THERAPY | Facility: CLINIC | Age: 58
End: 2018-03-23
Payer: MEDICARE

## 2018-03-23 DIAGNOSIS — R53.81 PHYSICAL DECONDITIONING: Primary | ICD-10-CM

## 2018-03-23 PROCEDURE — 97140 MANUAL THERAPY 1/> REGIONS: CPT

## 2018-03-23 PROCEDURE — 97110 THERAPEUTIC EXERCISES: CPT

## 2018-03-23 NOTE — PROGRESS NOTES
Daily Note     Today's date: 3/23/2018  Patient name: Alexa Fernandez  : 1960  MRN: [id number]  Referring provider: Emelina Brambila MD  Dx:   Encounter Diagnosis     ICD-10-CM    1  Physical deconditioning R53 81               Subjective: I have chronic pain but it is not any worse than usual  I felt good after yesterdays session  Objective: See treatment diary below    Assessment: Tolerated treatment well  Pt did require occasional vc's to perform ex with correct form  Pt reports consistency with HEP  Able to perform SLR flex and abd I standing today  Pt reports feeling well at end of Tx  Pt will benefit from cont  PT    Plan: Continue per plan of care       Manual  3/23                     B HS                        B Hip Flexor                        BGastroc                       Knee extension PROM                          15min                            Exercise Diary   3/23                     Nu Step  10min L3                     Quad Set   10" x10 B/L                     Hip Add with Ball  10''  20x                     Clamshell  3x10 blue                     Bridges   5''20x                     Standing SLR flexion and abd  Oc 10x ea                     Step Up   B 3x10                      SLS                       HR/TR   30x                     Lunges  Oc x 20                      Cervical hot pack  with LLLDE end of session   10 min 3#                     PPT      10'10x                     Supine marching                        P ball abs                        Self HS   30''3x NP                     Self Hip flexor   30''3x NP                      Self Gastroc    30''3x NP                                                                                                    Modalities

## 2018-03-26 ENCOUNTER — OFFICE VISIT (OUTPATIENT)
Dept: PHYSICAL THERAPY | Facility: CLINIC | Age: 58
End: 2018-03-26
Payer: MEDICARE

## 2018-03-26 DIAGNOSIS — R26.2 DIFFICULTY WALKING: ICD-10-CM

## 2018-03-26 DIAGNOSIS — R53.81 PHYSICAL DECONDITIONING: Primary | ICD-10-CM

## 2018-03-26 PROCEDURE — 97150 GROUP THERAPEUTIC PROCEDURES: CPT | Performed by: PHYSICAL THERAPIST

## 2018-03-26 PROCEDURE — 97140 MANUAL THERAPY 1/> REGIONS: CPT | Performed by: PHYSICAL THERAPIST

## 2018-03-26 NOTE — PROGRESS NOTES
Daily Note     Today's date: 3/26/2018  Patient name: Debra Alfredo  : 1960  MRN: [id number]  Referring provider: Bartolo Sheehan MD  Dx:   Encounter Diagnosis     ICD-10-CM    1  Physical deconditioning R53 81    2  Difficulty walking R26 2                   Subjective: Pt reports that she is feeling well prior to PT  Objective: See treatment diary below    Manual  3/23                     B HS                        B Hip Flexor                        BGastroc                       Knee extension PROM                          15min                            Exercise Diary   3/23                     Nu Step  10min L3                     Quad Set   10" x10 B/L                     Hip Add with Ball  10''  20x                     Clamshell  3x10 blue                     Bridges   5''20x                     Standing SLR flexion and abd  Oc 10x ea                     Step Up   B 3x10                      SLS                       HR/TR   30x                     Lunges  Oc x 20                      Cervical hot pack  with LLLDE end of session   10 min 3#                     PPT      10'10x                     Supine marching                        P ball abs                        Self HS   30''3x NP                     Self Hip flexor   30''3x NP                      Self Gastroc    30''3x NP                       Mini Squats   2x10                                                                            Modalities                                                                                 Assessment: Tolerated treatment well  Patient exhibited good technique with therapeutic exercises  Added mini squats this session, to help work on lower extremity strengthening  Pt tolerated mini squats without any exacerbation of sx  Plan: Continue per plan of care

## 2018-03-28 ENCOUNTER — OFFICE VISIT (OUTPATIENT)
Dept: PHYSICAL THERAPY | Facility: CLINIC | Age: 58
End: 2018-03-28
Payer: MEDICARE

## 2018-03-28 DIAGNOSIS — R53.81 PHYSICAL DECONDITIONING: Primary | ICD-10-CM

## 2018-03-28 PROCEDURE — 97140 MANUAL THERAPY 1/> REGIONS: CPT | Performed by: PHYSICAL THERAPIST

## 2018-03-28 PROCEDURE — 97110 THERAPEUTIC EXERCISES: CPT | Performed by: PHYSICAL THERAPIST

## 2018-03-28 NOTE — PROGRESS NOTES
Daily Note     Today's date: 3/28/2018  Patient name: Christel Dorsey  : 1960  MRN: [id number]  Referring provider: Sj Silva MD  Dx:   Encounter Diagnosis     ICD-10-CM    1  Physical deconditioning R53 81                   Subjective: Pt reports that she is doing well today  Objective: See treatment diary below    Manual  3/28                     B HS                        B Hip Flexor                        BGastroc                       Knee extension PROM                          15min                            Exercise Diary   3/28                     Nu Step  10min L3                     Quad Set   10" x10 B/L                     Hip Add with Ball  10''  20x                     Clamshell  3x10 blue                     Bridges   5''20x                     Standing SLR flexion and abd  Oc 10x ea                     Step Up   B 3x10                      SLS                       HR/TR   30x                     Lunges  Oc x 20                      Cervical hot pack  with LLLDE end of session   10 min 3#                     PPT      10'10x                     Supine marching   5''20x                     P ball abs                        Self HS   30''3x NP                     Self Hip flexor   30''3x NP                      Self Gastroc    30''3x NP                       Mini Squats   2x10                                                                            Modalities                                                                                 Assessment: Tolerated treatment well  Patient exhibited good technique with therapeutic exercises  Added supine marching this session, to help with core stability  No increased sx during treatment this session  Plan: Continue per plan of care

## 2018-03-30 ENCOUNTER — OFFICE VISIT (OUTPATIENT)
Dept: PHYSICAL THERAPY | Facility: CLINIC | Age: 58
End: 2018-03-30
Payer: MEDICARE

## 2018-03-30 DIAGNOSIS — K91.89 GASTRIC LEAK: ICD-10-CM

## 2018-03-30 DIAGNOSIS — R53.81 PHYSICAL DECONDITIONING: Primary | ICD-10-CM

## 2018-03-30 DIAGNOSIS — E03.9 ACQUIRED HYPOTHYROIDISM: ICD-10-CM

## 2018-03-30 DIAGNOSIS — R26.2 DIFFICULTY WALKING: ICD-10-CM

## 2018-03-30 DIAGNOSIS — E78.00 HYPERCHOLESTEROLEMIA: ICD-10-CM

## 2018-03-30 DIAGNOSIS — K91.89 ANASTOMOTIC STRICTURE OF GASTROJEJUNOSTOMY: ICD-10-CM

## 2018-03-30 PROCEDURE — 97110 THERAPEUTIC EXERCISES: CPT

## 2018-03-30 PROCEDURE — 97140 MANUAL THERAPY 1/> REGIONS: CPT

## 2018-03-30 NOTE — PROGRESS NOTES
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    6  Gastric leak K91 89                   Subjective: Pt offers no new comments and denies all pain at rest        Objective: See treatment diary below    Manual  3/30                     B HS                        B Hip Flexor                        BGastroc                       Knee extension PROM                          15min                            Exercise Diary   3/30                     Nu Step  10min L3                     Quad Set   10" x10 B/L                     Hip Add with Ball  10''  20x                     Clamshell  3x10 blue                     Bridges   5''20x                     Standing SLR flexion and abd  Oc 10x ea                     Step Up   B 3x10                      SLS                       HR/TR   30x                     Lunges  Oc x 20                      Cervical hot pack  with LLLDE end of session   10 min 3# NP                     PPT     10'10x NP                     Supine marching   5''20x                     P ball abs                        Self HS   30''3x NP                     Self Hip flexor   30''3x NP                      Self Gastroc    30''3x NP                       Mini Squats   2x10                                                                            Modalities                                                                            Assessment: Tolerated treatment well  Pt continues to be limited in PROM of B/L Lower extremities    Patient demonstrated fatigue post treatment, exhibited good technique with therapeutic exercises and would benefit from continued PT      Plan: Continue per plan of care  Progress treatment as tolerated

## 2018-04-02 ENCOUNTER — APPOINTMENT (OUTPATIENT)
Dept: PHYSICAL THERAPY | Facility: CLINIC | Age: 58
End: 2018-04-02
Payer: MEDICARE

## 2018-04-04 ENCOUNTER — OFFICE VISIT (OUTPATIENT)
Dept: PHYSICAL THERAPY | Facility: CLINIC | Age: 58
End: 2018-04-04
Payer: MEDICARE

## 2018-04-04 DIAGNOSIS — R53.81 PHYSICAL DECONDITIONING: Primary | ICD-10-CM

## 2018-04-04 PROCEDURE — 97110 THERAPEUTIC EXERCISES: CPT | Performed by: PHYSICAL THERAPIST

## 2018-04-04 PROCEDURE — 97140 MANUAL THERAPY 1/> REGIONS: CPT | Performed by: PHYSICAL THERAPIST

## 2018-04-04 PROCEDURE — 97116 GAIT TRAINING THERAPY: CPT | Performed by: PHYSICAL THERAPIST

## 2018-04-04 NOTE — PROGRESS NOTES
Daily Note     Today's date: 2018  Patient name: Jairon Kidd  : 1960  MRN: [id number]  Referring provider: Delon Thompson MD  Dx:   Encounter Diagnosis     ICD-10-CM    1  Physical deconditioning R53 81                   Subjective: Pt reports that her knees are sore prior to the start of therapy  " I think my knees are getting sore because im not used to using them "       Objective: See treatment diary below    Manual  18                     B HS                        B Hip Flexor                        BGastroc                       Knee extension PROM                          15min                            Exercise Diary   18                      Nu Step  10min L3                     Quad Set   10" x10 B/L                     Hip Add with Ball  10''  20x                     Clamshell  3x10 blue                     Bridges   5''20x                     Standing SLR flexion and abd  Oc 10x ea  1 5#                      Step Up   B 3x10                      SLS                       HR/TR   30x                     Lunges  Oc x 20                      Cervical hot pack  with LLLDE end of session   10 min 3# NP                     PPT     10'10x NP                     Supine marching   5''20x                     P ball abs                        Self HS   30''3x NP                     Self Hip flexor   30''3x NP                      Self Gastroc    30''3x NP                       Mini Squats   2x10                       Treadmill   5 min gait training                                                     Modalities                                                                                Assessment: Tolerated treatment well  Patient exhibited good technique with therapeutic exercises  Pt continues to ambulate with steppage gait  Initiated gait training on the treadmill this session  Pt requires cuing for proper sequencing and correct heel strike during ambulation   Pt lacks TKE bilaterally during ambulation  Plan: Continue per plan of care

## 2018-04-05 ENCOUNTER — OFFICE VISIT (OUTPATIENT)
Dept: PHYSICAL THERAPY | Facility: CLINIC | Age: 58
End: 2018-04-05
Payer: MEDICARE

## 2018-04-05 DIAGNOSIS — R26.2 DIFFICULTY WALKING: ICD-10-CM

## 2018-04-05 DIAGNOSIS — R53.81 PHYSICAL DECONDITIONING: Primary | ICD-10-CM

## 2018-04-05 PROCEDURE — 97140 MANUAL THERAPY 1/> REGIONS: CPT | Performed by: PHYSICAL THERAPIST

## 2018-04-05 PROCEDURE — 97110 THERAPEUTIC EXERCISES: CPT | Performed by: PHYSICAL THERAPIST

## 2018-04-05 NOTE — PROGRESS NOTES
Daily Note     Today's date: 2018  Patient name: Anastasia Cox  : 1960  MRN: [id number]  Referring provider: Ottoniel Bhardwaj MD  Dx:   Encounter Diagnosis     ICD-10-CM    1  Physical deconditioning R53 81    2  Difficulty walking R26 2                   Subjective:  Pt reports that she feels ok prior to the start of therapy         Objective: See treatment diary below    Objective: See treatment diary below    Manual  18                     B HS                        B Hip Flexor                        BGastroc                       Knee extension PROM                          15min                            Exercise Diary   18                      Nu Step  10min L3                     Quad Set   10" x10 B/L                     Hip Add with Ball  10''  20x                     Clamshell  3x10 blue                     Bridges   5''20x                     Standing SLR flexion and abd  Oc 10x ea  1 5#                      Step Up   B 3x10                      SLS                       HR/TR   30x                     Lunges  Oc x 20                      Cervical hot pack  with LLLDE end of session   10 min 3# NP                     PPT     10'10x NP                     Supine marching   5''20x                     P ball abs   5''20x                     Self HS   30''3x NP                     Self Hip flexor   30''3x NP                      Self Gastroc    30''3x NP                       Mini Squats   2x10                       Treadmill   5 min gait training                                                     Modalities                                                                            Objective: See treatment diary below    Manual  18                     B HS                        B Hip Flexor                        BGastroc                       Knee extension PROM                          15min                            Exercise Diary   18                      Nu Step  10min L3                     Quad Set   10" x10 B/L                     Hip Add with Ball  10''  20x                     Clamshell  3x10 blue                     Bridges   5''20x                     Standing SLR flexion and abd  Oc 10x ea  1 5#                      Step Up   B 3x10                      SLS                       HR/TR   30x                     Lunges  Oc x 20                      Cervical hot pack  with LLLDE end of session   10 min 3# NP                     PPT     10'10x NP                     Supine marching   5''20x                     P ball abs                        Self HS   30''3x NP                     Self Hip flexor   30''3x NP                      Self Gastroc    30''3x NP                       Mini Squats   2x10                       Treadmill   5 min gait training                                                     Modalities                                                                                Assessment: Tolerated treatment well  Patient exhibited good technique with therapeutic exercises, Added Pball abs this session, to work on core strength and stability  Pt is more aware of her gait pattern, and shows improvement in her ability to perform heel strike  Toe off is still lacking, due to tightness in B hip flexors  Plan: Continue per plan of care

## 2018-04-09 ENCOUNTER — OFFICE VISIT (OUTPATIENT)
Dept: PHYSICAL THERAPY | Facility: CLINIC | Age: 58
End: 2018-04-09
Payer: MEDICARE

## 2018-04-09 DIAGNOSIS — R26.2 DIFFICULTY WALKING: ICD-10-CM

## 2018-04-09 DIAGNOSIS — R53.81 PHYSICAL DECONDITIONING: Primary | ICD-10-CM

## 2018-04-09 PROCEDURE — 97150 GROUP THERAPEUTIC PROCEDURES: CPT | Performed by: PHYSICAL THERAPIST

## 2018-04-09 PROCEDURE — 97140 MANUAL THERAPY 1/> REGIONS: CPT | Performed by: PHYSICAL THERAPIST

## 2018-04-09 NOTE — PROGRESS NOTES
Daily Note     Today's date: 2018  Patient name: Fernando Fajardo  : 1960  MRN: [id number]  Referring provider: Mandie Osei MD  Dx:   Encounter Diagnosis     ICD-10-CM    1  Physical deconditioning R53 81    2  Difficulty walking R26 2                   Subjective: Pt reports that she is feeling good prior to therapy, and she is paying attention to how she is walking ,       Objective: See treatment diary below    Manual  18                     B HS                        B Hip Flexor                        BGastroc                       Knee extension PROM                          15min                            Exercise Diary   18                      Nu Step  10min L3                     Quad Set   10" x10 B/L                     Hip Add with Ball  10''  20x                     Clamshell  3x10 blue                     Bridges   5''20x                     Standing SLR flexion and abd  Oc 10x ea  2#                      Step Up   B 3x10                      SLS                       HR/TR   30x                     Lunges  Oc x 20                      Cervical hot pack  with LLLDE end of session   10 min 3# NP                     PPT     10'10x NP                     Supine marching   5''20x                     P ball abs   5''20x                     Self HS   30''3x NP                     Self Hip flexor   30''3x NP                      Self Gastroc    30''3x NP                       Mini Squats   2x10                       Treadmill   5 min gait training                                                     Modalities                                                                              Assessment: Tolerated treatment well  Patient exhibited good technique with therapeutic exercises  Progressed SLR, and core stability exercises to 2# ankle weight this session, to help with lower extremity and core strengthening  Plan: Continue per plan of care

## 2018-04-11 ENCOUNTER — OFFICE VISIT (OUTPATIENT)
Dept: PHYSICAL THERAPY | Facility: CLINIC | Age: 58
End: 2018-04-11
Payer: MEDICARE

## 2018-04-11 DIAGNOSIS — K91.89 ANASTOMOTIC STRICTURE OF GASTROJEJUNOSTOMY: ICD-10-CM

## 2018-04-11 DIAGNOSIS — E78.00 HYPERCHOLESTEROLEMIA: ICD-10-CM

## 2018-04-11 DIAGNOSIS — R26.2 DIFFICULTY WALKING: ICD-10-CM

## 2018-04-11 DIAGNOSIS — R53.81 PHYSICAL DECONDITIONING: Primary | ICD-10-CM

## 2018-04-11 DIAGNOSIS — E03.9 ACQUIRED HYPOTHYROIDISM: ICD-10-CM

## 2018-04-11 DIAGNOSIS — K91.89 GASTRIC LEAK: ICD-10-CM

## 2018-04-11 PROCEDURE — 97140 MANUAL THERAPY 1/> REGIONS: CPT

## 2018-04-11 PROCEDURE — 97110 THERAPEUTIC EXERCISES: CPT

## 2018-04-11 NOTE — PROGRESS NOTES
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    6  Gastric leak K91 89                   Subjective: Pt reports that she "feels good today " Pt denies pain and reports that she is working on her walking at home  Objective: See treatment diary below    Manual  18                     B HS                        B Hip Flexor                        BGastroc                       Knee extension PROM                          15min                            Exercise Diary   18                      Nu Step  10min L3                     Quad Set   10" x10 B/L NP                     Hip Add with Ball  5''  20x                     Clamshell  5" x20 blue                     Bridges   5''20x                     Standing SLR flexion and abd  Oc 20x ea  2#                      Step Up   B 3x10                      SLS                       HR/TR   30x                     Lunges  Oc x 20                      Cervical hot pack  with LLLDE end of session   10 min 3# NP                     PPT     10'10x NP                     Supine marching   5''20x                     P ball abs   5''20x                     Self HS   30''3x NP                     Self Hip flexor   30''3x NP                      Self Gastroc    30''3x NP                       Mini Squats   2x10                       Treadmill   5 min gait training                                                     Modalities                                                                            Assessment: Tolerated treatment well  Pt continues to have better gait on Tmill  Pt continues to lack TKE   Increased reps of multiple exercises to strengthen B/L LE  Patient demonstrated fatigue post treatment and would benefit from continued PT      Plan: Continue per plan of care  Progress treatment as tolerated

## 2018-04-13 ENCOUNTER — OFFICE VISIT (OUTPATIENT)
Dept: PHYSICAL THERAPY | Facility: CLINIC | Age: 58
End: 2018-04-13
Payer: MEDICARE

## 2018-04-13 DIAGNOSIS — R53.81 PHYSICAL DECONDITIONING: Primary | ICD-10-CM

## 2018-04-13 DIAGNOSIS — R26.2 DIFFICULTY WALKING: ICD-10-CM

## 2018-04-13 PROCEDURE — 97150 GROUP THERAPEUTIC PROCEDURES: CPT | Performed by: PHYSICAL THERAPIST

## 2018-04-13 PROCEDURE — 97140 MANUAL THERAPY 1/> REGIONS: CPT | Performed by: PHYSICAL THERAPIST

## 2018-04-13 NOTE — PROGRESS NOTES
Daily Note     Today's date: 2018  Patient name: Jay Jay Sharif  : 1960  MRN: [id number]  Referring provider: Marta Hill MD  Dx:   Encounter Diagnosis     ICD-10-CM    1  Physical deconditioning R53 81    2  Difficulty walking R26 2                   Subjective: Pt reports that she is doing well prior to therapy  Objective: See treatment diary below    Manual  18                     B HS                        B Hip Flexor                        BGastroc                       Knee extension PROM                          15min                            Exercise Diary   18                      Nu Step  10min L3                     Quad Set   10" x10 B/L NP                     Hip Add with Ball  5''  20x                     Clamshell  5" x20 blue                     Bridges   5''20x                      SLR flexion and abd  Oc 20x ea  2#                      Step Up   B 3x10                      SLS                       HR/TR   30x                     Lunges  Oc x 20                      Cervical hot pack  with LLLDE end of session   10 min 3# NP                     PPT     10'10x NP                     Supine marching   5''20x                     P ball abs   5''20x                     Self HS   30''3x NP                     Self Hip flexor   30''3x NP                      Self Gastroc    30''3x NP                       Mini Squats   2x10                       Treadmill   5 min gait training                                                     Modalities                                                                              Assessment: Tolerated treatment well  Patient exhibited good technique with therapeutic exercises  Pt continues to lack heel strike on her RLE during gait training on the treadmill  Pt scheduled for RA on  to prepare for D/C       Plan: Continue per plan of care

## 2018-04-17 ENCOUNTER — EVALUATION (OUTPATIENT)
Dept: PHYSICAL THERAPY | Facility: CLINIC | Age: 58
End: 2018-04-17
Payer: MEDICARE

## 2018-04-17 ENCOUNTER — TRANSCRIBE ORDERS (OUTPATIENT)
Dept: PHYSICAL THERAPY | Facility: CLINIC | Age: 58
End: 2018-04-17

## 2018-04-17 DIAGNOSIS — R53.81 PHYSICAL DECONDITIONING: ICD-10-CM

## 2018-04-17 DIAGNOSIS — R26.2 DIFFICULTY WALKING: Primary | ICD-10-CM

## 2018-04-17 DIAGNOSIS — R26.2 CANNOT WALK: Primary | ICD-10-CM

## 2018-04-17 PROCEDURE — 97140 MANUAL THERAPY 1/> REGIONS: CPT | Performed by: PHYSICAL THERAPIST

## 2018-04-17 PROCEDURE — 97110 THERAPEUTIC EXERCISES: CPT | Performed by: PHYSICAL THERAPIST

## 2018-04-17 PROCEDURE — 97164 PT RE-EVAL EST PLAN CARE: CPT | Performed by: PHYSICAL THERAPIST

## 2018-04-17 PROCEDURE — G8978 MOBILITY CURRENT STATUS: HCPCS | Performed by: PHYSICAL THERAPIST

## 2018-04-17 PROCEDURE — G8979 MOBILITY GOAL STATUS: HCPCS | Performed by: PHYSICAL THERAPIST

## 2018-04-17 NOTE — LETTER
April 17, 2018    Everton Mckenzie MD  [address] [place] [address]    Patient: Amber Chen   YOB: 1960   Date of Visit: 4/17/2018     Encounter Diagnosis     ICD-10-CM    1  Difficulty walking R26 2    2  Physical deconditioning R53 81        Dear Dr Gely Robertson:    Please review the attached Plan of Care from SURGERY [facility] recent visit  Please verify that you agree therapy should continue by signing the attached document and sending it back to our office  If you have any questions or concerns, please don't hesitate to call  Sincerely,    Walt Jaquez, PT      Referring Provider:      I certify that I have read the below Plan of Care and certify the need for these services furnished under this plan of treatment while under my care  Everton Mckenzie MD  [address] [address]  VIA Facsimile: [phone]          Assessment  Impairments: abnormal coordination, abnormal gait, abnormal muscle firing, abnormal muscle tone, abnormal or restricted ROM, abnormal movement, activity intolerance, impaired balance, impaired physical strength, pain with function and safety issue    Assessment details: Amber Chen has demonstrated decreased pain, increased strength, increased range of motion, and increased activity tolerance since starting physical therapy services  They report an overall improvement of 45% thus far  At this time, patient has achieved their maximum functional benefit from skilled physical therapy services and will be discharged to their [facility]  Patient is in agreement with the plan of care  As a result, patient is discharged from physical therapy  Understanding of Dx/Px/POC: good  Prognosis: good    Goals  1  In 4-6 weeks, patient will demonstrate a decrease in pain to 0/10 during functional activities MET   2  In 4-6 weeks, patient will demonstrate an increase in range of motion by 10 degrees  Partially MET   3   In 4-6 weeks, patient will demonstrate an increase in strength by 1/2 grade on MMT  Partially MET    1  In 6-8 weeks, patient will be independent with their home exercise program MET   2  In 6-8 weeks, patient will have zero limitations with strength Partially MET   3  In 6-8 weeks, patient will have zero limitations with ROM Partially MET   4  In 6-8 weeks, patient will have zero limitations with ambulation Partially MET   5  In 6-8 weeks, patient will have zero limitations with stair negotiation Not Met  Plan  Patient would benefit from: skilled PT  Planned therapy interventions: home exercise program, gait training, functional ROM exercises, therapeutic exercise and manual therapy  Frequency:DC from PT  Duration in weeks: 4  Treatment plan discussed with: patient  Plan details: Patient was provided a home exercise program and demonstrated an understanding of exercises  Patient was advised to stop performing home exercise program if symptoms increase or new complaints developed  Verbal understanding demonstrated regarding home exercise program instructions  Subjective Evaluation    History of Present Illness  Date of onset: 2017  Mechanism of injury: Pt reports that she feels 45 percent improved since she started  Pt reports that she notices this improvement, when she is going up and down her steps, She is also able to walk without thinking she is going to fall  Pt reports that the biggest issue at this point, is that she still has the constant pain in her knees, she also wants to be able to bend her knees without having any pain  Pain  Current pain rating: 3  At best pain ratin  Location: Right side of back     Quality: pressure and pulling  Relieving factors: medications, relaxation and rest  Progression: no change    Social Support  Steps to enter house: yes  Stairs in house: yes   Lives in: multiple-level home  Lives with: young children    not workingTreatments  Previous treatment: medication and physical therapy  Current treatment: physical therapy  Patient Goals  Patient goals for therapy: increased motion, increased strength and decreased pain  Patient goal: Drive again, More independence  Objective     Palpation     Additional Palpation Details  Pain and tenderness to Right paraspinal, into right gastroc  Tightness in right gastroc during palpation  Active Range of Motion   Left Hip   Flexion: WFL  Abduction: WFL  Adduction: WFL    Right Hip   Flexion: WFL  Abduction: WFL  Adduction: WFL  Left Knee   Flexion: 85 degrees      RA 3/14 85 degrees   RA 4/17 85 degrees  Extension: -30 degrees RA 3/14 -20 degrees  RA 4/17 -15 degrees     Right Knee   Flexion: 65 degrees   RA 3/14  75 degrees    RA 4/17 75 degrees   Extension: -25 degrees RA 3/14 -20 degrees RA 4/17 -15 degrees    Strength/Myotome Testing     Left Hip   Planes of Motion   Flexion: 3+  4/17 4/5  Abduction: 3+ 4/17  4/5   Adduction: 3+ 4/17 4/5     Right Hip   Planes of Motion   Flexion: 2 4/17 4/5   Abduction: 3+ 4/17 4/5   Adduction: 3+ 4/17 4/5     Left Knee   Flexion: 3+ 4/17  4/5   Extension: 3+ (Through Available Range ) 4/17 4/5     Right Knee   Flexion: 3+ 4/17 4/5   Extension: 3+ (Through Available Range) 4/17 4/5     Left Ankle/Foot   Dorsiflexion: 4  Plantar flexion: 4    Right Ankle/Foot   Dorsiflexion: 4  Plantar flexion: 4    Ambulation     Observational Gait   Decreased walking speed, stride length, left stance time, right stance time, left swing time and right swing time  Left foot contact pattern: foot flat  Right foot contact pattern: foot flat  Right arm swing: decreased  Base of support: decreased    Additional Observational Gait Details  TUG 40 seconds fall risk  RA 3/14 26 seconds fall risk  6MWT 120 feet in 3:30   RA 3/14 330 feet in 5:45 seconds  RA 4/17 545 feet in 6 minutes, improved endurance

## 2018-04-17 NOTE — PROGRESS NOTES
Assessment  Impairments: abnormal coordination, abnormal gait, abnormal muscle firing, abnormal muscle tone, abnormal or restricted ROM, abnormal movement, activity intolerance, impaired balance, impaired physical strength, pain with function and safety issue    Assessment details: Lakeisha Hernandez has demonstrated decreased pain, increased strength, increased range of motion, and increased activity tolerance since starting physical therapy services  They report an overall improvement of 45% thus far  At this time, patient has achieved their maximum functional benefit from skilled physical therapy services and will be discharged to their [facility]  Patient is in agreement with the plan of care  As a result, patient is discharged from physical therapy  Understanding of Dx/Px/POC: good  Prognosis: good    Goals  1  In 4-6 weeks, patient will demonstrate a decrease in pain to 0/10 during functional activities MET   2  In 4-6 weeks, patient will demonstrate an increase in range of motion by 10 degrees  Partially MET   3  In 4-6 weeks, patient will demonstrate an increase in strength by 1/2 grade on MMT  Partially MET    1  In 6-8 weeks, patient will be independent with their home exercise program MET   2  In 6-8 weeks, patient will have zero limitations with strength Partially MET   3  In 6-8 weeks, patient will have zero limitations with ROM Partially MET   4  In 6-8 weeks, patient will have zero limitations with ambulation Partially MET   5  In 6-8 weeks, patient will have zero limitations with stair negotiation Not Met  Plan  Patient would benefit from: skilled PT  Planned therapy interventions: home exercise program, gait training, functional ROM exercises, therapeutic exercise and manual therapy  Frequency:DC from PT  Duration in weeks: 4  Treatment plan discussed with: patient  Plan details: Patient was provided a home exercise program and demonstrated an understanding of exercises    Patient was advised to stop performing home exercise program if symptoms increase or new complaints developed  Verbal understanding demonstrated regarding home exercise program instructions  Subjective Evaluation    History of Present Illness  Date of onset: 2017  Mechanism of injury: Pt reports that she feels 45 percent improved since she started  Pt reports that she notices this improvement, when she is going up and down her steps, She is also able to walk without thinking she is going to fall  Pt reports that the biggest issue at this point, is that she still has the constant pain in her knees, she also wants to be able to bend her knees without having any pain  Pain  Current pain rating: 3  At best pain ratin  Location: Right side of back  Quality: pressure and pulling  Relieving factors: medications, relaxation and rest  Progression: no change    Social Support  Steps to enter house: yes  Stairs in house: yes   Lives in: multiple-level home  Lives with: young children    not workingTreatments  Previous treatment: medication and physical therapy  Current treatment: physical therapy  Patient Goals  Patient goals for therapy: increased motion, increased strength and decreased pain  Patient goal: Drive again, More independence  Objective     Palpation     Additional Palpation Details  Pain and tenderness to Right paraspinal, into right gastroc  Tightness in right gastroc during palpation  Active Range of Motion   Left Hip   Flexion: WFL  Abduction: WFL  Adduction: WFL    Right Hip   Flexion: WFL  Abduction: WFL  Adduction: WFL  Left Knee   Flexion: 85 degrees      RA 3/14 85 degrees   RA  85 degrees  Extension: -30 degrees RA 3/14 -20 degrees   RA  -15 degrees     Right Knee   Flexion: 65 degrees   RA 3/14  75 degrees    RA  75 degrees   Extension: -25 degrees RA 3/14 -20 degrees RA  -15 degrees    Strength/Myotome Testing     Left Hip   Planes of Motion   Flexion: 3+   4/5  Abduction: 3+ 4/17  4/5   Adduction: 3+ 4/17 4/5     Right Hip   Planes of Motion   Flexion: 2 4/17 4/5   Abduction: 3+ 4/17 4/5   Adduction: 3+ 4/17 4/5     Left Knee   Flexion: 3+ 4/17  4/5   Extension: 3+ (Through Available Range ) 4/17 4/5     Right Knee   Flexion: 3+ 4/17 4/5   Extension: 3+ (Through Available Range) 4/17 4/5     Left Ankle/Foot   Dorsiflexion: 4  Plantar flexion: 4    Right Ankle/Foot   Dorsiflexion: 4  Plantar flexion: 4    Ambulation     Observational Gait   Decreased walking speed, stride length, left stance time, right stance time, left swing time and right swing time  Left foot contact pattern: foot flat  Right foot contact pattern: foot flat  Right arm swing: decreased  Base of support: decreased    Additional Observational Gait Details  TUG 40 seconds fall risk  RA 3/14 26 seconds fall risk  6MWT 120 feet in 3:30   RA 3/14 330 feet in 5:45 seconds  RA 4/17 545 feet in 6 minutes, improved endurance

## 2018-04-26 ENCOUNTER — OFFICE VISIT (OUTPATIENT)
Dept: BARIATRICS | Facility: CLINIC | Age: 58
End: 2018-04-26
Payer: MEDICARE

## 2018-04-26 VITALS
DIASTOLIC BLOOD PRESSURE: 64 MMHG | RESPIRATION RATE: 18 BRPM | BODY MASS INDEX: 33.93 KG/M2 | TEMPERATURE: 98.6 F | WEIGHT: 191.5 LBS | HEIGHT: 63 IN | SYSTOLIC BLOOD PRESSURE: 112 MMHG | HEART RATE: 82 BPM

## 2018-04-26 DIAGNOSIS — E66.01 MORBID (SEVERE) OBESITY DUE TO EXCESS CALORIES (HCC): Primary | ICD-10-CM

## 2018-04-26 PROCEDURE — 99213 OFFICE O/P EST LOW 20 MIN: CPT | Performed by: SURGERY

## 2018-04-26 RX ORDER — ONDANSETRON 4 MG/1
TABLET, ORALLY DISINTEGRATING ORAL
Refills: 5 | COMMUNITY
Start: 2018-02-09 | End: 2018-04-26 | Stop reason: SDUPTHER

## 2018-04-26 RX ORDER — OXYBUTYNIN CHLORIDE 5 MG/1
TABLET, EXTENDED RELEASE ORAL
COMMUNITY
End: 2018-04-26 | Stop reason: SDUPTHER

## 2018-04-26 RX ORDER — METHYLPREDNISOLONE ACETATE 40 MG/ML
INJECTION, SUSPENSION INTRA-ARTICULAR; INTRALESIONAL; INTRAMUSCULAR; SOFT TISSUE
COMMUNITY
End: 2019-05-30 | Stop reason: HOSPADM

## 2018-04-26 RX ORDER — CLOBETASOL PROPIONATE 0.5 MG/ML
LOTION TOPICAL
COMMUNITY
End: 2019-05-30 | Stop reason: HOSPADM

## 2018-04-26 RX ORDER — TRIAMCINOLONE ACETONIDE 40 MG/ML
INJECTION, SUSPENSION INTRA-ARTICULAR; INTRAMUSCULAR
COMMUNITY
End: 2019-05-30 | Stop reason: HOSPADM

## 2018-04-26 RX ORDER — OXYCODONE AND ACETAMINOPHEN TABLETS 10; 300 MG/1; MG/1
TABLET ORAL
COMMUNITY
End: 2018-04-26 | Stop reason: SDUPTHER

## 2018-04-26 RX ORDER — FUROSEMIDE 20 MG/1
TABLET ORAL
COMMUNITY

## 2018-04-26 RX ORDER — CLOBETASOL PROPIONATE 0.5 MG/G
OINTMENT TOPICAL
COMMUNITY
End: 2019-05-30 | Stop reason: HOSPADM

## 2018-04-26 RX ORDER — PANTOPRAZOLE SODIUM 40 MG/1
40 TABLET, DELAYED RELEASE ORAL DAILY
Refills: 5 | COMMUNITY
Start: 2018-04-09

## 2018-04-26 RX ORDER — FENTANYL 50 UG/H
PATCH TRANSDERMAL
COMMUNITY
End: 2019-06-17 | Stop reason: ALTCHOICE

## 2018-04-26 RX ORDER — CYCLOBENZAPRINE HCL 10 MG
TABLET ORAL
COMMUNITY
End: 2019-06-17 | Stop reason: ALTCHOICE

## 2018-04-26 NOTE — PROGRESS NOTES
Follow Up - Bariatric Surgery   Davina Sun 62 y o  female MRN: [id number]  Unit/Bed#:  Encounter: [id number]            Subjective:  Patient is doing much better she is currently working with physical therapy and she just got enrolled in a gym and her upper extremity numbness has improved significantly    Objective:         Lab, Imaging and other studies: I have personally reviewed pertinent labs  Family History: non-contributory    Meds/Allergies   all medications and allergies reviewed    Vitals: Blood pressure 112/64, pulse 82, temperature 98 6 °F (37 °C), resp  rate 18, height 5' 2 5" (1 588 m), weight 86 9 kg (191 lb 8 oz), not currently breastfeeding  ,Body mass index is 34 47 kg/m²  [unfilled]    Invasive Devices     Peripherally Inserted Central Catheter Line            PICC Line 10/27/17 Right Brachial 180 days          Drain            Closed/Suction Drain Midline Abdomen Bulb 19 Fr  126 days                Physical Exam:     NAD  Abdomen soft non-tender, incisions well healed  No palpable hernias    Assessment/Plan:    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

## 2018-05-16 ENCOUNTER — EVALUATION (OUTPATIENT)
Dept: OCCUPATIONAL THERAPY | Facility: CLINIC | Age: 58
End: 2018-05-16
Payer: MEDICARE

## 2018-05-16 DIAGNOSIS — R20.0 BILATERAL HAND NUMBNESS: Primary | ICD-10-CM

## 2018-05-16 PROCEDURE — G8984 CARRY CURRENT STATUS: HCPCS

## 2018-05-16 PROCEDURE — 97110 THERAPEUTIC EXERCISES: CPT

## 2018-05-16 PROCEDURE — G8985 CARRY GOAL STATUS: HCPCS

## 2018-05-16 PROCEDURE — 97166 OT EVAL MOD COMPLEX 45 MIN: CPT

## 2018-05-16 NOTE — PROGRESS NOTES
OT Evaluation     Today's date: 2018  Patient name: Jay Jay Sharif  : 1960  MRN: [id number]  Referring provider: Marta Hill MD  Dx:   Encounter Diagnosis     ICD-10-CM    1  Bilateral hand numbness R20 0          Assessment    Assessment details: Patient had gastric bypass surgery 10-15 years ago  Patient recently had a revision completed in 2016  Patient was unable to tolerate eating and was prescribed TPN in 2017  She has recently discontinued TPN in 2018  Patient had an additional bariatric surgery on 2018  Patient has attended OP PT services since 2018 for generalized weakness and has recently transitioned into [facility]  Patient is presenting to OP OT services at this time for bilateral hand numbness  Patient reports symptoms began in October  Patient symptoms were initially involving her whole hand, but has decreased to her thumb and second digit  Patient reports symptoms are similar in both hands  Patient has follow-up with PCP on   Patient has difficulty with fine motor activities and using things such as buttons and caps  Patient reports generalized hand weakness and numbness  Understanding of Dx/Px/POC: good   Prognosis: good    Goals  STGs    Pt will increase  strength by 5-10#  Pt will increase wrist strength by 1/2 grade  Pt will report a decrease in sensation deficits by 25%  Independent with HEP  LTGs     Pt will increase  strength by an additional 5-10#  Pt will increase wrist strength by 1-2 grade  Pt will increase pinch strength by 3-5#  Pt will report an increase in [facility] as evident by increased ability to complete buttons, zippers and snaps  Pt will report an increase in ADL/IADL participation  Pt will report a decrease in sensation deficits by 50%        Plan  Patient would benefit from: skilled OT and OT eval  Planned modality interventions: thermotherapy: paraffin bath and thermotherapy: hydrocollator packs  Planned therapy interventions: fine motor coordination training, home exercise program, therapeutic exercise, stretching, strengthening, patient education and neuromuscular re-education  Frequency: 2x week  Duration in visits: 16  Duration in weeks: 8  Treatment plan discussed with: patient  Plan details: Patient has presenting to OP OT services with a dx of bilateral hand numbness  Patient demonstrating increased pain, increased strength, increased ROM and increased activity  Pt would benefit from continued Occupational Therapy services two times per week for 6+ weeks to return to prior level of function and achieve all established goals  Thank you for the referral!          Subjective Evaluation    History of Present Illness  Onset date: 2017    Mechanism of injury: Gastric Bypass  Quality of life: good    Pain  Current pain ratin  At best pain ratin  At worst pain ratin    Hand dominance: right    Patient Goals  Patient goals for therapy: decreased pain, increased motion, increased strength, independence with ADLs/IADLs and return to sport/leisure activities          Objective     Active Range of Motion     Left Wrist   Normal active range of motion    Right Wrist   Normal active range of motion    Left Thumb   Opposition: WNL    Right Thumb   Opposition: WNL    Additional Active Range of Motion Details  Full composite fist noted    Strength/Myotome Testing     Left Wrist/Hand   Wrist extension: 3+  Wrist flexion: 3+  Radial deviation: 3+  Ulnar deviation: 3+     (2nd hand position)     Trial 1: 10    Thumb Strength  Key/Lateral Pinch     Trail 1: 6  Tip/Two-Point Pinch     Trial 1: 3  Palmar/Three-Point Pinch     Trial 1: 5    Right Wrist/Hand   Wrist extension: 3+  Wrist flexion: 3+  Radial deviation: 3+  Ulnar deviation: 3+     (2nd hand position)     Trial 1: 15    Thumb Strength   Key/Lateral Pinch     Trial 1: 8  Tip/Two-Point Pinch     Trial 1: 4  Palmar/Three-Point Pinch     Trial 1: 8      Flowsheet Rows      Most Recent Value   PT/OT G-Codes   Current Score  48   Projected Score  61   FOTO information reviewed  Yes   Assessment Type  Evaluation   G code set  Carrying, Moving & Handling Objects   Carrying, Moving and Handling Objects Current Status ()  CK   Carrying, Moving and Handling Objects Goal Status ()  CJ        Precautions NA    Specialty Daily Treatment Diary     Manual  05/16/2018                                                   Exercise Diary  05/16/2018       Rice [place] 10 Min       Sensation Sticks        Theraputty Grasps Yellow x 20       Theraputty Pinches Yellow x 20       Theraputty Intrinsic Yellow x 20       Theraputty Finger Abduction Yellow x 10       Digi-Flex Red x 20       Thera-Ball Pushes 5 sec x 10       UBE Machine                                                                                                    Modalities 05/16/2018

## 2018-05-16 NOTE — LETTER
May 17, 2018    Daron Yu MD  [address] [place] [id number]    Patient: Fernando Fajardo   YOB: 1960   Date of Visit: 2018     Encounter Diagnosis     ICD-10-CM    1  Bilateral hand numbness R20 0        Dear Dr Jo Ann Mccall:    Please review the attached Plan of Care from SURGERY SPECIALTY [facility] recent visit  Please verify that you agree therapy should continue by signing the attached document and sending it back to our office  If you have any questions or concerns, please don't hesitate to call  Sincerely,    Gaurav Barrow OT      Referring Provider:     I certify that I have read the below Plan of Care and certify the need for these services furnished under this plan of treatment while under my care  Daron Yu MD  [address] [place] [address] Avenue: [phone]        OT Evaluation     Today's date: 2018  Patient name: Fernando Fajardo  : 1960  MRN: [id number]  Referring provider: Mandie Osei MD  Dx:   Encounter Diagnosis     ICD-10-CM    1  Bilateral hand numbness R20 0          Assessment    Assessment details: Patient had gastric bypass surgery 10-15 years ago  Patient recently had a revision completed in 2016  Patient was unable to tolerate eating and was prescribed TPN in 2017  She has recently discontinued TPN in 2018  Patient had an additional bariatric surgery on 2018  Patient has attended OP PT services since 2018 for generalized weakness and has recently transitioned into [facility]  Patient is presenting to OP OT services at this time for bilateral hand numbness  Patient reports symptoms began in October  Patient symptoms were initially involving her whole hand, but has decreased to her thumb and second digit  Patient reports symptoms are similar in both hands  Patient has follow-up with PCP on     Patient has difficulty with fine motor activities and using things such as buttons and caps  Patient reports generalized hand weakness and numbness  Understanding of Dx/Px/POC: good   Prognosis: good    Goals  STGs    Pt will increase  strength by 5-10#  Pt will increase wrist strength by 1/2 grade  Pt will report a decrease in sensation deficits by 25%  Independent with HEP  LTGs     Pt will increase  strength by an additional 5-10#  Pt will increase wrist strength by 1-2 grade  Pt will increase pinch strength by 3-5#  Pt will report an increase in [facility] as evident by increased ability to complete buttons, zippers and snaps  Pt will report an increase in ADL/IADL participation  Pt will report a decrease in sensation deficits by 50%  Plan  Patient would benefit from: skilled OT and OT eval  Planned modality interventions: thermotherapy: paraffin bath and thermotherapy: hydrocollator packs  Planned therapy interventions: fine motor coordination training, home exercise program, therapeutic exercise, stretching, strengthening, patient education and neuromuscular re-education  Frequency: 2x week  Duration in visits: 16  Duration in weeks: 8  Treatment plan discussed with: patient  Plan details: Patient has presenting to OP OT services with a dx of bilateral hand numbness  Patient demonstrating increased pain, increased strength, increased ROM and increased activity  Pt would benefit from continued Occupational Therapy services two times per week for 6+ weeks to return to prior level of function and achieve all established goals  Thank you for the referral!          Subjective Evaluation    History of Present Illness  Onset date: 2017    Mechanism of injury: Gastric Bypass  Quality of life: good    Pain  Current pain ratin  At best pain ratin  At worst pain ratin    Hand dominance: right    Patient Goals  Patient goals for therapy: decreased pain, increased motion, increased strength, independence with ADLs/IADLs and return to sport/leisure activities          Objective     Active Range of Motion     Left Wrist   Normal active range of motion    Right Wrist   Normal active range of motion    Left Thumb   Opposition: WNL    Right Thumb   Opposition: WNL    Additional Active Range of Motion Details  Full composite fist noted    Strength/Myotome Testing     Left Wrist/Hand   Wrist extension: 3+  Wrist flexion: 3+  Radial deviation: 3+  Ulnar deviation: 3+     (2nd hand position)     Trial 1: 10    Thumb Strength  Key/Lateral Pinch     Trail 1: 6  Tip/Two-Point Pinch     Trial 1: 3  Palmar/Three-Point Pinch     Trial 1: 5    Right Wrist/Hand   Wrist extension: 3+  Wrist flexion: 3+  Radial deviation: 3+  Ulnar deviation: 3+     (2nd hand position)     Trial 1: 15    Thumb Strength   Key/Lateral Pinch     Trial 1: 8  Tip/Two-Point Pinch     Trial 1: 4  Palmar/Three-Point Pinch     Trial 1: 8      Flowsheet Rows      Most Recent Value   PT/OT G-Codes   Current Score  48   Projected Score  61   FOTO information reviewed  Yes   Assessment Type  Evaluation   G code set  Carrying, Moving & Handling Objects   Carrying, Moving and Handling Objects Current Status ()  CK   Carrying, Moving and Handling Objects Goal Status ()  CJ        Precautions NA    Specialty Daily Treatment Diary     Manual  05/16/2018                                                   Exercise Diary  05/16/2018       Rice [place] 10 Min       Sensation Sticks        Theraputty Grasps Yellow x 20       Theraputty Pinches Yellow x 20       Theraputty Intrinsic Yellow x 20       Theraputty Finger Abduction Yellow x 10       Digi-Flex Red x 20       Thera-Ball Pushes 5 sec x 10       UBE Machine                                                                                                    Modalities 05/16/2018

## 2018-05-17 ENCOUNTER — TRANSCRIBE ORDERS (OUTPATIENT)
Dept: PHYSICAL THERAPY | Facility: CLINIC | Age: 58
End: 2018-05-17

## 2018-05-17 DIAGNOSIS — R20.0 NUMBNESS: Primary | ICD-10-CM

## 2018-05-17 DIAGNOSIS — R20.0 BILATERAL HAND NUMBNESS: ICD-10-CM

## 2018-05-18 ENCOUNTER — OFFICE VISIT (OUTPATIENT)
Dept: OCCUPATIONAL THERAPY | Facility: CLINIC | Age: 58
End: 2018-05-18
Payer: MEDICARE

## 2018-05-18 DIAGNOSIS — R20.0 BILATERAL HAND NUMBNESS: Primary | ICD-10-CM

## 2018-05-18 PROCEDURE — 97110 THERAPEUTIC EXERCISES: CPT

## 2018-05-18 PROCEDURE — 97112 NEUROMUSCULAR REEDUCATION: CPT

## 2018-05-21 ENCOUNTER — OFFICE VISIT (OUTPATIENT)
Dept: OCCUPATIONAL THERAPY | Facility: CLINIC | Age: 58
End: 2018-05-21
Payer: MEDICARE

## 2018-05-21 DIAGNOSIS — R20.0 BILATERAL HAND NUMBNESS: Primary | ICD-10-CM

## 2018-05-21 PROCEDURE — 97112 NEUROMUSCULAR REEDUCATION: CPT

## 2018-05-21 PROCEDURE — 97110 THERAPEUTIC EXERCISES: CPT

## 2018-05-21 NOTE — PROGRESS NOTES
Daily Note     Today's date: 2018  Patient name: Annel Bernal  : 1960  MRN: [id number]  Referring provider: Kyle Duggan MD  Dx:   Encounter Diagnosis     ICD-10-CM    1  Bilateral hand numbness R20 0            Subjective: "I had a good weekend "      Objective: See treatment diary below    Specialty Daily Treatment Diary     Manual  2018                                                 Exercise Diary  2018     Rice [place] 10 Min 10 Min 10 Min     Sensation Sticks        Theraputty Grasps Yellow x 20 Yellow x 20 Yellow x 20     Theraputty Pinches Yellow x 20 Yellow x 20 Yellow x 20     Theraputty Intrinsic Yellow x 20 Yellow x 20 Yellow x 20     Theraputty Finger Abduction Yellow x 10 Yellow x 10 Yellow x 10     Digi-Flex Red x 20 Green x 20 Green x 20     Thera-Ball Pushes 5 sec x 10 5 sec x 10 5 sec x 10     UBE Machine  8:30 Min 8:00 Min     Theraputty Thumb Presses  Yellow x 20 Yellow x 20     Theraputty [place]one Presses  Yellow x 20 Yellow x 20     [employer] Peg Board  10 sets : 2:05 Min 10 sets 1:50 Min     Brown Gripper Peg Board   1st Resistance 15 pegs     Golf Ball Manipulation   2 x 10                                                         Modalities 2018                                   Assessment: Tolerated treatment well  Patient exhibited good technique with therapeutic exercises and would benefit from continued OT      Plan: Continue per plan of care  Progress treatment as tolerated

## 2018-05-23 ENCOUNTER — OFFICE VISIT (OUTPATIENT)
Dept: OCCUPATIONAL THERAPY | Facility: CLINIC | Age: 58
End: 2018-05-23
Payer: MEDICARE

## 2018-05-23 DIAGNOSIS — R20.0 BILATERAL HAND NUMBNESS: Primary | ICD-10-CM

## 2018-05-23 PROCEDURE — 97110 THERAPEUTIC EXERCISES: CPT

## 2018-05-23 PROCEDURE — 97112 NEUROMUSCULAR REEDUCATION: CPT

## 2018-05-23 NOTE — PROGRESS NOTES
Daily Note     Today's date: 2018  Patient name: Marychuy Almaraz  : 1960  MRN: [id number]  Referring provider: Hao Oviedo MD  Dx:   Encounter Diagnosis     ICD-10-CM    1  Bilateral hand numbness R20 0      Pain In: 0/10  Pain Out: 0/10  Subjective: "It is beautiful out there "      Objective: See treatment diary below    Specialty Daily Treatment Diary     Manual  2018                                                Exercise Diary  2018    Rice [place] 10 Min 10 Min 10 Min 10 Min    Sensation Sticks        Theraputty Grasps Yellow x 20 Yellow x 20 Yellow x 20 Red x 20    Theraputty Pinches Yellow x 20 Yellow x 20 Yellow x 20 Red x 20    Theraputty Intrinsic Yellow x 20 Yellow x 20 Yellow x 20 Red x 20    Theraputty Finger Abduction Yellow x 10 Yellow x 10 Yellow x 10 Red x 10    Digi-Flex Red x 20 Green x 20 Green x 20 Green x 20    Thera-Ball Pushes 5 sec x 10 5 sec x 10 5 sec x 10 5 sec x 10    UBE Machine  8:30 Min 8:00 Min 11:10 Min    Theraputty Thumb Presses  Yellow x 20 Yellow x 20 Red x 20    Theraputty Cone Presses  Yellow x 20 Yellow x 20 Red x 20    Purdue Peg Board  10 sets : 2:05 Min 10 sets 1:50 Min 10 sets 1:35    Brown Gripper Peg Board   1st Resistance 15 pegs 1st Resistance 15 pegs    Golf Ball Manipulation   2 x 10 2 x 10                                                        Modalities 2018                                  Assessment: Tolerated treatment well  Patient exhibited good technique with therapeutic exercises and would benefit from continued OT      Plan: Continue per plan of care  Progress treatment as tolerated

## 2018-05-30 ENCOUNTER — OFFICE VISIT (OUTPATIENT)
Dept: OCCUPATIONAL THERAPY | Facility: CLINIC | Age: 58
End: 2018-05-30
Payer: MEDICARE

## 2018-05-30 DIAGNOSIS — R20.0 BILATERAL HAND NUMBNESS: Primary | ICD-10-CM

## 2018-05-30 PROCEDURE — 97110 THERAPEUTIC EXERCISES: CPT

## 2018-05-30 PROCEDURE — 97112 NEUROMUSCULAR REEDUCATION: CPT

## 2018-05-30 NOTE — PROGRESS NOTES
Daily Note     Today's date: 2018  Patient name: Argenis Stewart  : 1960  MRN: [id number]  Referring provider: Viky Alicia MD  Dx:   Encounter Diagnosis     ICD-10-CM    1  Bilateral hand numbness R20 0        Pain In: 0/10  Pain Out: 0/10    Subjective: "I had a good weekend "          Objective: See treatment diary below    Specialty Daily Treatment Diary     Manual  2018                                               Exercise Diary  2018   Rice [place] 10 Min 10 Min 10 Min 10 Min 10 Min   Sensation Sticks        Theraputty Grasps Yellow x 20 Yellow x 20 Yellow x 20 Red x 20 Red x 20   Theraputty Pinches Yellow x 20 Yellow x 20 Yellow x 20 Red x 20 Red x 20   Theraputty Intrinsic Yellow x 20 Yellow x 20 Yellow x 20 Red x 20 Red x 20   Theraputty Finger Abduction Yellow x 10 Yellow x 10 Yellow x 10 Red x 10 Red x 10   Digi-Flex Red x 20 Green x 20 Green x 20 Green x 20 Green x 20   Thera-Ball Pushes 5 sec x 10 5 sec x 10 5 sec x 10 5 sec x 10 5 sec x 10   UBE Machine  8:30 Min 8:00 Min 11:10 Min 10:25 Min   Theraputty Thumb Presses  Yellow x 20 Yellow x 20 Red x 20 Red x 20   Theraputty Cone Presses  Yellow x 20 Yellow x 20 Red x 20 Red x 20   Purdue Peg Board  10 sets : 2:05 Min 10 sets 1:50 Min 10 sets 1:35 10 sets 1:26   Brown Gripper Peg Board   1st Resistance 15 pegs 1st Resistance 15 pegs 1st Resistance 15 pegs   Golf Ball Manipulation   2 x 10 2 x 10 2 x 10                                                       Modalities 2018                                 Assessment: Tolerated treatment well  Patient exhibited good technique with therapeutic exercises and would benefit from continued OT      Plan: Continue per plan of care  Progress treatment as tolerated

## 2018-06-01 ENCOUNTER — OFFICE VISIT (OUTPATIENT)
Dept: OCCUPATIONAL THERAPY | Facility: CLINIC | Age: 58
End: 2018-06-01
Payer: MEDICARE

## 2018-06-01 DIAGNOSIS — R20.0 BILATERAL HAND NUMBNESS: Primary | ICD-10-CM

## 2018-06-01 PROCEDURE — 97110 THERAPEUTIC EXERCISES: CPT

## 2018-06-01 PROCEDURE — 97112 NEUROMUSCULAR REEDUCATION: CPT

## 2018-06-01 NOTE — PROGRESS NOTES
Daily Note     Today's date: 2018  Patient name: Marychuy Almaraz  : 1960  MRN: [id number]  Referring provider: Hao Oviedo MD  Dx:   Encounter Diagnosis     ICD-10-CM    1  Bilateral hand numbness R20 0          Subjective: "I feel pretty good "    Pain In:0/10  Pain Out: 0/10      Objective: See treatment diary below    Specialty Daily Treatment Diary     Manual  2018                                               Exercise Diary  2018   Rice [place] 10 Min  10 Min 10 Min 10 Min   Sensation Sticks        Theraputty Grasps Red x 20  Yellow x 20 Red x 20 Red x 20   Theraputty Pinches Red x 20  Yellow x 20 Red x 20 Red x 20   Theraputty Intrinsic Red x 20  Yellow x 20 Red x 20 Red x 20   Theraputty Finger Abduction Red x 10  Yellow x 10 Red x 10 Red x 10   Digi-Flex Green x 20  Green x 20 Green x 20 Green x 20   Thera-Ball Pushes 5 sec x 10  5 sec x 10 5 sec x 10 5 sec x 10   UBE Machine 10 Min  8:00 Min 11:10 Min 10:25 Min   Theraputty Thumb Presses Red x 20  Yellow x 20 Red x 20 Red x 20   Theraputty Cone Presses Red x 20  Yellow x 20 Red x 20 Red x 20   Purdue Peg Board 10 sets 1:25  10 sets 1:50 Min 10 sets 1:35 10 sets 1:26   Brown Gripper Peg Board 1st Resistance 15 pegs  1st Resistance 15 pegs 1st Resistance 15 pegs 1st Resistance 15 pegs   Golf Ball Manipulation 2 x 10  2 x 10 2 x 10 2 x 10   Yellow Ball Squeezes 5 sec x 10       Nut and [place] Board X 6                                           Modalities 2018                                 Assessment: Tolerated treatment well  Patient exhibited good technique with therapeutic exercises and would benefit from continued OT      Plan: Continue per plan of care  Progress treatment as tolerated

## 2018-06-04 ENCOUNTER — OFFICE VISIT (OUTPATIENT)
Dept: OCCUPATIONAL THERAPY | Facility: CLINIC | Age: 58
End: 2018-06-04
Payer: MEDICARE

## 2018-06-04 DIAGNOSIS — R20.0 BILATERAL HAND NUMBNESS: Primary | ICD-10-CM

## 2018-06-04 PROCEDURE — 97110 THERAPEUTIC EXERCISES: CPT

## 2018-06-04 PROCEDURE — 97112 NEUROMUSCULAR REEDUCATION: CPT

## 2018-06-04 NOTE — PROGRESS NOTES
Daily Note     Today's date: 2018  Patient name: Lakeisha Hernandez  : 1960  MRN: [id number]  Referring provider: Jan Dallas MD  Dx:   Encounter Diagnosis     ICD-10-CM    1  Bilateral hand numbness R20 0        Pain In: 0/10  Pain Out: 0/10    Subjective: "I feel pretty good "    Specialty Daily Treatment Diary     Manual  2018                                               Exercise Diary  2018   Rice [place] 10 Min 10 Min  10 Min 10 Min   Sensation Sticks        Theraputty Grasps Red x 20 Green x 20  Red x 20 Red x 20   Theraputty Pinches Red x 20 Green x 20  Red x 20 Red x 20   Theraputty Intrinsic Red x 20 Green x 20  Red x 20 Red x 20   Theraputty Finger Abduction Red x 10 Green x 10  Red x 10 Red x 10   Digi-Flex Green x 20 Blue x 20  Green x 20 Green x 20   Thera-Ball Pushes 5 sec x 10 5 sec x 10  5 sec x 10 5 sec x 10   UBE Machine 10 Min 10 Min  11:10 Min 10:25 Min   Theraputty Thumb Presses Red x 20 Green x 20  Red x 20 Red x 20   Theraputty Cone Presses Red x 20 Green x 20  Red x 20 Red x 20   Purdue Peg Board 10 sets 1:25 10 sets  10 sets 1:35 10 sets 1:26   Brown Gripper Peg Board 1st Resistance 15 pegs 2nd Resistance 15 pegs  1st Resistance 15 pegs 1st Resistance 15 pegs   Golf Ball Manipulation 2 x 10 2 x 10  2 x 10 2 x 10   Yellow Ball Squeezes 5 sec x 10 5 sec x 10      Nut and [place] Board X 6 X 6                                          Modalities 2018                                   Assessment: Tolerated treatment well  Patient exhibited good technique with therapeutic exercises and would benefit from continued OT      Plan: Continue per plan of care  Progress treatment as tolerated

## 2018-06-06 ENCOUNTER — OFFICE VISIT (OUTPATIENT)
Dept: OCCUPATIONAL THERAPY | Facility: CLINIC | Age: 58
End: 2018-06-06
Payer: MEDICARE

## 2018-06-06 DIAGNOSIS — R20.0 BILATERAL HAND NUMBNESS: Primary | ICD-10-CM

## 2018-06-06 PROCEDURE — 97110 THERAPEUTIC EXERCISES: CPT

## 2018-06-06 PROCEDURE — 97112 NEUROMUSCULAR REEDUCATION: CPT

## 2018-06-06 NOTE — PROGRESS NOTES
Daily Note     Today's date: 2018  Patient name: Jay Jay Sharif  : 1960  MRN: [id number]  Referring provider: Marta Hill MD  Dx:   Encounter Diagnosis     ICD-10-CM    1  Bilateral hand numbness R20 0      Pain In: 0/10  Pain Out: 0/10    Subjective: "I feel good today "      Objective: See treatment diary below    Specialty Daily Treatment Diary     Manual  2018                                               Exercise Diary  2018   Rice [place] 10 Min 10 Min 10 Min  10 Min   Sensation Sticks        Theraputty Grasps Red x 20 Green x 20 Green x 20  Red x 20   Theraputty Pinches Red x 20 Green x 20 Green x 20  Red x 20   Theraputty Intrinsic Red x 20 Green x 20 Green x 20  Red x 20   Theraputty Finger Abduction Red x 10 Green x 10 Green x 10  Red x 10   Digi-Flex Green x 20 Blue x 20 Blue x 20  Green x 20   Thera-Ball Pushes 5 sec x 10 5 sec x 10 5 sec x 10  5 sec x 10   UBE Machine 10 Min 10 Min 10 Min  10:25 Min   Theraputty Thumb Presses Red x 20 Green x 20 Green x 20  Red x 20   Theraputty Cone Presses Red x 20 Green x 20 Green x 20  Red x 20   Purdue Peg Board 10 sets 1:25 10 sets 10 sets  10 sets 1:26   Brown Gripper Peg Board 1st Resistance 15 pegs 2nd Resistance 15 pegs 2nd Resistance 15 pegs  1st Resistance 15 pegs   Golf Ball Manipulation 2 x 10 2 x 10 2 x 10  2 x 10   Yellow Ball Squeezes 5 sec x 10 5 sec x 10 5 sec x 10     Nut and [place] Board X 6 X 6 X 6                                         Modalities 2018                                 Assessment: Tolerated treatment well  Patient exhibited good technique with therapeutic exercises and would benefit from continued OT      Plan: Continue per plan of care  Progress treatment as tolerated

## 2018-06-11 ENCOUNTER — OFFICE VISIT (OUTPATIENT)
Dept: OCCUPATIONAL THERAPY | Facility: CLINIC | Age: 58
End: 2018-06-11
Payer: MEDICARE

## 2018-06-11 DIAGNOSIS — R20.0 BILATERAL HAND NUMBNESS: Primary | ICD-10-CM

## 2018-06-11 PROCEDURE — 97110 THERAPEUTIC EXERCISES: CPT

## 2018-06-11 PROCEDURE — 97112 NEUROMUSCULAR REEDUCATION: CPT

## 2018-06-13 ENCOUNTER — OFFICE VISIT (OUTPATIENT)
Dept: OCCUPATIONAL THERAPY | Facility: CLINIC | Age: 58
End: 2018-06-13
Payer: MEDICARE

## 2018-06-13 DIAGNOSIS — R20.0 BILATERAL HAND NUMBNESS: Primary | ICD-10-CM

## 2018-06-13 PROCEDURE — 97112 NEUROMUSCULAR REEDUCATION: CPT

## 2018-06-13 PROCEDURE — 97110 THERAPEUTIC EXERCISES: CPT

## 2018-06-13 NOTE — PROGRESS NOTES
Daily Note    Today's date: 2018  Patient name: Rosa Maria Vasquez  : 1960  MRN: [id number]  Referring provider: Keysha Brooks MD  Dx:   Encounter Diagnosis     ICD-10-CM    1  Bilateral hand numbness R20 0        Subjective: "I have [place] after this "      Objective: See treatment diary below    Specialty Daily Treatment Diary     Manual  2018                                               Exercise Diary  2018   [address] 10 Min 10 Min 10 Min 10 Min 10 Min   Sensation Sticks        Theraputty Grasps [employer] x 20 Green x 20 Green x 20 Green x 20 Green x 20   Theraputty Pinches Red x 20 Green x 20 Green x 20 Green x 20 Green x 20   Theraputty Intrinsic Red x 20 Green x 20 Green x 20 Green x 20 Green x 20   Theraputty Finger Abduction Red x 10 Green x 10 Green x 10 Green x 10 Green x 10   Digi-Flex Green x 20 Blue x 20 Blue x 20 Blue x 20 Blue x 20   Thera-Ball Pushes 5 sec x 10 5 sec x 10 5 sec x 10 5 sec x 10 5 sec x 10   UBE Machine 10 Min 10 Min 10 Min 10 Min 10 MIn   Theraputty Thumb Presses Red x 20 Green x 20 Green x 20 Green x 20 Green x 20   Theraputty Cone Presses Red x 20 Green x 20 Green x 20 Green x 20 Green x 20   Purdue Peg Board 10 sets 1:25 10 sets 10 sets 10 sets 10 sets   Brown Gripper Peg Board 1st Resistance 15 pegs 2nd Resistance 15 pegs 2nd Resistance 15 pegs 2nd resistance 15 pegs 2nd Resistance 15 pegs   Golf Ball Manipulation 2 x 10 2 x 10 2 x 10 2 x 10 2 x 10   Yellow Ball Squeezes 5 sec x 10 5 sec x 10 5 sec x 10 5 sec x 10 5 sec x 10   Nut and [place] Board X 6 X 6 X 6 X 6 X 6                                       Modalities 2018                                 Assessment: Tolerated treatment well  Patient exhibited good technique with therapeutic exercises and would benefit from continued OT      Plan: Continue per plan of care  Progress treatment as tolerated

## 2018-06-18 ENCOUNTER — OFFICE VISIT (OUTPATIENT)
Dept: OCCUPATIONAL THERAPY | Facility: CLINIC | Age: 58
End: 2018-06-18
Payer: MEDICARE

## 2018-06-18 ENCOUNTER — TRANSCRIBE ORDERS (OUTPATIENT)
Dept: PHYSICAL THERAPY | Facility: CLINIC | Age: 58
End: 2018-06-18

## 2018-06-18 DIAGNOSIS — R20.0 BILATERAL HAND NUMBNESS: Primary | ICD-10-CM

## 2018-06-18 PROCEDURE — 97110 THERAPEUTIC EXERCISES: CPT

## 2018-06-18 PROCEDURE — G8984 CARRY CURRENT STATUS: HCPCS

## 2018-06-18 PROCEDURE — 97112 NEUROMUSCULAR REEDUCATION: CPT

## 2018-06-18 PROCEDURE — G8985 CARRY GOAL STATUS: HCPCS

## 2018-06-18 PROCEDURE — 97168 OT RE-EVAL EST PLAN CARE: CPT

## 2018-06-18 NOTE — PROGRESS NOTES
OT Re-Evaluation    Today's date: 2018  Patient name: Rogelio Harrison  : 1960  MRN: [id number]  Referring provider: Evelia Morris MD  Dx:   Encounter Diagnosis     ICD-10-CM    1  Bilateral hand numbness R20 0          Assessment    Assessment details: Patient had gastric bypass surgery 10-15 years ago  Patient recently had a revision completed in 2016  Patient was unable to tolerate eating and was prescribed TPN in 2017  She has recently discontinued TPN in 2018  Patient had an additional bariatric surgery on 2018  Patient has attended OP PT services since 2018 for generalized weakness and has recently transitioned into [facility]  Patient is presenting to OP OT services at this time for bilateral hand numbness  Patient reports symptoms began in October  Patient symptoms were initially involving her whole hand, but has decreased to her thumb and second digit  Patient reports symptoms are similar in both hands  Patient has follow-up with PCP on   Patient has a follow-up with PCP 2018  Patient has a follow-up with Bariatric Surgeon on 2018  Patient reports right hand has improved more then left since start of care  Patient reports increased functional use with right hand for fine motor activities such as opening bottles  Patient reports sensation improvements noted in right hand as compared to left  Understanding of Dx/Px/POC: good   Prognosis: good    Goals  STGs    Pt will increase  strength by 5-10#  - Met    Pt will increase wrist strength by 1/2 grade  - Met    Pt will report a decrease in sensation deficits by 25%  - Met    Independent with HEP  - Met      LTGs     Pt will increase  strength by an additional 5-10#  - Partially Met    Pt will increase wrist strength by 1-2 grade  - Partially Met    Pt will increase pinch strength by 3-5#   - met    Pt will report an increase in [facility] as evident by increased ability to complete buttons, zippers and snaps  - Partially Met    Pt will report an increase in ADL/IADL participation  - Partially Met    Pt will report a decrease in sensation deficits by 50%  - Partially Met      Plan  Patient would benefit from: skilled OT  Planned modality interventions: thermotherapy: paraffin bath and thermotherapy: hydrocollator packs  Planned therapy interventions: fine motor coordination training, home exercise program, therapeutic exercise, stretching, strengthening, patient education and neuromuscular re-education  Frequency: 2x week  Duration in visits: 8  Duration in weeks: 4  Treatment plan discussed with: patient  Plan details: Patient has Consistent attended OP OT services since start of care  Patient has made steady progress towards established goals  Pt has shown improvement start of care, demonstrating increased strength, increased ROM and increased activity  However, pt remains with impairments including remaining ROM and strength deficits  Pt would benefit from continuation of skilled therapy services to further progress POC and address remaining deficits at this time  Thank you! Subjective Evaluation    History of Present Illness  Onset date: 2017    Mechanism of injury: Gastric Bypass  Quality of life: good    Pain  Current pain ratin  At best pain ratin  At worst pain ratin    Hand dominance: right    Patient Goals  Patient goals for therapy: decreased pain, increased motion, increased strength, independence with ADLs/IADLs and return to sport/leisure activities          Objective     Active Range of Motion     Left Wrist   Normal active range of motion    Right Wrist   Normal active range of motion    Left Thumb   Opposition: WNL    Right Thumb   Opposition: WNL    Additional Active Range of Motion Details  Full composite fist noted    Strength/Myotome Testing     Left Wrist/Hand   Wrist extension: 4-  Wrist flexion: 4-  Radial deviation: 4-  Ulnar deviation: 4-     (2nd hand position)     Trial 1: 20    Thumb Strength  Key/Lateral Pinch     Trail 1: 9  Tip/Two-Point Pinch     Trial 1: 7  Palmar/Three-Point Pinch     Trial 1: 10    Right Wrist/Hand   Wrist extension: 4-  Wrist flexion: 4-  Radial deviation: 4-  Ulnar deviation: 4-     (2nd hand position)     Trial 1: 30    Thumb Strength   Key/Lateral Pinch     Trial 1: 13  Tip/Two-Point Pinch     Trial 1: 8  Palmar/Three-Point Pinch     Trial 1: 12      Flowsheet Rows      Most Recent Value   PT/OT G-Codes   Current Score  61   Projected Score  61   FOTO information reviewed  Yes   Assessment Type  Re-evaluation   G code set  Carrying, Moving & Handling Objects   Carrying, Moving and Handling Objects Current Status ()  CJ   Carrying, Moving and Handling Objects Goal Status ()  CJ          Specialty Daily Treatment Diary     Manual  06/18/2018 06/06/2018 06/11/2018 06/13/2018                                               Exercise Diary  06/18/2018 06/06/2018 06/11/2018 06/13/208   Rice [place] 10 Min  10 Min 10 Min 10 Min   Sensation Sticks        Theraputty Grasps Green x 20  Green x 20 Green x 20 Green x 20   Theraputty Pinches Green x 20  Green x 20 Green x 20 Green x 20   Theraputty Intrinsic Green x 20  Green x 20 Green x 20 Green x 20   Theraputty Finger Abduction Green x 10  Green x 10 Green x 10 Green x 10   Digi-Flex Blue x 20  Blue x 20 Blue x 20 Blue x 20   Thera-Ball Pushes 5 sec x 10  5 sec x 10 5 sec x 10 5 sec x 10   UBE Machine 10 Min  10 Min 10 Min 10 MIn   Theraputty Thumb Presses Green x 20  Green x 20 Green x 20 Green x 20   Theraputty Cone Presses Green x 20  Green x 20 Green x 20 Green x 20   Purdue Peg Board 10 sets 1:25  10 sets 10 sets 10 sets   Brown Gripper Peg Board 2nd Resistance 15 pegs  2nd Resistance 15 pegs 2nd resistance 15 pegs 2nd Resistance 15 pegs   Golf Ball Manipulation 2 x 10  2 x 10 2 x 10 2 x 10   Yellow Ball Squeezes 5 sec x 10  5 sec x 10 5 sec x 10 5 sec x 10   Nut and [place] Board X 6  X 6 X 6 X 6                                       Modalities 06/18/2018 06/06/2018 06/11/2018 06/13/2018                                   Patient tolerated session well  Therapist will make increases as tolerated  Continue with POC

## 2018-06-20 ENCOUNTER — OFFICE VISIT (OUTPATIENT)
Dept: OCCUPATIONAL THERAPY | Facility: CLINIC | Age: 58
End: 2018-06-20
Payer: MEDICARE

## 2018-06-20 DIAGNOSIS — R20.0 BILATERAL HAND NUMBNESS: Primary | ICD-10-CM

## 2018-06-20 PROCEDURE — 97110 THERAPEUTIC EXERCISES: CPT

## 2018-06-20 PROCEDURE — 97112 NEUROMUSCULAR REEDUCATION: CPT

## 2018-06-20 NOTE — PROGRESS NOTES
Daily Note     Today's date: 2018  Patient name: Aisha Yu  : 1960  MRN: [id number]  Referring provider: Ted Calles MD  Dx:   Encounter Diagnosis     ICD-10-CM    1  Bilateral hand numbness R20 0        Subjective: "That was a little harder today "      Objective: See treatment diary below    Specialty Daily Treatment Diary     Manual  2018                                               Exercise Diary  2018   [address] 10 Min 10 Min  10 Min 10 Min   Sensation Sticks        Theraputty Grasps Green x 20 Green x 20  Green x 20 Green x 20   Theraputty Pinches Green x 20 Green x 20  Green x 20 Green x 20   Theraputty Intrinsic Green x 20 Green x 20  Green x 20 Green x 20   Theraputty Finger Abduction Green x 10 Green x 10  Green x 10 Green x 10   Digi-Flex Blue x 20 Blue x 20  Blue x 20 Blue x 20   Thera-Ball Pushes 5 sec x 10 5 sec x 10  5 sec x 10 5 sec x 10   UBE Machine 10 Min 10 Min  10 Min 10 MIn   Theraputty Thumb Presses Green x 20 Green x 20  Green x 20 Green x 20   Theraputty Cone Presses Green x 20 Green x 20  Green x 20 Green x 20   Purdue Peg Board 10 sets 1:25 10 sets   10 sets 10 sets   Brown Gripper Peg Board 2nd Resistance 15 pegs 3rd Resistance 15 pegs  2nd resistance 15 pegs 2nd Resistance 15 pegs   Golf Ball Manipulation 2 x 10 2 x 10  2 x 10 2 x 10   Yellow Ball Squeezes 5 sec x 10 5 sec x 10  5 sec x 10 5 sec x 10   Nut and [place] Board X 6 X 6  X 6 X 6   Chips into Cup  X 25      Flex Bar T-Band  Red x 20                          Modalities 2018                                 Assessment: Tolerated treatment well  Patient exhibited good technique with therapeutic exercises and would benefit from continued OT      Plan: Continue per plan of care  Progress treatment as tolerated

## 2018-06-25 ENCOUNTER — OFFICE VISIT (OUTPATIENT)
Dept: OCCUPATIONAL THERAPY | Facility: CLINIC | Age: 58
End: 2018-06-25
Payer: MEDICARE

## 2018-06-25 DIAGNOSIS — R20.0 BILATERAL HAND NUMBNESS: Primary | ICD-10-CM

## 2018-06-25 PROCEDURE — 97110 THERAPEUTIC EXERCISES: CPT

## 2018-06-25 PROCEDURE — 97112 NEUROMUSCULAR REEDUCATION: CPT

## 2018-06-25 NOTE — PROGRESS NOTES
Daily Note     Today's date: 2018  Patient name: Annika Monday  : 1960  MRN: [id number]  Referring provider: Erika Chapa MD  Dx:   Encounter Diagnosis     ICD-10-CM    1  Bilateral hand numbness R20 0                   Subjective: I have gotten some strength and movement back  I couldn't even  my cell phone         Objective: See treatment diary below    Specialty Daily Treatment Diary      Manual  2018                                                                               Exercise Diary  2018   Rice [place] 10 Min 10 Min 8 min 10 Min 10 Min   Sensation Sticks             Theraputty Grasps Green x 20 Green x 20 Green x 20 Green x 20 Green x 20   Theraputty Pinches Green x 20 Green x 20 Green x 20 Green x 20 Green x 20   Theraputty Intrinsic Green x 20 Green x 20 Green x 20 Green x 20 Green x 20   Theraputty Finger Abduction Green x 10 Green x 10 Green x 10 Green x 10 Green x 10   Digi-Flex Blue x 20 Blue x 20 Blue x 30 Blue x 20 Blue x 20   Thera-Ball Pushes 5 sec x 10 5 sec x 10 5 sec x 20 5 sec x 10 5 sec x 10   UBE Machine 10 Min 10 Min 10 min 10 Min 10 MIn   Theraputty Thumb Presses Green x 20 Green x 20 Green x 20 Green x 20 Green x 20   Theraputty Cone Presses Green x 20 Green x 20 Green x 20 Green x 20 Green x 20   Purdue Peg Board 10 sets 1:25 10 sets  10 sets 10 sets 10 sets   [place] Gripper Peg Board 2nd Resistance 15 pegs 3rd Resistance 15 pegs 3rd level 15 pegs 2nd resistance 15 pegs 2nd Resistance 15 pegs   Golf Ball Manipulation 2 x 10 2 x 10 X 20 2 x 10 2 x 10   Yellow Ball Squeezes 5 sec x 10 5 sec x 10 5 sec x 20 5 sec x 10 5 sec x 10   Nut and [place] Board X 6 X 6 X 6 X 6 X 6   Chips into Cup   X 25 X 25       Flex Bar T-Band   Red x 20 Red x 20                                         Modalities 2018                                                 Assessment: Tolerated treatment well  Patient exhibited good technique with therapeutic exercises and would benefit from continued OTPt completed program I with setup of items  No increased pain noted throughout  Plan: Continue per plan of care  Progress treatment as tolerated  Progress treament per protocol

## 2018-06-28 ENCOUNTER — OFFICE VISIT (OUTPATIENT)
Dept: OCCUPATIONAL THERAPY | Facility: CLINIC | Age: 58
End: 2018-06-28
Payer: MEDICARE

## 2018-06-28 DIAGNOSIS — R20.0 BILATERAL HAND NUMBNESS: Primary | ICD-10-CM

## 2018-06-28 PROCEDURE — 97112 NEUROMUSCULAR REEDUCATION: CPT

## 2018-06-28 PROCEDURE — 97110 THERAPEUTIC EXERCISES: CPT

## 2018-06-28 NOTE — PROGRESS NOTES
Daily Note     Today's date: 2018  Patient name: Jay Jay Sharif  : 1960  MRN: [id number]  Referring provider: Marta Hill MD  Dx:   Encounter Diagnosis     ICD-10-CM    1  Bilateral hand numbness R20 0                   Subjective: Its my left that will go numb when I am doing nothing         Objective: See treatment diary below      Specialty Daily Treatment Diary      Manual  2018                                                                               Exercise Diary  2018   Rice [place] 10 Min 10 Min 8 min 6 Min 10 Min   Sensation Sticks             Theraputty Grasps Green x 20 Green x 20 Green x 20 Green x 20 Green x 20   Theraputty Pinches Green x 20 Green x 20 Green x 20 Green x 20 Green x 20   Theraputty Intrinsic Green x 20 Green x 20 Green x 20 Green x 20 Green x 20   Theraputty Finger Abduction Green x 10 Green x 10 Green x 10 Green x 10 Green x 10   Digi-Flex Blue x 20 Blue x 20 Blue x 30 Blue x 20 Blue x 20   Thera-Ball Pushes 5 sec x 10 5 sec x 10 5 sec x 20 5 sec x 10 5 sec x 10   UBE Machine 10 Min 10 Min 10 min 10 Min 10 MIn   Theraputty Thumb Presses Green x 20 Green x 20 Green x 20 Green x 20 Green x 20   Theraputty Cone Presses Green x 20 Green x 20 Green x 20 Green x 20 Green x 20   Purdue Peg Board 10 sets 1:25 10 sets  10 sets 10 sets 10 sets   Brown Gripper Peg Board 2nd Resistance 15 pegs 3rd Resistance 15 pegs 3rd level 15 pegs 2nd resistance 15 pegs 2nd Resistance 15 pegs   Golf Ball Manipulation 2 x 10 2 x 10 X 20 2 x 10 2 x 10   Yellow Ball Squeezes 5 sec x 10 5 sec x 10 5 sec x 20 5 sec x 10 5 sec x 10   Nut and [place] Board X 6 X 6 X 6 X 6 X 6   Chips into Cup   X 25 X 25 X 25     Flex Bar T-Band   Red x 20 Red x 20 Red x 20     Theraputty Bead Retrieval       [place] 10                         Modalities 2018                                                 Assessment: Tolerated treatment well  Patient exhibited good technique with therapeutic exercises and would benefit from continued OT      Plan: Continue per plan of care  Progress treatment as tolerated  Progress treament per protocol

## 2018-06-29 ENCOUNTER — TRANSCRIBE ORDERS (OUTPATIENT)
Dept: LAB | Facility: CLINIC | Age: 58
End: 2018-06-29

## 2018-06-29 ENCOUNTER — APPOINTMENT (OUTPATIENT)
Dept: LAB | Facility: CLINIC | Age: 58
End: 2018-06-29
Payer: MEDICARE

## 2018-06-29 DIAGNOSIS — E55.9 VITAMIN D DEFICIENCY: ICD-10-CM

## 2018-06-29 DIAGNOSIS — E03.9 HYPOTHYROIDISM, UNSPECIFIED TYPE: Primary | ICD-10-CM

## 2018-06-29 DIAGNOSIS — E03.9 HYPOTHYROIDISM, UNSPECIFIED TYPE: ICD-10-CM

## 2018-06-29 LAB
25(OH)D3 SERPL-MCNC: 20.3 NG/ML (ref 30–100)
T4 FREE SERPL-MCNC: 1.06 NG/DL (ref 0.76–1.46)
TSH SERPL DL<=0.05 MIU/L-ACNC: 2.28 UIU/ML (ref 0.36–3.74)

## 2018-06-29 PROCEDURE — 36415 COLL VENOUS BLD VENIPUNCTURE: CPT

## 2018-06-29 PROCEDURE — 84439 ASSAY OF FREE THYROXINE: CPT

## 2018-06-29 PROCEDURE — 82306 VITAMIN D 25 HYDROXY: CPT

## 2018-06-29 PROCEDURE — 84443 ASSAY THYROID STIM HORMONE: CPT

## 2018-07-02 ENCOUNTER — OFFICE VISIT (OUTPATIENT)
Dept: OCCUPATIONAL THERAPY | Facility: CLINIC | Age: 58
End: 2018-07-02
Payer: MEDICARE

## 2018-07-02 DIAGNOSIS — R20.0 BILATERAL HAND NUMBNESS: Primary | ICD-10-CM

## 2018-07-02 PROCEDURE — 97110 THERAPEUTIC EXERCISES: CPT

## 2018-07-02 PROCEDURE — 97150 GROUP THERAPEUTIC PROCEDURES: CPT

## 2018-07-02 NOTE — PROGRESS NOTES
Daily Note     Today's date: 2018  Patient name: Donny Mae  : 1960  MRN: [id number]  Referring provider: Soraya Wild MD  Dx:   Encounter Diagnosis     ICD-10-CM    1  Bilateral hand numbness R20 0          Subjective: "I am doing good "      Objective: See treatment diary below    Specialty Daily Treatment Diary      Manual  2018                                                                               Exercise Diary  2018   Rice [place] 10 Min 10 Min 8 min 6 Min 8 Min   Sensation Sticks            Theraputty Grasps Green x 20 Green x 20 Green x 20 Green x 20 Green x 20   Theraputty Pinches Green x 20 Green x 20 Green x 20 Green x 20 Green x 20   Theraputty Intrinsic Green x 20 Green x 20 Green x 20 Green x 20 Green x 20   Theraputty Finger Abduction Green x 10 Green x 10 Green x 10 Green x 10 Green x 10   Digi-Flex Blue x 20 Blue x 20 Blue x 30 Blue x 20 Blue x 20   Thera-Ball Pushes 5 sec x 10 5 sec x 10 5 sec x 20 5 sec x 10 5 sec x 10   UBE Machine 10 Min 10 Min 10 min 10 Min 10 Min   Theraputty Thumb Presses Green x 20 Green x 20 Green x 20 Green x 20 Green x 20   Theraputty Cone Presses Green x 20 Green x 20 Green x 20 Green x 20 Green x 20   Purdue Peg Board 10 sets 1:25 10 sets  10 sets 10 sets 10 sets   Mirant Board 2nd Resistance 15 pegs 3rd Resistance 15 pegs 3rd level 15 pegs 2nd resistance 15 pegs 2nd Resistance 15 pegs   Golf Ball Manipulation 2 x 10 2 x 10 X 20 2 x 10 2 x 10   Yellow Ball Squeezes 5 sec x 10 5 sec x 10 5 sec x 20 5 sec x 10 5 sec x 10   Nut and [place] Board X 6 X 6 X 6 X 6 X 6   Chips into Cup   X 25 X 25 X 25  x 25   Flex Bar T-Band   Red x 20 Red x 20 Red x 20  Red x 20   Theraputty Bead Retrieval       Green X 10  Green x 10                       Modalities 2018                                           Assessment: Tolerated treatment well  Patient exhibited good technique with therapeutic exercises and would benefit from continued OT      Plan: Continue per plan of care  Progress treatment as tolerated

## 2018-07-06 ENCOUNTER — OFFICE VISIT (OUTPATIENT)
Dept: OCCUPATIONAL THERAPY | Facility: CLINIC | Age: 58
End: 2018-07-06
Payer: MEDICARE

## 2018-07-06 DIAGNOSIS — R20.0 BILATERAL HAND NUMBNESS: Primary | ICD-10-CM

## 2018-07-06 PROCEDURE — 97110 THERAPEUTIC EXERCISES: CPT

## 2018-07-06 NOTE — PROGRESS NOTES
Daily Note     Today's date: 2018  Patient name: Rogeilo Harrison  : 1960  MRN: [id number]  Referring provider: Evelia Morris MD  Dx:   Encounter Diagnosis     ICD-10-CM    1  Bilateral hand numbness R20 0        Subjective: "I am doing good "      Objective: See treatment diary below    Specialty Daily Treatment Diary      Manual  2018                                                                               Exercise Diary  2018   Rice [place] 10 Min  8 min 6 Min 8 Min   Sensation Sticks           Theraputty Grasps Green x 20  Green x 20 Green x 20 Green x 20   Theraputty Pinches Green x 20  Green x 20 Green x 20 Green x 20   Theraputty Intrinsic Green x 20  Green x 20 Green x 20 Green x 20   Theraputty Finger Abduction Green x 10  Green x 10 Green x 10 Green x 10   Digi-Flex Blue x 20  Blue x 30 Blue x 20 Blue x 20   Thera-Ball Pushes 5 sec x 10  5 sec x 20 5 sec x 10 5 sec x 10   UBE Machine 10 Min  10 min 10 Min 10 Min   Theraputty Thumb Presses Green x 20  Green x 20 Green x 20 Green x 20   Theraputty Cone Presses Green x 20  Green x 20 Green x 20 Green x 20   Purdue Peg Board 10 sets 1:25  10 sets 10 sets 10 sets   [employer] [place] [employer] 2nd Resistance 15 pegs  3rd level 15 pegs 2nd resistance 15 pegs 2nd Resistance 15 pegs   Golf Ball Manipulation 2 x 10  X 20 2 x 10 2 x 10   Yellow Ball Squeezes 5 sec x 10  5 sec x 20 5 sec x 10 5 sec x 10   Nut and [place] Board X 6  X 6 X 6 X 6   Chips into Cup    X 25 X 25  x 25   Flex Bar T-Band Red x 20   Red x 20 Red x 20  Red x 20   Theraputty Bead Retrieval Green x 10     Green X 10  Green x 10                       Modalities 2018                                                  Assessment: Tolerated treatment well   Patient exhibited good technique with therapeutic exercises and would benefit from continued OT      Plan: Continue per plan of care  Progress treatment as tolerated

## 2018-07-09 ENCOUNTER — OFFICE VISIT (OUTPATIENT)
Dept: OCCUPATIONAL THERAPY | Facility: CLINIC | Age: 58
End: 2018-07-09
Payer: MEDICARE

## 2018-07-09 DIAGNOSIS — R20.0 BILATERAL HAND NUMBNESS: Primary | ICD-10-CM

## 2018-07-09 PROCEDURE — 97112 NEUROMUSCULAR REEDUCATION: CPT

## 2018-07-09 PROCEDURE — 97110 THERAPEUTIC EXERCISES: CPT

## 2018-07-09 NOTE — PROGRESS NOTES
Daily Note     Today's date: 2018  Patient name: Davina Sun  : 1960  MRN: [id number]  Referring provider: Hadley Perez MD  Dx:   Encounter Diagnosis     ICD-10-CM    1  Bilateral hand numbness R20 0        Subjective: "Doing good "      Objective: See treatment diary below    Specialty Daily Treatment Diary      Manual  2018                                                                               Exercise Diary  2018   Rice [place] 10 Min 10 Min  6 Min 8 Min   Sensation Sticks          Theraputty Grasps Green x 20 Green x 20  Green x 20 Green x 20   Theraputty Pinches Green x 20 Green x 20  Green x 20 Green x 20   Theraputty Intrinsic Green x 20 Green x 20  Green x 20 Green x 20   Theraputty Finger Abduction Green x 10 Green x 10  Green x 10 Green x 10   Digi-Flex Blue x 20 Blue x 20  Blue x 20 Blue x 20   Thera-Ball Pushes 5 sec x 10 5 sec x 10  5 sec x 10 5 sec x 10   UBE Machine 10 Min 10 Min  10 Min 10 Min   Theraputty Thumb Presses Green x 20 Green x 20  Green x 20 Green x 20   Theraputty Cone Presses Green x 20 Green x 20  Green x 20 Green x 20   Purdue Peg Board 10 sets 1:25 10 sets 1:25  10 sets 10 sets   [employer] 2nd Resistance 15 pegs 2nd Resistance 15 pegs  2nd resistance 15 pegs 2nd Resistance 15 pegs   Golf Ball Manipulation 2 x 10 2 x 10  2 x 10 2 x 10   Yellow Ball Squeezes 5 sec x 10 5 sec x 10  5 sec x 10 5 sec x 10   Nut and [place] Board X 6 X 6  X 6 X 6   Chips into Cup     X 25  x 25   Flex Bar T-Band Red x 20  Green x 20  Red x 20  Red x 20   Theraputty Bead Retrieval Green x 10 Green x 10  Green X 10  Green x 10                      Modalities 2018                                                  Assessment: Tolerated treatment well   Patient exhibited good technique with therapeutic exercises and would benefit from continued OT      Plan: Continue per plan of care  Progress treatment as tolerated

## 2018-07-11 ENCOUNTER — OFFICE VISIT (OUTPATIENT)
Dept: OCCUPATIONAL THERAPY | Facility: CLINIC | Age: 58
End: 2018-07-11
Payer: MEDICARE

## 2018-07-11 DIAGNOSIS — R20.0 BILATERAL HAND NUMBNESS: Primary | ICD-10-CM

## 2018-07-11 PROCEDURE — 97112 NEUROMUSCULAR REEDUCATION: CPT

## 2018-07-11 PROCEDURE — 97110 THERAPEUTIC EXERCISES: CPT

## 2018-07-11 NOTE — PROGRESS NOTES
Daily Note     Today's date: 2018  Patient name: Lance Rogers  : 1960  MRN: [id number]  Referring provider: Jose M Gould MD  Dx:   Encounter Diagnosis     ICD-10-CM    1  Bilateral hand numbness R20 0        Subjective: "I don't have anything planned this weekend "      Objective: See treatment diary below    Specialty Daily Treatment Diary      Manual  2018                                                                               Exercise Diary  2018   Rice [place] 10 Min 10 Min 10 Min  8 Min   Sensation Sticks         Theraputty Grasps Green x 20 Green x 20 Green x 20  Green x 20   Theraputty Pinches Green x 20 Green x 20 Green x 20  Green x 20   Theraputty Intrinsic Green x 20 Green x 20 Green x 20  Green x 20   Theraputty Finger Abduction Green x 10 Green x 10 Green x 10  Green x 10   Digi-Flex Blue x 20 Blue x 20 Blue x 20  Blue x 20   Thera-Ball Pushes 5 sec x 10 5 sec x 10 5 sec x 10  5 sec x 10   UBE Machine 10 Min 10 Min 10 Min  10 Min   Theraputty Thumb Presses Green x 20 Green x 20 Green x 20  Green x 20   Theraputty Cone Presses Green x 20 Green x 20 Green x 20  Green x 20   Purdue Peg Board 10 sets 1:25 10 sets 1:25 10 sets 1:25  10 sets   [place] [employer] Peg Board 2nd Resistance 15 pegs 2nd Resistance 15 pegs 2nd Resistance 15 pegs  2nd Resistance 15 pegs   Golf Ball Manipulation 2 x 10 2 x 10 2 x 10  2 x 10   Yellow Ball Squeezes 5 sec x 10 5 sec x 10 5 sec x 10  5 sec x 10   Nut and [place] Board X 6 X 6 X 6  X 6   Chips into Cup       x 25   Flex Bar T-Band Red x 20  Green x 20 Green x 20   Red x 20   Theraputty Bead Retrieval Green x 10 Green x 10 Green x 20   Green x 10                      Modalities 2018                                                 Assessment: Tolerated treatment well   Patient exhibited good technique with therapeutic exercises and would benefit from continued OT      Plan: Continue per plan of care  Progress treatment as tolerated

## 2018-07-16 ENCOUNTER — OFFICE VISIT (OUTPATIENT)
Dept: OCCUPATIONAL THERAPY | Facility: CLINIC | Age: 58
End: 2018-07-16
Payer: MEDICARE

## 2018-07-16 DIAGNOSIS — R20.0 BILATERAL HAND NUMBNESS: Primary | ICD-10-CM

## 2018-07-16 PROCEDURE — 97112 NEUROMUSCULAR REEDUCATION: CPT

## 2018-07-16 PROCEDURE — 97110 THERAPEUTIC EXERCISES: CPT

## 2018-07-16 NOTE — PROGRESS NOTES
Daily Note     Today's date: 2018  Patient name: Catrachito Hansen  : 1960  MRN: [id number]  Referring provider: Darlin Burnette MD  Dx:   Encounter Diagnosis     ICD-10-CM    1  Bilateral hand numbness R20 0        Subjective: "This hand is doing a lot better "      Objective: See treatment diary below    Specialty Daily Treatment Diary      Manual  2018                                                                                Exercise Diary  2018    Rice [place] 10 Min 10 Min 10 Min 10 Min    Sensation Sticks         Theraputty Grasps Green x 20 Green x 20 Green x 20 Green x 20    Theraputty Pinches Green x 20 Green x 20 Green x 20 Green x 20    Theraputty Intrinsic Green x 20 Green x 20 Green x 20 Green x 20    Theraputty Finger Abduction Green x 10 Green x 10 Green x 10 Green x 10    Digi-Flex Blue x 20 Blue x 20 Blue x 20 Blue x 20    Thera-Ball Pushes 5 sec x 10 5 sec x 10 5 sec x 10 5 sec x 10    UBE Machine 10 Min 10 Min 10 Min 10 Min    Theraputty Thumb Presses Green x 20 Green x 20 Green x 20 Green x 20    Theraputty Cone Presses Green x 20 Green x 20 Green x 20 Green x 20    Purdue Peg Board 10 sets 1:25 10 sets 1:25 10 sets 1:25 10 sets 1:25    [place] Gri[place] Peg Board 2nd Resistance 15 pegs 2nd Resistance 15 pegs 2nd Resistance 15 pegs 2nd Resistance 15 Pegs    Golf Ball Manipulation 2 x 10 2 x 10 2 x 10 2 x 10    Yellow Ball Squeezes 5 sec x 10 5 sec x 10 5 sec x 10 5 sec x 10    Nut and [place] Board X 6 X 6 X 6 X 6    Chips into Cup         Flex Bar T-Band Red x 20  Green x 20 Green x 20 Green x 20    Theraputty Bead Retrieval Green x 10 Green x 10 Green x 20 Green x 20                       Modalities 2018                                                  Assessment: Tolerated treatment well   Patient exhibited good technique with therapeutic exercises and would benefit from continued OT      Plan: Continue per plan of care  Progress treatment as tolerated

## 2018-07-18 ENCOUNTER — OFFICE VISIT (OUTPATIENT)
Dept: OCCUPATIONAL THERAPY | Facility: CLINIC | Age: 58
End: 2018-07-18
Payer: MEDICARE

## 2018-07-18 DIAGNOSIS — R20.0 BILATERAL HAND NUMBNESS: Primary | ICD-10-CM

## 2018-07-18 PROCEDURE — 97110 THERAPEUTIC EXERCISES: CPT

## 2018-07-18 PROCEDURE — 97112 NEUROMUSCULAR REEDUCATION: CPT

## 2018-07-18 NOTE — PROGRESS NOTES
Daily Note     Today's date: 2018  Patient name: Vahid Mckinley  : 1960  MRN: [id number]  Referring provider: Daisy Gaines MD  Dx:   Encounter Diagnosis     ICD-10-CM    1  Bilateral hand numbness R20 0          Subjective: "I don't have anything planned "      Objective: See treatment diary below    Specialty Daily Treatment Diary      Manual  2018                                                                               Exercise Diary  2018   Rice [place] 10 Min 10 Min 10 Min 10 Min 10 Min   Sensation Sticks         Theraputty Grasps Green x 20 Green x 20 Green x 20 Green x 20 Green x 20   Theraputty Pinches Green x 20 Green x 20 Green x 20 Green x 20 Green x 20   Theraputty Intrinsic Green x 20 Green x 20 Green x 20 Green x 20 Green x 20   Theraputty Finger Abduction Green x 10 Green x 10 Green x 10 Green x 10 Green x 10   Digi-Flex Blue x 20 Blue x 20 Blue x 20 Blue x 20 Blue x 20   Thera-Ball Pushes 5 sec x 10 5 sec x 10 5 sec x 10 5 sec x 10 5 sec x 10   UBE Machine 10 Min 10 Min 10 Min 10 Min 10 Min   Theraputty Thumb Presses Green x 20 Green x 20 Green x 20 Green x 20 Green x 20   Theraputty Cone Presses Green x 20 Green x 20 Green x 20 Green x 20 Green x 20   Purdue Peg Board 10 sets 1:25 10 sets 1:25 10 sets 1:25 10 sets 1:25 10 sets 1:25   [employer] Peg Board 2nd Resistance 15 pegs 2nd Resistance 15 pegs 2nd Resistance 15 pegs 2nd Resistance 15 Pegs 2nd resistance 15 pegs   Golf Ball Manipulation 2 x 10 2 x 10 2 x 10 2 x 10 2 x 10   Yellow Ball Squeezes 5 sec x 10 5 sec x 10 5 sec x 10 5 sec x 10 5 sec x 10   Nut and [place] Board X 6 X 6 X 6 X 6 X 6   Chips into Cup         Flex Bar T-Band Red x 20  Green x 20 Green x 20 Green x 20 Green x 20   Theraputty Bead Retrieval Green x 10 Green x 10 Green x 20 Green x 20 Green x 20                      Modalities 2018 07/11/2018 07/16/2018 07/18/2018                                                 Assessment: Tolerated treatment well  Patient exhibited good technique with therapeutic exercises and would benefit from continued OT      Plan: Continue per plan of care  Progress treatment as tolerated

## 2018-07-23 ENCOUNTER — OFFICE VISIT (OUTPATIENT)
Dept: OCCUPATIONAL THERAPY | Facility: CLINIC | Age: 58
End: 2018-07-23
Payer: MEDICARE

## 2018-07-23 DIAGNOSIS — R20.0 BILATERAL HAND NUMBNESS: Primary | ICD-10-CM

## 2018-07-23 PROCEDURE — G8985 CARRY GOAL STATUS: HCPCS

## 2018-07-23 PROCEDURE — 97168 OT RE-EVAL EST PLAN CARE: CPT

## 2018-07-23 PROCEDURE — G8986 CARRY D/C STATUS: HCPCS

## 2018-07-23 PROCEDURE — 97112 NEUROMUSCULAR REEDUCATION: CPT

## 2018-07-23 PROCEDURE — 97110 THERAPEUTIC EXERCISES: CPT

## 2018-07-23 NOTE — LETTER
2018    Jacinda Palma MD  [address] [place] [address] [address] [facility]    Patient: Aisha Yu   YOB: 1960   Date of Visit: 2018     Encounter Diagnosis     ICD-10-CM    1  Bilateral hand numbness R20 0        Dear Dr Thai Elizalde:    Please review the attached Plan of Care from [facility]ERY [facility] recent visit  Please verify that you agree therapy should continue by signing the attached document and sending it back to our office  If you have any questions or concerns, please don't hesitate to call  Sincerely,    Louis Richardson OT      Referring Provider:     I certify that I have read the below Plan of Care and certify the need for these services furnished under this plan of treatment while under my care  Jacinda Palma MD  [address] [place] [address] [address]: [phone]        OT Re-Evaluation/Discharge    Today's date: 2018  Patient name: Aisha Yu  : 1960  MRN: [id number]  Referring provider: Ted Calles MD  Dx:   Encounter Diagnosis     ICD-10-CM    1  Bilateral hand numbness R20 0          Assessment    Assessment details: Patient had gastric bypass surgery 10-15 years ago  Patient recently had a revision completed in 2016  Patient was unable to tolerate eating and was prescribed TPN in 2017  She has recently discontinued TPN in 2018  Patient had an additional bariatric surgery on 2018  Patient has attended OP PT services since 2018 for generalized weakness and has recently transitioned into Wellness Program  Patient is presenting to OP OT services at this time for bilateral hand numbness  Patient reports symptoms began in October  Patient symptoms were initially involving her whole hand, but has decreased to her thumb and second digit  Patient reports symptoms are similar in both hands  Patient has follow-up with PCP on         Patient has a follow-up with PCP 07/03/2018  Patient has a follow-up with Bariatric Surgeon on 07/26/2018  Patient reports right hand has improved more then left since start of care  Patient reports increased functional use with right hand for fine motor activities such as opening bottles  Patient reports sensation improvements noted in right hand as compared to left  Patient reports sensation improvements in right hand of 60% and left hand of 40%  Understanding of Dx/Px/POC: good   Prognosis: good    Goals  STGs    Pt will increase  strength by 5-10#  - Met    Pt will increase wrist strength by 1/2 grade  - Met    Pt will report a decrease in sensation deficits by 25%  - Met    Independent with HEP  - Met      LTGs     Pt will increase  strength by an additional 5-10#  - Met    Pt will increase wrist strength by 1-2 grade  - Met    Pt will increase pinch strength by 3-5#  - Met    Pt will report an increase in [address] as evident by increased ability to complete buttons, zippers and snaps  - Met    Pt will report an increase in ADL/IADL participation  - Met    Pt will report a decrease in sensation deficits by 50%  - Met      Plan  Treatment plan discussed with: patient  Plan details: Patient has Consistent attended OP OT services since start of care  Patient has attended 20 visits since start of care  Patient last missed visit/visit was on 07/23/2018  Patient and therapist discussed discontinued and have agreed on Plan of Care  Patient achieved all goals in plan of care  Patient to be discharged to an independent Home Exercise Program  Thank you for the referral  Please refer to patient's last assessment for most recent objective measurements  Subjective Evaluation    History of Present Illness  Onset date: October 2017    Mechanism of injury: Gastric Bypass  Quality of life: good    Pain  Current pain rating: 3  At best pain rating: 3  At worst pain rating: 3  Location: B/L Hands    Hand dominance: right    Patient Goals  Patient goals for therapy: decreased pain, increased motion, increased strength, independence with ADLs/IADLs and return to sport/leisure activities          Objective     Active Range of Motion     Left Wrist   Normal active range of motion    Right Wrist   Normal active range of motion    Left Thumb   Opposition: WNL    Right Thumb   Opposition: WNL    Additional Active Range of Motion Details  Full composite fist noted    Strength/Myotome Testing     Left Wrist/Hand   Wrist extension: 4-  Wrist flexion: 4-  Radial deviation: 4-  Ulnar deviation: 4-     (2nd hand position)     Trial 1: 40    Thumb Strength  Key/Lateral Pinch     Trail 1: 9  Tip/Two-Point Pinch     Trial 1: 9  Palmar/Three-Point Pinch     Trial 1: 10    Right Wrist/Hand   Wrist extension: 4-  Wrist flexion: 4-  Radial deviation: 4-  Ulnar deviation: 4-     (2nd hand position)     Trial 1: 45    Thumb Strength   Key/Lateral Pinch     Trial 1: 13  Tip/Two-Point Pinch     Trial 1: 8  Palmar/Three-Point Pinch     Trial 1: 12      Flowsheet Rows      Most Recent Value   PT/OT G-Codes   Current Score  61   Projected Score  61   Assessment Type  Discharge   G code set  Carrying, Moving & Handling Objects   Carrying, Moving and Handling Objects Goal Status ()     Carrying, Moving and Handling Objects Discharge Status ()            Specialty Daily Treatment Diary      Manual  07/23/2018 07/11/2018 07/16/2018 07/18/2018                                                                               Exercise Diary  07/23/2018 07/11/2018 07/16/2018 07/18/2018   Rice [place] 10 Min  10 Min 10 Min 10 Min   Sensation Sticks         Theraputty Grasps Green x 20  Green x 20 Green x 20 Green x 20   Theraputty Pinches Green x 20  Green x 20 Green x 20 Green x 20   Theraputty Intrinsic Green x 20  Green x 20 Green x 20 Green x 20   Theraputty Finger Abduction Green x 10  Green x 10 Green x 10 Green x 10   Digi-Flex Blue x 20  Blue x 20 Blue x 20 Blue x 20   Thera-Ball Pushes 5 sec x 10  5 sec x 10 5 sec x 10 5 sec x 10   UBE Machine 10 Min  10 Min 10 Min 10 Min   Theraputty Thumb Presses Green x 20  Green x 20 Green x 20 Green x 20   Theraputty Cone Presses Green x 20  Green x 20 Green x 20 Green x 20   [place] Peg Board 10 sets 1:25  10 sets 1:25 10 sets 1:25 10 sets 1:25   [place] [employer] Peg Board 2nd Resistance 15 pegs  2nd Resistance 15 pegs 2nd Resistance 15 Pegs 2nd resistance 15 pegs   Golf Ball Manipulation 2 x 10  2 x 10 2 x 10 2 x 10   Yellow Ball Squeezes 5 sec x 10  5 sec x 10 5 sec x 10 5 sec x 10   Nut and [place] Board X 6  X 6 X 6 X 6   Chips into Cup         Flex Bar T-Band Red x 20   Green x 20 Green x 20 Green x 20   Theraputty Bead Retrieval Green x 10  Green x 20 Green x 20 Green x 20                      Modalities 07/23/2018 07/11/2018 07/16/2018 07/18/2018

## 2018-07-23 NOTE — PROGRESS NOTES
OT Re-Evaluation/Discharge    Today's date: 2018  Patient name: Davina Sun  : 1960  MRN: [id number]  Referring provider: Hadley Perez MD  Dx:   Encounter Diagnosis     ICD-10-CM    1  Bilateral hand numbness R20 0          Assessment    Assessment details: Patient had gastric bypass surgery 10-15 years ago  Patient recently had a revision completed in 2016  Patient was unable to tolerate eating and was prescribed TPN in 2017  She has recently discontinued TPN in 2018  Patient had an additional bariatric surgery on 2018  Patient has attended OP PT services since 2018 for generalized weakness and has recently transitioned into Wellness Program  Patient is presenting to OP OT services at this time for bilateral hand numbness  Patient reports symptoms began in October  Patient symptoms were initially involving her whole hand, but has decreased to her thumb and second digit  Patient reports symptoms are similar in both hands  Patient has follow-up with PCP on   Patient has a follow-up with PCP 2018  Patient has a follow-up with Bariatric Surgeon on 2018  Patient reports right hand has improved more then left since start of care  Patient reports increased functional use with right hand for fine motor activities such as opening bottles  Patient reports sensation improvements noted in right hand as compared to left  Patient reports sensation improvements in right hand of 60% and left hand of 40%  Understanding of Dx/Px/POC: good   Prognosis: good    Goals  STGs    Pt will increase  strength by 5-10#  - Met    Pt will increase wrist strength by 1/2 grade  - Met    Pt will report a decrease in sensation deficits by 25%  - Met    Independent with HEP  - Met      LTGs     Pt will increase  strength by an additional 5-10#  - Met    Pt will increase wrist strength by 1-2 grade  - Met    Pt will increase pinch strength by 3-5#   - Met    Pt will report an increase in [facility] as evident by increased ability to complete buttons, zippers and snaps  - Met    Pt will report an increase in ADL/IADL participation  - Met    Pt will report a decrease in sensation deficits by 50%  - Met      Plan  Treatment plan discussed with: patient  Plan details: Patient has Consistent attended OP OT services since start of care  Patient has attended 20 visits since start of care  Patient last missed visit/visit was on 07/23/2018  Patient and therapist discussed discontinued and have agreed on Plan of Care  Patient achieved all goals in plan of care  Patient to be discharged to an independent Home Exercise Program  Thank you for the referral  Please refer to patient's last assessment for most recent objective measurements  Subjective Evaluation    History of Present Illness  Onset date: October 2017    Mechanism of injury: Gastric Bypass  Quality of life: good    Pain  Current pain rating: 3  At best pain rating: 3  At worst pain rating: 3  Location: B/L Hands    Hand dominance: right    Patient Goals  Patient goals for therapy: decreased pain, increased motion, increased strength, independence with ADLs/IADLs and return to sport/leisure activities          Objective     Active Range of Motion     Left Wrist   Normal active range of motion    Right Wrist   Normal active range of motion    Left Thumb   Opposition: WNL    Right Thumb   Opposition: WNL    Additional Active Range of Motion Details  Full composite fist noted    Strength/Myotome Testing     Left Wrist/Hand   Wrist extension: 4-  Wrist flexion: 4-  Radial deviation: 4-  Ulnar deviation: 4-     (2nd hand position)     Trial 1: 40    Thumb Strength  Key/Lateral Pinch     Trail 1: 9  Tip/Two-Point Pinch     Trial 1: 9  Palmar/Three-Point Pinch     Trial 1: 10    Right Wrist/Hand   Wrist extension: 4-  Wrist flexion: 4-  Radial deviation: 4-  Ulnar deviation: 4-     (2nd hand position) Trial 1: 45    Thumb Strength   Key/Lateral Pinch     Trial 1: 13  Tip/Two-Point Pinch     Trial 1: 8  Palmar/Three-Point Pinch     Trial 1: 12      Flowsheet Rows      Most Recent Value   PT/OT G-Codes   Current Score  61   Projected Score  61   Assessment Type  Discharge   G code set  Carrying, Moving & Handling Objects   Carrying, Moving and Handling Objects Goal Status ()     Carrying, Moving and Handling Objects Discharge Status ()            Specialty Daily Treatment Diary      Manual  07/23/2018 07/11/2018 07/16/2018 07/18/2018                                                                               Exercise Diary  07/23/2018 07/11/2018 07/16/2018 07/18/2018   Rice [place] 10 Min  10 Min 10 Min 10 Min   Sensation Sticks         Theraputty Grasps Green x 20  Green x 20 Green x 20 Green x 20   Theraputty Pinches Green x 20  Green x 20 Green x 20 Green x 20   Theraputty Intrinsic Green x 20  Green x 20 Green x 20 Green x 20   Theraputty Finger Abduction Green x 10  Green x 10 Green x 10 Green x 10   Digi-Flex Blue x 20  Blue x 20 Blue x 20 Blue x 20   Thera-Ball Pushes 5 sec x 10  5 sec x 10 5 sec x 10 5 sec x 10   UBE Machine 10 Min  10 Min 10 Min 10 Min   Theraputty Thumb Presses Green x 20  Green x 20 Green x 20 Green x 20   Theraputty Cone Presses Green x 20  Green x 20 Green x 20 Green x 20   Purdue Peg Board 10 sets 1:25  10 sets 1:25 10 sets 1:25 10 sets 1:25   Brown Gripper Peg Board 2nd Resistance 15 pegs  2nd Resistance 15 pegs 2nd Resistance 15 Pegs 2nd resistance 15 pegs   Golf Ball Manipulation 2 x 10  2 x 10 2 x 10 2 x 10   Yellow Ball Squeezes 5 sec x 10  5 sec x 10 5 sec x 10 5 sec x 10   Nut and [place] Board X 6  X 6 X 6 X 6   Chips into Cup         Flex Bar T-Band Red x 20   Green x 20 Green x 20 Green x 20   Theraputty Bead Retrieval Green x 10  Green x 20 Green x 20 Green x 20                      Modalities 07/23/2018 07/11/2018 07/16/2018 07/18/2018

## 2018-07-24 ENCOUNTER — TRANSCRIBE ORDERS (OUTPATIENT)
Dept: PHYSICAL THERAPY | Facility: CLINIC | Age: 58
End: 2018-07-24

## 2018-07-24 DIAGNOSIS — R20.0 TACTILE ANESTHESIA: Primary | ICD-10-CM

## 2018-07-24 DIAGNOSIS — R20.0 NUMBNESS: ICD-10-CM

## 2018-07-25 ENCOUNTER — APPOINTMENT (OUTPATIENT)
Dept: OCCUPATIONAL THERAPY | Facility: CLINIC | Age: 58
End: 2018-07-25
Payer: MEDICARE

## 2018-07-26 ENCOUNTER — OFFICE VISIT (OUTPATIENT)
Dept: BARIATRICS | Facility: CLINIC | Age: 58
End: 2018-07-26
Payer: MEDICARE

## 2018-07-26 VITALS
DIASTOLIC BLOOD PRESSURE: 80 MMHG | HEIGHT: 63 IN | RESPIRATION RATE: 18 BRPM | SYSTOLIC BLOOD PRESSURE: 132 MMHG | BODY MASS INDEX: 36.59 KG/M2 | HEART RATE: 72 BPM | WEIGHT: 206.5 LBS | TEMPERATURE: 97.2 F

## 2018-07-26 DIAGNOSIS — E66.01 MORBID (SEVERE) OBESITY DUE TO EXCESS CALORIES (HCC): Primary | ICD-10-CM

## 2018-07-26 PROCEDURE — 99214 OFFICE O/P EST MOD 30 MIN: CPT | Performed by: SURGERY

## 2018-07-26 NOTE — PROGRESS NOTES
Follow Up - Bariatric Surgery   Sindy Saunders 62 y o  female MRN: [id number]  Unit/Bed#:  Encounter: [id number]            Subjective:  Doing very well no complaints    Objective:         Lab, Imaging and other studies: I have personally reviewed pertinent labs  Family History: non-contributory    Meds/Allergies   all medications and allergies reviewed    Vitals: Blood pressure 132/80, pulse 72, temperature (!) 97 2 °F (36 2 °C), resp  rate 18, height 5' 2 5" (1 588 m), weight 93 7 kg (206 lb 8 oz), not currently breastfeeding  ,Body mass index is 37 17 kg/m²  [unfilled]    Invasive Devices     Peripherally Inserted Central Catheter Line            PICC Line 10/27/17 Right Brachial 271 days          Drain            Closed/Suction Drain Midline Abdomen Bulb 19 Fr  217 days                Physical Exam:     NAD  Abdomen soft non-tender, incisions well healed  No palpable hernias    Assessment/Plan:    patient is s/p LRYGB revision    6 months out, doing very well, no complaints, following instructions  I emphasized the need to be compliant with vitamin, calcium and protein intake on a daily basis  We also talked about daily excercise  In addition the patient was instructed to call with the development of nausea, vomiting, fever, chills or any episode of abdominal pain  Her bilateral hand numbness is also improving significantly according to her with physical and occupational therapy                Farheen Roy MD

## 2018-08-30 ENCOUNTER — OFFICE VISIT (OUTPATIENT)
Dept: BARIATRICS | Facility: CLINIC | Age: 58
End: 2018-08-30

## 2018-08-30 VITALS — WEIGHT: 211.7 LBS | HEIGHT: 63 IN | BODY MASS INDEX: 37.51 KG/M2

## 2018-08-30 DIAGNOSIS — Z98.84 BARIATRIC SURGERY STATUS: Primary | ICD-10-CM

## 2018-08-30 DIAGNOSIS — K91.2 POSTSURGICAL MALABSORPTION: ICD-10-CM

## 2018-08-30 DIAGNOSIS — E66.9 OBESITY, CLASS II, BMI 35-39.9: ICD-10-CM

## 2018-08-30 PROCEDURE — RECHECK: Performed by: DIETITIAN, REGISTERED

## 2018-08-30 NOTE — PROGRESS NOTES
Bariatric Follow Up Nutrition Note    Type of surgery  Gastric bypass: Original surgery at another facility in 2004   Surgery dates at [address] :laparoscopic Revision due to fistula 4/2016  Second laparoscopic  revision due to marginal ulcer and  stricture 12/2017  2years  post-op : First revision 4/2016  Surgeon: Dr Vicente Redd  62 y o   female   Height 5' 2 5" (1 588 m), weight 96 kg (211 lb 11 2 oz), not currently breastfeeding  Body mass index is 38 1 kg/m²  Weight on Day of Weight Loss Surgery:350#   Weight in (lb) to have BMI = 25: 137 6#  Pre-Op Excess Wt: 212 6#  Varinder weight : 191 5# after second revision, patient was on TPN prior to second revision due to inability to take PO and malnutrition  Post-Op Wt Loss: 178 6/ 71% EBWL in 2 year(s)    Review of History and Medications   Past Medical History:   Diagnosis Date    Anxiety     Back pain     Back pain at L4-L5 level     Chronic pain disorder     back    DDD (degenerative disc disease), lumbar     Depression     Edema of both legs     Fall at home     Full dentures     GERD (gastroesophageal reflux disease)     History of gastric ulcer     "Years ago    History of heartburn     Hypothyroid     Hypothyroidism    Morbid obesity (HCC)     Motion sickness     Numbness and tingling in both hands     Numbness and tingling of both feet     PICC (peripherally inserted central catheter) in place     Right arm    PONV (postoperative nausea and vomiting)     Postgastrectomy malabsorption     Sciatica     Shortness of breath     Skin abnormality     Edema BLE- uses boots at home   skin on shins dark and rough    Stress incontinence     Stricture esophagus     Use of cane as ambulatory aid     Wears glasses      Past Surgical History:   Procedure Laterality Date    ABDOMINAL ADHESION SURGERY N/A 12/20/2017    Procedure: EXTENSIVE LYSIS ADHESIONS;  Surgeon: Sabi Pope MD;  [facility];  Service: Bariatrics    ABDOMINAL SURGERY       SECTION      x3    CHOLECYSTECTOMY      COLONOSCOPY      COSMETIC SURGERY      tummy tuck    ESOPHAGOGASTRODUODENOSCOPY N/A 2016    Procedure: ESOPHAGOGASTRODUODENOSCOPY (EGD); Surgeon: Farheen Roy MD;  [facility]; Service:     ESOPHAGOGASTRODUODENOSCOPY N/A 2017    Procedure: ESOPHAGOGASTRODUODENOSCOPY (EGD); Surgeon: Farheen Roy MD;  [facility];  Service: Bariatrics    GASTRIC BYPASS      PANNICULECTOMY      PARTIAL GASTRECTOMY N/A 2017    Procedure: SUBTOTAL GASTRECTOMY, ESOPHAGEAL JEJUNOSTOMY;  Surgeon: Farheen Roy MD;  [facility];  Service: [facility] MT EGD TRANSORAL BIOPSY SINGLE/MULTIPLE N/A 2017    Procedure: ESOPHAGOGASTRODUODENOSCOPY (EGD) with biopsy;  Surgeon: Deloris Muniz MD;  [facility]; Service: Bariatrics    MT EGD TRANSORAL BIOPSY SINGLE/MULTIPLE N/A 10/4/2017    Procedure: ESOPHAGOGASTRODUODENOSCOPY (EGD) with balloon dilatation;  Surgeon: Farheen Roy MD;  [facility]; Service: Bariatrics    MT EGD TRANSORAL BIOPSY SINGLE/MULTIPLE N/A 2017    Procedure: ESOPHAGOGASTRODUODENOSCOPY (EGD) with balloon dilation;  Surgeon: Farheen Roy MD;  [facility]; Service: Bariatrics    MT LAP, SAADIA RESTRICT PROC, LONGITUDINAL GASTRECTOMY N/A 2016    Procedure:   LAPAROSCOPIC GASTROGASTIC FISTULA TAKEDOWN, PARTIAL GASTRECTOMY, GASTRO JEJUNOSTOMY, EXTENSIVE LYSIS OF ADHESIONS;  Surgeon: Farheen Roy MD;  [facility];  Service: Bariatrics     Social History     Social History    Marital status: Single     Spouse name: N/A    Number of children: N/A    Years of education: N/A     Social History Main Topics    Smoking status: Former Smoker     Years: 5 00     Types: Cigarettes     Quit date: 2001    Smokeless tobacco: Never Used      Comment: Quit 15 years ago   4 cigarettes daily    Alcohol use No  Drug use: No    Sexual activity: Not on file     Other Topics Concern    Not on file     Social History Narrative    No narrative on file       Current Outpatient Prescriptions:     acetaminophen (TYLENOL) 160 mg/5 mL suspension, Take 10 mL by mouth every 4 (four) hours as needed for mild pain, headaches or fever, Disp: 237 mL, Rfl: 0    ALPRAZolam (XANAX) 1 mg tablet, Take 1 mg by mouth daily at bedtime as needed for anxiety, Disp: , Rfl:     cholecalciferol (VITAMIN D3) 1,000 units tablet, Take 1,000 Units by mouth daily, Disp: , Rfl:     clobetasol (TEMOVATE) 0 05 % ointment, clobetasol propionate 0 05 % oint, Disp: , Rfl:     clobetasol propionate (CLOBEX) 0 05 % lotion, APPLY A THIN LAYER TO THE AFFECTED AREA(S) BY TOPICAL ROUTE 2 TIMES PER DAY, Disp: , Rfl:     cyclobenzaprine (FLEXERIL) 10 mg tablet, cyclobenzaprine hcl 10 mg tabs, Disp: , Rfl:     fentaNYL (DURAGESIC) 50 mcg/hr, fentanyl 50 mcg/hr pt72, Disp: , Rfl:     furosemide (LASIX) 20 mg tablet, furosemide 20 mg tablet, Disp: , Rfl:     levothyroxine 100 mcg tablet, Take 100 mcg by mouth daily, Disp: , Rfl:     methylPREDNISolone acetate (DEPO-MEDROL) 40 mg/mL injection, Take by injection route   bilateral knee injection, Disp: , Rfl:     ondansetron (ZOFRAN) 4 mg tablet, Take 4 mg by mouth every 12 (twelve) hours as needed for nausea or vomiting, Disp: , Rfl:     oxyCODONE (OXYCONTIN) 20 mg 12 hr tablet, oxycontin 20 mg t12a, Disp: , Rfl:     pantoprazole (PROTONIX) 40 mg tablet, , Disp: , Rfl: 5    sucralfate (CARAFATE) 1 g/10 mL suspension, Take 1 g by mouth 4 (four) times a day, Disp: , Rfl:     tapentadol (NUCYNTA) 50 mg tablet, every 4 (four) hours, Disp: , Rfl:     triamcinolone acetonide (KENALOG) 40 mg/mL, Take by injection route , Disp: , Rfl:     Patient is taking Procare vitamin one per day and 4000 IU of vitamin D3 and 1000  Mcg of B12    Food Intake and Lifestyle Assessment   Food Intake Assessment completed via 24 hour recall  Breakfast: egg   Snack : fruit   Lunch:  small salad with chicken for lunch    Snack:  applesauce   Dinner: chicken or pork with cauliflower and potato  Snack:Premier protein    Patient is able to tolerate approximately 1/2 cup food total per meal, which is why she eats frequent meals  Beverage intake: water  Diet texture/stage: regular  Protein supplement: Premier protein one per day ( 30 grams )   Estimated protein intake per day: 60-70 grams with shake   Estimated fluid intake per day: 48-64 ounces per day   Meals eaten away from home: rarely, due to cost   Typical meal pattern: 3 meals per day and 2-3 snacks per day  Eating Behaviors: Appropriate diet advancement, Appropriate portion sizes and Does not drink with meals and waits 30-minutes after meal before resuming drinking    Food allergies or intolerances: cannot tolerate bread  Cultural or Muslim considerations: none     Physical Assessment  Nutrition Related Findings  Return of Hunger and Numbness & Tingling:  Patient with history of B12 deficiency, symptoms are significantly improved, still has some numbness in three fingers  Went to OT which improved her finger strength  Physical Activity  Types of exercise: Walking  Gym- goes to the gym where she had OT three times per week  Returned home from [place] where she visited her sister, so happy she could participate in all activities, bowling, walking, etc   Current physical limitations: walks with a cane, some residual numbness in fingers    Psychosocial Assessment   Support systems: relative(s)  Socioeconomic factors: lives alone, limited income  Daughters live nearby Sister in [place] that she visits    Nutrition Diagnosis  Diagnosis:Unintentional weight gain   Related to:  Altered GI function and instructed to eat more and gain weight due to her previous diagnosis of malnutrition   As Evidenced by: weight gain of 10 pounds since April ( 3 months )      Interventions and Teaching Patient educated on post-op nutrition guidelines  Patient educated and handouts provided  Adequate hydration  Expected weight loss: Instructed patient to focus on weight maintenance at this time, no longer needs to push her food intake  Exercise:  Continue with gym 3 times per week   Protein supplements:  Provided with sample of Ensure Max and coupons for both Premier and Ensure Max  Dietary and lifestyle changes:  Instructed patient to weight herself weekly, allow 2 to 4 pounds for weight maintenance, if her weight continues to increase contact the office for follow up or become more mindful of her eating habits  Potential for food intolerance after surgery, and ways to deal with them including: lactose intolerance, nausea, reflux, vomiting, diarrhea, food intolerance, appetite changes, gas  Vitamin / Mineral supplementation of Multivitamin with minerals, Calcium and Vitamin B12  Instructed patient to start taking calcium three times per day, due to economic status, patient will take chewable calcium carbonate with meals  She will continue with the Procare bariatric vitamin and extra B12     Education provided to: patient    Barriers to learning: No barriers identified    Readiness to change: monitoring    Comprehension: demonstrated understanding     Expected Compliance: good    Goals  1  Focus on weight maintenance- weigh self weekly and document  2  Continue with protein shake in evening to prevent snacking  3  Three meals ( 1/2 cup each as tolerated) and 2 snacks ( 200 calories or less each )  4  Continue gym three times per week   5  Continue with Procare vitamins/minerals , and B12, and add 1500 mg of calcium carbonate with meals   ( chewable to increase absorption )    Evaluation / Monitoring  Eating pattern as discussed Tolerance of nutrition prescription Body weight Lab values Physical activity      Time Spent:   30 Minutes  Patient has f/u with PA for her annual- will have repeat blood work done at that time

## 2019-01-28 ENCOUNTER — TELEPHONE (OUTPATIENT)
Dept: BARIATRICS | Facility: CLINIC | Age: 59
End: 2019-01-28

## 2019-01-28 PROBLEM — Z98.84 BARIATRIC SURGERY STATUS: Status: ACTIVE | Noted: 2019-01-28

## 2019-03-22 ENCOUNTER — APPOINTMENT (OUTPATIENT)
Dept: LAB | Facility: CLINIC | Age: 59
End: 2019-03-22
Payer: MEDICARE

## 2019-03-22 ENCOUNTER — TRANSCRIBE ORDERS (OUTPATIENT)
Dept: URGENT CARE | Facility: CLINIC | Age: 59
End: 2019-03-22

## 2019-03-22 DIAGNOSIS — E55.9 VITAMIN D DEFICIENCY: ICD-10-CM

## 2019-03-22 DIAGNOSIS — Z79.1 ENCOUNTER FOR LONG-TERM (CURRENT) USE OF NSAIDS: Primary | ICD-10-CM

## 2019-03-22 DIAGNOSIS — E03.9 HYPOTHYROIDISM, ADULT: ICD-10-CM

## 2019-03-22 LAB
25(OH)D3 SERPL-MCNC: 43.3 NG/ML (ref 30–100)
ALBUMIN SERPL BCP-MCNC: 3.4 G/DL (ref 3.5–5)
ALP SERPL-CCNC: 129 U/L (ref 46–116)
ALT SERPL W P-5'-P-CCNC: 19 U/L (ref 12–78)
ANION GAP SERPL CALCULATED.3IONS-SCNC: 6 MMOL/L (ref 4–13)
AST SERPL W P-5'-P-CCNC: 20 U/L (ref 5–45)
BILIRUB SERPL-MCNC: 0.6 MG/DL (ref 0.2–1)
BUN SERPL-MCNC: 9 MG/DL (ref 5–25)
CALCIUM SERPL-MCNC: 8.6 MG/DL (ref 8.3–10.1)
CHLORIDE SERPL-SCNC: 106 MMOL/L (ref 100–108)
CO2 SERPL-SCNC: 28 MMOL/L (ref 21–32)
CREAT SERPL-MCNC: 1.05 MG/DL (ref 0.6–1.3)
ERYTHROCYTE [DISTWIDTH] IN BLOOD BY AUTOMATED COUNT: 11.9 % (ref 11.6–15.1)
GFR SERPL CREATININE-BSD FRML MDRD: 59 ML/MIN/1.73SQ M
GLUCOSE SERPL-MCNC: 98 MG/DL (ref 65–140)
HCT VFR BLD AUTO: 44.4 % (ref 34.8–46.1)
HGB BLD-MCNC: 14.4 G/DL (ref 11.5–15.4)
MCH RBC QN AUTO: 32.8 PG (ref 26.8–34.3)
MCHC RBC AUTO-ENTMCNC: 32.4 G/DL (ref 31.4–37.4)
MCV RBC AUTO: 101 FL (ref 82–98)
PLATELET # BLD AUTO: 242 THOUSANDS/UL (ref 149–390)
PMV BLD AUTO: 12.5 FL (ref 8.9–12.7)
POTASSIUM SERPL-SCNC: 4.1 MMOL/L (ref 3.5–5.3)
PROT SERPL-MCNC: 6.9 G/DL (ref 6.4–8.2)
RBC # BLD AUTO: 4.39 MILLION/UL (ref 3.81–5.12)
SODIUM SERPL-SCNC: 140 MMOL/L (ref 136–145)
T4 FREE SERPL-MCNC: 1.11 NG/DL (ref 0.76–1.46)
TSH SERPL DL<=0.05 MIU/L-ACNC: 0.91 UIU/ML (ref 0.36–3.74)
WBC # BLD AUTO: 6.26 THOUSAND/UL (ref 4.31–10.16)

## 2019-03-22 PROCEDURE — 85027 COMPLETE CBC AUTOMATED: CPT

## 2019-03-22 PROCEDURE — 80053 COMPREHEN METABOLIC PANEL: CPT

## 2019-03-22 PROCEDURE — 82306 VITAMIN D 25 HYDROXY: CPT

## 2019-03-22 PROCEDURE — 84443 ASSAY THYROID STIM HORMONE: CPT

## 2019-03-22 PROCEDURE — 84439 ASSAY OF FREE THYROXINE: CPT

## 2019-03-22 PROCEDURE — 36415 COLL VENOUS BLD VENIPUNCTURE: CPT

## 2019-05-01 NOTE — PROGRESS NOTES
Daily Note     Today's date: 2018  Patient name: Jay Jay Sharif  : 1960  MRN: [id number]  Referring provider: Marta Hill MD  Dx:   Encounter Diagnosis     ICD-10-CM    1  Bilateral hand numbness R20 0        Pain In:06/10  Pain Out:    Subjective: "A little bit of pain "      Objective: See treatment diary below    pecialty Daily Treatment Diary     Manual  2018                                                  Exercise Diary  2018      Rice [place] 10 Min 10 Min      Sensation Sticks        Theraputty Grasps Yellow x 20 Yellow x 20      Theraputty Pinches Yellow x 20 Yellow x 20      Theraputty Intrinsic Yellow x 20 Yellow x 20      Theraputty Finger Abduction Yellow x 10 Yellow x 10      Digi-Flex Red x 20 Green x 20      Thera-Ball Pushes 5 sec x 10 5 sec x 10      UBE Machine  8:30 Min      Theraputty Thumb Presses  Yellow x 20      Theraputty Cone Presses  Yellow x 20      Purdue Peg Board  10 sets : 2:05 Min                                                                          Modalities 2018                                    Assessment: Tolerated treatment well  Patient exhibited good technique with therapeutic exercises and would benefit from continued OT      Plan: Continue per plan of care  Progress treatment as tolerated 
01-May-2019

## 2019-05-22 NOTE — PROGRESS NOTES
Daily Note     Today's date: 3/12/2018  Patient name: Christel Dorsey  : 1960  MRN: [id number]  Referring provider: Sj Silva MD  Dx:   Encounter Diagnosis     ICD-10-CM    1  Physical deconditioning R53 81    2  Difficulty walking R26 2    3  Hypercholesterolemia E78 00    4  Acquired hypothyroidism E03 9    5  Anastomotic stricture of gastrojejunostomy K91 89    6  Gastric leak K91 89                   Subjective: Pt report that she "feels that the knees are getting better " She reports that they are stiff in the mornings  Pt denies pain in B/L knees  Objective: See treatment diary below      Manual  3/12                     B HS                        B Hip Flexor                        BGastroc                       Knee extension PROM                          15min                            Exercise Diary   3/12                     Nu Step  10min L3                     Quad Set   10" x10 B/L                     Hip Add with Ball  10''10x                     Clamshell  3x10 blue                     Bridges   5''20x                     SLR flexion   10x                     Step Up   A 3x10                      SLS                       HR/TR   30x                     Lunges                        Cervical hot pack  with LLLDE end of session   10 min 3#                     PPT      10'10x                     Supine marching                        P ball abs                        Self HS   30''3x                     Self Hip flexor   30''3x                     Self Gastroc    30''3x                                                                                                   Modalities                                                                          Assessment: Tolerated treatment well  Pt continues to be limited in PROM in b/l knees  Pt able to better tolerate standing exercises this date  Pt as no adverse affect to MHP on B/L after Tx   Patient demonstrated fatigue post treatment and would benefit from continued PT      Plan: Continue per plan of care  Progress treatment as tolerated  Suturegard Retention Suture: 2-0 Nylon

## 2019-05-26 ENCOUNTER — HOSPITAL ENCOUNTER (EMERGENCY)
Facility: HOSPITAL | Age: 59
End: 2019-05-27
Attending: EMERGENCY MEDICINE
Payer: MEDICARE

## 2019-05-26 VITALS
SYSTOLIC BLOOD PRESSURE: 145 MMHG | HEART RATE: 69 BPM | TEMPERATURE: 97.9 F | WEIGHT: 230 LBS | RESPIRATION RATE: 18 BRPM | HEIGHT: 60 IN | BODY MASS INDEX: 45.16 KG/M2 | OXYGEN SATURATION: 100 % | DIASTOLIC BLOOD PRESSURE: 78 MMHG

## 2019-05-26 DIAGNOSIS — K80.50 PANCREATITIS DUE TO COMMON BILE DUCT STONE: Primary | ICD-10-CM

## 2019-05-26 DIAGNOSIS — K85.90 PANCREATITIS DUE TO COMMON BILE DUCT STONE: Primary | ICD-10-CM

## 2019-05-26 PROCEDURE — 99285 EMERGENCY DEPT VISIT HI MDM: CPT

## 2019-05-26 PROCEDURE — 93005 ELECTROCARDIOGRAM TRACING: CPT

## 2019-05-26 RX ORDER — HYDROMORPHONE HCL/PF 1 MG/ML
1 SYRINGE (ML) INJECTION ONCE
Status: COMPLETED | OUTPATIENT
Start: 2019-05-27 | End: 2019-05-27

## 2019-05-26 RX ORDER — PANTOPRAZOLE SODIUM 40 MG/1
40 INJECTION, POWDER, FOR SOLUTION INTRAVENOUS ONCE
Status: COMPLETED | OUTPATIENT
Start: 2019-05-27 | End: 2019-05-27

## 2019-05-26 RX ORDER — ONDANSETRON 2 MG/ML
4 INJECTION INTRAMUSCULAR; INTRAVENOUS ONCE
Status: COMPLETED | OUTPATIENT
Start: 2019-05-27 | End: 2019-05-27

## 2019-05-27 ENCOUNTER — APPOINTMENT (EMERGENCY)
Dept: CT IMAGING | Facility: HOSPITAL | Age: 59
End: 2019-05-27
Payer: MEDICARE

## 2019-05-27 ENCOUNTER — HOSPITAL ENCOUNTER (INPATIENT)
Facility: HOSPITAL | Age: 59
LOS: 3 days | Discharge: HOME HEALTH | DRG: 444 | End: 2019-05-30
Attending: HOSPITALIST | Admitting: INTERNAL MEDICINE
Payer: MEDICARE

## 2019-05-27 DIAGNOSIS — E66.01 MORBID OBESITY DUE TO EXCESS CALORIES (HCC): ICD-10-CM

## 2019-05-27 DIAGNOSIS — K83.1 COMMON BILE DUCT OBSTRUCTION: Primary | ICD-10-CM

## 2019-05-27 DIAGNOSIS — K91.89 ANASTOMOTIC STRICTURE OF GASTROJEJUNOSTOMY: ICD-10-CM

## 2019-05-27 DIAGNOSIS — K80.50 CHOLEDOCHOLITHIASIS: ICD-10-CM

## 2019-05-27 DIAGNOSIS — K91.89 GASTRIC LEAK: ICD-10-CM

## 2019-05-27 DIAGNOSIS — Z98.84 BARIATRIC SURGERY STATUS: ICD-10-CM

## 2019-05-27 PROBLEM — E46 PROTEIN CALORIE MALNUTRITION (HCC): Status: RESOLVED | Noted: 2017-10-27 | Resolved: 2019-05-27

## 2019-05-27 LAB
ALBUMIN SERPL BCP-MCNC: 4 G/DL (ref 3.5–5.7)
ALP SERPL-CCNC: 226 U/L (ref 40–150)
ALT SERPL W P-5'-P-CCNC: 246 U/L (ref 7–52)
ANION GAP SERPL CALCULATED.3IONS-SCNC: 12 MMOL/L (ref 4–13)
APTT PPP: 33 SECONDS (ref 26–38)
AST SERPL W P-5'-P-CCNC: 756 U/L (ref 13–39)
ATRIAL RATE: 53 BPM
BASOPHILS # BLD AUTO: 0 THOUSANDS/ΜL (ref 0–0.1)
BASOPHILS NFR BLD AUTO: 0 % (ref 0–2)
BILIRUB SERPL-MCNC: 2.4 MG/DL (ref 0.2–1)
BUN SERPL-MCNC: 10 MG/DL (ref 7–25)
CALCIUM SERPL-MCNC: 9.7 MG/DL (ref 8.6–10.5)
CHLORIDE SERPL-SCNC: 101 MMOL/L (ref 98–107)
CO2 SERPL-SCNC: 24 MMOL/L (ref 21–31)
CREAT SERPL-MCNC: 1.02 MG/DL (ref 0.6–1.2)
EOSINOPHIL # BLD AUTO: 0 THOUSAND/ΜL (ref 0–0.61)
EOSINOPHIL NFR BLD AUTO: 1 % (ref 0–5)
ERYTHROCYTE [DISTWIDTH] IN BLOOD BY AUTOMATED COUNT: 12.1 % (ref 11.5–14.5)
GFR SERPL CREATININE-BSD FRML MDRD: 61 ML/MIN/1.73SQ M
GLUCOSE SERPL-MCNC: 162 MG/DL (ref 65–99)
HCT VFR BLD AUTO: 43.1 % (ref 42–47)
HGB BLD-MCNC: 15 G/DL (ref 12–16)
INR PPP: 1.04 (ref 0.9–1.5)
LIPASE SERPL-CCNC: 1865 U/L (ref 11–82)
LYMPHOCYTES # BLD AUTO: 0.8 THOUSANDS/ΜL (ref 0.6–4.47)
LYMPHOCYTES NFR BLD AUTO: 10 % (ref 21–51)
MCH RBC QN AUTO: 33.3 PG (ref 26–34)
MCHC RBC AUTO-ENTMCNC: 34.9 G/DL (ref 31–37)
MCV RBC AUTO: 96 FL (ref 81–99)
MONOCYTES # BLD AUTO: 0.6 THOUSAND/ΜL (ref 0.17–1.22)
MONOCYTES NFR BLD AUTO: 7 % (ref 2–12)
NEUTROPHILS # BLD AUTO: 6.4 THOUSANDS/ΜL (ref 1.4–6.5)
NEUTS SEG NFR BLD AUTO: 82 % (ref 42–75)
P AXIS: -19 DEGREES
PLATELET # BLD AUTO: 217 THOUSANDS/UL (ref 149–390)
PMV BLD AUTO: 10.5 FL (ref 8.6–11.7)
POTASSIUM SERPL-SCNC: 3.9 MMOL/L (ref 3.5–5.5)
PR INTERVAL: 166 MS
PROT SERPL-MCNC: 7.1 G/DL (ref 6.4–8.9)
PROTHROMBIN TIME: 12 SECONDS (ref 10.2–13)
QRS AXIS: -15 DEGREES
QRSD INTERVAL: 76 MS
QT INTERVAL: 448 MS
QTC INTERVAL: 420 MS
RBC # BLD AUTO: 4.52 MILLION/UL (ref 3.9–5.2)
SODIUM SERPL-SCNC: 137 MMOL/L (ref 134–143)
T WAVE AXIS: 39 DEGREES
TROPONIN I SERPL-MCNC: <0.03 NG/ML
VENTRICULAR RATE: 53 BPM
WBC # BLD AUTO: 7.8 THOUSAND/UL (ref 4.8–10.8)

## 2019-05-27 PROCEDURE — 96374 THER/PROPH/DIAG INJ IV PUSH: CPT

## 2019-05-27 PROCEDURE — 85610 PROTHROMBIN TIME: CPT | Performed by: EMERGENCY MEDICINE

## 2019-05-27 PROCEDURE — 85025 COMPLETE CBC W/AUTO DIFF WBC: CPT | Performed by: EMERGENCY MEDICINE

## 2019-05-27 PROCEDURE — 99222 1ST HOSP IP/OBS MODERATE 55: CPT | Performed by: INTERNAL MEDICINE

## 2019-05-27 PROCEDURE — 93010 ELECTROCARDIOGRAM REPORT: CPT | Performed by: INTERNAL MEDICINE

## 2019-05-27 PROCEDURE — 96361 HYDRATE IV INFUSION ADD-ON: CPT

## 2019-05-27 PROCEDURE — 84484 ASSAY OF TROPONIN QUANT: CPT | Performed by: EMERGENCY MEDICINE

## 2019-05-27 PROCEDURE — C9113 INJ PANTOPRAZOLE SODIUM, VIA: HCPCS | Performed by: EMERGENCY MEDICINE

## 2019-05-27 PROCEDURE — 83690 ASSAY OF LIPASE: CPT | Performed by: EMERGENCY MEDICINE

## 2019-05-27 PROCEDURE — 36415 COLL VENOUS BLD VENIPUNCTURE: CPT | Performed by: EMERGENCY MEDICINE

## 2019-05-27 PROCEDURE — 74177 CT ABD & PELVIS W/CONTRAST: CPT

## 2019-05-27 PROCEDURE — 99223 1ST HOSP IP/OBS HIGH 75: CPT | Performed by: INTERNAL MEDICINE

## 2019-05-27 PROCEDURE — 96375 TX/PRO/DX INJ NEW DRUG ADDON: CPT

## 2019-05-27 PROCEDURE — 85730 THROMBOPLASTIN TIME PARTIAL: CPT | Performed by: EMERGENCY MEDICINE

## 2019-05-27 PROCEDURE — 80053 COMPREHEN METABOLIC PANEL: CPT | Performed by: EMERGENCY MEDICINE

## 2019-05-27 RX ORDER — OXYCODONE HYDROCHLORIDE 10 MG/1
10 TABLET ORAL EVERY 4 HOURS PRN
Status: DISCONTINUED | OUTPATIENT
Start: 2019-05-27 | End: 2019-05-30 | Stop reason: HOSPADM

## 2019-05-27 RX ORDER — LEVOTHYROXINE SODIUM 0.12 MG/1
125 TABLET ORAL EVERY OTHER DAY
Status: DISCONTINUED | OUTPATIENT
Start: 2019-05-28 | End: 2019-05-30 | Stop reason: HOSPADM

## 2019-05-27 RX ORDER — BUSPIRONE HYDROCHLORIDE 10 MG/1
20 TABLET ORAL
Status: DISCONTINUED | OUTPATIENT
Start: 2019-05-27 | End: 2019-05-30 | Stop reason: HOSPADM

## 2019-05-27 RX ORDER — SODIUM CHLORIDE 9 MG/ML
125 INJECTION, SOLUTION INTRAVENOUS CONTINUOUS
Status: DISCONTINUED | OUTPATIENT
Start: 2019-05-27 | End: 2019-05-27 | Stop reason: HOSPADM

## 2019-05-27 RX ORDER — LEVOTHYROXINE SODIUM 0.12 MG/1
125 TABLET ORAL DAILY
COMMUNITY
End: 2021-05-05

## 2019-05-27 RX ORDER — ONDANSETRON 2 MG/ML
4 INJECTION INTRAMUSCULAR; INTRAVENOUS EVERY 6 HOURS PRN
Status: DISCONTINUED | OUTPATIENT
Start: 2019-05-27 | End: 2019-05-30 | Stop reason: HOSPADM

## 2019-05-27 RX ORDER — LEVOTHYROXINE SODIUM 0.1 MG/1
100 TABLET ORAL
Status: DISCONTINUED | OUTPATIENT
Start: 2019-05-27 | End: 2019-05-27

## 2019-05-27 RX ORDER — MELATONIN 10 MG
10 CAPSULE ORAL
COMMUNITY

## 2019-05-27 RX ORDER — OXYBUTYNIN CHLORIDE 10 MG/1
10 TABLET, EXTENDED RELEASE ORAL DAILY
Status: DISCONTINUED | OUTPATIENT
Start: 2019-05-27 | End: 2019-05-30 | Stop reason: HOSPADM

## 2019-05-27 RX ORDER — BUSPIRONE HYDROCHLORIDE 15 MG/1
15 TABLET ORAL DAILY
COMMUNITY

## 2019-05-27 RX ORDER — PANTOPRAZOLE SODIUM 40 MG/1
40 TABLET, DELAYED RELEASE ORAL
Status: DISCONTINUED | OUTPATIENT
Start: 2019-05-28 | End: 2019-05-30 | Stop reason: HOSPADM

## 2019-05-27 RX ORDER — LEVOTHYROXINE SODIUM 0.12 MG/1
125 TABLET ORAL
Status: DISCONTINUED | OUTPATIENT
Start: 2019-05-27 | End: 2019-05-27 | Stop reason: CLARIF

## 2019-05-27 RX ORDER — MELATONIN
2000 DAILY
Status: DISCONTINUED | OUTPATIENT
Start: 2019-05-27 | End: 2019-05-30 | Stop reason: HOSPADM

## 2019-05-27 RX ORDER — OXYBUTYNIN CHLORIDE 10 MG/1
10 TABLET, EXTENDED RELEASE ORAL DAILY
COMMUNITY

## 2019-05-27 RX ORDER — OXYCODONE HYDROCHLORIDE 10 MG/1
30 TABLET ORAL EVERY 6 HOURS
Status: DISCONTINUED | OUTPATIENT
Start: 2019-05-27 | End: 2019-05-30 | Stop reason: HOSPADM

## 2019-05-27 RX ORDER — LANOLIN ALCOHOL/MO/W.PET/CERES
6 CREAM (GRAM) TOPICAL
Status: DISCONTINUED | OUTPATIENT
Start: 2019-05-27 | End: 2019-05-30 | Stop reason: HOSPADM

## 2019-05-27 RX ORDER — LEVOTHYROXINE SODIUM 0.1 MG/1
100 TABLET ORAL EVERY OTHER DAY
Status: DISCONTINUED | OUTPATIENT
Start: 2019-05-27 | End: 2019-05-30 | Stop reason: HOSPADM

## 2019-05-27 RX ORDER — OXYCODONE HYDROCHLORIDE 30 MG/1
30 TABLET ORAL EVERY 6 HOURS
COMMUNITY
End: 2020-07-22

## 2019-05-27 RX ORDER — OXYCODONE AND ACETAMINOPHEN 10; 325 MG/1; MG/1
1 TABLET ORAL EVERY 4 HOURS
COMMUNITY

## 2019-05-27 RX ORDER — MAGNESIUM HYDROXIDE/ALUMINUM HYDROXICE/SIMETHICONE 120; 1200; 1200 MG/30ML; MG/30ML; MG/30ML
30 SUSPENSION ORAL EVERY 6 HOURS PRN
Status: DISCONTINUED | OUTPATIENT
Start: 2019-05-27 | End: 2019-05-30 | Stop reason: HOSPADM

## 2019-05-27 RX ORDER — ACETAMINOPHEN 325 MG/1
325 TABLET ORAL EVERY 4 HOURS
Status: DISCONTINUED | OUTPATIENT
Start: 2019-05-27 | End: 2019-05-30 | Stop reason: HOSPADM

## 2019-05-27 RX ADMIN — HYDROMORPHONE HYDROCHLORIDE 1 MG: 1 INJECTION, SOLUTION INTRAMUSCULAR; INTRAVENOUS; SUBCUTANEOUS at 00:05

## 2019-05-27 RX ADMIN — ONDANSETRON 4 MG: 2 INJECTION INTRAMUSCULAR; INTRAVENOUS at 00:05

## 2019-05-27 RX ADMIN — OXYCODONE HYDROCHLORIDE 30 MG: 10 TABLET ORAL at 17:46

## 2019-05-27 RX ADMIN — VITAMIN D, TAB 1000IU (100/BT) 2000 UNITS: 25 TAB at 12:53

## 2019-05-27 RX ADMIN — OXYCODONE HYDROCHLORIDE 30 MG: 10 TABLET ORAL at 12:54

## 2019-05-27 RX ADMIN — PANTOPRAZOLE SODIUM 40 MG: 40 INJECTION, POWDER, FOR SOLUTION INTRAVENOUS at 00:05

## 2019-05-27 RX ADMIN — MELATONIN TAB 3 MG 6 MG: 3 TAB at 21:26

## 2019-05-27 RX ADMIN — ACETAMINOPHEN 325 MG: 325 TABLET ORAL at 12:53

## 2019-05-27 RX ADMIN — ENOXAPARIN SODIUM 40 MG: 40 INJECTION SUBCUTANEOUS at 12:53

## 2019-05-27 RX ADMIN — ACETAMINOPHEN 325 MG: 325 TABLET ORAL at 20:06

## 2019-05-27 RX ADMIN — ONDANSETRON 4 MG: 2 INJECTION INTRAMUSCULAR; INTRAVENOUS at 20:11

## 2019-05-27 RX ADMIN — OXYBUTYNIN CHLORIDE 10 MG: 10 TABLET, EXTENDED RELEASE ORAL at 12:54

## 2019-05-27 RX ADMIN — SODIUM CHLORIDE 1000 ML: 0.9 INJECTION, SOLUTION INTRAVENOUS at 00:04

## 2019-05-27 RX ADMIN — ACETAMINOPHEN 325 MG: 325 TABLET ORAL at 16:12

## 2019-05-27 RX ADMIN — BUSPIRONE HYDROCHLORIDE 20 MG: 10 TABLET ORAL at 21:26

## 2019-05-27 RX ADMIN — IOHEXOL 100 ML: 350 INJECTION, SOLUTION INTRAVENOUS at 02:36

## 2019-05-27 RX ADMIN — LEVOTHYROXINE SODIUM 100 MCG: 100 TABLET ORAL at 12:53

## 2019-05-27 RX ADMIN — BUSPIRONE HYDROCHLORIDE 15 MG: 5 TABLET ORAL at 12:54

## 2019-05-28 ENCOUNTER — ANESTHESIA EVENT (INPATIENT)
Dept: RADIOLOGY | Facility: HOSPITAL | Age: 59
DRG: 444 | End: 2019-05-28
Payer: MEDICARE

## 2019-05-28 LAB
ALBUMIN SERPL BCP-MCNC: 2.7 G/DL (ref 3.5–5)
ALP SERPL-CCNC: 263 U/L (ref 46–116)
ALT SERPL W P-5'-P-CCNC: 204 U/L (ref 12–78)
ANION GAP SERPL CALCULATED.3IONS-SCNC: 5 MMOL/L (ref 4–13)
AST SERPL W P-5'-P-CCNC: 181 U/L (ref 5–45)
BASOPHILS # BLD AUTO: 0.03 THOUSANDS/ΜL (ref 0–0.1)
BASOPHILS NFR BLD AUTO: 1 % (ref 0–1)
BILIRUB SERPL-MCNC: 3.07 MG/DL (ref 0.2–1)
BUN SERPL-MCNC: 6 MG/DL (ref 5–25)
CALCIUM SERPL-MCNC: 8.8 MG/DL (ref 8.3–10.1)
CHLORIDE SERPL-SCNC: 103 MMOL/L (ref 100–108)
CO2 SERPL-SCNC: 29 MMOL/L (ref 21–32)
CREAT SERPL-MCNC: 1 MG/DL (ref 0.6–1.3)
EOSINOPHIL # BLD AUTO: 0.27 THOUSAND/ΜL (ref 0–0.61)
EOSINOPHIL NFR BLD AUTO: 7 % (ref 0–6)
ERYTHROCYTE [DISTWIDTH] IN BLOOD BY AUTOMATED COUNT: 11.8 % (ref 11.6–15.1)
GFR SERPL CREATININE-BSD FRML MDRD: 62 ML/MIN/1.73SQ M
GLUCOSE SERPL-MCNC: 103 MG/DL (ref 65–140)
HCT VFR BLD AUTO: 38.9 % (ref 34.8–46.1)
HGB BLD-MCNC: 13 G/DL (ref 11.5–15.4)
IMM GRANULOCYTES # BLD AUTO: 0.01 THOUSAND/UL (ref 0–0.2)
IMM GRANULOCYTES NFR BLD AUTO: 0 % (ref 0–2)
LYMPHOCYTES # BLD AUTO: 1.3 THOUSANDS/ΜL (ref 0.6–4.47)
LYMPHOCYTES NFR BLD AUTO: 34 % (ref 14–44)
MCH RBC QN AUTO: 33.9 PG (ref 26.8–34.3)
MCHC RBC AUTO-ENTMCNC: 33.4 G/DL (ref 31.4–37.4)
MCV RBC AUTO: 101 FL (ref 82–98)
MONOCYTES # BLD AUTO: 0.38 THOUSAND/ΜL (ref 0.17–1.22)
MONOCYTES NFR BLD AUTO: 10 % (ref 4–12)
NEUTROPHILS # BLD AUTO: 1.88 THOUSANDS/ΜL (ref 1.85–7.62)
NEUTS SEG NFR BLD AUTO: 48 % (ref 43–75)
NRBC BLD AUTO-RTO: 0 /100 WBCS
PLATELET # BLD AUTO: 161 THOUSANDS/UL (ref 149–390)
PMV BLD AUTO: 12.8 FL (ref 8.9–12.7)
POTASSIUM SERPL-SCNC: 4.1 MMOL/L (ref 3.5–5.3)
PROT SERPL-MCNC: 6.3 G/DL (ref 6.4–8.2)
RBC # BLD AUTO: 3.84 MILLION/UL (ref 3.81–5.12)
SODIUM SERPL-SCNC: 137 MMOL/L (ref 136–145)
WBC # BLD AUTO: 3.87 THOUSAND/UL (ref 4.31–10.16)

## 2019-05-28 PROCEDURE — 80053 COMPREHEN METABOLIC PANEL: CPT | Performed by: INTERNAL MEDICINE

## 2019-05-28 PROCEDURE — 0F9930Z DRAINAGE OF COMMON BILE DUCT WITH DRAINAGE DEVICE, PERCUTANEOUS APPROACH: ICD-10-PCS | Performed by: RADIOLOGY

## 2019-05-28 PROCEDURE — 99232 SBSQ HOSP IP/OBS MODERATE 35: CPT | Performed by: INTERNAL MEDICINE

## 2019-05-28 PROCEDURE — 99222 1ST HOSP IP/OBS MODERATE 55: CPT | Performed by: SURGERY

## 2019-05-28 PROCEDURE — 85025 COMPLETE CBC W/AUTO DIFF WBC: CPT | Performed by: INTERNAL MEDICINE

## 2019-05-28 RX ORDER — FAMOTIDINE 20 MG/1
20 TABLET, FILM COATED ORAL 2 TIMES DAILY
Status: DISCONTINUED | OUTPATIENT
Start: 2019-05-28 | End: 2019-05-30 | Stop reason: HOSPADM

## 2019-05-28 RX ADMIN — ACETAMINOPHEN 325 MG: 325 TABLET ORAL at 11:10

## 2019-05-28 RX ADMIN — BUSPIRONE HYDROCHLORIDE 15 MG: 5 TABLET ORAL at 08:00

## 2019-05-28 RX ADMIN — PANTOPRAZOLE SODIUM 40 MG: 40 TABLET, DELAYED RELEASE ORAL at 06:13

## 2019-05-28 RX ADMIN — OXYCODONE HYDROCHLORIDE 30 MG: 10 TABLET ORAL at 00:11

## 2019-05-28 RX ADMIN — ACETAMINOPHEN 325 MG: 325 TABLET ORAL at 17:30

## 2019-05-28 RX ADMIN — FAMOTIDINE 20 MG: 20 TABLET ORAL at 17:30

## 2019-05-28 RX ADMIN — ONDANSETRON 4 MG: 2 INJECTION INTRAMUSCULAR; INTRAVENOUS at 03:31

## 2019-05-28 RX ADMIN — ACETAMINOPHEN 325 MG: 325 TABLET ORAL at 00:11

## 2019-05-28 RX ADMIN — OXYCODONE HYDROCHLORIDE 30 MG: 10 TABLET ORAL at 23:46

## 2019-05-28 RX ADMIN — ACETAMINOPHEN 325 MG: 325 TABLET ORAL at 03:31

## 2019-05-28 RX ADMIN — LEVOTHYROXINE SODIUM 125 MCG: 125 TABLET ORAL at 06:14

## 2019-05-28 RX ADMIN — OXYCODONE HYDROCHLORIDE 30 MG: 10 TABLET ORAL at 11:11

## 2019-05-28 RX ADMIN — ENOXAPARIN SODIUM 40 MG: 40 INJECTION SUBCUTANEOUS at 08:00

## 2019-05-28 RX ADMIN — MELATONIN TAB 3 MG 6 MG: 3 TAB at 21:40

## 2019-05-28 RX ADMIN — OXYCODONE HYDROCHLORIDE 30 MG: 10 TABLET ORAL at 17:30

## 2019-05-28 RX ADMIN — OXYCODONE HYDROCHLORIDE 30 MG: 10 TABLET ORAL at 06:13

## 2019-05-28 RX ADMIN — VITAMIN D, TAB 1000IU (100/BT) 2000 UNITS: 25 TAB at 08:00

## 2019-05-28 RX ADMIN — BUSPIRONE HYDROCHLORIDE 20 MG: 10 TABLET ORAL at 21:41

## 2019-05-28 RX ADMIN — OXYBUTYNIN CHLORIDE 10 MG: 10 TABLET, EXTENDED RELEASE ORAL at 08:00

## 2019-05-28 RX ADMIN — ACETAMINOPHEN 325 MG: 325 TABLET ORAL at 08:00

## 2019-05-28 RX ADMIN — ACETAMINOPHEN 325 MG: 325 TABLET ORAL at 21:41

## 2019-05-29 ENCOUNTER — APPOINTMENT (INPATIENT)
Dept: RADIOLOGY | Facility: HOSPITAL | Age: 59
DRG: 444 | End: 2019-05-29
Attending: INTERNAL MEDICINE
Payer: MEDICARE

## 2019-05-29 ENCOUNTER — ANESTHESIA (INPATIENT)
Dept: RADIOLOGY | Facility: HOSPITAL | Age: 59
DRG: 444 | End: 2019-05-29
Payer: MEDICARE

## 2019-05-29 LAB — GLUCOSE SERPL-MCNC: 95 MG/DL (ref 65–140)

## 2019-05-29 PROCEDURE — C1769 GUIDE WIRE: HCPCS

## 2019-05-29 PROCEDURE — 87205 SMEAR GRAM STAIN: CPT | Performed by: INTERNAL MEDICINE

## 2019-05-29 PROCEDURE — 87077 CULTURE AEROBIC IDENTIFY: CPT | Performed by: INTERNAL MEDICINE

## 2019-05-29 PROCEDURE — 47490 INCISION OF GALLBLADDER: CPT

## 2019-05-29 PROCEDURE — C1729 CATH, DRAINAGE: HCPCS

## 2019-05-29 PROCEDURE — 47534 PLMT BILIARY DRAINAGE CATH: CPT | Performed by: RADIOLOGY

## 2019-05-29 PROCEDURE — 87186 SC STD MICRODIL/AGAR DIL: CPT | Performed by: INTERNAL MEDICINE

## 2019-05-29 PROCEDURE — C1894 INTRO/SHEATH, NON-LASER: HCPCS

## 2019-05-29 PROCEDURE — 82948 REAGENT STRIP/BLOOD GLUCOSE: CPT

## 2019-05-29 PROCEDURE — NC001 PR NO CHARGE: Performed by: RADIOLOGY

## 2019-05-29 PROCEDURE — 99232 SBSQ HOSP IP/OBS MODERATE 35: CPT | Performed by: INTERNAL MEDICINE

## 2019-05-29 PROCEDURE — 87070 CULTURE OTHR SPECIMN AEROBIC: CPT | Performed by: INTERNAL MEDICINE

## 2019-05-29 RX ORDER — NEOSTIGMINE METHYLSULFATE 1 MG/ML
INJECTION INTRAVENOUS AS NEEDED
Status: DISCONTINUED | OUTPATIENT
Start: 2019-05-29 | End: 2019-05-29 | Stop reason: SURG

## 2019-05-29 RX ORDER — GLYCOPYRROLATE 0.2 MG/ML
INJECTION INTRAMUSCULAR; INTRAVENOUS AS NEEDED
Status: DISCONTINUED | OUTPATIENT
Start: 2019-05-29 | End: 2019-05-29 | Stop reason: SURG

## 2019-05-29 RX ORDER — EPHEDRINE SULFATE 50 MG/ML
INJECTION INTRAVENOUS AS NEEDED
Status: DISCONTINUED | OUTPATIENT
Start: 2019-05-29 | End: 2019-05-29 | Stop reason: SURG

## 2019-05-29 RX ORDER — MIDAZOLAM HYDROCHLORIDE 1 MG/ML
INJECTION INTRAMUSCULAR; INTRAVENOUS AS NEEDED
Status: DISCONTINUED | OUTPATIENT
Start: 2019-05-29 | End: 2019-05-29 | Stop reason: SURG

## 2019-05-29 RX ORDER — SODIUM CHLORIDE 9 MG/ML
INJECTION, SOLUTION INTRAVENOUS CONTINUOUS PRN
Status: DISCONTINUED | OUTPATIENT
Start: 2019-05-29 | End: 2019-05-29 | Stop reason: SURG

## 2019-05-29 RX ORDER — ONDANSETRON 2 MG/ML
INJECTION INTRAMUSCULAR; INTRAVENOUS AS NEEDED
Status: DISCONTINUED | OUTPATIENT
Start: 2019-05-29 | End: 2019-05-29 | Stop reason: SURG

## 2019-05-29 RX ORDER — FENTANYL CITRATE 50 UG/ML
INJECTION, SOLUTION INTRAMUSCULAR; INTRAVENOUS AS NEEDED
Status: DISCONTINUED | OUTPATIENT
Start: 2019-05-29 | End: 2019-05-29 | Stop reason: SURG

## 2019-05-29 RX ORDER — ROCURONIUM BROMIDE 10 MG/ML
INJECTION, SOLUTION INTRAVENOUS AS NEEDED
Status: DISCONTINUED | OUTPATIENT
Start: 2019-05-29 | End: 2019-05-29 | Stop reason: SURG

## 2019-05-29 RX ORDER — PROPOFOL 10 MG/ML
INJECTION, EMULSION INTRAVENOUS AS NEEDED
Status: DISCONTINUED | OUTPATIENT
Start: 2019-05-29 | End: 2019-05-29 | Stop reason: SURG

## 2019-05-29 RX ORDER — LIDOCAINE HYDROCHLORIDE 10 MG/ML
INJECTION, SOLUTION INFILTRATION; PERINEURAL CODE/TRAUMA/SEDATION MEDICATION
Status: DISCONTINUED | OUTPATIENT
Start: 2019-05-29 | End: 2019-05-30 | Stop reason: HOSPADM

## 2019-05-29 RX ORDER — DEXAMETHASONE SODIUM PHOSPHATE 4 MG/ML
INJECTION, SOLUTION INTRA-ARTICULAR; INTRALESIONAL; INTRAMUSCULAR; INTRAVENOUS; SOFT TISSUE AS NEEDED
Status: DISCONTINUED | OUTPATIENT
Start: 2019-05-29 | End: 2019-05-29 | Stop reason: SURG

## 2019-05-29 RX ADMIN — MIDAZOLAM 2 MG: 1 INJECTION INTRAMUSCULAR; INTRAVENOUS at 13:03

## 2019-05-29 RX ADMIN — ACETAMINOPHEN 325 MG: 325 TABLET ORAL at 05:57

## 2019-05-29 RX ADMIN — OXYBUTYNIN CHLORIDE 10 MG: 10 TABLET, EXTENDED RELEASE ORAL at 08:13

## 2019-05-29 RX ADMIN — FAMOTIDINE 20 MG: 20 TABLET ORAL at 08:13

## 2019-05-29 RX ADMIN — PIPERACILLIN AND TAZOBACTAM 3.38 G: 3; .375 INJECTION, POWDER, LYOPHILIZED, FOR SOLUTION INTRAVENOUS; PARENTERAL at 13:26

## 2019-05-29 RX ADMIN — BUSPIRONE HYDROCHLORIDE 20 MG: 10 TABLET ORAL at 22:09

## 2019-05-29 RX ADMIN — PHENYLEPHRINE HYDROCHLORIDE 100 MCG: 10 INJECTION INTRAVENOUS at 13:17

## 2019-05-29 RX ADMIN — ACETAMINOPHEN 325 MG: 325 TABLET ORAL at 22:09

## 2019-05-29 RX ADMIN — FAMOTIDINE 20 MG: 20 TABLET ORAL at 17:08

## 2019-05-29 RX ADMIN — OXYCODONE HYDROCHLORIDE 10 MG: 10 TABLET ORAL at 20:33

## 2019-05-29 RX ADMIN — ACETAMINOPHEN 325 MG: 325 TABLET ORAL at 08:13

## 2019-05-29 RX ADMIN — MELATONIN TAB 3 MG 6 MG: 3 TAB at 22:10

## 2019-05-29 RX ADMIN — OXYCODONE HYDROCHLORIDE 10 MG: 10 TABLET ORAL at 15:37

## 2019-05-29 RX ADMIN — OXYCODONE HYDROCHLORIDE 10 MG: 10 TABLET ORAL at 04:56

## 2019-05-29 RX ADMIN — FENTANYL CITRATE 100 MCG: 50 INJECTION, SOLUTION INTRAMUSCULAR; INTRAVENOUS at 13:08

## 2019-05-29 RX ADMIN — PROPOFOL 200 MG: 10 INJECTION, EMULSION INTRAVENOUS at 13:08

## 2019-05-29 RX ADMIN — LIDOCAINE HYDROCHLORIDE 3 ML: 10 INJECTION, SOLUTION INFILTRATION; PERINEURAL at 13:50

## 2019-05-29 RX ADMIN — NEOSTIGMINE METHYLSULFATE 3 MG: 1 INJECTION, SOLUTION INTRAVENOUS at 14:23

## 2019-05-29 RX ADMIN — SODIUM CHLORIDE: 0.9 INJECTION, SOLUTION INTRAVENOUS at 12:57

## 2019-05-29 RX ADMIN — ACETAMINOPHEN 325 MG: 325 TABLET ORAL at 17:08

## 2019-05-29 RX ADMIN — LIDOCAINE HYDROCHLORIDE 3 ML: 10 INJECTION, SOLUTION INFILTRATION; PERINEURAL at 14:08

## 2019-05-29 RX ADMIN — BUSPIRONE HYDROCHLORIDE 15 MG: 5 TABLET ORAL at 08:13

## 2019-05-29 RX ADMIN — EPHEDRINE SULFATE 5 MG: 50 INJECTION, SOLUTION INTRAVENOUS at 14:04

## 2019-05-29 RX ADMIN — VITAMIN D, TAB 1000IU (100/BT) 2000 UNITS: 25 TAB at 08:14

## 2019-05-29 RX ADMIN — ROCURONIUM BROMIDE 30 MG: 100 INJECTION, SOLUTION INTRAVENOUS at 13:08

## 2019-05-29 RX ADMIN — GLYCOPYRROLATE 0.6 MG: 0.2 INJECTION INTRAMUSCULAR; INTRAVENOUS at 14:23

## 2019-05-29 RX ADMIN — LEVOTHYROXINE SODIUM 100 MCG: 100 TABLET ORAL at 06:35

## 2019-05-29 RX ADMIN — DEXAMETHASONE SODIUM PHOSPHATE 4 MG: 4 INJECTION, SOLUTION INTRAMUSCULAR; INTRAVENOUS at 13:22

## 2019-05-29 RX ADMIN — EPHEDRINE SULFATE 10 MG: 50 INJECTION, SOLUTION INTRAVENOUS at 13:24

## 2019-05-29 RX ADMIN — EPHEDRINE SULFATE 5 MG: 50 INJECTION, SOLUTION INTRAVENOUS at 13:43

## 2019-05-29 RX ADMIN — LIDOCAINE HYDROCHLORIDE 3 ML: 10 INJECTION, SOLUTION INFILTRATION; PERINEURAL at 13:45

## 2019-05-29 RX ADMIN — ONDANSETRON HYDROCHLORIDE 4 MG: 2 INJECTION, SOLUTION INTRAVENOUS at 13:22

## 2019-05-29 RX ADMIN — ACETAMINOPHEN 325 MG: 325 TABLET ORAL at 01:02

## 2019-05-29 RX ADMIN — OXYCODONE HYDROCHLORIDE 30 MG: 10 TABLET ORAL at 23:49

## 2019-05-29 RX ADMIN — PANTOPRAZOLE SODIUM 40 MG: 40 TABLET, DELAYED RELEASE ORAL at 05:57

## 2019-05-29 RX ADMIN — OXYCODONE HYDROCHLORIDE 30 MG: 10 TABLET ORAL at 17:54

## 2019-05-29 RX ADMIN — OXYCODONE HYDROCHLORIDE 30 MG: 10 TABLET ORAL at 08:13

## 2019-05-30 VITALS
DIASTOLIC BLOOD PRESSURE: 63 MMHG | HEART RATE: 71 BPM | TEMPERATURE: 97.7 F | BODY MASS INDEX: 47.36 KG/M2 | WEIGHT: 242.51 LBS | OXYGEN SATURATION: 94 % | RESPIRATION RATE: 20 BRPM | SYSTOLIC BLOOD PRESSURE: 112 MMHG

## 2019-05-30 PROCEDURE — 99239 HOSP IP/OBS DSCHRG MGMT >30: CPT | Performed by: INTERNAL MEDICINE

## 2019-05-30 RX ORDER — 0.9 % SODIUM CHLORIDE 0.9 %
10 VIAL (ML) INJECTION DAILY
Qty: 900 ML | Refills: 0 | Status: SHIPPED | OUTPATIENT
Start: 2019-05-30 | End: 2019-06-02

## 2019-05-30 RX ADMIN — PANTOPRAZOLE SODIUM 40 MG: 40 TABLET, DELAYED RELEASE ORAL at 06:03

## 2019-05-30 RX ADMIN — ENOXAPARIN SODIUM 40 MG: 40 INJECTION SUBCUTANEOUS at 09:17

## 2019-05-30 RX ADMIN — OXYCODONE HYDROCHLORIDE 10 MG: 10 TABLET ORAL at 01:51

## 2019-05-30 RX ADMIN — FAMOTIDINE 20 MG: 20 TABLET ORAL at 09:16

## 2019-05-30 RX ADMIN — ACETAMINOPHEN 325 MG: 325 TABLET ORAL at 16:34

## 2019-05-30 RX ADMIN — ACETAMINOPHEN 325 MG: 325 TABLET ORAL at 02:52

## 2019-05-30 RX ADMIN — OXYBUTYNIN CHLORIDE 10 MG: 10 TABLET, EXTENDED RELEASE ORAL at 09:17

## 2019-05-30 RX ADMIN — OXYCODONE HYDROCHLORIDE 30 MG: 10 TABLET ORAL at 13:29

## 2019-05-30 RX ADMIN — OXYCODONE HYDROCHLORIDE 30 MG: 10 TABLET ORAL at 06:03

## 2019-05-30 RX ADMIN — ACETAMINOPHEN 325 MG: 325 TABLET ORAL at 09:17

## 2019-05-30 RX ADMIN — LEVOTHYROXINE SODIUM 125 MCG: 125 TABLET ORAL at 06:03

## 2019-05-30 RX ADMIN — OXYCODONE HYDROCHLORIDE 10 MG: 10 TABLET ORAL at 09:17

## 2019-05-30 RX ADMIN — VITAMIN D, TAB 1000IU (100/BT) 2000 UNITS: 25 TAB at 09:16

## 2019-05-30 RX ADMIN — BUSPIRONE HYDROCHLORIDE 15 MG: 5 TABLET ORAL at 09:16

## 2019-05-31 DIAGNOSIS — Z45.2 PICC (PERIPHERALLY INSERTED CENTRAL CATHETER) FLUSH: Primary | ICD-10-CM

## 2019-05-31 RX ORDER — 0.9 % SODIUM CHLORIDE 0.9 %
5 VIAL (ML) INJECTION AS NEEDED
Status: SHIPPED | OUTPATIENT
Start: 2019-05-31

## 2019-06-01 LAB
BACTERIA SPEC BFLD CULT: ABNORMAL
GRAM STN SPEC: ABNORMAL
GRAM STN SPEC: ABNORMAL

## 2019-06-02 DIAGNOSIS — K80.50 CHOLEDOCHOLITHIASIS: Primary | ICD-10-CM

## 2019-06-02 RX ORDER — 0.9 % SODIUM CHLORIDE 0.9 %
10 VIAL (ML) INJECTION DAILY
Qty: 300 ML | Refills: 3 | Status: SHIPPED | OUTPATIENT
Start: 2019-06-02 | End: 2020-07-22

## 2019-06-13 ENCOUNTER — TELEPHONE (OUTPATIENT)
Dept: RADIOLOGY | Facility: HOSPITAL | Age: 59
End: 2019-06-13

## 2019-06-13 RX ORDER — SODIUM CHLORIDE 9 MG/ML
75 INJECTION, SOLUTION INTRAVENOUS CONTINUOUS
Status: CANCELLED | OUTPATIENT
Start: 2019-06-13

## 2019-06-17 ENCOUNTER — HOSPITAL ENCOUNTER (OUTPATIENT)
Dept: RADIOLOGY | Facility: HOSPITAL | Age: 59
Discharge: HOME | End: 2019-06-17
Attending: INTERNAL MEDICINE | Admitting: RADIOLOGY
Payer: MEDICARE

## 2019-06-17 VITALS
RESPIRATION RATE: 15 BRPM | SYSTOLIC BLOOD PRESSURE: 102 MMHG | HEART RATE: 65 BPM | DIASTOLIC BLOOD PRESSURE: 58 MMHG | OXYGEN SATURATION: 98 % | HEIGHT: 62 IN | WEIGHT: 230 LBS | TEMPERATURE: 97.6 F | BODY MASS INDEX: 42.33 KG/M2

## 2019-06-17 DIAGNOSIS — R11.0 NAUSEA AFTER ANESTHESIA, INITIAL ENCOUNTER: Primary | ICD-10-CM

## 2019-06-17 DIAGNOSIS — K83.1 BILIARY OBSTRUCTION: ICD-10-CM

## 2019-06-17 DIAGNOSIS — T88.59XA NAUSEA AFTER ANESTHESIA, INITIAL ENCOUNTER: Primary | ICD-10-CM

## 2019-06-17 PROCEDURE — 99152 MOD SED SAME PHYS/QHP 5/>YRS: CPT

## 2019-06-17 PROCEDURE — 47544 REMOVAL DUCT GLBLDR CALCULI: CPT

## 2019-06-17 PROCEDURE — C1729 CATH, DRAINAGE: HCPCS

## 2019-06-17 PROCEDURE — 47543 ENDOLUMINAL BX BILIARY TREE: CPT

## 2019-06-17 PROCEDURE — 99152 MOD SED SAME PHYS/QHP 5/>YRS: CPT | Performed by: RADIOLOGY

## 2019-06-17 PROCEDURE — 47542 DILATE BILIARY DUCT/AMPULLA: CPT | Performed by: RADIOLOGY

## 2019-06-17 PROCEDURE — 47536 EXCHANGE BILIARY DRG CATH: CPT

## 2019-06-17 PROCEDURE — 47543 ENDOLUMINAL BX BILIARY TREE: CPT | Performed by: RADIOLOGY

## 2019-06-17 PROCEDURE — 88112 CYTOPATH CELL ENHANCE TECH: CPT | Performed by: PATHOLOGY

## 2019-06-17 PROCEDURE — 47554 BILIARY ENDOSCOPY THRU SKIN: CPT | Performed by: RADIOLOGY

## 2019-06-17 PROCEDURE — C1894 INTRO/SHEATH, NON-LASER: HCPCS

## 2019-06-17 PROCEDURE — 47536 EXCHANGE BILIARY DRG CATH: CPT | Performed by: RADIOLOGY

## 2019-06-17 PROCEDURE — 99153 MOD SED SAME PHYS/QHP EA: CPT

## 2019-06-17 PROCEDURE — C1769 GUIDE WIRE: HCPCS

## 2019-06-17 RX ORDER — SCOLOPAMINE TRANSDERMAL SYSTEM 1 MG/1
1 PATCH, EXTENDED RELEASE TRANSDERMAL
Qty: 2 PATCH | Refills: 0 | Status: SHIPPED | OUTPATIENT
Start: 2019-06-17 | End: 2020-07-22

## 2019-06-17 RX ORDER — SODIUM CHLORIDE 9 MG/ML
75 INJECTION, SOLUTION INTRAVENOUS CONTINUOUS
Status: DISCONTINUED | OUTPATIENT
Start: 2019-06-17 | End: 2019-06-18 | Stop reason: HOSPADM

## 2019-06-17 RX ORDER — FENTANYL CITRATE 50 UG/ML
INJECTION, SOLUTION INTRAMUSCULAR; INTRAVENOUS CODE/TRAUMA/SEDATION MEDICATION
Status: COMPLETED | OUTPATIENT
Start: 2019-06-17 | End: 2019-06-17

## 2019-06-17 RX ORDER — ONDANSETRON 2 MG/ML
INJECTION INTRAMUSCULAR; INTRAVENOUS CODE/TRAUMA/SEDATION MEDICATION
Status: COMPLETED | OUTPATIENT
Start: 2019-06-17 | End: 2019-06-17

## 2019-06-17 RX ORDER — MIDAZOLAM HYDROCHLORIDE 1 MG/ML
INJECTION INTRAMUSCULAR; INTRAVENOUS CODE/TRAUMA/SEDATION MEDICATION
Status: COMPLETED | OUTPATIENT
Start: 2019-06-17 | End: 2019-06-17

## 2019-06-17 RX ADMIN — MIDAZOLAM 1 MG: 1 INJECTION INTRAMUSCULAR; INTRAVENOUS at 09:34

## 2019-06-17 RX ADMIN — FENTANYL CITRATE 50 MCG: 50 INJECTION INTRAMUSCULAR; INTRAVENOUS at 09:19

## 2019-06-17 RX ADMIN — FENTANYL CITRATE 50 MCG: 50 INJECTION INTRAMUSCULAR; INTRAVENOUS at 09:23

## 2019-06-17 RX ADMIN — FENTANYL CITRATE 50 MCG: 50 INJECTION INTRAMUSCULAR; INTRAVENOUS at 09:34

## 2019-06-17 RX ADMIN — MIDAZOLAM 1 MG: 1 INJECTION INTRAMUSCULAR; INTRAVENOUS at 09:23

## 2019-06-17 RX ADMIN — MIDAZOLAM 2 MG: 1 INJECTION INTRAMUSCULAR; INTRAVENOUS at 09:19

## 2019-06-17 RX ADMIN — IOHEXOL 50 ML: 300 INJECTION, SOLUTION INTRAVENOUS at 10:04

## 2019-06-17 RX ADMIN — SODIUM CHLORIDE 75 ML/HR: 0.9 INJECTION, SOLUTION INTRAVENOUS at 07:55

## 2019-06-17 RX ADMIN — ONDANSETRON 4 MG: 2 INJECTION INTRAMUSCULAR; INTRAVENOUS at 09:29

## 2019-06-17 RX ADMIN — FENTANYL CITRATE 50 MCG: 50 INJECTION INTRAMUSCULAR; INTRAVENOUS at 09:29

## 2019-07-03 ENCOUNTER — OFFICE VISIT (OUTPATIENT)
Dept: BARIATRICS | Facility: CLINIC | Age: 59
End: 2019-07-03
Payer: MEDICARE

## 2019-07-03 VITALS
DIASTOLIC BLOOD PRESSURE: 82 MMHG | HEIGHT: 63 IN | WEIGHT: 219.5 LBS | SYSTOLIC BLOOD PRESSURE: 128 MMHG | HEART RATE: 74 BPM | TEMPERATURE: 97.4 F | RESPIRATION RATE: 20 BRPM | BODY MASS INDEX: 38.89 KG/M2

## 2019-07-03 DIAGNOSIS — Z98.84 BARIATRIC SURGERY STATUS: ICD-10-CM

## 2019-07-03 DIAGNOSIS — K80.50 CHOLEDOCHOLITHIASIS: Primary | ICD-10-CM

## 2019-07-03 PROCEDURE — 99213 OFFICE O/P EST LOW 20 MIN: CPT | Performed by: SURGERY

## 2019-07-03 NOTE — PROGRESS NOTES
Assessment/Plan:    Choledocholithiasis  Patient is a 51-year-old female with a recent history of common bile duct obstruction from a stricture and small stones  Patient had placement of a transhepatic biliary tube which was recently up sized  She also underwent balloon dilation of the stricture as well as brushings which returned negative for malignancy  The patient also underwent a sphincterotomy  Her tube is currently capped and the patient is tolerating a diet well and has not developed jaundice  The patient will follow up with interventional Radiology regarding the timing for removal of the tube  From our standpoint the patient does not need any surgery and the tube can come out when deemed appropriate by interventional radiology  We will referred the patient to our gastroenterologist for chronic follow-up of her biliary stricture  Bariatric surgery status  Patient is a 51-year-old female with a history of laparoscopic gastric bypass with partial resection of gastric remnant  Patient has done very well from her weight loss standpoint with an excess weight loss of over 70%  The patient is taking her vitamin and supplement levels  She is currently having a transhepatic drain of her biliary system due to a stricture which was malignant on brushing  Patient will follow up with us on a yearly basis or earlier if needed  Diagnoses and all orders for this visit:    Bariatric surgery status    Choledocholithiasis          Subjective:      Patient ID: Lance Rogers is a 62 y o  female  HPI    The following portions of the patient's history were reviewed and updated as appropriate: allergies, current medications, past family history, past medical history, past social history, past surgical history and problem list     Review of Systems   Constitutional: Negative  HENT: Negative  Eyes: Negative  Respiratory: Negative  Cardiovascular: Negative      Gastrointestinal: Positive for abdominal pain  Negative for anal bleeding, blood in stool, constipation and diarrhea  Endocrine: Negative  Genitourinary: Negative  Musculoskeletal: Positive for arthralgias and back pain  Objective:      /82 (BP Location: Left arm, Patient Position: Sitting, Cuff Size: Standard)   Pulse 74   Temp (!) 97 4 °F (36 3 °C) (Tympanic)   Resp 20   Ht 5' 2 5" (1 588 m)   Wt 99 6 kg (219 lb 8 oz)   BMI 39 51 kg/m²          Physical Exam   Constitutional: She is oriented to person, place, and time  She appears well-developed and well-nourished  No distress  Patient is morbidly obese  Eyes: Pupils are equal, round, and reactive to light  Conjunctivae are normal  No scleral icterus  Neck: Normal range of motion  No tracheal deviation present  Cardiovascular: Normal rate and regular rhythm  Pulmonary/Chest: Effort normal and breath sounds normal    Abdominal: Soft  She exhibits no distension and no mass  There is no tenderness  There is no guarding  Right sided pigtail catheter capped  No surrounding signs of infection  On active bleeding  Abdomen soft nontender nondistended  No evidence of hernias  Neurological: She is oriented to person, place, and time  Skin: She is not diaphoretic

## 2019-07-03 NOTE — ASSESSMENT & PLAN NOTE
Patient is a 28-year-old female with a recent history of common bile duct obstruction from a stricture and small stones  Patient had placement of a transhepatic biliary tube which was recently up sized  She also underwent balloon dilation of the stricture as well as brushings which returned negative for malignancy  The patient also underwent a sphincterotomy  Her tube is currently capped and the patient is tolerating a diet well and has not developed jaundice  The patient will follow up with interventional Radiology regarding the timing for removal of the tube  From our standpoint the patient does not need any surgery and the tube can come out when deemed appropriate by interventional radiology  We will referred the patient to our gastroenterologist for chronic follow-up of her biliary stricture

## 2019-07-03 NOTE — ASSESSMENT & PLAN NOTE
Patient is a 70-year-old female with a history of laparoscopic gastric bypass with partial resection of gastric remnant  Patient has done very well from her weight loss standpoint with an excess weight loss of over 70%  The patient is taking her vitamin and supplement levels  She is currently having a transhepatic drain of her biliary system due to a stricture which was malignant on brushing  Patient will follow up with us on a yearly basis or earlier if needed

## 2019-07-03 NOTE — LETTER
July 3, 2019     Nicole Gomez MD  [address] [place] [address]    Patient: Chaitanya Sharpe   YOB: 1960   Date of Visit: 7/3/2019       Dear Dr Hernandez Ruffin: Thank you for referring Mikaela Maria to me for evaluation  Below are my notes for this consultation  If you have questions, please do not hesitate to call me  I look forward to following your patient along with you  Sincerely,        Polo Calderon MD        CC: MD Philippe Daniel MD  7/3/2019 11:55 AM  Sign at close encounter  Assessment/Plan:    Choledocholithiasis  Patient is a 19-year-old female with a recent history of common bile duct obstruction from a stricture and small stones  Patient had placement of a transhepatic biliary tube which was recently up sized  She also underwent balloon dilation of the stricture as well as brushings which returned negative for malignancy  The patient also underwent a sphincterotomy  Her tube is currently capped and the patient is tolerating a diet well and has not developed jaundice  The patient will follow up with interventional Radiology regarding the timing for removal of the tube  From our standpoint the patient does not need any surgery and the tube can come out when deemed appropriate by interventional radiology  We will referred the patient to our gastroenterologist for chronic follow-up of her biliary stricture  Bariatric surgery status  Patient is a 19-year-old female with a history of laparoscopic gastric bypass with partial resection of gastric remnant  Patient has done very well from her weight loss standpoint with an excess weight loss of over 70%  The patient is taking her vitamin and supplement levels  She is currently having a transhepatic drain of her biliary system due to a stricture which was malignant on brushing  Patient will follow up with us on a yearly basis or earlier if needed         Diagnoses and all orders for this visit:    Bariatric surgery status    Choledocholithiasis          Subjective:      Patient ID: Maxwell Rodriguez is a 62 y o  female  HPI    The following portions of the patient's history were reviewed and updated as appropriate: allergies, current medications, past family history, past medical history, past social history, past surgical history and problem list     Review of Systems   Constitutional: Negative  HENT: Negative  Eyes: Negative  Respiratory: Negative  Cardiovascular: Negative  Gastrointestinal: Positive for abdominal pain  Negative for anal bleeding, blood in stool, constipation and diarrhea  Endocrine: Negative  Genitourinary: Negative  Musculoskeletal: Positive for arthralgias and back pain  Objective:      /82 (BP Location: Left arm, Patient Position: Sitting, Cuff Size: Standard)   Pulse 74   Temp (!) 97 4 °F (36 3 °C) (Tympanic)   Resp 20   Ht 5' 2 5" (1 588 m)   Wt 99 6 kg (219 lb 8 oz)   BMI 39 51 kg/m²           Physical Exam   Constitutional: She is oriented to person, place, and time  She appears well-developed and well-nourished  No distress  Patient is morbidly obese  Eyes: Pupils are equal, round, and reactive to light  Conjunctivae are normal  No scleral icterus  Neck: Normal range of motion  No tracheal deviation present  Cardiovascular: Normal rate and regular rhythm  Pulmonary/Chest: Effort normal and breath sounds normal    Abdominal: Soft  She exhibits no distension and no mass  There is no tenderness  There is no guarding  Right sided pigtail catheter capped  No surrounding signs of infection  On active bleeding  Abdomen soft nontender nondistended  No evidence of hernias  Neurological: She is oriented to person, place, and time  Skin: She is not diaphoretic

## 2019-07-15 ENCOUNTER — HOSPITAL ENCOUNTER (OUTPATIENT)
Dept: RADIOLOGY | Facility: HOSPITAL | Age: 59
Discharge: HOME | End: 2019-07-15
Attending: SURGERY | Admitting: SURGERY
Payer: MEDICARE

## 2019-07-15 DIAGNOSIS — Z98.84 BARIATRIC SURGERY STATUS: ICD-10-CM

## 2019-07-15 DIAGNOSIS — K80.50 CHOLEDOCHOLITHIASIS: ICD-10-CM

## 2019-07-15 PROCEDURE — C1769 GUIDE WIRE: HCPCS

## 2019-07-15 PROCEDURE — C1729 CATH, DRAINAGE: HCPCS

## 2019-07-15 PROCEDURE — 49423 EXCHANGE DRAINAGE CATHETER: CPT

## 2019-07-15 PROCEDURE — 47536 EXCHANGE BILIARY DRG CATH: CPT | Performed by: RADIOLOGY

## 2019-07-15 PROCEDURE — C1894 INTRO/SHEATH, NON-LASER: HCPCS

## 2019-07-15 PROCEDURE — 75984 XRAY CONTROL CATHETER CHANGE: CPT

## 2019-07-15 RX ADMIN — IOHEXOL 10 ML: 300 INJECTION, SOLUTION INTRAVENOUS at 12:53

## 2019-07-19 ENCOUNTER — TELEPHONE (OUTPATIENT)
Dept: GASTROENTEROLOGY | Facility: CLINIC | Age: 59
End: 2019-07-19

## 2019-07-19 ENCOUNTER — TELEPHONE (OUTPATIENT)
Dept: BARIATRICS | Facility: CLINIC | Age: 59
End: 2019-07-19

## 2019-07-19 NOTE — TELEPHONE ENCOUNTER
called pt regarding her inquiry about her tube - she was confused - dr Mihai Hernandez stated that the patient would have to call IR as the tube belongs to them , I called the patient and provided her with the number to IR

## 2019-07-19 NOTE — TELEPHONE ENCOUNTER
New pt is currently scheduled in sept   She is having issues with her tube, please advise if her appt could be pushed up [phone]

## 2019-07-22 ENCOUNTER — OFFICE VISIT (OUTPATIENT)
Dept: GASTROENTEROLOGY | Facility: MEDICAL CENTER | Age: 59
End: 2019-07-22
Payer: MEDICARE

## 2019-07-22 VITALS
SYSTOLIC BLOOD PRESSURE: 126 MMHG | HEIGHT: 62 IN | DIASTOLIC BLOOD PRESSURE: 64 MMHG | BODY MASS INDEX: 42.88 KG/M2 | TEMPERATURE: 98.6 F | WEIGHT: 233 LBS | HEART RATE: 66 BPM

## 2019-07-22 DIAGNOSIS — Z98.84 BARIATRIC SURGERY STATUS: ICD-10-CM

## 2019-07-22 DIAGNOSIS — K83.1 BILIARY STRICTURE: Primary | ICD-10-CM

## 2019-07-22 DIAGNOSIS — K83.1 COMMON BILE DUCT STRICTURE: ICD-10-CM

## 2019-07-22 DIAGNOSIS — K80.50 CHOLEDOCHOLITHIASIS: ICD-10-CM

## 2019-07-22 PROCEDURE — 99215 OFFICE O/P EST HI 40 MIN: CPT | Performed by: INTERNAL MEDICINE

## 2019-07-23 ENCOUNTER — APPOINTMENT (OUTPATIENT)
Dept: LAB | Facility: CLINIC | Age: 59
End: 2019-07-23
Payer: MEDICARE

## 2019-07-23 DIAGNOSIS — K83.1 BILIARY STRICTURE: ICD-10-CM

## 2019-07-23 DIAGNOSIS — K80.50 CHOLEDOCHOLITHIASIS: ICD-10-CM

## 2019-07-23 LAB
ALBUMIN SERPL BCP-MCNC: 2.9 G/DL (ref 3.5–5)
ALP SERPL-CCNC: 134 U/L (ref 46–116)
ALT SERPL W P-5'-P-CCNC: 21 U/L (ref 12–78)
ANION GAP SERPL CALCULATED.3IONS-SCNC: 5 MMOL/L (ref 4–13)
AST SERPL W P-5'-P-CCNC: 18 U/L (ref 5–45)
BILIRUB DIRECT SERPL-MCNC: 0.27 MG/DL (ref 0–0.2)
BILIRUB SERPL-MCNC: 0.75 MG/DL (ref 0.2–1)
BUN SERPL-MCNC: 13 MG/DL (ref 5–25)
CALCIUM SERPL-MCNC: 8.4 MG/DL (ref 8.3–10.1)
CHLORIDE SERPL-SCNC: 109 MMOL/L (ref 100–108)
CO2 SERPL-SCNC: 25 MMOL/L (ref 21–32)
CREAT SERPL-MCNC: 1.05 MG/DL (ref 0.6–1.3)
GFR SERPL CREATININE-BSD FRML MDRD: 59 ML/MIN/1.73SQ M
GLUCOSE P FAST SERPL-MCNC: 83 MG/DL (ref 65–99)
POTASSIUM SERPL-SCNC: 3.9 MMOL/L (ref 3.5–5.3)
PROT SERPL-MCNC: 6.5 G/DL (ref 6.4–8.2)
SODIUM SERPL-SCNC: 139 MMOL/L (ref 136–145)

## 2019-07-23 PROCEDURE — 36415 COLL VENOUS BLD VENIPUNCTURE: CPT

## 2019-07-23 PROCEDURE — 80053 COMPREHEN METABOLIC PANEL: CPT

## 2019-07-23 PROCEDURE — 86301 IMMUNOASSAY TUMOR CA 19-9: CPT

## 2019-07-23 PROCEDURE — 82248 BILIRUBIN DIRECT: CPT

## 2019-07-24 ENCOUNTER — TELEPHONE (OUTPATIENT)
Dept: GASTROENTEROLOGY | Facility: MEDICAL CENTER | Age: 59
End: 2019-07-24

## 2019-07-24 DIAGNOSIS — K83.1 BILIARY STRICTURE: Primary | ICD-10-CM

## 2019-07-24 LAB — CANCER AG19-9 SERPL-ACNC: 15 U/ML (ref 0–35)

## 2019-07-24 NOTE — TELEPHONE ENCOUNTER
She needs blood work next Wednesday before she goes for the IR procedure on Thursday  She then needs another 1 2 weeks after that  All orders are in

## 2019-07-24 NOTE — TELEPHONE ENCOUNTER
----- Message from April Chowdhury MD sent at 7/23/2019  5:26 PM EDT -----  Please call patient :  Labs look good

## 2019-07-24 NOTE — TELEPHONE ENCOUNTER
Spoke to patient she's aware of her lab results  She also wanted you to know she has an appt Monday to get her tube capped and then she goes back Thursday  If you need her to get more labs done please enter in system  She thanks you for all of your help

## 2019-07-25 NOTE — TELEPHONE ENCOUNTER
Pt called, gave her below info - she is aware to have CMP done next Wednesday and then CMP done 1- 2 weeks after   - also mailed scripts to patient

## 2019-07-28 PROBLEM — K83.1 COMMON BILE DUCT STRICTURE: Status: ACTIVE | Noted: 2019-07-28

## 2019-07-28 NOTE — PROGRESS NOTES
Outpatient Follow up  [facility]  [address] [facility]  Magdaleno Rios MD  Ph : [phone]  Fax : [phone]  Mobile : [phone]  Email : [email or web address]  Also available on [address] [facility] 62 y o  female MRN: [id number]    PCP: Ami Villatoro MD  Referring: Juan Luciano MD  [id number] [address], [address] [place] [address] presented for a follow up visit  My recommendations are included  Please do not hesitate to contact me with any questions you may have  ASSESSMENT AND PLAN:      Choledocholithiasis  Successfully treated by interventional Radiology  Common bile duct stricture  There is a concern regarding a stricture of the common bile duct  She has an internal-external drain  There is no evidence of malignancy on brushing/biopsy however these can be false negative  However this is likely inflammatory/secondary to trauma from choledocholithiasis  I have discussed the case with interventional radiology personally  At this time would recommend the following :  -she will be seeing interventional Radiology next week at which time she will be an external drain  She will have blood work done 2-3 days after that and repeat evaluation by IR  If her blood work is not significant for obstruction and cholangiogram suggests emptying into the duodenum bed eventually she will have her drain removed  This was however not be done during that same leak but would wait for another 1-2 weeks  If there is evidence of obstruction then we may proceed with doing a evaluation through the percutaneous access using a cholangio scope or may do a single balloon enteroscopy to evaluate the area of the ampulla  Discussed at length with the patient and her daughter  Discussed personally with Dr Shiloh Garcia - IR         Diagnoses and all orders for this visit:    Biliary stricture  -     Cancer antigen 19-9; Future    Bariatric surgery status  -     Ambulatory referral to Gastroenterology    Choledocholithiasis  -     Ambulatory referral to Gastroenterology  -     Comprehensive metabolic panel; Future  -     Bilirubin, direct; Future    Common bile duct stricture      ______________________________________________________________________    SUBJECTIVE:  Vahid Subramanian is a 60-year-old lady was seen by myself in the hospital when she was admitted for choledocholithiasis  Since she has had no gastric remnant his lap assisted ERCP was not possible hence she underwent IR guided percutaneous drainage  The stone was successfully removed however there was a concern for a distal CBD stricture  Brushings were negative  She still has a drain in place  She would like to have the drain removed  I reviewed her IR studies and they do suggested dilated bile duct with distal narrowing and slow or no emptying of the contrast into the duodenum  Due to this reason the AP internal external drain was left in place  The external part is capped  She is not having any abdominal pain except for discomfort at the percutaneous tube site  She would really like to have this removed  She does not have any nausea vomiting  No weight loss  No jaundice  REVIEW OF SYSTEMS IS OTHERWISE NEGATIVE        Historical Information   Past Medical History:   Diagnosis Date    Anxiety     Back pain     Back pain at L4-L5 level     Bariatric surgery status     Chronic pain disorder     back    Cramping of hands     and legs    DDD (degenerative disc disease), lumbar     Depression     Edema of both legs     with lasix much better    Fall at home     almost 2 yrs ago    Full dentures     GERD (gastroesophageal reflux disease)     History of gastric ulcer     "Years ago    History of heartburn     Hypothyroid     Hypothyroidism    Morbid obesity (HCC)     Motion sickness     Nausea & vomiting     Numbness and tingling in both hands  Numbness and tingling of both feet     PICC (peripherally inserted central catheter) in place     Right arm    PONV (postoperative nausea and vomiting)     Postgastrectomy malabsorption     Sciatica     Shortness of breath     zuñiga    Skin abnormality     Edema BLE- uses boots at home  skin on shins dark and rough    Stress incontinence     Stricture esophagus     Use of cane as ambulatory aid     Wears glasses      Past Surgical History:   Procedure Laterality Date    ABDOMINAL ADHESION SURGERY N/A 2017    Procedure: EXTENSIVE LYSIS ADHESIONS;  Surgeon: Carrie Carranza MD;  [facility];  Service: Elisabeth Zamora ABDOMINAL SURGERY       SECTION      x3    CHOLECYSTECTOMY      COLONOSCOPY      COSMETIC SURGERY      tummy tuck    ESOPHAGOGASTRODUODENOSCOPY N/A 2016    Procedure: ESOPHAGOGASTRODUODENOSCOPY (EGD); Surgeon: Carrie Carranza MD;  [facility]; Service:     ESOPHAGOGASTRODUODENOSCOPY N/A 2017    Procedure: ESOPHAGOGASTRODUODENOSCOPY (EGD); Surgeon: Carrie Carranza MD;  [facility];  Service: Bariatrics    GASTRIC BYPASS      IR TUBE PLACEMENT BILI  2019    PANNICULECTOMY      PARTIAL GASTRECTOMY N/A 2017    Procedure: SUBTOTAL GASTRECTOMY, ESOPHAGEAL JEJUNOSTOMY;  Surgeon: Carrie Carranza MD;  [facility];  Service: Elisabeth Zamora [place] EGD TRANSORAL BIOPSY SINGLE/MULTIPLE N/A 2017    Procedure: ESOPHAGOGASTRODUODENOSCOPY (EGD) with biopsy;  Surgeon: Digna Purcell MD;  [facility]; Service: Bariatrics    TX EGD TRANSORAL BIOPSY SINGLE/MULTIPLE N/A 10/4/2017    Procedure: ESOPHAGOGASTRODUODENOSCOPY (EGD) with balloon dilatation;  Surgeon: Carrie Carranza MD;  [facility]; Service: Bariatrics    TX EGD TRANSORAL BIOPSY SINGLE/MULTIPLE N/A 2017    Procedure: ESOPHAGOGASTRODUODENOSCOPY (EGD) with balloon dilation;  Surgeon: Carrie Carranza MD;  [facility];   Service: The X Androcial  ID LAP, SAADIA RESTRICT PROC, LONGITUDINAL GASTRECTOMY N/A 2016    Procedure:   LAPAROSCOPIC GASTROGASTIC FISTULA TAKEDOWN, PARTIAL GASTRECTOMY, GASTRO JEJUNOSTOMY, EXTENSIVE LYSIS OF ADHESIONS;  Surgeon: Savana Pérez MD;  [facility];  Service: Bariatrics     Social History   Social History     Substance and Sexual Activity   Alcohol Use No     Social History     Substance and Sexual Activity   Drug Use No     Social History     Tobacco Use   Smoking Status Former Smoker    Years: 5 00    Types: Cigarettes    Last attempt to quit: 2001    Years since quittin 6   Smokeless Tobacco Never Used   Tobacco Comment    Quit 15 years ago   4 cigarettes daily     Family History   Problem Relation Age of Onset    Alzheimer's disease Mother     Diabetes Father     Hypertension Father     Anxiety disorder Daughter     Alcohol abuse Family        Meds/Allergies       Current Outpatient Medications:     acetaminophen (TYLENOL) 160 mg/5 mL suspension    busPIRone (BUSPAR) 15 mg tablet    BUSPIRONE HCL PO    cholecalciferol (VITAMIN D3) 1,000 units tablet    furosemide (LASIX) 20 mg tablet    levothyroxine 100 mcg tablet    levothyroxine 125 mcg tablet    Melatonin 10 MG CAPS    ondansetron (ZOFRAN) 4 mg tablet    oxybutynin (DITROPAN-XL) 10 MG 24 hr tablet    oxyCODONE (ROXICODONE) 30 MG immediate release tablet    oxyCODONE-acetaminophen (PERCOCET)  mg per tablet    pantoprazole (PROTONIX) 40 mg tablet    scopolamine (TRANSDERM-SCOP) 1 5 mg/3 days TD 72 hr patch    sodium chloride, PF, 0 9 %    Current Facility-Administered Medications:     sodium chloride (PF) 0 9 % injection 5 mL, 5 mL, Intravenous, PRN    Allergies   Allergen Reactions    Nsaids      Due to gastric bypass    Sulfate     Ms Contin [Morphine] Rash           Objective     Blood pressure 126/64, pulse 66, temperature 98 6 °F (37 °C), temperature source Tympanic, height 5' 2" (1 575 m), weight 106 kg (233 lb), not currently breastfeeding  Body mass index is 42 62 kg/m²  PHYSICAL EXAM:      Physical Exam   Constitutional: She is oriented to person, place, and time  Vital signs are normal  She appears well-developed and well-nourished  Obese   HENT:   Head: Normocephalic and atraumatic  Eyes: Pupils are equal, round, and reactive to light  Conjunctivae are normal  No scleral icterus  Neck: Normal range of motion  Cardiovascular: Normal rate, regular rhythm and normal heart sounds  Pulmonary/Chest: Effort normal and breath sounds normal  No respiratory distress  Abdominal: Soft  Normal appearance and bowel sounds are normal  She exhibits no distension, no ascites and no mass  There is no hepatosplenomegaly  There is no tenderness  No hernia  Right upper quadrant percutaneous train  Musculoskeletal: Normal range of motion  Lymphadenopathy:     She has no cervical adenopathy  Neurological: She is alert and oriented to person, place, and time  Skin: Skin is warm  Psychiatric: She has a normal mood and affect  Her behavior is normal  Thought content normal        Lab Results:   No visits with results within 1 Day(s) from this visit     Latest known visit with results is:   Hospital Outpatient Visit on 06/17/2019   Component Date Value    Case Report 06/17/2019                      Value:Non-gynecologic Cytology                          Case: [id number]                                  Authorizing Provider:  Mario Alberto Sharpe MD        Collected:           06/17/2019 0945              Ordering Location:     [facility]        Received:            06/18/2019 [address] [place] [address] Interventional                                                                            Radiology                                                                    Pathologist:           Lilly Michaud MD                                                                 Specimen: Brushing, common bile duct                                                                 Final Diagnosis 06/17/2019                      Value: This result contains rich text formatting which cannot be displayed here   Note 06/17/2019                      Value: This result contains rich text formatting which cannot be displayed here  Lorna Silence Gross Description 06/17/2019                      Value: This result contains rich text formatting which cannot be displayed here   Additional Information 06/17/2019                      Value: This result contains rich text formatting which cannot be displayed here  Radiology Results:   Ir Tube Check    Result Date: 7/19/2019  Narrative: Drainage catheter evaluation Indication: Biliary stricture status post percutaneous dilatation  Internal/external drain in place  History of gastric bypass  Procedure: The internal/external biliary drainage catheter and surrounding skin were prepped and draped in sterile fashion  1% lidocaine was used for local anesthetic  The drain was removed over a wire and a 7-Maltese sheath was placed  Cholangiogram was performed  Internal drainage was assessed  The sheath was advanced across the ampulla and pullback cholangiogram was performed to assess the distal CBD  The sheath was removed and 10-Maltese biliary drain was replaced  Distal loop was formed in the duodenum  The drain was secured with suture and capped for internal drainage  No stones are seen  Contrast: 50 cc Omnipaque-300 Fluoroscopy time: 4 6 minutes Images: 33 Findings: The catheter remains in satisfactory position   The distal CBD at the ampulla appears circumferentially narrowed  There is no spontaneous internal drainage  The common bile duct is moderately distended though otherwise unremarkable  There is mild intrahepatic ductal dilatation centrally       Impression: Impression: Persistent circumferential narrowing of the distal CBD at the ampulla  without spontaneous internal drainage  10-Czech internal/external biliary drain was replaced  Patient is status post stricturoplasty and brush biopsy demonstrating atypical biliary epithelium favored to be reactive  Plan: Patient has a follow-up appointment with a new gastroenterologist   Will need to discuss findings and determine plan  Direct visualization would probably be the next ideal step   Workstation performed: [id number]     Answers for HPI/ROS submitted by the patient on 7/21/2019   Abdominal pain  Chronicity: recurrent  Onset: more than 1 month ago  Onset quality: sudden  Frequency: intermittently  Episode duration: 5 hours  Progression since onset: gradually improving  Pain location: generalized abdominal region  Pain - numeric: 6/10  Pain quality: burning, sharp  Radiates to: back  anorexia: Yes  arthralgias: Yes  belching: No  constipation: Yes  diarrhea: No  dysuria: No  fever: No  flatus: No  frequency: No  headaches: No  hematochezia: No  hematuria: No  melena: No  myalgias: Yes  nausea: Yes  weight loss: No  vomiting: Yes  Aggravated by: nothing  Relieved by: nothing  Diagnostic workup: CT scan, GI consult, surgery

## 2019-07-28 NOTE — ASSESSMENT & PLAN NOTE
There is a concern regarding a stricture of the common bile duct  She has an internal-external drain  There is no evidence of malignancy on brushing/biopsy however these can be false negative  However this is likely inflammatory/secondary to trauma from choledocholithiasis  I have discussed the case with interventional radiology personally  At this time would recommend the following :  -she will be seeing interventional Radiology next week at which time she will be an external drain  She will have blood work done 2-3 days after that and repeat evaluation by IR  If her blood work is not significant for obstruction and cholangiogram suggests emptying into the duodenum bed eventually she will have her drain removed  This was however not be done during that same leak but would wait for another 1-2 weeks  If there is evidence of obstruction then we may proceed with doing a evaluation through the percutaneous access using a cholangio scope or may do a single balloon enteroscopy to evaluate the area of the ampulla  Discussed at length with the patient and her daughter  Discussed personally with Dr Monae Sweet - IR

## 2019-07-29 ENCOUNTER — HOSPITAL ENCOUNTER (OUTPATIENT)
Dept: RADIOLOGY | Facility: HOSPITAL | Age: 59
Discharge: HOME | End: 2019-07-29
Attending: INTERNAL MEDICINE | Admitting: RADIOLOGY
Payer: MEDICARE

## 2019-07-29 DIAGNOSIS — Z98.84 BARIATRIC SURGERY STATUS: ICD-10-CM

## 2019-07-29 DIAGNOSIS — K80.50 CHOLEDOCHOLITHIASIS: ICD-10-CM

## 2019-07-29 PROCEDURE — 47536 EXCHANGE BILIARY DRG CATH: CPT | Performed by: RADIOLOGY

## 2019-07-29 PROCEDURE — 75984 XRAY CONTROL CATHETER CHANGE: CPT

## 2019-07-29 PROCEDURE — C1729 CATH, DRAINAGE: HCPCS

## 2019-07-29 PROCEDURE — 47536 EXCHANGE BILIARY DRG CATH: CPT

## 2019-07-29 RX ADMIN — IOHEXOL 10 ML: 300 INJECTION, SOLUTION INTRAVENOUS at 13:27

## 2019-07-29 NOTE — DISCHARGE INSTRUCTIONS
TUBE CARE INSTRUCTIONS    Care after your procedure:    Resume your normal diet  Small sips of flat soda will help with nausea  1  The properly functioning catheter should be forward flushed once (1x) daily with 10ml of normal saline using clean technique  You will be given a prescription for flushes  To flush the tube, clean both connections with alcohol swab  Twist off the drainage bag/ bulb  tubing and twist the saline syringe into the drainage tube and flush  Remove the syringe and twist the drainage bag / bulb tubing tubing back on     2  The drainage bag/bulb may be emptied as necessary  Keep a record of the amount of fluid you drain from your tube  This should be done with clean technique as well  3  A fresh dressing should be applied daily over the tube insertion site  4  As the tube is secured to the skin with only a suture,try not to pull on your tube  Tub baths are not permitted  Showers are permitted if the patient's skin entry site is prevented from getting wet  Similarly, washcloth "baths" are acceptable  Contact Interventional Radiology at [phone] [place] PATIENTS: Contact Interventional Radiology at [phone]) Charito Thomas PATIENTS: Contact Interventional Radiology at [phone]) if:    1  Leakage or large amounts of liquid around the catheter  2  Fever of 101 degrees lasting several hours without other obvious cause (such as sore throat, flu, etc)  3  Persistent nausea or vomiting  4  Diminished drainage, which may be associated with pressure or pain  Or when the     drainage from your tube is less than 10mls for 48 hours  5  Catheter pulled back or falls out  The following pharmacies carry the flush syringes         [facility]                     [facility]  [address] [facility]                         [address] [facility]  Phone [phone]            Phone [phone]   [place] [address]                                [phone]  [address] [facility] [address] [place]                      [address] [facility]  Phone [phone]            Phone [phone]                      [place]                                                                                                          [phone]  [facility]  [facility] [address]    [address]  Phone [phone]

## 2019-07-29 NOTE — SEDATION DOCUMENTATION
conversion of an internal/external bili tube to and external bili tube  Pt tube is capped and pt will have a bag to put to drainage is needed  please flush with 10 cc daily per Dr Ephriam Kanner

## 2019-07-29 NOTE — BRIEF OP NOTE (RAD/CATH)
Conversion of internal-external to external biliary drain  Procedure Note    PATIENT NAME: Lorna Gipson  : 1960  MRN: [id number]     Pre-op Diagnosis:   1  Choledocholithiasis    2  Bariatric surgery status      Post-op Diagnosis:   1  Choledocholithiasis    2  Bariatric surgery status        Surgeon:   Ang Bravo MD    Estimated Blood Loss: None    Findings:   1  Successful conversion of internal-external biliary drain to an external biliary drain using 12 Fr Amplatz Marquette drain  2   There was stricture at the distal CBD, however, contrast flowed into the small bowel      Specimens: None    Complications:  None    Anesthesia: Local    Ang Bravo MD     Date: 2019  Time: 1:20 PM

## 2019-07-29 NOTE — H&P
IR H&P    HPI:  62year old female with history of gastric bypass c/b bile duct stricture underwent percutaneous internal-external biliary drain placement on 2019  Patient underwent dilation of the CBD stricture on 2019  Patient returns for externalization of the drain with possible removal of the drain in the future if the CBD is patent  PMH:  Chronic pain  DDD  GERD  Hypothyroid    PSH:    Cholecystectomy  Panniculectomy  Gastrectomy    Physical exam:  Gen: NAD  Abd: Biliary drain in place    A/P:  62year old female with biliary drain for CBD stricture      - Biliary drain conversion to external, followed by capping trial

## 2019-07-31 ENCOUNTER — APPOINTMENT (OUTPATIENT)
Dept: LAB | Facility: CLINIC | Age: 59
End: 2019-07-31
Payer: MEDICARE

## 2019-07-31 ENCOUNTER — TRANSCRIBE ORDERS (OUTPATIENT)
Dept: LAB | Facility: CLINIC | Age: 59
End: 2019-07-31

## 2019-07-31 DIAGNOSIS — K83.1 BILIARY STRICTURE: ICD-10-CM

## 2019-07-31 LAB
ALBUMIN SERPL BCP-MCNC: 3 G/DL (ref 3.5–5)
ALP SERPL-CCNC: 142 U/L (ref 46–116)
ALT SERPL W P-5'-P-CCNC: 14 U/L (ref 12–78)
ANION GAP SERPL CALCULATED.3IONS-SCNC: 3 MMOL/L (ref 4–13)
AST SERPL W P-5'-P-CCNC: 13 U/L (ref 5–45)
BILIRUB SERPL-MCNC: 0.87 MG/DL (ref 0.2–1)
BUN SERPL-MCNC: 6 MG/DL (ref 5–25)
CALCIUM SERPL-MCNC: 8.7 MG/DL (ref 8.3–10.1)
CHLORIDE SERPL-SCNC: 105 MMOL/L (ref 100–108)
CO2 SERPL-SCNC: 28 MMOL/L (ref 21–32)
CREAT SERPL-MCNC: 1.01 MG/DL (ref 0.6–1.3)
GFR SERPL CREATININE-BSD FRML MDRD: 62 ML/MIN/1.73SQ M
GLUCOSE P FAST SERPL-MCNC: 96 MG/DL (ref 65–99)
POTASSIUM SERPL-SCNC: 3.9 MMOL/L (ref 3.5–5.3)
PROT SERPL-MCNC: 6.9 G/DL (ref 6.4–8.2)
SODIUM SERPL-SCNC: 136 MMOL/L (ref 136–145)

## 2019-07-31 PROCEDURE — 80053 COMPREHEN METABOLIC PANEL: CPT

## 2019-07-31 PROCEDURE — 36415 COLL VENOUS BLD VENIPUNCTURE: CPT

## 2019-08-01 ENCOUNTER — HOSPITAL ENCOUNTER (OUTPATIENT)
Dept: RADIOLOGY | Facility: HOSPITAL | Age: 59
Discharge: HOME | End: 2019-08-01
Attending: INTERNAL MEDICINE | Admitting: RADIOLOGY
Payer: MEDICARE

## 2019-08-01 DIAGNOSIS — Z98.84 BARIATRIC SURGERY STATUS: ICD-10-CM

## 2019-08-01 PROCEDURE — 47531 INJECTION FOR CHOLANGIOGRAM: CPT | Performed by: RADIOLOGY

## 2019-08-01 PROCEDURE — 49424 ASSESS CYST CONTRAST INJECT: CPT

## 2019-08-01 PROCEDURE — 76080 X-RAY EXAM OF FISTULA: CPT

## 2019-08-01 RX ADMIN — IOHEXOL 10 ML: 300 INJECTION, SOLUTION INTRAVENOUS at 17:32

## 2019-08-01 NOTE — DISCHARGE INSTR - LAB
POST tube removal     Care after your procedure:          Contact Interventional Radiology at [phone] Hang PATIENTS: Contact Interventional Radiology at [phone]) Stuart Fontenot PATIENTS: Contact Interventional Radiology at [phone]) if:    1  Difficulty breathing, nausea or vomiting  2  Chills or fever above 101 degrees F      3  Pain at biopsy site not relieved by medication  4  Develop any redness, swelling, heat, unusual drainage, heavy bruising or bleeding from biopsy site

## 2019-08-07 ENCOUNTER — APPOINTMENT (OUTPATIENT)
Dept: LAB | Facility: CLINIC | Age: 59
End: 2019-08-07
Payer: MEDICARE

## 2019-08-07 DIAGNOSIS — K83.1 BILIARY STRICTURE: ICD-10-CM

## 2019-08-07 LAB
ALBUMIN SERPL BCP-MCNC: 2.9 G/DL (ref 3.5–5)
ALP SERPL-CCNC: 116 U/L (ref 46–116)
ALT SERPL W P-5'-P-CCNC: 14 U/L (ref 12–78)
ANION GAP SERPL CALCULATED.3IONS-SCNC: 5 MMOL/L (ref 4–13)
AST SERPL W P-5'-P-CCNC: 14 U/L (ref 5–45)
BILIRUB SERPL-MCNC: 0.61 MG/DL (ref 0.2–1)
BUN SERPL-MCNC: 7 MG/DL (ref 5–25)
CALCIUM SERPL-MCNC: 8.5 MG/DL (ref 8.3–10.1)
CHLORIDE SERPL-SCNC: 107 MMOL/L (ref 100–108)
CO2 SERPL-SCNC: 29 MMOL/L (ref 21–32)
CREAT SERPL-MCNC: 0.98 MG/DL (ref 0.6–1.3)
GFR SERPL CREATININE-BSD FRML MDRD: 64 ML/MIN/1.73SQ M
GLUCOSE P FAST SERPL-MCNC: 83 MG/DL (ref 65–99)
POTASSIUM SERPL-SCNC: 4.1 MMOL/L (ref 3.5–5.3)
PROT SERPL-MCNC: 6.5 G/DL (ref 6.4–8.2)
SODIUM SERPL-SCNC: 141 MMOL/L (ref 136–145)

## 2019-08-07 PROCEDURE — 36415 COLL VENOUS BLD VENIPUNCTURE: CPT

## 2019-08-07 PROCEDURE — 80053 COMPREHEN METABOLIC PANEL: CPT

## 2019-08-08 DIAGNOSIS — K83.1 COMMON BILE DUCT STRICTURE: Primary | ICD-10-CM

## 2019-08-23 ENCOUNTER — APPOINTMENT (OUTPATIENT)
Dept: LAB | Facility: CLINIC | Age: 59
End: 2019-08-23
Payer: MEDICARE

## 2019-08-23 DIAGNOSIS — K83.1 COMMON BILE DUCT STRICTURE: ICD-10-CM

## 2019-08-23 LAB
ALBUMIN SERPL BCP-MCNC: 3 G/DL (ref 3.5–5)
ALP SERPL-CCNC: 106 U/L (ref 46–116)
ALT SERPL W P-5'-P-CCNC: 14 U/L (ref 12–78)
ANION GAP SERPL CALCULATED.3IONS-SCNC: 10 MMOL/L (ref 4–13)
AST SERPL W P-5'-P-CCNC: 18 U/L (ref 5–45)
BILIRUB SERPL-MCNC: 0.75 MG/DL (ref 0.2–1)
BUN SERPL-MCNC: 10 MG/DL (ref 5–25)
CALCIUM SERPL-MCNC: 8.4 MG/DL (ref 8.3–10.1)
CHLORIDE SERPL-SCNC: 107 MMOL/L (ref 100–108)
CO2 SERPL-SCNC: 25 MMOL/L (ref 21–32)
CREAT SERPL-MCNC: 1.06 MG/DL (ref 0.6–1.3)
GFR SERPL CREATININE-BSD FRML MDRD: 58 ML/MIN/1.73SQ M
GLUCOSE P FAST SERPL-MCNC: 82 MG/DL (ref 65–99)
POTASSIUM SERPL-SCNC: 3.8 MMOL/L (ref 3.5–5.3)
PROT SERPL-MCNC: 6.7 G/DL (ref 6.4–8.2)
SODIUM SERPL-SCNC: 142 MMOL/L (ref 136–145)

## 2019-08-23 PROCEDURE — 36415 COLL VENOUS BLD VENIPUNCTURE: CPT

## 2019-08-23 PROCEDURE — 80053 COMPREHEN METABOLIC PANEL: CPT

## 2019-09-09 ENCOUNTER — OFFICE VISIT (OUTPATIENT)
Dept: GASTROENTEROLOGY | Facility: MEDICAL CENTER | Age: 59
End: 2019-09-09
Payer: MEDICARE

## 2019-09-09 VITALS
BODY MASS INDEX: 41.96 KG/M2 | DIASTOLIC BLOOD PRESSURE: 60 MMHG | WEIGHT: 228 LBS | TEMPERATURE: 97.6 F | HEIGHT: 62 IN | HEART RATE: 56 BPM | SYSTOLIC BLOOD PRESSURE: 120 MMHG

## 2019-09-09 DIAGNOSIS — K83.1 COMMON BILE DUCT STRICTURE: Primary | ICD-10-CM

## 2019-09-09 PROCEDURE — 99214 OFFICE O/P EST MOD 30 MIN: CPT | Performed by: INTERNAL MEDICINE

## 2019-09-09 RX ORDER — BUPRENORPHINE HYDROCHLORIDE 150 UG/1
FILM, SOLUBLE BUCCAL
Refills: 5 | COMMUNITY
Start: 2019-09-01

## 2019-09-09 RX ORDER — CYCLOBENZAPRINE HCL 5 MG
TABLET ORAL
Refills: 5 | COMMUNITY
Start: 2019-08-23

## 2019-09-09 NOTE — ASSESSMENT & PLAN NOTE
Likely inflammatory biliary stricture  Her drain has been removed  Her labs are stable  We will repeat labs in 2 weeks and then in 3 months  Check MRI / MRCP in 3 months  Follow up in office in 4 months

## 2019-09-09 NOTE — PROGRESS NOTES
Outpatient Follow up  [facility] [address]  [address]  Crow Romero MD  Ph : [phone]  Fax : [phone]  Mobile : [phone]  Email : [email or web address]  Also available on [employer]    Davina Sun 62 y o  female MRN: [id number]    PCP: Aye Love MD  Referring: No referring provider defined for this encounter  Davina Sun presented for a follow up visit  My recommendations are included  Please do not hesitate to contact me with any questions you may have  ASSESSMENT AND PLAN:      Common bile duct stricture  Likely inflammatory biliary stricture  Her drain has been removed  Her labs are stable  We will repeat labs in 2 weeks and then in 3 months  Check MRI / MRCP in 3 months  Follow up in office in 4 months  Diagnoses and all orders for this visit:    Common bile duct stricture  -     Comprehensive metabolic panel; Future  -     Comprehensive metabolic panel; Future  -     MRI abdomen w wo contrast and mrcp; Future    Other orders  -     BELBUCA 150 MCG FILM  -     cyclobenzaprine (FLEXERIL) 5 mg tablet      ______________________________________________________________________    SUBJECTIVE:  Eva Arguelles is a 49-year-old lady with a history of gastric bypass who presents to us for follow-up and evaluation of the common bile duct stricture for which she had a drain placed  She is doing well  She had a CBD strictures and choledocholithiasis  She had an external drain which has since been removed on 8/1 and at that time as well she had a persistent high-grade distal CBD stricture identified with bulbous proximal CBD  No intrahepatic biliary dilatation  Spontaneous drainage does occur but it is delayed  Jonathan White Her LFT's have remained stable  No symptoms including abdominal pain, nausea, vomiting, fever or chills  Her appetite is good  She did have a colonoscopy done about 2 years ago         REVIEW OF SYSTEMS IS OTHERWISE NEGATIVE  Historical Information   Past Medical History:   Diagnosis Date    Anxiety     Back pain     Back pain at L4-L5 level     Bariatric surgery status     Chronic pain disorder     back    Cramping of hands     and legs    DDD (degenerative disc disease), lumbar     Depression     Edema of both legs     with lasix much better    Fall at home     almost 2 yrs ago    Full dentures     GERD (gastroesophageal reflux disease)     History of gastric ulcer     "Years ago    History of heartburn     Hypothyroid     Hypothyroidism    Morbid obesity (HCC)     Motion sickness     Nausea & vomiting     Numbness and tingling in both hands     Numbness and tingling of both feet     PICC (peripherally inserted central catheter) in place     Right arm    PONV (postoperative nausea and vomiting)     Postgastrectomy malabsorption     Sciatica     Shortness of breath     zuñiga    Skin abnormality     Edema BLE- uses boots at home  skin on shins dark and rough    Stress incontinence     Stricture esophagus     Use of cane as ambulatory aid     Wears glasses      Past Surgical History:   Procedure Laterality Date    ABDOMINAL ADHESION SURGERY N/A 2017    Procedure: EXTENSIVE LYSIS ADHESIONS;  Surgeon: Kristin Kim MD;  [facility];  Service: [facility] ABDOMINAL SURGERY       SECTION      x3    CHOLECYSTECTOMY      COLONOSCOPY      COSMETIC SURGERY      tummy tuck    ESOPHAGOGASTRODUODENOSCOPY N/A 2016    Procedure: ESOPHAGOGASTRODUODENOSCOPY (EGD); Surgeon: Kristin Kim MD;  [facility]; Service:     ESOPHAGOGASTRODUODENOSCOPY N/A 2017    Procedure: ESOPHAGOGASTRODUODENOSCOPY (EGD);   Surgeon: Kristin Kim MD;  [facility];  Service: Bariatrics    GASTRIC BYPASS      IR TUBE PLACEMENT BILI  2019    PANNICULECTOMY      PARTIAL GASTRECTOMY N/A 2017    Procedure: SUBTOTAL GASTRECTOMY, ESOPHAGEAL JEJUNOSTOMY;  Surgeon: Farheen Roy MD;  [facility];  Service: Bariatrics    IA EGD TRANSORAL BIOPSY SINGLE/MULTIPLE N/A 2017    Procedure: ESOPHAGOGASTRODUODENOSCOPY (EGD) with biopsy;  Surgeon: Deloris Muniz MD;  [facility]; Service: Bariatrics    IA EGD TRANSORAL BIOPSY SINGLE/MULTIPLE N/A 10/4/2017    Procedure: ESOPHAGOGASTRODUODENOSCOPY (EGD) with balloon dilatation;  Surgeon: Farheen Roy MD;  [facility]; Service: Bariatrics    IA EGD TRANSORAL BIOPSY SINGLE/MULTIPLE N/A 2017    Procedure: ESOPHAGOGASTRODUODENOSCOPY (EGD) with balloon dilation;  Surgeon: Farheen Roy MD;  [facility]; Service: Bariatrics    IA LAP, SAADIA RESTRICT PROC, LONGITUDINAL GASTRECTOMY N/A 2016    Procedure:   LAPAROSCOPIC GASTROGASTIC FISTULA TAKEDOWN, PARTIAL GASTRECTOMY, GASTRO JEJUNOSTOMY, EXTENSIVE LYSIS OF ADHESIONS;  Surgeon: Farheen Roy MD;  [facility];  Service: Bariatrics     Social History   Social History     Substance and Sexual Activity   Alcohol Use No     Social History     Substance and Sexual Activity   Drug Use No     Social History     Tobacco Use   Smoking Status Former Smoker    Years: 5 00    Types: Cigarettes    Last attempt to quit: 2001    Years since quittin 7   Smokeless Tobacco Never Used   Tobacco Comment    Quit 15 years ago   4 cigarettes daily     Family History   Problem Relation Age of Onset    Alzheimer's disease Mother     Diabetes Father     Hypertension Father     Anxiety disorder Daughter     Alcohol abuse Family        Meds/Allergies       Current Outpatient Medications:     acetaminophen (TYLENOL) 160 mg/5 mL suspension    BELBUCA 150 MCG FILM    busPIRone (BUSPAR) 15 mg tablet    BUSPIRONE HCL PO    cholecalciferol (VITAMIN D3) 1,000 units tablet    cyclobenzaprine (FLEXERIL) 5 mg tablet    furosemide (LASIX) 20 mg tablet    levothyroxine 100 mcg tablet    levothyroxine 125 mcg tablet    Melatonin 10 MG CAPS    ondansetron (ZOFRAN) 4 mg tablet    oxybutynin (DITROPAN-XL) 10 MG 24 hr tablet    oxyCODONE (ROXICODONE) 30 MG immediate release tablet    oxyCODONE-acetaminophen (PERCOCET)  mg per tablet    pantoprazole (PROTONIX) 40 mg tablet    scopolamine (TRANSDERM-SCOP) 1 5 mg/3 days TD 72 hr patch    sodium chloride, PF, 0 9 %    Current Facility-Administered Medications:     sodium chloride (PF) 0 9 % injection 5 mL, 5 mL, Intravenous, PRN    Allergies   Allergen Reactions    Nsaids      Due to gastric bypass    Sulfate     Ms Contin [Morphine] Rash           Objective     Blood pressure 120/60, pulse 56, temperature 97 6 °F (36 4 °C), temperature source Tympanic, height 5' 2" (1 575 m), weight 103 kg (228 lb), not currently breastfeeding  Body mass index is 41 7 kg/m²  PHYSICAL EXAM:      Physical Exam   Constitutional: She is oriented to person, place, and time  Vital signs are normal  She appears well-developed and well-nourished  HENT:   Head: Normocephalic and atraumatic  Eyes: Pupils are equal, round, and reactive to light  Conjunctivae are normal  No scleral icterus  Neck: Normal range of motion  Cardiovascular: Normal rate, regular rhythm and normal heart sounds  Pulmonary/Chest: Effort normal and breath sounds normal  No respiratory distress  Abdominal: Soft  Normal appearance and bowel sounds are normal  She exhibits no distension, no ascites and no mass  There is no hepatosplenomegaly  There is no tenderness  No hernia  Musculoskeletal: Normal range of motion  Lymphadenopathy:     She has no cervical adenopathy  Neurological: She is alert and oriented to person, place, and time  Skin: Skin is warm  Psychiatric: She has a normal mood and affect  Her behavior is normal  Thought content normal        Lab Results:   No visits with results within 1 Day(s) from this visit     Latest known visit with results is:   Appointment on 08/23/2019   Component Date Value    Sodium 08/23/2019 142     Potassium 08/23/2019 3 8     Chloride 08/23/2019 107     CO2 08/23/2019 25     ANION GAP 08/23/2019 10     BUN 08/23/2019 10     Creatinine 08/23/2019 1 06     Glucose, Fasting 08/23/2019 82     Calcium 08/23/2019 8 4     AST 08/23/2019 18     ALT 08/23/2019 14     Alkaline Phosphatase 08/23/2019 106     Total Protein 08/23/2019 6 7     Albumin 08/23/2019 3 0*    Total Bilirubin 08/23/2019 0 75     eGFR 08/23/2019 58          Radiology Results:   No results found

## 2019-10-01 ENCOUNTER — APPOINTMENT (OUTPATIENT)
Dept: LAB | Facility: CLINIC | Age: 59
End: 2019-10-01
Payer: MEDICARE

## 2019-10-01 DIAGNOSIS — K83.1 COMMON BILE DUCT STRICTURE: ICD-10-CM

## 2019-10-01 LAB
ALBUMIN SERPL BCP-MCNC: 2.9 G/DL (ref 3.5–5)
ALP SERPL-CCNC: 104 U/L (ref 46–116)
ALT SERPL W P-5'-P-CCNC: 18 U/L (ref 12–78)
ANION GAP SERPL CALCULATED.3IONS-SCNC: 4 MMOL/L (ref 4–13)
AST SERPL W P-5'-P-CCNC: 22 U/L (ref 5–45)
BILIRUB SERPL-MCNC: 0.6 MG/DL (ref 0.2–1)
BUN SERPL-MCNC: 12 MG/DL (ref 5–25)
CALCIUM SERPL-MCNC: 8.8 MG/DL (ref 8.3–10.1)
CHLORIDE SERPL-SCNC: 107 MMOL/L (ref 100–108)
CO2 SERPL-SCNC: 29 MMOL/L (ref 21–32)
CREAT SERPL-MCNC: 0.96 MG/DL (ref 0.6–1.3)
GFR SERPL CREATININE-BSD FRML MDRD: 65 ML/MIN/1.73SQ M
GLUCOSE P FAST SERPL-MCNC: 84 MG/DL (ref 65–99)
POTASSIUM SERPL-SCNC: 4.2 MMOL/L (ref 3.5–5.3)
PROT SERPL-MCNC: 6.7 G/DL (ref 6.4–8.2)
SODIUM SERPL-SCNC: 140 MMOL/L (ref 136–145)

## 2019-10-01 PROCEDURE — 36415 COLL VENOUS BLD VENIPUNCTURE: CPT

## 2019-10-01 PROCEDURE — 80053 COMPREHEN METABOLIC PANEL: CPT

## 2019-12-09 ENCOUNTER — HOSPITAL ENCOUNTER (OUTPATIENT)
Dept: MRI IMAGING | Facility: HOSPITAL | Age: 59
Discharge: HOME | End: 2019-12-09
Payer: MEDICARE

## 2019-12-09 DIAGNOSIS — K83.1 COMMON BILE DUCT STRICTURE: ICD-10-CM

## 2019-12-09 PROCEDURE — A9585 GADOBUTROL INJECTION: HCPCS | Performed by: INTERNAL MEDICINE

## 2019-12-09 PROCEDURE — 74183 MRI ABD W/O CNTR FLWD CNTR: CPT

## 2019-12-09 RX ADMIN — GADOBUTROL 10 ML: 604.72 INJECTION INTRAVENOUS at 10:33

## 2019-12-10 ENCOUNTER — APPOINTMENT (OUTPATIENT)
Dept: LAB | Facility: CLINIC | Age: 59
End: 2019-12-10
Payer: MEDICARE

## 2019-12-10 DIAGNOSIS — K83.1 COMMON BILE DUCT STRICTURE: ICD-10-CM

## 2019-12-10 DIAGNOSIS — K83.1 COMMON BILE DUCT STRICTURE: Primary | ICD-10-CM

## 2019-12-10 LAB
ALBUMIN SERPL BCP-MCNC: 3.1 G/DL (ref 3.5–5)
ALP SERPL-CCNC: 118 U/L (ref 46–116)
ALT SERPL W P-5'-P-CCNC: 19 U/L (ref 12–78)
ANION GAP SERPL CALCULATED.3IONS-SCNC: 3 MMOL/L (ref 4–13)
AST SERPL W P-5'-P-CCNC: 17 U/L (ref 5–45)
BILIRUB SERPL-MCNC: 0.91 MG/DL (ref 0.2–1)
BUN SERPL-MCNC: 13 MG/DL (ref 5–25)
CALCIUM SERPL-MCNC: 8.7 MG/DL (ref 8.3–10.1)
CHLORIDE SERPL-SCNC: 107 MMOL/L (ref 100–108)
CO2 SERPL-SCNC: 30 MMOL/L (ref 21–32)
CREAT SERPL-MCNC: 1.19 MG/DL (ref 0.6–1.3)
GFR SERPL CREATININE-BSD FRML MDRD: 50 ML/MIN/1.73SQ M
GLUCOSE P FAST SERPL-MCNC: 91 MG/DL (ref 65–99)
POTASSIUM SERPL-SCNC: 4.1 MMOL/L (ref 3.5–5.3)
PROT SERPL-MCNC: 6.5 G/DL (ref 6.4–8.2)
SODIUM SERPL-SCNC: 140 MMOL/L (ref 136–145)

## 2019-12-10 PROCEDURE — 80053 COMPREHEN METABOLIC PANEL: CPT

## 2019-12-10 PROCEDURE — 36415 COLL VENOUS BLD VENIPUNCTURE: CPT

## 2019-12-12 ENCOUNTER — TELEPHONE (OUTPATIENT)
Dept: GASTROENTEROLOGY | Facility: CLINIC | Age: 59
End: 2019-12-12

## 2019-12-12 NOTE — TELEPHONE ENCOUNTER
Patients GI provider:  Dr Alex Khoury    Number to return call: [phone]    Reason for call: Pt was returning a missed call from rea    Scheduled procedure/appointment date if applicable: Apt/procedure - n/a

## 2020-02-03 ENCOUNTER — TELEPHONE (OUTPATIENT)
Dept: BARIATRICS | Facility: CLINIC | Age: 60
End: 2020-02-03

## 2020-02-03 NOTE — TELEPHONE ENCOUNTER
called patient to r/s apt due to change in providers schedule - left a voicemail and mailed a letter

## 2020-03-06 ENCOUNTER — TRANSCRIBE ORDERS (OUTPATIENT)
Dept: ADMINISTRATIVE | Facility: HOSPITAL | Age: 60
End: 2020-03-06

## 2020-03-06 DIAGNOSIS — Z12.31 VISIT FOR SCREENING MAMMOGRAM: Primary | ICD-10-CM

## 2020-03-09 ENCOUNTER — HOSPITAL ENCOUNTER (OUTPATIENT)
Dept: MAMMOGRAPHY | Facility: HOSPITAL | Age: 60
Discharge: HOME | End: 2020-03-09
Attending: INTERNAL MEDICINE
Payer: MEDICARE

## 2020-03-09 VITALS — WEIGHT: 225 LBS | BODY MASS INDEX: 41.41 KG/M2 | HEIGHT: 62 IN

## 2020-03-09 DIAGNOSIS — Z12.31 VISIT FOR SCREENING MAMMOGRAM: ICD-10-CM

## 2020-03-09 PROCEDURE — 77063 BREAST TOMOSYNTHESIS BI: CPT

## 2020-03-09 PROCEDURE — 77067 SCR MAMMO BI INCL CAD: CPT

## 2020-07-06 ENCOUNTER — TELEPHONE (OUTPATIENT)
Dept: BARIATRICS | Facility: CLINIC | Age: 60
End: 2020-07-06

## 2020-07-16 ENCOUNTER — TRANSCRIBE ORDERS (OUTPATIENT)
Dept: LAB | Facility: CLINIC | Age: 60
End: 2020-07-16

## 2020-07-16 ENCOUNTER — APPOINTMENT (OUTPATIENT)
Dept: LAB | Facility: CLINIC | Age: 60
End: 2020-07-16
Payer: MEDICARE

## 2020-07-16 DIAGNOSIS — D64.9 ANEMIA, UNSPECIFIED TYPE: ICD-10-CM

## 2020-07-16 DIAGNOSIS — E55.9 VITAMIN D DEFICIENCY: ICD-10-CM

## 2020-07-16 DIAGNOSIS — Z79.1 ENCOUNTER FOR LONG-TERM (CURRENT) USE OF NSAIDS: ICD-10-CM

## 2020-07-16 DIAGNOSIS — E03.9 HYPOTHYROIDISM, UNSPECIFIED TYPE: Primary | ICD-10-CM

## 2020-07-16 LAB
25(OH)D3 SERPL-MCNC: 32.7 NG/ML (ref 30–100)
ALBUMIN SERPL BCP-MCNC: 3.1 G/DL (ref 3.5–5)
ALP SERPL-CCNC: 116 U/L (ref 46–116)
ALT SERPL W P-5'-P-CCNC: 12 U/L (ref 12–78)
ANION GAP SERPL CALCULATED.3IONS-SCNC: 6 MMOL/L (ref 4–13)
AST SERPL W P-5'-P-CCNC: 15 U/L (ref 5–45)
BILIRUB SERPL-MCNC: 1.37 MG/DL (ref 0.2–1)
BUN SERPL-MCNC: 11 MG/DL (ref 5–25)
CALCIUM SERPL-MCNC: 9.2 MG/DL (ref 8.3–10.1)
CHLORIDE SERPL-SCNC: 107 MMOL/L (ref 100–108)
CO2 SERPL-SCNC: 27 MMOL/L (ref 21–32)
CREAT SERPL-MCNC: 1.1 MG/DL (ref 0.6–1.3)
ERYTHROCYTE [DISTWIDTH] IN BLOOD BY AUTOMATED COUNT: 11.7 % (ref 11.6–15.1)
GFR SERPL CREATININE-BSD FRML MDRD: 55 ML/MIN/1.73SQ M
GLUCOSE P FAST SERPL-MCNC: 82 MG/DL (ref 65–99)
HCT VFR BLD AUTO: 43.3 % (ref 34.8–46.1)
HGB BLD-MCNC: 14.4 G/DL (ref 11.5–15.4)
MCH RBC QN AUTO: 32.3 PG (ref 26.8–34.3)
MCHC RBC AUTO-ENTMCNC: 33.3 G/DL (ref 31.4–37.4)
MCV RBC AUTO: 97 FL (ref 82–98)
PLATELET # BLD AUTO: 204 THOUSANDS/UL (ref 149–390)
PMV BLD AUTO: 13.1 FL (ref 8.9–12.7)
POTASSIUM SERPL-SCNC: 4.1 MMOL/L (ref 3.5–5.3)
PROT SERPL-MCNC: 6.9 G/DL (ref 6.4–8.2)
RBC # BLD AUTO: 4.46 MILLION/UL (ref 3.81–5.12)
SODIUM SERPL-SCNC: 140 MMOL/L (ref 136–145)
T4 FREE SERPL-MCNC: 1.91 NG/DL (ref 0.76–1.46)
TSH SERPL DL<=0.05 MIU/L-ACNC: 0.01 UIU/ML (ref 0.36–3.74)
WBC # BLD AUTO: 5.87 THOUSAND/UL (ref 4.31–10.16)

## 2020-07-16 PROCEDURE — 84439 ASSAY OF FREE THYROXINE: CPT

## 2020-07-16 PROCEDURE — 36415 COLL VENOUS BLD VENIPUNCTURE: CPT

## 2020-07-16 PROCEDURE — 85027 COMPLETE CBC AUTOMATED: CPT

## 2020-07-16 PROCEDURE — 82306 VITAMIN D 25 HYDROXY: CPT

## 2020-07-16 PROCEDURE — 84443 ASSAY THYROID STIM HORMONE: CPT

## 2020-07-16 PROCEDURE — 80053 COMPREHEN METABOLIC PANEL: CPT

## 2020-07-21 PROBLEM — K91.2 POSTSURGICAL MALABSORPTION: Status: ACTIVE | Noted: 2020-07-21

## 2020-07-21 PROBLEM — Z48.815 ENCOUNTER FOR SURGICAL AFTERCARE FOLLOWING SURGERY OF DIGESTIVE SYSTEM: Status: ACTIVE | Noted: 2020-07-21

## 2020-07-21 NOTE — ASSESSMENT & PLAN NOTE
61year old female status post laparoscopic bypass surgery with partial resection of the gastric remnant with extensive lysis of adhesions  by Dr Fátima Arndt 4/20/2016    She is here in routine post-op visit with complaints of recent dysphagia-to solids and some liquids-but notes can tolerate very small amounts prior to vomiting up "phelgm"    Weight is stable from last year    Initial:390 3 lb  Current:219 5 lb  EWL:68%  Vrainder:191 5 lb-3 months post-op  Current BMI is Body mass index is 39 51 kg/m²      Tolerating a regular diet-no-doing some small portions /liquids  Eating at least 60 grams of protein per day-not consistent  Following 30/60 minute rule with liquids-yes  Drinking at least 64 ounces of fluid per day-no  Drinking carbonated beverages-no  Sufficient exercise-walking  Using NSAIDs regularly-no  Using nicotine-no and no second-hand smoke exposure  Using alcohol-no

## 2020-07-22 ENCOUNTER — OFFICE VISIT (OUTPATIENT)
Dept: BARIATRICS | Facility: CLINIC | Age: 60
End: 2020-07-22
Payer: MEDICARE

## 2020-07-22 ENCOUNTER — PREP FOR PROCEDURE (OUTPATIENT)
Dept: BARIATRICS | Facility: CLINIC | Age: 60
End: 2020-07-22

## 2020-07-22 VITALS
HEIGHT: 63 IN | BODY MASS INDEX: 38.89 KG/M2 | SYSTOLIC BLOOD PRESSURE: 124 MMHG | DIASTOLIC BLOOD PRESSURE: 60 MMHG | TEMPERATURE: 97.6 F | WEIGHT: 219.5 LBS | HEART RATE: 75 BPM

## 2020-07-22 DIAGNOSIS — Z98.84 BARIATRIC SURGERY STATUS: ICD-10-CM

## 2020-07-22 DIAGNOSIS — Z48.815 ENCOUNTER FOR SURGICAL AFTERCARE FOLLOWING SURGERY OF DIGESTIVE SYSTEM: ICD-10-CM

## 2020-07-22 DIAGNOSIS — R63.8 DECREASED ORAL INTAKE: ICD-10-CM

## 2020-07-22 DIAGNOSIS — R13.19 OTHER DYSPHAGIA: ICD-10-CM

## 2020-07-22 DIAGNOSIS — E03.9 ACQUIRED HYPOTHYROIDISM: ICD-10-CM

## 2020-07-22 DIAGNOSIS — E66.01 MORBID (SEVERE) OBESITY DUE TO EXCESS CALORIES (HCC): ICD-10-CM

## 2020-07-22 DIAGNOSIS — R13.19 OTHER DYSPHAGIA: Primary | ICD-10-CM

## 2020-07-22 DIAGNOSIS — K91.2 POSTSURGICAL MALABSORPTION: ICD-10-CM

## 2020-07-22 DIAGNOSIS — E66.01 MORBID (SEVERE) OBESITY DUE TO EXCESS CALORIES (HCC): Primary | ICD-10-CM

## 2020-07-22 PROCEDURE — 99214 OFFICE O/P EST MOD 30 MIN: CPT | Performed by: PHYSICIAN ASSISTANT

## 2020-07-22 RX ORDER — LINACLOTIDE 145 UG/1
CAPSULE, GELATIN COATED ORAL
COMMUNITY
Start: 2020-06-30

## 2020-07-22 NOTE — PROGRESS NOTES
Assessment/Plan:    Encounter for surgical aftercare following surgery of digestive system  61year old female status post laparoscopic bypass surgery with partial resection of the gastric remnant with extensive lysis of adhesions  by Dr Vega Vann 4/20/2016    She is here in routine post-op visit with complaints of recent dysphagia-to solids and some liquids-but notes can tolerate very small amounts prior to vomiting up "phelgm"    Weight is stable from last year    Initial:390 3 lb  Current:219 5 lb  EWL:68%  Varinder:191 5 lb-3 months post-op  Current BMI is Body mass index is 39 51 kg/m²  Tolerating a regular diet-no-doing some small portions /liquids  Eating at least 60 grams of protein per day-not consistent  Following 30/60 minute rule with liquids-yes  Drinking at least 64 ounces of fluid per day-no  Drinking carbonated beverages-no  Sufficient exercise-walking  Using NSAIDs regularly-no  Using nicotine-no and no second-hand smoke exposure  Using alcohol-no      Other dysphagia  61year old female status post laparoscopic bypass surgery with partial resection of the gastric remnant with extensive lysis of adhesions  by Dr Vega Vann 4/20/2016        She reports around 3 week history of intermittent dysphagia to some solids and liquids-she is tolerating some liquids    She notes she is drinking some protein drinks and water  She notes she can eat 3-5 spoonfuls of food and then brings up "mucous"    She denies abdominal pain,denies nausea, no fever or chills  Moving bowels ok    On exam no overt abdominal pain to palpation  Concern is that she has developed a stricture/ulcer    PLAN: upper endoscopy to evaluate anatomy +/- dilation-advised on benefits/risks and she is agreeable to proceed      Will add carafate 4 times daily for now  She will continue high dose ppi      Morbid (severe) obesity due to excess calories ([address] )  She is maintaining weight from last year    Postsurgical malabsorption  Malabsorption- patient is at risk for malabsorption of vitamins/minerals secondary to malabsorption from her procedure and restriction of intakes  Reviewed current supplements and advised on same    She is taking a one-a-day MVI and and taking 2000 IU vitamin D3    Hypothyroidism  On medication/followed by pcp    Decreased oral intake  Her oral intakes have been decreased overall with intermittent dysphagia and intermittent vomiting    Will send her for IVF of 1 liter NSS with 100 mg thiamine over one hour twice weekly for the next couple weeks -prior to upper endoscopy       Diagnoses and all orders for this visit:    Other dysphagia  -     Ferritin; Future  -     Folate; Future  -     Iron Saturation %; Future  -     PTH, intact; Future  -     Vitamin A; Future  -     Vitamin B1, whole blood; Future  -     Vitamin B12; Future  -     Vitamin D 25 hydroxy; Future  -     Zinc; Future  -     Prealbumin; Future    Morbid (severe) obesity due to excess calories (HCC)  -     Ferritin; Future  -     Folate; Future  -     Iron Saturation %; Future  -     PTH, intact; Future  -     Vitamin A; Future  -     Vitamin B1, whole blood; Future  -     Vitamin B12; Future  -     Vitamin D 25 hydroxy; Future  -     Zinc; Future    Encounter for surgical aftercare following surgery of digestive system  -     Ferritin; Future  -     Folate; Future  -     Iron Saturation %; Future  -     PTH, intact; Future  -     Vitamin A; Future  -     Vitamin B1, whole blood; Future  -     Vitamin B12; Future  -     Vitamin D 25 hydroxy; Future  -     Zinc; Future  -     Prealbumin; Future    Postsurgical malabsorption  -     Ferritin; Future  -     Folate; Future  -     Iron Saturation %; Future  -     PTH, intact; Future  -     Vitamin A; Future  -     Vitamin B1, whole blood; Future  -     Vitamin B12; Future  -     Vitamin D 25 hydroxy; Future  -     Zinc; Future    Acquired hypothyroidism  -     Ferritin; Future  -     Folate;  Future  -     Iron Saturation %; Future  -     PTH, intact; Future  -     Vitamin A; Future  -     Vitamin B1, whole blood; Future  -     Vitamin B12; Future  -     Vitamin D 25 hydroxy; Future  -     Zinc; Future    Bariatric surgery status  -     Ferritin; Future  -     Folate; Future  -     Iron Saturation %; Future  -     PTH, intact; Future  -     Vitamin A; Future  -     Vitamin B1, whole blood; Future  -     Vitamin B12; Future  -     Vitamin D 25 hydroxy; Future  -     Zinc; Future    Decreased oral intake  -     Prealbumin; Future    Other orders  -     LINZESS 145 MCG CAPS  -     Cancel: thiamine 100 mg in sodium chloride 0 9 % 1,000 mL IV  -     thiamine 100 mg in sodium chloride 0 9 % 1,000 mL IV          Subjective:      Patient ID: Lizet Baez is a 61 y o  female  61year old female status post laparoscopic revision with take down of fistula and extensive lysis of adhesions by Dr Jenna Stewart 4/20/2016  She is here in routine annual follow-up  She reports she was doing ok with diet tolerance (regular diet) until around 3 weeks ago when she started with dysphagia to solids and this has included some liquids as well-but she is getting in small portions of food and getting a protein drink in her diet and tolerating small volumes at one time  -intermittently "vomiting phlegm"-not regurgitating food  She is maintaining her weight from last year  She is taking some regular vitamins  Doing some walking for exercise  The following portions of the patient's history were reviewed and updated as appropriate: allergies, current medications, past family history, past medical history, past social history, past surgical history and problem list     Review of Systems   Constitutional: Negative for chills, fever and unexpected weight change (weight stable from last year)  Respiratory: Negative for shortness of breath and wheezing  Cardiovascular: Negative for chest pain and palpitations  Gastrointestinal: Positive for vomiting  Negative for abdominal pain, constipation, diarrhea and nausea  Notes intermittent vomiting "phlegm"   Psychiatric/Behavioral: Suicidal ideas: no complait of anxiety or depression  Objective:      /60 (BP Location: Left arm, Patient Position: Sitting)   Pulse 75   Temp 97 6 °F (36 4 °C) (Temporal)   Ht 5' 2 5" (1 588 m)   Wt 99 6 kg (219 lb 8 oz)   BMI 39 51 kg/m²          Physical Exam   Constitutional: She is oriented to person, place, and time  She appears well-developed and well-nourished  No distress  Morbidly obese  Has fair skin turgor   HENT:   Mucous membranes appear moist   Pulmonary/Chest: Effort normal    Abdominal: Soft  There is no tenderness  There is no guarding  No hernia  Neurological: She is alert and oriented to person, place, and time  Skin: Skin is warm and dry  She is not diaphoretic  Psychiatric: She has a normal mood and affect  Nursing note and vitals reviewed          She was not deemed to need Covid 19 testing-but will have this screen prior to upper endoscopy

## 2020-07-22 NOTE — ASSESSMENT & PLAN NOTE
61year old female status post laparoscopic bypass surgery with partial resection of the gastric remnant with extensive lysis of adhesions  by Dr Enrrique Wilkins 4/20/2016        She reports around 3 week history of intermittent dysphagia to some solids and liquids-she is tolerating some liquids    She notes she is drinking some protein drinks and water  She notes she can eat 3-5 spoonfuls of food and then brings up "mucous"    She denies abdominal pain,denies nausea, no fever or chills  Moving bowels ok    On exam no overt abdominal pain to palpation  Concern is that she has developed a stricture/ulcer    PLAN: upper endoscopy to evaluate anatomy +/- dilation-advised on benefits/risks and she is agreeable to proceed      Will add carafate 4 times daily for now  She will continue high dose ppi

## 2020-07-22 NOTE — ASSESSMENT & PLAN NOTE
Her oral intakes have been decreased overall with intermittent dysphagia and intermittent vomiting    Will send her for IVF of 1 liter NSS with 100 mg thiamine over one hour twice weekly for the next couple weeks -prior to upper endoscopy

## 2020-07-22 NOTE — ASSESSMENT & PLAN NOTE
Malabsorption- patient is at risk for malabsorption of vitamins/minerals secondary to malabsorption from her procedure and restriction of intakes  Reviewed current supplements and advised on same    She is taking a one-a-day MVI and and taking 2000 IU vitamin D3

## 2020-07-22 NOTE — PATIENT INSTRUCTIONS
It was great to see you again today  Go back to post-op liquid diet for a couple days  Then try baby foods with liquids in between  IF you are unable to keep anything down, call us and go to ER if needed  Get IV hydration as ordered Thursday and Friday this week and then 2 days next week-(I would try to space them out next week if your schedule allows)    Get upper endoscopy done and then have a follow-up in the office with Dr Gabino Barr to review the results of the upper endoscopy  Also schedule: Follow-up in one year  We kindly ask that you arrive 15 minutes before your scheduled appointment time with your provider to allow you to be roomed, have your vital signs checked and your chart updated by our staff  We thank you for your patience at your visit  Your appointment time is reserved only for you  If you are unable to make your scheduled visit, we would request that you call to cancel and reschedule it at your earliest convenience  Follow diet as discussed  Follow  vitamin and mineral recommendations as reviewed with you  Bariatric vitamins are highly recommended  Vitamins are important for a life-time  Low levels can lead to deficiency which can lead to other medical problems  Exercise as tolerated    If you have gotten a lab slip at this visit, please note that most labs are FASTING-but you need to drink water the night before and the morning before your labs are done  It is HIGHLY RECOMMENDED that you check with your insurance to make sure all the labs ordered are covered by your individual insurance policy  This is especially important if you also get labs done by other providers outside of [facility]  You want to avoid having duplicate labs done  Note you will be given a lab slip AFTER  your annual visit next year to check your vitamin and mineral levels  You will not need to make a second appointment after the labs are received/reviewed   You will receive a letter/and or phone call with your results  Most labs do NOT honor a lab slip dated one year in advance now  IMPORTANT if you have a [facility]" account, you will receive a letter of your vitamin/mineral results through the computer  Please watch for an update to your chart-since recommendations for supplement adjustments will be sent to you this way  Call our office if he have any problems with abdominal pain especially if associated with fever, chills, nausea, vomiting or any other concerns  All  Post-bariatric surgery patients should be aware that very small quantities of any alcohol  can cause impairment and it is very possible not to feel the effect  The effect can be in the system for several hours  It is also a stomach irritant  It is advised to AVOID alcohol, Nonsteroidal antiinflammatory drugs (NSAIDS) and nicotine of all forms   Any of these can cause stomach irritation/pain

## 2020-07-23 ENCOUNTER — HOSPITAL ENCOUNTER (OUTPATIENT)
Dept: INFUSION CENTER | Facility: CLINIC | Age: 60
Discharge: HOME | End: 2020-07-23
Payer: MEDICARE

## 2020-07-23 VITALS — DIASTOLIC BLOOD PRESSURE: 66 MMHG | SYSTOLIC BLOOD PRESSURE: 119 MMHG | HEART RATE: 66 BPM | TEMPERATURE: 97.7 F

## 2020-07-23 DIAGNOSIS — R13.19 OTHER DYSPHAGIA: ICD-10-CM

## 2020-07-23 DIAGNOSIS — Z98.84 BARIATRIC SURGERY STATUS: Primary | ICD-10-CM

## 2020-07-23 DIAGNOSIS — Z48.815 ENCOUNTER FOR SURGICAL AFTERCARE FOLLOWING SURGERY OF DIGESTIVE SYSTEM: ICD-10-CM

## 2020-07-23 DIAGNOSIS — R63.8 DECREASED ORAL INTAKE: ICD-10-CM

## 2020-07-23 PROCEDURE — 96365 THER/PROPH/DIAG IV INF INIT: CPT

## 2020-07-23 RX ORDER — SUCRALFATE ORAL 1 G/10ML
1 SUSPENSION ORAL 4 TIMES DAILY
Qty: 1200 ML | Refills: 1 | Status: SHIPPED | OUTPATIENT
Start: 2020-07-23 | End: 2020-09-21

## 2020-07-23 RX ADMIN — THIAMINE HYDROCHLORIDE: 100 INJECTION, SOLUTION INTRAMUSCULAR; INTRAVENOUS at 12:12

## 2020-07-23 NOTE — PROGRESS NOTES
Presented today for iv hydration, tolerated same well  Will return for 2nd day hydration as scheduled

## 2020-07-23 NOTE — PLAN OF CARE
Problem: Potential for Falls  Goal: Patient will remain free of falls  Description  INTERVENTIONS:  - Assess patient frequently for physical needs  -  Identify cognitive and physical deficits and behaviors that affect risk of falls    -  [place] fall precautions as indicated by assessment   - Educate patient/family on patient safety including physical limitations  - Instruct patient to call for assistance with activity based on assessment  - Modify environment to reduce risk of injury  - Consider OT/PT consult to assist with strengthening/mobility  Outcome: Progressing

## 2020-07-24 ENCOUNTER — TELEPHONE (OUTPATIENT)
Dept: BARIATRICS | Facility: CLINIC | Age: 60
End: 2020-07-24

## 2020-07-24 ENCOUNTER — HOSPITAL ENCOUNTER (OUTPATIENT)
Dept: INFUSION CENTER | Facility: CLINIC | Age: 60
Discharge: HOME | End: 2020-07-24
Payer: MEDICARE

## 2020-07-24 VITALS
TEMPERATURE: 98 F | SYSTOLIC BLOOD PRESSURE: 105 MMHG | RESPIRATION RATE: 18 BRPM | HEART RATE: 72 BPM | DIASTOLIC BLOOD PRESSURE: 71 MMHG

## 2020-07-24 DIAGNOSIS — Z98.84 BARIATRIC SURGERY STATUS: Primary | ICD-10-CM

## 2020-07-24 DIAGNOSIS — R13.19 OTHER DYSPHAGIA: ICD-10-CM

## 2020-07-24 DIAGNOSIS — Z48.815 ENCOUNTER FOR SURGICAL AFTERCARE FOLLOWING SURGERY OF DIGESTIVE SYSTEM: ICD-10-CM

## 2020-07-24 DIAGNOSIS — R63.8 DECREASED ORAL INTAKE: ICD-10-CM

## 2020-07-24 PROCEDURE — 96365 THER/PROPH/DIAG IV INF INIT: CPT

## 2020-07-24 RX ADMIN — THIAMINE HYDROCHLORIDE: 100 INJECTION, SOLUTION INTRAMUSCULAR; INTRAVENOUS at 12:33

## 2020-07-24 NOTE — PLAN OF CARE
Problem: Potential for Falls  Goal: Patient will remain free of falls  Description  INTERVENTIONS:  - Assess patient frequently for physical needs  -  Identify cognitive and physical deficits and behaviors that affect risk of falls    -  [place] fall precautions as indicated by assessment   - Educate patient/family on patient safety including physical limitations  - Instruct patient to call for assistance with activity based on assessment  - Modify environment to reduce risk of injury  - Consider OT/PT consult to assist with strengthening/mobility  7/24/2020 1221 by Rancho Almazan RN  Outcome: Progressing  7/24/2020 1221 by Rancho Almazan RN  Outcome: Progressing

## 2020-07-27 ENCOUNTER — HOSPITAL ENCOUNTER (OUTPATIENT)
Dept: INFUSION CENTER | Facility: HOSPITAL | Age: 60
Discharge: HOME | End: 2020-07-27
Payer: MEDICARE

## 2020-07-27 ENCOUNTER — HOSPITAL ENCOUNTER (OUTPATIENT)
Dept: INFUSION CENTER | Facility: HOSPITAL | Age: 60
Discharge: HOME | End: 2020-07-27

## 2020-07-27 VITALS
SYSTOLIC BLOOD PRESSURE: 107 MMHG | OXYGEN SATURATION: 94 % | RESPIRATION RATE: 18 BRPM | DIASTOLIC BLOOD PRESSURE: 58 MMHG | TEMPERATURE: 97.9 F | HEART RATE: 67 BPM

## 2020-07-27 DIAGNOSIS — R13.19 OTHER DYSPHAGIA: Primary | ICD-10-CM

## 2020-07-27 DIAGNOSIS — R63.8 DECREASED ORAL INTAKE: ICD-10-CM

## 2020-07-27 DIAGNOSIS — Z98.84 BARIATRIC SURGERY STATUS: ICD-10-CM

## 2020-07-27 DIAGNOSIS — E66.01 MORBID (SEVERE) OBESITY DUE TO EXCESS CALORIES (HCC): ICD-10-CM

## 2020-07-27 DIAGNOSIS — Z48.815 ENCOUNTER FOR SURGICAL AFTERCARE FOLLOWING SURGERY OF DIGESTIVE SYSTEM: ICD-10-CM

## 2020-07-27 PROCEDURE — 96365 THER/PROPH/DIAG IV INF INIT: CPT

## 2020-07-27 PROCEDURE — 96366 THER/PROPH/DIAG IV INF ADDON: CPT

## 2020-07-27 PROCEDURE — U0003 INFECTIOUS AGENT DETECTION BY NUCLEIC ACID (DNA OR RNA); SEVERE ACUTE RESPIRATORY SYNDROME CORONAVIRUS 2 (SARS-COV-2) (CORONAVIRUS DISEASE [COVID-19]), AMPLIFIED PROBE TECHNIQUE, MAKING USE OF HIGH THROUGHPUT TECHNOLOGIES AS DESCRIBED BY CMS-2020-01-R: HCPCS

## 2020-07-27 RX ADMIN — THIAMINE HYDROCHLORIDE: 100 INJECTION, SOLUTION INTRAMUSCULAR; INTRAVENOUS at 14:25

## 2020-07-27 NOTE — PLAN OF CARE
Problem: Potential for Falls  Goal: Patient will remain free of falls  Description  INTERVENTIONS:  - Assess patient frequently for physical needs  -  Identify cognitive and physical deficits and behaviors that affect risk of falls  -  [place] fall precautions as indicated by assessment   - Educate patient/family on patient safety including physical limitations  - Instruct patient to call for assistance with activity based on assessment  - Modify environment to reduce risk of injury  - Consider OT/PT consult to assist with strengthening/mobility  Outcome: Progressing     Problem: INFECTION - ADULT  Goal: Absence or prevention of progression during hospitalization  Description  INTERVENTIONS:  - Assess and monitor for signs and symptoms of infection  - Monitor lab/diagnostic results  - Monitor all insertion sites, i e  indwelling lines, tubes, and drains  - Monitor endotracheal if appropriate and nasal secretions for changes in amount and color  - [place] appropriate cooling/warming therapies per order  - Administer medications as ordered  - Instruct and encourage patient and family to use good hand hygiene technique  - Identify and instruct in appropriate isolation precautions for identified infection/condition  Outcome: Progressing     Problem: Knowledge Deficit  Goal: Patient/family/caregiver demonstrates understanding of disease process, treatment plan, medications, and discharge instructions  Description  Complete learning assessment and assess knowledge base    Interventions:  - Provide teaching at level of understanding  - Provide teaching via preferred learning methods  Outcome: Progressing

## 2020-07-28 LAB — SARS-COV-2 RNA SPEC QL NAA+PROBE: NOT DETECTED

## 2020-07-29 ENCOUNTER — TRANSCRIBE ORDERS (OUTPATIENT)
Dept: LAB | Facility: CLINIC | Age: 60
End: 2020-07-29

## 2020-07-29 ENCOUNTER — APPOINTMENT (OUTPATIENT)
Dept: LAB | Facility: CLINIC | Age: 60
End: 2020-07-29
Payer: MEDICARE

## 2020-07-29 DIAGNOSIS — K91.2 POSTSURGICAL MALABSORPTION: ICD-10-CM

## 2020-07-29 DIAGNOSIS — E03.9 ACQUIRED HYPOTHYROIDISM: ICD-10-CM

## 2020-07-29 DIAGNOSIS — Z48.815 ENCOUNTER FOR SURGICAL AFTERCARE FOLLOWING SURGERY OF DIGESTIVE SYSTEM: ICD-10-CM

## 2020-07-29 DIAGNOSIS — E66.01 MORBID (SEVERE) OBESITY DUE TO EXCESS CALORIES (HCC): ICD-10-CM

## 2020-07-29 DIAGNOSIS — R13.19 OTHER DYSPHAGIA: ICD-10-CM

## 2020-07-29 DIAGNOSIS — Z98.84 BARIATRIC SURGERY STATUS: ICD-10-CM

## 2020-07-29 LAB
25(OH)D3 SERPL-MCNC: 36.8 NG/ML (ref 30–100)
FERRITIN SERPL-MCNC: 76 NG/ML (ref 8–388)
FOLATE SERPL-MCNC: 4 NG/ML (ref 3.1–17.5)
IRON SATN MFR SERPL: 30 %
IRON SERPL-MCNC: 91 UG/DL (ref 50–170)
PTH-INTACT SERPL-MCNC: 55.4 PG/ML (ref 18.4–80.1)
TIBC SERPL-MCNC: 302 UG/DL (ref 250–450)
VIT B12 SERPL-MCNC: 355 PG/ML (ref 100–900)

## 2020-07-29 PROCEDURE — 84630 ASSAY OF ZINC: CPT

## 2020-07-29 PROCEDURE — 83540 ASSAY OF IRON: CPT

## 2020-07-29 PROCEDURE — 82607 VITAMIN B-12: CPT

## 2020-07-29 PROCEDURE — 82306 VITAMIN D 25 HYDROXY: CPT

## 2020-07-29 PROCEDURE — 36415 COLL VENOUS BLD VENIPUNCTURE: CPT

## 2020-07-29 PROCEDURE — 83970 ASSAY OF PARATHORMONE: CPT

## 2020-07-29 PROCEDURE — 83550 IRON BINDING TEST: CPT

## 2020-07-29 PROCEDURE — 82728 ASSAY OF FERRITIN: CPT

## 2020-07-29 PROCEDURE — 82746 ASSAY OF FOLIC ACID SERUM: CPT

## 2020-07-29 PROCEDURE — 84425 ASSAY OF VITAMIN B-1: CPT

## 2020-07-29 PROCEDURE — 84590 ASSAY OF VITAMIN A: CPT

## 2020-07-30 ENCOUNTER — HOSPITAL ENCOUNTER (OUTPATIENT)
Dept: INFUSION CENTER | Facility: HOSPITAL | Age: 60
Discharge: HOME | End: 2020-07-30
Payer: MEDICARE

## 2020-07-30 VITALS
TEMPERATURE: 97.7 F | HEART RATE: 65 BPM | DIASTOLIC BLOOD PRESSURE: 56 MMHG | RESPIRATION RATE: 18 BRPM | OXYGEN SATURATION: 98 % | SYSTOLIC BLOOD PRESSURE: 103 MMHG

## 2020-07-30 DIAGNOSIS — Z48.815 ENCOUNTER FOR SURGICAL AFTERCARE FOLLOWING SURGERY OF DIGESTIVE SYSTEM: ICD-10-CM

## 2020-07-30 DIAGNOSIS — R13.19 OTHER DYSPHAGIA: ICD-10-CM

## 2020-07-30 DIAGNOSIS — Z98.84 BARIATRIC SURGERY STATUS: Primary | ICD-10-CM

## 2020-07-30 DIAGNOSIS — R63.8 DECREASED ORAL INTAKE: ICD-10-CM

## 2020-07-30 PROCEDURE — 96365 THER/PROPH/DIAG IV INF INIT: CPT

## 2020-07-30 RX ADMIN — THIAMINE HYDROCHLORIDE: 100 INJECTION, SOLUTION INTRAMUSCULAR; INTRAVENOUS at 12:39

## 2020-07-31 LAB — VIT B1 BLD-SCNC: 176 NMOL/L (ref 66.5–200)

## 2020-08-01 LAB — VIT A SERPL-MCNC: 19.9 UG/DL (ref 20.1–62)

## 2020-08-02 LAB — ZINC SERPL-MCNC: 81 UG/DL (ref 56–134)

## 2020-08-03 ENCOUNTER — HOSPITAL ENCOUNTER (OUTPATIENT)
Dept: INFUSION CENTER | Facility: HOSPITAL | Age: 60
Discharge: HOME | End: 2020-08-03
Payer: MEDICARE

## 2020-08-03 VITALS
SYSTOLIC BLOOD PRESSURE: 108 MMHG | DIASTOLIC BLOOD PRESSURE: 56 MMHG | RESPIRATION RATE: 18 BRPM | TEMPERATURE: 96.8 F | HEART RATE: 60 BPM

## 2020-08-03 DIAGNOSIS — Z48.815 ENCOUNTER FOR SURGICAL AFTERCARE FOLLOWING SURGERY OF DIGESTIVE SYSTEM: ICD-10-CM

## 2020-08-03 DIAGNOSIS — R13.19 OTHER DYSPHAGIA: ICD-10-CM

## 2020-08-03 DIAGNOSIS — R63.8 DECREASED ORAL INTAKE: ICD-10-CM

## 2020-08-03 DIAGNOSIS — Z98.84 BARIATRIC SURGERY STATUS: Primary | ICD-10-CM

## 2020-08-03 PROCEDURE — 96365 THER/PROPH/DIAG IV INF INIT: CPT

## 2020-08-03 RX ADMIN — THIAMINE HYDROCHLORIDE: 100 INJECTION, SOLUTION INTRAMUSCULAR; INTRAVENOUS at 14:45

## 2020-08-04 ENCOUNTER — ANESTHESIA EVENT (OUTPATIENT)
Dept: GASTROENTEROLOGY | Facility: HOSPITAL | Age: 60
End: 2020-08-04

## 2020-08-04 DIAGNOSIS — E50.9 VITAMIN A DEFICIENCY: Primary | ICD-10-CM

## 2020-08-04 DIAGNOSIS — K91.2 POSTSURGICAL MALABSORPTION: ICD-10-CM

## 2020-08-04 DIAGNOSIS — E53.8 LOW VITAMIN B12 LEVEL: ICD-10-CM

## 2020-08-04 DIAGNOSIS — Z98.84 BARIATRIC SURGERY STATUS: ICD-10-CM

## 2020-08-05 ENCOUNTER — HOSPITAL ENCOUNTER (OUTPATIENT)
Dept: GASTROENTEROLOGY | Facility: HOSPITAL | Age: 60
Setting detail: OUTPATIENT SURGERY
Discharge: HOME | End: 2020-08-05
Attending: SURGERY
Payer: MEDICARE

## 2020-08-05 ENCOUNTER — ANESTHESIA (OUTPATIENT)
Dept: GASTROENTEROLOGY | Facility: HOSPITAL | Age: 60
End: 2020-08-05

## 2020-08-05 VITALS
OXYGEN SATURATION: 100 % | RESPIRATION RATE: 17 BRPM | WEIGHT: 219 LBS | SYSTOLIC BLOOD PRESSURE: 130 MMHG | DIASTOLIC BLOOD PRESSURE: 72 MMHG | HEART RATE: 71 BPM | TEMPERATURE: 97.3 F | BODY MASS INDEX: 40.3 KG/M2 | HEIGHT: 62 IN

## 2020-08-05 DIAGNOSIS — E66.01 MORBID (SEVERE) OBESITY DUE TO EXCESS CALORIES (HCC): ICD-10-CM

## 2020-08-05 DIAGNOSIS — R13.19 OTHER DYSPHAGIA: ICD-10-CM

## 2020-08-05 DIAGNOSIS — K91.89 ANASTOMOTIC STRICTURE OF GASTROJEJUNOSTOMY: Primary | ICD-10-CM

## 2020-08-05 PROCEDURE — 43249 ESOPH EGD DILATION <30 MM: CPT | Performed by: SURGERY

## 2020-08-05 PROCEDURE — C1726 CATH, BAL DIL, NON-VASCULAR: HCPCS

## 2020-08-05 RX ORDER — SODIUM CHLORIDE 9 MG/ML
125 INJECTION, SOLUTION INTRAVENOUS CONTINUOUS
Status: DISCONTINUED | OUTPATIENT
Start: 2020-08-05 | End: 2020-08-09 | Stop reason: HOSPADM

## 2020-08-05 RX ORDER — PROPOFOL 10 MG/ML
INJECTION, EMULSION INTRAVENOUS AS NEEDED
Status: DISCONTINUED | OUTPATIENT
Start: 2020-08-05 | End: 2020-08-05

## 2020-08-05 RX ORDER — LIDOCAINE HYDROCHLORIDE 20 MG/ML
INJECTION, SOLUTION EPIDURAL; INFILTRATION; INTRACAUDAL; PERINEURAL AS NEEDED
Status: DISCONTINUED | OUTPATIENT
Start: 2020-08-05 | End: 2020-08-05

## 2020-08-05 RX ADMIN — SODIUM CHLORIDE 125 ML/HR: 0.9 INJECTION, SOLUTION INTRAVENOUS at 08:13

## 2020-08-05 RX ADMIN — LIDOCAINE HYDROCHLORIDE 5 ML: 20 INJECTION, SOLUTION EPIDURAL; INFILTRATION; INTRACAUDAL; PERINEURAL at 09:03

## 2020-08-05 RX ADMIN — PROPOFOL 40 MG: 10 INJECTION, EMULSION INTRAVENOUS at 09:10

## 2020-08-05 RX ADMIN — PROPOFOL 200 MG: 10 INJECTION, EMULSION INTRAVENOUS at 09:03

## 2020-08-05 NOTE — ANESTHESIA PREPROCEDURE EVALUATION
Procedure:  EGD    Relevant Problems   CARDIO   (+) Hypercholesterolemia   (+) Paroxysmal atrial fibrillation (HCC)      ENDO   (+) Hypothyroidism      GI/HEPATIC   (+) Bariatric surgery status   (+) Other dysphagia      Other   (+) Anastomotic stricture of gastrojejunostomy   (+) Lumbar disc disease   (+) Morbid (severe) obesity due to excess calories (HCC)        Physical Exam    Airway    Mallampati score: II  TM Distance: >3 FB  Neck ROM: full     Dental   No notable dental hx     Cardiovascular  Rhythm: regular, Rate: normal, Cardiovascular exam normal    Pulmonary  Pulmonary exam normal Breath sounds clear to auscultation,     Other Findings        Anesthesia Plan  ASA Score- 3     Anesthesia Type-     Plan Factors-        Patient is not a current smoker  Patient not instructed to abstain from smoking on day of procedure  Patient did not smoke on day of surgery  Induction- intravenous  Postoperative Plan-     Informed Consent- Anesthetic plan and risks discussed with patient

## 2020-08-05 NOTE — ANESTHESIA POSTPROCEDURE EVALUATION
Post-Op Assessment Note    CV Status:  Stable    Pain management: adequate     Mental Status:  Alert and awake   Hydration Status:  Euvolemic   PONV Controlled:  Controlled   Airway Patency:  Patent      Post Op Vitals Reviewed: Yes      Staff: Anesthesiologist         No complications documented      BP      Temp     Pulse     Resp      SpO2

## 2020-08-07 ENCOUNTER — APPOINTMENT (OUTPATIENT)
Dept: RADIOLOGY | Facility: CLINIC | Age: 60
End: 2020-08-07
Payer: MEDICARE

## 2020-08-07 ENCOUNTER — TRANSCRIBE ORDERS (OUTPATIENT)
Dept: URGENT CARE | Facility: CLINIC | Age: 60
End: 2020-08-07

## 2020-08-07 ENCOUNTER — APPOINTMENT (OUTPATIENT)
Dept: LAB | Facility: CLINIC | Age: 60
End: 2020-08-07
Payer: MEDICARE

## 2020-08-07 ENCOUNTER — HOSPITAL ENCOUNTER (OUTPATIENT)
Dept: INFUSION CENTER | Facility: HOSPITAL | Age: 60
Discharge: HOME | End: 2020-08-07
Payer: MEDICARE

## 2020-08-07 VITALS
DIASTOLIC BLOOD PRESSURE: 57 MMHG | RESPIRATION RATE: 16 BRPM | HEART RATE: 62 BPM | TEMPERATURE: 96.8 F | SYSTOLIC BLOOD PRESSURE: 113 MMHG

## 2020-08-07 DIAGNOSIS — Z48.815 ENCOUNTER FOR SURGICAL AFTERCARE FOLLOWING SURGERY OF DIGESTIVE SYSTEM: ICD-10-CM

## 2020-08-07 DIAGNOSIS — M79.672 PAIN IN LEFT FOOT: Primary | ICD-10-CM

## 2020-08-07 DIAGNOSIS — R63.8 DECREASED ORAL INTAKE: ICD-10-CM

## 2020-08-07 DIAGNOSIS — R79.89 ELEVATED SERUM FREE T4 LEVEL: ICD-10-CM

## 2020-08-07 DIAGNOSIS — R13.19 OTHER DYSPHAGIA: ICD-10-CM

## 2020-08-07 DIAGNOSIS — N18.30 CHRONIC KIDNEY DISEASE, STAGE 3 (HCC): ICD-10-CM

## 2020-08-07 DIAGNOSIS — Z98.84 BARIATRIC SURGERY STATUS: Primary | ICD-10-CM

## 2020-08-07 LAB
ANION GAP SERPL CALCULATED.3IONS-SCNC: 5 MMOL/L (ref 4–13)
BUN SERPL-MCNC: 7 MG/DL (ref 5–25)
CALCIUM SERPL-MCNC: 8.5 MG/DL (ref 8.3–10.1)
CHLORIDE SERPL-SCNC: 109 MMOL/L (ref 100–108)
CO2 SERPL-SCNC: 29 MMOL/L (ref 21–32)
CREAT SERPL-MCNC: 0.94 MG/DL (ref 0.6–1.3)
GFR SERPL CREATININE-BSD FRML MDRD: 67 ML/MIN/1.73SQ M
GLUCOSE SERPL-MCNC: 97 MG/DL (ref 65–140)
POTASSIUM SERPL-SCNC: 4.1 MMOL/L (ref 3.5–5.3)
SODIUM SERPL-SCNC: 143 MMOL/L (ref 136–145)
T4 FREE SERPL-MCNC: 1.54 NG/DL (ref 0.76–1.46)
TSH SERPL DL<=0.05 MIU/L-ACNC: 0.01 UIU/ML (ref 0.36–3.74)

## 2020-08-07 PROCEDURE — 84439 ASSAY OF FREE THYROXINE: CPT

## 2020-08-07 PROCEDURE — 36415 COLL VENOUS BLD VENIPUNCTURE: CPT

## 2020-08-07 PROCEDURE — 96365 THER/PROPH/DIAG IV INF INIT: CPT

## 2020-08-07 PROCEDURE — 84443 ASSAY THYROID STIM HORMONE: CPT

## 2020-08-07 PROCEDURE — 73630 X-RAY EXAM OF FOOT: CPT

## 2020-08-07 PROCEDURE — 80048 BASIC METABOLIC PNL TOTAL CA: CPT

## 2020-08-07 RX ADMIN — THIAMINE HYDROCHLORIDE: 100 INJECTION, SOLUTION INTRAMUSCULAR; INTRAVENOUS at 12:03

## 2020-08-07 NOTE — PLAN OF CARE
Problem: Potential for Falls  Goal: Patient will remain free of falls  Description: INTERVENTIONS:  - Assess patient frequently for physical needs  -  Identify cognitive and physical deficits and behaviors that affect risk of falls    -  [place] fall precautions as indicated by assessment   - Educate patient/family on patient safety including physical limitations  - Instruct patient to call for assistance with activity based on assessment  - Modify environment to reduce risk of injury  - Consider OT/PT consult to assist with strengthening/mobility  Outcome: Progressing     Problem: PAIN - ADULT  Goal: Verbalizes/displays adequate comfort level or baseline comfort level  Description: Interventions:  - Encourage patient to monitor pain and request assistance  - Assess pain using appropriate pain scale  - Administer analgesics based on type and severity of pain and evaluate response  - Implement non-pharmacological measures as appropriate and evaluate response  - Consider cultural and social influences on pain and pain management  - Notify physician/advanced practitioner if interventions unsuccessful or patient reports new pain  Outcome: Progressing     Problem: INFECTION - ADULT  Goal: Absence or prevention of progression during hospitalization  Description: INTERVENTIONS:  - Assess and monitor for signs and symptoms of infection  - Monitor lab/diagnostic results  - Monitor all insertion sites, i e  indwelling lines, tubes, and drains  - Monitor endotracheal if appropriate and nasal secretions for changes in amount and color  - [place] appropriate cooling/warming therapies per order  - Administer medications as ordered  - Instruct and encourage patient and family to use good hand hygiene technique  - Identify and instruct in appropriate isolation precautions for identified infection/condition  Outcome: Progressing     Problem: SAFETY ADULT  Goal: Patient will remain free of falls  Description: INTERVENTIONS:  - Assess patient frequently for physical needs  -  Identify cognitive and physical deficits and behaviors that affect risk of falls  -  [place] fall precautions as indicated by assessment   - Educate patient/family on patient safety including physical limitations  - Instruct patient to call for assistance with activity based on assessment  - Modify environment to reduce risk of injury  - Consider OT/PT consult to assist with strengthening/mobility  Outcome: Progressing     Problem: DISCHARGE PLANNING  Goal: Discharge to home or other facility with appropriate resources  Description: INTERVENTIONS:  - Identify barriers to discharge w/patient and caregiver  - Arrange for needed discharge resources and transportation as appropriate  - Identify discharge learning needs (meds, wound care, etc )  - Arrange for interpretive services to assist at discharge as needed  - Refer to Case Management Department for coordinating discharge planning if the patient needs post-hospital services based on physician/advanced practitioner order or complex needs related to functional status, cognitive ability, or social support system  Outcome: Progressing     Problem: Knowledge Deficit  Goal: Patient/family/caregiver demonstrates understanding of disease process, treatment plan, medications, and discharge instructions  Description: Complete learning assessment and assess knowledge base    Interventions:  - Provide teaching at level of understanding  - Provide teaching via preferred learning methods  Outcome: Progressing

## 2020-08-11 ENCOUNTER — TELEPHONE (OUTPATIENT)
Dept: BARIATRICS | Facility: CLINIC | Age: 60
End: 2020-08-11

## 2020-08-11 ENCOUNTER — HOSPITAL ENCOUNTER (OUTPATIENT)
Dept: INFUSION CENTER | Facility: HOSPITAL | Age: 60
Discharge: HOME | End: 2020-08-11

## 2020-08-11 DIAGNOSIS — R20.0 NUMBNESS: Primary | ICD-10-CM

## 2020-08-11 NOTE — TELEPHONE ENCOUNTER
I called patient as she apparently went to the infusion center today for more IVF  She indicated to them that she thought she needed to continue with this until seen in the office at the end of this month  I left message-advised her that my intent was to order these until she had her upper endoscopy  It was noted that she was dilated at the time of EGD so she likely would not need further IVF if she is tolerating liquids and some po  I asked her to call me if she feels she is not drinking/eating ok and I would be happy to order further infusions-otherwise she should keep her scheduled follow-up in the office  CR    Addendum: she called me back and reviewed above with her  She is eating and drinking well now-so is aware she does not need more IVF  She will keep her scheduled follow-up in the office  CR

## 2020-08-11 NOTE — TELEPHONE ENCOUNTER
Patient had apparently gone to infusion center at [facility] today-thought she required infusions until the end of this month  I spoke with the infusion center and advised them that my intent was to order infusions until she had her upper endoscopy done -which was completed 8/5/2020  It  Was noted that she was dilated then  Advised infusion center that if she is able to drink well she should not need more infusions now-but if she has any concerns she or they can let me know  Otherwise she should not need further infusions  The staff member will call her to let her know  They have my contact phone number for any concerns  CR

## 2020-08-13 ENCOUNTER — HOSPITAL ENCOUNTER (OUTPATIENT)
Dept: INFUSION CENTER | Facility: HOSPITAL | Age: 60
Discharge: HOME | End: 2020-08-13

## 2020-08-18 NOTE — PROGRESS NOTES
Message  Decreased TPN to   Dextrose 20% 500 ml  AA 10% 650 ml  Lipids 20% 100 ml  Total of 1250 ml ( patient started on pureed diet with liquids)  Total calories= 800 kcal and 65 grams of protein  Pt's LFTs elevated, decrease in TPN should help with levels  Plans is to d/c TPN next week if patient tolerates diet well  New orders faxed to [facility]      Active Problems    1  Adjustment disorder with mixed anxiety and depressed mood (309 28) (F43 23)   2  Anxiety disorder (300 00) (F41 9)   3  Arthritis (716 90) (M19 90)   4  Bereavement (V62 82) (Z63 4)   5  Chronic pain (338 29) (G89 29)   6  Depression (311) (F32 9)   7  Edema extremities (782 3) (R60 0)   8  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)   9  Hypercholesterolemia (272 0) (E78 0)   10  Hypothyroidism (244 9) (E03 9)   11  Insomnia (780 52) (G47 00)   12  Obesity (278 00) (E66 9)   13  Postgastrectomy malabsorption (579 3) (K91 2,Z90 3)   14  Preop cardiovascular exam (V72 81) (Z01 810)   15  Pre-op testing (V72 84) (Z01 818)   16  Status post gastric bypass for obesity (V45 86) (Z98 84)   17  Weight gain (783 1) (R63 5)    Current Meds   1  ALPRAZolam 1 MG Oral Tablet; TAKE 1 TABLET AT BEDTIME; Therapy: 63OMI6414 to (Evaluate:62Eih8416); Last Rx:10Nov2015 Ordered   2  Levothyroxine Sodium 150 MCG Oral Tablet; Therapy: (Recorded:69Hom8922) to Recorded   3  Omeprazole 20 MG Oral Capsule Delayed Release; TAKE 1 CAPSULE DAILY; Therapy: 22Apr2016 to (Evaluate:26Vfp4564)  Requested for: 27Apr2016; Last   Rx:22Apr2016 Ordered   4  Optisource Post Bariatric Surg Oral Tablet Chewable; CHEW AND SWALLOW 1 TABLET   DAILY; Therapy: 86CGT0488 to (Evaluate:36Ksb4116); Last Rx:10Nov2015 Ordered   5  OxyCODONE HCl - 30 MG Oral Tablet; TAKE 1 TABLET EVERY 6 HOURS AS NEEDED   FOR PAIN;   Therapy: 88GDX9204 to (Evaluate:79Azc2732); Last Rx:10Nov2015 Ordered    Allergies    1   No Known Drug Allergies    Signatures   Electronically signed by : MARY Ellison; Jun 7 Renal condition is worsening slowly.  Continue current treatment regimen. increase fluid intake and avoid salty foods.  Renal condition will be reassessed in 6 months.   2016 11:46AM EST                       (Author)

## 2020-08-26 ENCOUNTER — OFFICE VISIT (OUTPATIENT)
Dept: BARIATRICS | Facility: CLINIC | Age: 60
End: 2020-08-26

## 2020-08-26 VITALS
RESPIRATION RATE: 18 BRPM | HEIGHT: 62 IN | HEART RATE: 74 BPM | WEIGHT: 219 LBS | BODY MASS INDEX: 40.3 KG/M2 | TEMPERATURE: 97.5 F | SYSTOLIC BLOOD PRESSURE: 118 MMHG | DIASTOLIC BLOOD PRESSURE: 70 MMHG

## 2020-08-26 DIAGNOSIS — R13.10 DYSPHAGIA: Primary | ICD-10-CM

## 2020-08-26 PROCEDURE — 99024 POSTOP FOLLOW-UP VISIT: CPT | Performed by: SURGERY

## 2020-08-26 NOTE — PROGRESS NOTES
BARIATRIC HISTORY AND PHYSICAL - BARIATRIC SURGERY  Nika Clark 61 y o  female MRN: [id number]  Unit/Bed#:  Encounter: [id number]      HPI:  Nika Clark is a 61 y o  female who presents status post EGD with dilatation 8/5/2020  She has history of subtotal gastrectomy and esophagojejunostomy with a stricture at the anastomosis  Her anastomosis measured 8 mm, required balloon dilatation up to 12 mm with a CRE balloon  She reports complete resolution of her dysphagia  And she is satisfied with the result of  EGD and dilatation  Review of Systems   Constitutional: Negative for chills and fatigue  HENT: Negative for ear pain  Eyes: Negative for pain  Respiratory: Negative for cough, shortness of breath and wheezing  Cardiovascular: Negative for chest pain  Gastrointestinal: Negative for abdominal distention and abdominal pain  No dysphagia   Endocrine: Negative for polydipsia  Genitourinary: Negative for flank pain  Musculoskeletal: Negative for arthralgias and back pain  Skin: Negative for pallor  Allergic/Immunologic: Negative for food allergies  Neurological: Negative for weakness and headaches  Hematological: Does not bruise/bleed easily  Psychiatric/Behavioral: Negative for confusion         Historical Information   Past Medical History:   Diagnosis Date    Anxiety     Back pain     Back pain at L4-L5 level     Bariatric surgery status     Chronic pain disorder     back    Cramping of hands     and legs    DDD (degenerative disc disease), lumbar     Depression     Edema of both legs     with lasix much better    Fall at home     almost 2 yrs ago    Full dentures     GERD (gastroesophageal reflux disease)     History of gastric ulcer     "Years ago    History of heartburn     Hypothyroid     Hypothyroidism    Morbid obesity (HCC)     Motion sickness     Nausea & vomiting     Numbness and tingling in both hands     Numbness and tingling of both feet     PICC (peripherally inserted central catheter) in place     Right arm    PONV (postoperative nausea and vomiting)     Postgastrectomy malabsorption     Sciatica     Shortness of breath     zuñiga    Skin abnormality     Edema BLE- uses boots at home  skin on shins dark and rough    Stress incontinence     Stricture esophagus     Use of cane as ambulatory aid     Wears glasses      Past Surgical History:   Procedure Laterality Date    ABDOMINAL ADHESION SURGERY N/A 2017    Procedure: EXTENSIVE LYSIS ADHESIONS;  Surgeon: Douglas Hines MD;  [facility];  Service: Shayna Garcia ABDOMINAL SURGERY       SECTION      x3    CHOLECYSTECTOMY      COLONOSCOPY      COSMETIC SURGERY      tummy tuck    ESOPHAGOGASTRODUODENOSCOPY N/A 2016    Procedure: ESOPHAGOGASTRODUODENOSCOPY (EGD); Surgeon: Douglas Hines MD;  [facility]; Service:     ESOPHAGOGASTRODUODENOSCOPY N/A 2017    Procedure: ESOPHAGOGASTRODUODENOSCOPY (EGD); Surgeon: Douglas Hines MD;  [facility];  Service: Bariatrics    GASTRIC BYPASS      IR BILIARY TUBE PLACEMENT (PTC)  2019    PANNICULECTOMY      PARTIAL GASTRECTOMY N/A 2017    Procedure: SUBTOTAL GASTRECTOMY, ESOPHAGEAL JEJUNOSTOMY;  Surgeon: Douglas Hines MD;  [facility];  Service: Shayna Garcia SC EGD TRANSORAL BIOPSY SINGLE/MULTIPLE N/A 2017    Procedure: ESOPHAGOGASTRODUODENOSCOPY (EGD) with biopsy;  Surgeon: Nichole Garrett MD;  [facility]; Service: Bariatrics    SC EGD TRANSORAL BIOPSY SINGLE/MULTIPLE N/A 10/4/2017    Procedure: ESOPHAGOGASTRODUODENOSCOPY (EGD) with balloon dilatation;  Surgeon: Douglas Hines MD;  [facility]; Service: Bariatrics    SC EGD TRANSORAL BIOPSY SINGLE/MULTIPLE N/A 2017    Procedure: ESOPHAGOGASTRODUODENOSCOPY (EGD) with balloon dilation;  Surgeon: Douglas Hines MD;  [facility];   Service: Bariatrics    SC LAP, SAADIA RESTRICT PROC, LONGITUDINAL GASTRECTOMY N/A 2016    Procedure:   LAPAROSCOPIC GASTROGASTIC FISTULA TAKEDOWN, PARTIAL GASTRECTOMY, GASTRO JEJUNOSTOMY, EXTENSIVE LYSIS OF ADHESIONS;  Surgeon: Lilian Esposito MD;  [facility];  Service: Bariatrics     Social History   Social History     Substance and Sexual Activity   Alcohol Use No     Social History     Substance and Sexual Activity   Drug Use No     Social History     Tobacco Use   Smoking Status Former Smoker    Years: 5 00    Types: Cigarettes    Last attempt to quit: 2001    Years since quittin 7   Smokeless Tobacco Never Used   Tobacco Comment    Quit 15 years ago  4 cigarettes daily     Family History: non-contributory    Meds/Allergies   all medications and allergies reviewed  Allergies   Allergen Reactions    Nsaids      Due to gastric bypass    Sulfate     Ms Contin [Morphine] Rash       Objective     Current Vitals:   Blood Pressure: 118/70 (20 1020)  Pulse: 74 (20 1020)  Temperature: 97 5 °F (36 4 °C) (20 1020)  Respirations: 18 (20 1020)  Height: 5' 2 25" (158 1 cm) (20 1020)  Weight - Scale: 99 3 kg (219 lb) (20 1020)  bowel movement  [unfilled]    Invasive Devices     None                 Physical Exam  Constitutional:       Appearance: Normal appearance  HENT:      Head: Normocephalic  Mouth/Throat:      Mouth: Mucous membranes are moist    Eyes:      Conjunctiva/sclera: Conjunctivae normal       Pupils: Pupils are equal, round, and reactive to light  Neck:      Musculoskeletal: Normal range of motion  Cardiovascular:      Rate and Rhythm: Normal rate and regular rhythm  Pulmonary:      Effort: Pulmonary effort is normal    Abdominal:      General: Abdomen is flat  There is no distension  Tenderness: There is no abdominal tenderness  Musculoskeletal: Normal range of motion  Skin:     General: Skin is warm  Capillary Refill: Capillary refill takes less than 2 seconds  Neurological:      General: No focal deficit present  Mental Status: She is alert  Psychiatric:         Mood and Affect: Mood normal          Lab Results: I have personally reviewed pertinent lab results  Imaging: I have personally reviewed pertinent reports  EKG, Pathology, and Other Studies: I have personally reviewed pertinent reports  Code Status: [unfilled]  Advance Directive and Living Will:      Power of :    POLST:      Assessment/Plan: This is a 59-year-old female with stricture at gastrojejunal anastomosis status post EGD dilatation  Reports Complete resolution of dysphagia  Continue Carafate and PPI and scheduled  Call office if you experience difficulty of dysphagia

## 2020-09-21 DIAGNOSIS — R13.19 OTHER DYSPHAGIA: ICD-10-CM

## 2020-09-21 RX ORDER — SUCRALFATE ORAL 1 G/10ML
1 SUSPENSION ORAL 4 TIMES DAILY
Qty: 1200 ML | Refills: 1 | Status: SHIPPED | OUTPATIENT
Start: 2020-09-21

## 2020-09-24 ENCOUNTER — APPOINTMENT (OUTPATIENT)
Dept: RADIOLOGY | Facility: CLINIC | Age: 60
End: 2020-09-24
Payer: MEDICARE

## 2020-09-24 ENCOUNTER — APPOINTMENT (OUTPATIENT)
Dept: LAB | Facility: CLINIC | Age: 60
End: 2020-09-24
Payer: MEDICARE

## 2020-09-24 ENCOUNTER — TRANSCRIBE ORDERS (OUTPATIENT)
Dept: URGENT CARE | Facility: CLINIC | Age: 60
End: 2020-09-24

## 2020-09-24 DIAGNOSIS — M25.511 RIGHT SHOULDER PAIN, UNSPECIFIED CHRONICITY: ICD-10-CM

## 2020-09-24 DIAGNOSIS — E03.9 HYPOTHYROIDISM, ADULT: Primary | ICD-10-CM

## 2020-09-24 LAB
T4 FREE SERPL-MCNC: 1.34 NG/DL (ref 0.76–1.46)
TSH SERPL DL<=0.05 MIU/L-ACNC: 0.25 UIU/ML (ref 0.36–3.74)

## 2020-09-24 PROCEDURE — 84439 ASSAY OF FREE THYROXINE: CPT

## 2020-09-24 PROCEDURE — 36415 COLL VENOUS BLD VENIPUNCTURE: CPT

## 2020-09-24 PROCEDURE — 84443 ASSAY THYROID STIM HORMONE: CPT

## 2020-09-24 PROCEDURE — 73030 X-RAY EXAM OF SHOULDER: CPT

## 2020-12-08 ENCOUNTER — OFFICE VISIT (OUTPATIENT)
Dept: BARIATRICS | Facility: CLINIC | Age: 60
End: 2020-12-08
Payer: MEDICARE

## 2020-12-08 VITALS
TEMPERATURE: 97.7 F | HEIGHT: 63 IN | DIASTOLIC BLOOD PRESSURE: 70 MMHG | SYSTOLIC BLOOD PRESSURE: 128 MMHG | HEART RATE: 59 BPM | BODY MASS INDEX: 41.73 KG/M2 | WEIGHT: 235.5 LBS

## 2020-12-08 DIAGNOSIS — E66.01 MORBID (SEVERE) OBESITY DUE TO EXCESS CALORIES (HCC): Primary | ICD-10-CM

## 2020-12-08 DIAGNOSIS — Z48.815 ENCOUNTER FOR SURGICAL AFTERCARE FOLLOWING SURGERY OF DIGESTIVE SYSTEM: ICD-10-CM

## 2020-12-08 DIAGNOSIS — E50.9 VITAMIN A DEFICIENCY: ICD-10-CM

## 2020-12-08 DIAGNOSIS — Z98.84 BARIATRIC SURGERY STATUS: ICD-10-CM

## 2020-12-08 DIAGNOSIS — E03.9 ACQUIRED HYPOTHYROIDISM: ICD-10-CM

## 2020-12-08 DIAGNOSIS — K91.2 POSTSURGICAL MALABSORPTION: ICD-10-CM

## 2020-12-08 DIAGNOSIS — E53.8 LOW VITAMIN B12 LEVEL: ICD-10-CM

## 2020-12-08 PROBLEM — R79.89 LOW VITAMIN B12 LEVEL: Status: ACTIVE | Noted: 2020-12-08

## 2020-12-08 PROCEDURE — 99214 OFFICE O/P EST MOD 30 MIN: CPT | Performed by: PHYSICIAN ASSISTANT

## 2021-01-04 ENCOUNTER — LAB (OUTPATIENT)
Dept: LAB | Facility: CLINIC | Age: 61
End: 2021-01-04
Payer: MEDICARE

## 2021-01-04 ENCOUNTER — TRANSCRIBE ORDERS (OUTPATIENT)
Dept: LAB | Facility: CLINIC | Age: 61
End: 2021-01-04

## 2021-01-04 DIAGNOSIS — E10.69 TYPE 1 DIABETES MELLITUS WITH OTHER SPECIFIED COMPLICATION (HCC): Primary | ICD-10-CM

## 2021-01-04 DIAGNOSIS — R63.8 DECREASED ORAL INTAKE: ICD-10-CM

## 2021-01-04 DIAGNOSIS — I51.9 MYXEDEMA HEART DISEASE: ICD-10-CM

## 2021-01-04 DIAGNOSIS — Z48.815 ENCOUNTER FOR SURGICAL AFTERCARE FOLLOWING SURGERY OF DIGESTIVE SYSTEM: ICD-10-CM

## 2021-01-04 DIAGNOSIS — E66.01 MORBID (SEVERE) OBESITY DUE TO EXCESS CALORIES (HCC): ICD-10-CM

## 2021-01-04 DIAGNOSIS — E03.9 MYXEDEMA HEART DISEASE: ICD-10-CM

## 2021-01-04 DIAGNOSIS — E50.9 VITAMIN A DEFICIENCY: ICD-10-CM

## 2021-01-04 DIAGNOSIS — K91.2 POSTSURGICAL MALABSORPTION: ICD-10-CM

## 2021-01-04 DIAGNOSIS — Z98.84 BARIATRIC SURGERY STATUS: ICD-10-CM

## 2021-01-04 DIAGNOSIS — E10.69 TYPE 1 DIABETES MELLITUS WITH OTHER SPECIFIED COMPLICATION (HCC): ICD-10-CM

## 2021-01-04 DIAGNOSIS — R13.19 OTHER DYSPHAGIA: ICD-10-CM

## 2021-01-04 DIAGNOSIS — E53.8 LOW VITAMIN B12 LEVEL: ICD-10-CM

## 2021-01-04 LAB
25(OH)D3 SERPL-MCNC: 27.3 NG/ML (ref 30–100)
ANION GAP SERPL CALCULATED.3IONS-SCNC: 2 MMOL/L (ref 4–13)
BUN SERPL-MCNC: 14 MG/DL (ref 5–25)
CALCIUM SERPL-MCNC: 9 MG/DL (ref 8.3–10.1)
CHLORIDE SERPL-SCNC: 108 MMOL/L (ref 100–108)
CO2 SERPL-SCNC: 29 MMOL/L (ref 21–32)
CREAT SERPL-MCNC: 1.12 MG/DL (ref 0.6–1.3)
ERYTHROCYTE [DISTWIDTH] IN BLOOD BY AUTOMATED COUNT: 12.5 % (ref 11.6–15.1)
FERRITIN SERPL-MCNC: 13 NG/ML (ref 8–388)
FOLATE SERPL-MCNC: 5 NG/ML (ref 3.1–17.5)
GFR SERPL CREATININE-BSD FRML MDRD: 54 ML/MIN/1.73SQ M
GLUCOSE P FAST SERPL-MCNC: 82 MG/DL (ref 65–99)
HCT VFR BLD AUTO: 41.8 % (ref 34.8–46.1)
HGB BLD-MCNC: 13.5 G/DL (ref 11.5–15.4)
IRON SATN MFR SERPL: 15 %
IRON SERPL-MCNC: 64 UG/DL (ref 50–170)
MCH RBC QN AUTO: 31.8 PG (ref 26.8–34.3)
MCHC RBC AUTO-ENTMCNC: 32.3 G/DL (ref 31.4–37.4)
MCV RBC AUTO: 98 FL (ref 82–98)
PLATELET # BLD AUTO: 219 THOUSANDS/UL (ref 149–390)
PMV BLD AUTO: 11.5 FL (ref 8.9–12.7)
POTASSIUM SERPL-SCNC: 4.2 MMOL/L (ref 3.5–5.3)
PREALB SERPL-MCNC: 18.9 MG/DL (ref 18–40)
PTH-INTACT SERPL-MCNC: 89.6 PG/ML (ref 18.4–80.1)
RBC # BLD AUTO: 4.25 MILLION/UL (ref 3.81–5.12)
SODIUM SERPL-SCNC: 139 MMOL/L (ref 136–145)
T4 FREE SERPL-MCNC: 1.1 NG/DL (ref 0.76–1.46)
TIBC SERPL-MCNC: 441 UG/DL (ref 250–450)
TSH SERPL DL<=0.05 MIU/L-ACNC: 3.2 UIU/ML (ref 0.36–3.74)
VIT B12 SERPL-MCNC: 190 PG/ML (ref 100–900)
WBC # BLD AUTO: 5.74 THOUSAND/UL (ref 4.31–10.16)

## 2021-01-04 PROCEDURE — 83918 ORGANIC ACIDS TOTAL QUANT: CPT

## 2021-01-04 PROCEDURE — 82728 ASSAY OF FERRITIN: CPT

## 2021-01-04 PROCEDURE — 84443 ASSAY THYROID STIM HORMONE: CPT

## 2021-01-04 PROCEDURE — 83550 IRON BINDING TEST: CPT

## 2021-01-04 PROCEDURE — 82607 VITAMIN B-12: CPT

## 2021-01-04 PROCEDURE — 84134 ASSAY OF PREALBUMIN: CPT

## 2021-01-04 PROCEDURE — 84425 ASSAY OF VITAMIN B-1: CPT

## 2021-01-04 PROCEDURE — 82746 ASSAY OF FOLIC ACID SERUM: CPT

## 2021-01-04 PROCEDURE — 83970 ASSAY OF PARATHORMONE: CPT

## 2021-01-04 PROCEDURE — 83883 ASSAY NEPHELOMETRY NOT SPEC: CPT

## 2021-01-04 PROCEDURE — 80048 BASIC METABOLIC PNL TOTAL CA: CPT

## 2021-01-04 PROCEDURE — 82306 VITAMIN D 25 HYDROXY: CPT

## 2021-01-04 PROCEDURE — 36415 COLL VENOUS BLD VENIPUNCTURE: CPT

## 2021-01-04 PROCEDURE — 85027 COMPLETE CBC AUTOMATED: CPT

## 2021-01-04 PROCEDURE — 84439 ASSAY OF FREE THYROXINE: CPT

## 2021-01-04 PROCEDURE — 83540 ASSAY OF IRON: CPT

## 2021-01-04 PROCEDURE — 84590 ASSAY OF VITAMIN A: CPT

## 2021-01-04 PROCEDURE — 84630 ASSAY OF ZINC: CPT

## 2021-01-07 LAB
MISCELLANEOUS LAB TEST RESULT: NORMAL
ZINC SERPL-MCNC: 65 UG/DL (ref 44–115)

## 2021-01-08 LAB
METHYLMALONATE SERPL-SCNC: 1043 NMOL/L (ref 0–378)
SL AMB DISCLAIMER: ABNORMAL
VIT B1 BLD-SCNC: 80.8 NMOL/L (ref 66.5–200)

## 2021-01-09 LAB — VIT A SERPL-MCNC: 32.6 UG/DL (ref 22–69.5)

## 2021-03-03 ENCOUNTER — OFFICE VISIT (OUTPATIENT)
Dept: WOUND CARE | Facility: CLINIC | Age: 61
End: 2021-03-03
Payer: MEDICARE

## 2021-03-03 VITALS
SYSTOLIC BLOOD PRESSURE: 136 MMHG | DIASTOLIC BLOOD PRESSURE: 77 MMHG | HEART RATE: 78 BPM | WEIGHT: 246 LBS | TEMPERATURE: 98.1 F | RESPIRATION RATE: 20 BRPM | BODY MASS INDEX: 45.27 KG/M2 | HEIGHT: 62 IN

## 2021-03-03 DIAGNOSIS — L25.9 CONTACT DERMATITIS, UNSPECIFIED CONTACT DERMATITIS TYPE, UNSPECIFIED TRIGGER: ICD-10-CM

## 2021-03-03 DIAGNOSIS — I87.312 CHRONIC VENOUS HYPERTENSION (IDIOPATHIC) WITH ULCER OF LEFT LOWER EXTREMITY (CODE) (HCC): ICD-10-CM

## 2021-03-03 DIAGNOSIS — I87.311 CHRONIC VENOUS HYPERTENSION (IDIOPATHIC) WITH ULCER OF RIGHT LOWER EXTREMITY (CODE) (HCC): Primary | ICD-10-CM

## 2021-03-03 DIAGNOSIS — L97.911 NON-PRESSURE CHRONIC ULCER OF RIGHT LOWER LEG, LIMITED TO BREAKDOWN OF SKIN (HCC): ICD-10-CM

## 2021-03-03 DIAGNOSIS — L97.921 NON-PRESSURE CHRONIC ULCER OF LEFT LOWER LEG, LIMITED TO BREAKDOWN OF SKIN (HCC): ICD-10-CM

## 2021-03-03 PROCEDURE — 29581 APPL MULTLAYER CMPRN SYS LEG: CPT

## 2021-03-03 PROCEDURE — 99204 OFFICE O/P NEW MOD 45 MIN: CPT | Performed by: NURSE PRACTITIONER

## 2021-03-03 PROCEDURE — 99213 OFFICE O/P EST LOW 20 MIN: CPT | Performed by: NURSE PRACTITIONER

## 2021-03-03 RX ORDER — BETAMETHASONE DIPROPIONATE 0.5 MG/G
CREAM TOPICAL 2 TIMES DAILY
Qty: 30 G | Refills: 0 | Status: SHIPPED | OUTPATIENT
Start: 2021-03-03 | End: 2021-05-05 | Stop reason: SDUPTHER

## 2021-03-03 RX ORDER — BETAMETHASONE DIPROPIONATE 0.05 %
OINTMENT (GRAM) TOPICAL ONCE
Status: COMPLETED | OUTPATIENT
Start: 2021-03-03 | End: 2021-03-03

## 2021-03-03 RX ORDER — LIDOCAINE 40 MG/G
CREAM TOPICAL ONCE
Status: CANCELLED | OUTPATIENT
Start: 2021-03-03 | End: 2021-03-03

## 2021-03-03 RX ADMIN — Medication: at 09:24

## 2021-03-03 NOTE — PROGRESS NOTES
Patient ID: Diego Mcconnell is a 61 y o  female Date of Birth 1960     Chief Complaint  Chief Complaint   Patient presents with   [facility] Patient Visit     Patient here today for ulcers on left and right lower legs  Patient stated about 3 weeks ago they started to drain  She does have compression pump and she was using them until the broke 3 weeks ago  Patient arrived with ulcers open to air  Allergies  Nsaids, Sulfate, and Ms contin [morphine]    Assessment:     Diagnoses and all orders for this visit:    Chronic venous hypertension (idiopathic) with ulcer of right lower extremity (CODE) (HCC)  -     Wound cleansing and dressings; Future  -     betamethasone dipropionate (DIPROSONE) 0 05 % ointment  -     betamethasone dipropionate (DIPROSONE) 0 05 % cream; Apply topically 2 (two) times a day  -     Cast Application    Chronic venous hypertension (idiopathic) with ulcer of left lower extremity (CODE) (HCC)  -     Wound cleansing and dressings; Future  -     betamethasone dipropionate (DIPROSONE) 0 05 % ointment  -     betamethasone dipropionate (DIPROSONE) 0 05 % cream; Apply topically 2 (two) times a day  -     Cast Application    Non-pressure chronic ulcer of right lower leg, limited to breakdown of skin (HCC)  -     Wound cleansing and dressings; Future  -     betamethasone dipropionate (DIPROSONE) 0 05 % ointment  -     betamethasone dipropionate (DIPROSONE) 0 05 % cream; Apply topically 2 (two) times a day  -     Cast Application    Non-pressure chronic ulcer of left lower leg, limited to breakdown of skin (HCC)  -     Wound cleansing and dressings;  Future  -     betamethasone dipropionate (DIPROSONE) 0 05 % ointment  -     betamethasone dipropionate (DIPROSONE) 0 05 % cream; Apply topically 2 (two) times a day  -     Cast Application    Contact dermatitis, unspecified contact dermatitis type, unspecified trigger  -     Cast Application    Other orders  -     Cancel: lidocaine (LMX) 4 % cream Plan:  1  Initial visit  Wounds cleansed with NSS and gauze  EUNICE's completed in office today: 1 0 LLE and 1 1 RLE  Wounds produce a minimal amount of drainage but patient has bilateral LE edema  Will have betamethasone cream and lotion applied to her legs bilaterally covered with CoFlex wraps  Counseled patient to not get wraps wet  Will have RN staff contact Janise Lombard about either fixing patient's lymphedma pumps or try ordering her a new pump  Will order patient compression stockings next week  Also ordered betamethasone ointment for patient to use PRN on her contact dermatitis  Also recommend moisturizing her skin daily with lotion  Patient verbalized agreement and understanding  Patient will follow up in 1 week         Wound 03/03/21 Venous Ulcer Leg Right;Posterior (Active)   Wound Image Images linked 03/03/21 0851   Wound Description Epithelialization;Peng 03/03/21 0851   Nanda-wound Assessment Hyperpigmented 03/03/21 0851   Wound Length (cm) 2 5 cm 03/03/21 0851   Wound Width (cm) 2 5 cm 03/03/21 0851   Wound Depth (cm) 0 1 cm 03/03/21 0851   Wound Surface Area (cm^2) 6 25 cm^2 03/03/21 0851   Wound Volume (cm^3) 0 62 cm^3 03/03/21 0851   Calculated Wound Volume (cm^3) 0 62 cm^3 03/03/21 0851   Drainage Amount None 03/03/21 0851   Non-staged Wound Description Partial thickness 03/03/21 0851   Treatments Cleansed 03/03/21 0851   Patient Tolerance Tolerated well 03/03/21 0851       Wound 03/03/21 Venous Ulcer Leg Left;Posterior (Active)   Wound Image Images linked 03/03/21 0850   Wound Description Epithelialization;Peng 03/03/21 0850   Nanda-wound Assessment Hyperpigmented 03/03/21 0850   Wound Length (cm) 0 5 cm 03/03/21 0850   Wound Width (cm) 1 cm 03/03/21 0850   Wound Depth (cm) 0 1 cm 03/03/21 0850   Wound Surface Area (cm^2) 0 5 cm^2 03/03/21 0850   Wound Volume (cm^3) 0 05 cm^3 03/03/21 0850   Calculated Wound Volume (cm^3) 0 05 cm^3 03/03/21 0850   Drainage Amount None 03/03/21 0850   Treatments Cleansed 03/03/21 0850   Patient Tolerance Tolerated well 03/03/21 0850       Wound 03/03/21 Venous Ulcer Leg Right;Posterior (Active)   Date First Assessed: 03/03/21   Primary Wound Type: Venous Ulcer  Location: Leg  Wound Location Orientation: Right;Posterior       Wound 03/03/21 Venous Ulcer Leg Left;Posterior (Active)   Date First Assessed: 03/03/21   Primary Wound Type: Venous Ulcer  Location: Leg  Wound Location Orientation: Left;Posterior       [REMOVED] Wound 05/29/19 Other (Comment) Abdomen Right;Upper (Removed)   Resolved Date: 03/03/21  Date First Assessed/Time First Assessed: 05/29/19 1414   Pre-Existing Wound: No  Traumatic Wound Type: (c) Other (Comment)  Location: Abdomen  Wound Location Orientation: Right;Upper  Wound Description (Comments): needle inser    Subjective:     Patient is a 61year old female who presents to the [facility] wound center as a new patient for venous ulcers of her bilateral lower extremities  Patient reports her wounds have been present for the past 3 months  Patient reports prior history of LE ulcers  Patient reports her wounds have been weeping a small amount of drainage  She has been managing her wounds at home with an oatmeal cream and compression wraps  Patient also reports she owns lymphedema pumps but they broke 3 weeks ago  Patient also c/o pruritis on her bilateral upper arms and lower back  No open wounds present on her upper arms or lower back  She admits she has been scratching at these areas frequently  She also reports she has tried to apply different types of lotions for relief but nothing has worked  She denies any pain, fevers, or chills         The following portions of the patient's history were reviewed and updated as appropriate:   She  has a past medical history of Anxiety, Back pain, Back pain at L4-L5 level, Bariatric surgery status, Chronic pain disorder, Cramping of hands, DDD (degenerative disc disease), lumbar, Depression, Edema of both legs, Fall at home, Full dentures, GERD (gastroesophageal reflux disease), History of gastric ulcer, History of heartburn, Hypothyroid, Morbid obesity ([facility] [address] ), Motion sickness, Nausea & vomiting, Numbness and tingling in both hands, Numbness and tingling of both feet, PICC (peripherally inserted central catheter) in place, PONV (postoperative nausea and vomiting), Postgastrectomy malabsorption, Sciatica, Shortness of breath, Skin abnormality, Stress incontinence, Stricture esophagus, Use of cane as ambulatory aid, and Wears glasses  She   Patient Active Problem List    Diagnosis Date Noted    Low vitamin B12 level 2020    Vitamin A deficiency 2020    Other dysphagia 2020    Decreased oral intake 2020    Encounter for surgical aftercare following surgery of digestive system 2020    Postsurgical malabsorption 2020    Common bile duct stricture 2019    Choledocholithiasis 2019    Bariatric surgery status 2019    Anastomotic stricture of gastrojejunostomy 10/28/2017    Paroxysmal atrial fibrillation ([facility] [address] ) 2016    Morbid (severe) obesity due to excess calories ([facility] [address] ) 2016    Hypothyroidism 2016    Hypercholesterolemia 2016    Lumbar disc disease 2016     She  has a past surgical history that includes  section; Gastric bypass; Panniculectomy; Cholecystectomy; Abdominal surgery; pr lap, donell restrict proc, longitudinal gastrectomy (N/A, 2016); Esophagogastroduodenoscopy (N/A, 2016); Cosmetic surgery; pr egd transoral biopsy single/multiple (N/A, 2017); pr egd transoral biopsy single/multiple (N/A, 10/4/2017); Colonoscopy; pr egd transoral biopsy single/multiple (N/A, 2017); Esophagogastroduodenoscopy (N/A, 2017); Partial gastrectomy (N/A, 2017); Abdominal adhesion surgery (N/A, 2017); and IR biliary tube placement (PTC) (2019)  Her family history includes Alcohol abuse in her family;  Alzheimer's disease in her mother; Anxiety disorder in her daughter; Diabetes in her father; Hypertension in her father; No Known Problems in her daughter, daughter, maternal aunt, maternal aunt, maternal aunt, maternal aunt, maternal aunt, maternal grandmother, paternal grandmother, sister, sister, sister, sister, sister, sister, and sister  She  reports that she quit smoking about 19 years ago  Her smoking use included cigarettes  She quit after 5 00 years of use  She has never used smokeless tobacco  She reports that she does not drink alcohol or use drugs    Current Outpatient Medications   Medication Sig Dispense Refill    acetaminophen (TYLENOL) 160 mg/5 mL suspension Take 10 mL by mouth every 4 (four) hours as needed for mild pain, headaches or fever 237 mL 0    BELBUCA 150 MCG FILM   5    BUSPIRONE HCL PO Take 20 mg by mouth daily at bedtime      cholecalciferol (VITAMIN D3) 1,000 units tablet Take 1,000 Units by mouth 2 (two) times a day       cyclobenzaprine (FLEXERIL) 5 mg tablet   5    furosemide (LASIX) 20 mg tablet furosemide 20 mg tablet daily      levothyroxine 100 mcg tablet Take 100 mcg by mouth daily On Odd days      LINZESS 145 MCG CAPS       Melatonin 10 MG CAPS Take 10 mg by mouth daily at bedtime      oxybutynin (DITROPAN-XL) 10 MG 24 hr tablet Take 10 mg by mouth daily      oxyCODONE-acetaminophen (PERCOCET)  mg per tablet Take 1 tablet by mouth every 4 (four) hours      pantoprazole (PROTONIX) 40 mg tablet Take 40 mg by mouth daily   5    betamethasone dipropionate (DIPROSONE) 0 05 % cream Apply topically 2 (two) times a day 30 g 0    busPIRone (BUSPAR) 15 mg tablet Take 15 mg by mouth daily      levothyroxine 125 mcg tablet Take 125 mcg by mouth daily On Even days      ondansetron (ZOFRAN) 4 mg tablet Take 4 mg by mouth every 12 (twelve) hours as needed for nausea or vomiting      sucralfate (CARAFATE) 1 g/10 mL suspension TAKE 10 ML (1 G TOTAL) BY MOUTH 4 (FOUR) TIMES A DAY (Patient not taking: Reported on 3/3/2021) 1200 mL 1     Current Facility-Administered Medications   Medication Dose Route Frequency Provider Last Rate Last Admin    sodium chloride (PF) 0 9 % injection 5 mL  5 mL Intravenous PRN Zulema Dutton PA-C         She is allergic to nsaids; sulfate; and ms contin [morphine]       Review of Systems   Constitutional: Negative  HENT: Negative for ear pain and hearing loss  Eyes: Negative for pain  Respiratory: Negative for chest tightness and shortness of breath  Cardiovascular: Positive for leg swelling  Negative for chest pain and palpitations  Gastrointestinal: Negative for diarrhea, nausea and vomiting  Genitourinary: Negative for dysuria  Musculoskeletal: Negative for gait problem  Skin: Positive for wound  Neurological: Negative for tremors and weakness  Psychiatric/Behavioral: Negative for behavioral problems, confusion and suicidal ideas                       Objective:       Wound 03/03/21 Venous Ulcer Leg Right;Posterior (Active)   Wound Image Images linked 03/03/21 0851   Wound Description Epithelialization;Peng 03/03/21 0851   Nanda-wound Assessment Hyperpigmented 03/03/21 0851   Wound Length (cm) 2 5 cm 03/03/21 0851   Wound Width (cm) 2 5 cm 03/03/21 0851   Wound Depth (cm) 0 1 cm 03/03/21 0851   Wound Surface Area (cm^2) 6 25 cm^2 03/03/21 0851   Wound Volume (cm^3) 0 62 cm^3 03/03/21 0851   Calculated Wound Volume (cm^3) 0 62 cm^3 03/03/21 0851   Drainage Amount None 03/03/21 0851   Non-staged Wound Description Partial thickness 03/03/21 0851   Treatments Cleansed 03/03/21 0851   Patient Tolerance Tolerated well 03/03/21 0851       Wound 03/03/21 Venous Ulcer Leg Left;Posterior (Active)   Wound Image Images linked 03/03/21 0850   Wound Description Epithelialization;Peng 03/03/21 0850   Nanda-wound Assessment Hyperpigmented 03/03/21 0850   Wound Length (cm) 0 5 cm 03/03/21 0850   Wound Width (cm) 1 cm 03/03/21 0850   Wound Depth (cm) 0 1 cm 03/03/21 0850   Wound Surface Area (cm^2) 0 5 cm^2 03/03/21 0850   Wound Volume (cm^3) 0 05 cm^3 03/03/21 0850   Calculated Wound Volume (cm^3) 0 05 cm^3 03/03/21 0850   Drainage Amount None 03/03/21 0850   Treatments Cleansed 03/03/21 0850   Patient Tolerance Tolerated well 03/03/21 0850       /77   Pulse 78   Temp 98 1 °F (36 7 °C)   Resp 20   Ht 5' 2" (1 575 m)   Wt 112 kg (246 lb)   BMI 44 99 kg/m²     Physical Exam  Vitals signs and nursing note reviewed  Constitutional:       General: She is not in acute distress  Appearance: Normal appearance  She is obese  HENT:      Head: Normocephalic and atraumatic  Eyes:      General:         Right eye: No discharge  Left eye: No discharge  Neck:      Musculoskeletal: Normal range of motion and neck supple  No neck rigidity  Pulmonary:      Effort: Pulmonary effort is normal  No respiratory distress  Musculoskeletal: Normal range of motion  Right lower leg: Edema present  Left lower leg: Edema present  Skin:     General: Skin is warm and dry  Findings: Wound present  No erythema  Neurological:      General: No focal deficit present  Mental Status: She is alert and oriented to person, place, and time  Mental status is at baseline  Psychiatric:         Mood and Affect: Mood normal          Behavior: Behavior normal          Thought Content: Thought content normal          Judgment: Judgment normal            Wound Instructions:  Orders Placed This Encounter   Procedures    Wound cleansing and dressings     Shower yes with protection  May purchase cast cover  Wound Center to wash lower legs with soap and water  Then apply Betamethasone to lower legs  Multiplayer wrap procedure     Before application, EUNICE and/or TBI determined to be adequate for healing and application of compression  Lower extremity washed prior to application of compression wrap   With the foot in dorsiflexed position, Coflex was applied as per physician orders without complications or complaint of pain  The procedure was tolerated well  Toes warm & pink post application  Patient provided education & reinforced to observe toes for any discoloration, swelling or tingling and instructed when to report to the [address] [facility] or to remove compression  Unna Boot/ Multi-layer compression wrap Instructions    Keep compression wrap/wraps clean and dry  If wraps are too tight and you experience numbness/tingling, call the wound center  If after hours, remove wraps or proceed to nearest E R  and call wound center in [facility] Genta will be changed 1 x weekly    Avoid prolonged standing in one place  Elevate leg(s) above the level of the heart when sitting or as much as possible  Standing Status:   Future     Standing Expiration Date:   1/9/1579    Cast Application     This order was created via procedure documentation        Diagnosis ICD-10-CM Associated Orders   1  Chronic venous hypertension (idiopathic) with ulcer of right lower extremity (CODE) ([facility])  I87 311 Wound cleansing and dressings     betamethasone dipropionate (DIPROSONE) 0 05 % ointment     betamethasone dipropionate (DIPROSONE) 0 05 % cream     Cast Application   2  Chronic venous hypertension (idiopathic) with ulcer of left lower extremity (CODE) ([facility])  I87 312 Wound cleansing and dressings     betamethasone dipropionate (DIPROSONE) 0 05 % ointment     betamethasone dipropionate (DIPROSONE) 0 05 % cream     Cast Application   3  Non-pressure chronic ulcer of right lower leg, limited to breakdown of skin ([facility])  L97 911 Wound cleansing and dressings     betamethasone dipropionate (DIPROSONE) 0 05 % ointment     betamethasone dipropionate (DIPROSONE) 0 05 % cream     Cast Application   4   Non-pressure chronic ulcer of left lower leg, limited to breakdown of skin ([facility])  L97 [id number] Wound cleansing and dressings     betamethasone dipropionate (DIPROSONE) 0 05 % ointment betamethasone dipropionate (DIPROSONE) 0 05 % cream     Cast Application   5   Contact dermatitis, unspecified contact dermatitis type, unspecified trigger  A41 1 Cast Application

## 2021-03-03 NOTE — PATIENT INSTRUCTIONS
Orders Placed This Encounter   Procedures    Wound cleansing and dressings     Shower yes with protection  May purchase cast cover  Wound Center to wash lower legs with soap and water  Then apply Betamethasone to lower legs  Multiplayer wrap procedure     Before application, EUNICE and/or TBI determined to be adequate for healing and application of compression  Lower extremity washed prior to application of compression wrap  With the foot in dorsiflexed position, Coflex was applied as per physician orders without complications or complaint of pain  The procedure was tolerated well  Toes warm & pink post application  Patient provided education & reinforced to observe toes for any discoloration, swelling or tingling and instructed when to report to the [address] or to remove compression  Unna Boot/ Multi-layer compression wrap Instructions    Keep compression wrap/wraps clean and dry  If wraps are too tight and you experience numbness/tingling, call the wound center  If after hours, remove wraps or proceed to nearest E R  and call wound center in [facility] will be changed 1 x weekly    Avoid prolonged standing in one place  Elevate leg(s) above the level of the heart when sitting or as much as possible  Standing Status:   Future     Standing Expiration Date:   3/3/2022     High Protein Diet   WHAT YOU NEED TO KNOW:   A high-protein diet is a meal plan that includes extra protein  Your body may need extra protein if you have certain health conditions, such as cancer, burns, or injuries  You may also need to follow this diet to get stronger after a surgery or illness  Extra protein helps to heal wounds and form new tissue in the body  Your dietitian will tell you how much protein and how many calories you need each day  DISCHARGE INSTRUCTIONS:   Foods high in protein:  The average amount of protein is listed below in grams (g)   To find the exact amount of protein in a food, read the food labels on packaged items  · Dairy:      ? 1 cup of any type of milk (8 g)    ? ½ cup of evaporated canned milk (9 g)    ? ¼ cup of nonfat dry milk (11 g)    ? 1 ounce of semi-hard or solid cheese (7 g)    ? ¼ cup of parmesan cheese (8 g)    ? ½ cup of cottage cheese (14 g)    ? ½ cup of pudding (4 g)    ? 1 cup of plain or fruit yogurt (8 g)    · Meats and meat substitutes:      ? 3 ounces of cooked freshwater fish (21 g)     ? 3 ounces of cooked shellfish (19 g)    ? ½ cup of canned tuna (14 g)    ? 3 ounces of cooked chicken, turkey, or other poultry (24 g)    ? 3 ounces of cooked beef, pork, lamb, or other red meat (21 g)    ? 1 large egg (6 g)    ? ¼ cup of fat free egg substitute (5 g)    ? ½ cup of tofu or tempeh (10 g)    ? 1 cup of cooked dried beans, such as fong, kidney, or navy (15 g)    · Nuts and seeds:      ? 2 tablespoons of almonds, cashews, sunflower seeds, or walnuts (5 g)    ? 2 tablespoons of peanuts (7 g)    ? 2 tablespoons of peanut butter (8 g)    How to add extra protein:   · Add powdered milk to milk, cereals, scrambled eggs, soups, and casseroles  · Add cheese to sauces, soups, or vegetables  · Add eggs to tuna, salads, sauces, or casseroles  · Add nutrition supplements and breakfast drink mixes to milk or shakes  · Add nuts to foods or eat them as snacks  · Add meat (beef, chicken, and pork) to soups, casseroles, pasta dishes, or vegetables  · Add beans, peas, and other legumes to salads  · Eat cottage cheese or yogurt with fruit  © Copyright [address] Information is for End User's use only and may not be sold, redistributed or otherwise used for commercial purposes  All illustrations and images included in CareNotes® are the copyrighted property of [employer]  or [facility] [address]  The above information is an  only  It is not intended as medical advice for individual conditions or treatments   Talk to your doctor, nurse or pharmacist before following any medical regimen to see if it is safe and effective for you  Low-Sodium Diet   AMBULATORY CARE:   A low-sodium diet  limits foods that are high in sodium (salt)  You will need to follow a low-sodium diet if you have high blood pressure, kidney disease, or heart failure  You may also need to follow this diet if you have a condition that is causing your body to retain (hold) extra fluid  You may need to limit the amount of sodium you eat in a day to 1,500 to 2,000 mg  Ask your healthcare provider how much sodium you can have each day  How to use food labels to choose foods that are low in sodium:  Read food labels to find the amount of sodium they contain  The amount of sodium is listed in milligrams (mg)  The % Daily Value (DV) column tells you how much of your daily needs are met by 1 serving of the food for each nutrient listed  Choose foods that have less than 5% of the DV of sodium  These foods are considered low in sodium  Foods that have 20% or more of the DV of sodium are considered high in sodium  Some food labels may also list any of the following terms that tell you about the sodium content in the food:  · Sodium-free:  Less than 5 mg in each serving    · Very low sodium:  35 mg of sodium or less in each serving    · Low sodium:  140 mg of sodium or less in each serving    · Reduced sodium: At least 25% less sodium in each serving than the regular type    · Light in sodium:  50% less sodium in each serving    · Unsalted or no added salt:  No extra salt is added during processing (the food may still contain sodium)     Foods to avoid:  Salty foods are high in sodium  You should avoid the following:  · Processed foods:      ? Mixes for cornbread, biscuits, cake, and pudding     ? Instant foods, such as potatoes, cereals, noodles, and rice     ? Packaged foods, such as bread stuffing, rice and pasta mixes, snack dip mixes, and macaroni and cheese     ?  Canned foods, such as canned vegetables, soups, broths, sauces, and vegetable or tomato juice    ? Snack foods, such as salted chips, popcorn, pretzels, pork rinds, salted crackers, and salted nuts    ? Frozen foods, such as dinners, entrees, vegetables with sauces, and breaded meats    ? Sauerkraut, pickled vegetables, and other foods prepared in brine    · Meats and cheeses:      ? Smoked or cured meat, such as corned beef, hutchison, ham, hot dogs, and sausage    ? Canned meats or spreads, such as potted meats, sardines, anchovies, and imitation seafood    ? Deli or lunch meats, such as bologna, ham, turkey, and roast beef    ? Processed cheese, such as American cheese and cheese spreads    · Condiments, sauces, and seasonings:      ? Salt (¼ teaspoon of salt contains 575 mg of sodium)    ? Seasonings made with salt, such as garlic salt, celery salt, onion salt, and seasoned salt    ? Regular soy sauce, barbecue sauce, teriyaki sauce, steak sauce, Worcestershire sauce, and most flavored vinegars    ? Canned gravy and mixes     ? Regular condiments, such as mustard, ketchup, and salad dressings    ? Pickles and olives    ? Meat tenderizers and monosodium glutamate (MSG)    Foods to include:  Read the food label to find the exact amount of sodium in each serving  · Bread and cereal:  Try to choose breads with less than 80 mg of sodium per serving  ? Bread, roll, solange, tortilla, or unsalted crackers  ? Ready-to-eat cereals with less than 5% DV of sodium (examples include shredded wheat and puffed rice)    ? Pasta    · Vegetables and fruits:      ? Unsalted fresh, frozen, or canned vegetables    ? Fresh, frozen, or canned fruits    ? Fruit juice    · Dairy:  One serving has about 150 mg of sodium  ? Milk, all types    ? Yogurt    ? Hard cheese, such as cheddar, Swiss, [employer], or mozzarella    · Meat and other protein foods:  Some raw meats may have added sodium  ? Plain meats, fish, and poultry     ? Eggs    · Other foods:      ?  Homemade pudding    ? Unsalted nuts, popcorn, or pretzels    ? Unsalted butter or margarine    Ways to decrease sodium:   · Add spices and herbs to foods instead of salt during cooking  Use salt-free seasonings to add flavor to foods  Examples include onion powder, garlic powder, basil, shipley powder, paprika, and parsley  Try lemon or lime juice or vinegar to give foods a tart flavor  Use hot peppers, pepper, or cayenne pepper to add a spicy flavor  · Do not keep a salt shaker at your kitchen table  This may help keep you from adding salt to food at the table  A teaspoon of salt has 2,300 mg of sodium  It may take time to get used to enjoying the natural flavor of food instead of adding salt  Talk to your healthcare provider before you use salt substitutes  Some salt substitutes have a high amount of potassium and need to be avoided if you have kidney disease  · Choose low-sodium foods at restaurants  Meals from restaurants are often high in sodium  Some restaurants have nutrition information on the menu that tells you the amount of sodium in their foods  If possible, ask for your food to be prepared with less, or no salt  · Shop for unsalted or low-sodium foods and snacks at the grocery store  Examples include unsalted or low-sodium broths, soups, and canned vegetables  Choose fresh or frozen vegetables instead  Choose unsalted nuts or seeds or fresh fruits or vegetables as snacks  Read food labels and choose salt-free, very low-sodium, or low-sodium foods  © Copyright [address] Information is for End User's use only and may not be sold, redistributed or otherwise used for commercial purposes  All illustrations and images included in CareNotes® are the copyrighted property of [employer]  or [address] [place], [place] [address]   The above information is an  only  It is not intended as medical advice for individual conditions or treatments   Talk to your doctor, nurse or pharmacist before following any medical regimen to see if it is safe and effective for you

## 2021-03-03 NOTE — PROGRESS NOTES
Cast Application    Date/Time: 3/3/2021 9:38 AM  Performed by: Flaca Carrington RN  Authorized by: COLTON Hutson   Universal Protocol:  Consent: Verbal consent obtained  Consent given by: patient  Patient identity confirmed: verbally with patient      Pre-procedure details:     Sensation:  Normal  Procedure details:     Laterality:  Bilateral    Location:  Leg    Leg:  L lower leg and R lower legCast type:  Multi-layer compression short leg  Post-procedure details:     Pain:  Improved    Sensation:  Normal    Patient tolerance of procedure:   Tolerated well, no immediate complications

## 2021-03-03 NOTE — LETTER
March 3, 2021     Teressa Gonzalez MD  [address] [place] [address]    Patient: Mansi Rasmussen   YOB: 1960   Date of Visit: 3/3/2021       Dear Dr Yg Fortune: Thank you for referring Reno Barbour to me for evaluation  Below are my notes for this consultation  If you have questions, please do not hesitate to call me  I look forward to following your patient along with you           Sincerely,        COLTON Ang        CC: No Recipients

## 2021-03-10 ENCOUNTER — OFFICE VISIT (OUTPATIENT)
Dept: WOUND CARE | Facility: CLINIC | Age: 61
End: 2021-03-10
Payer: MEDICARE

## 2021-03-10 VITALS
RESPIRATION RATE: 18 BRPM | DIASTOLIC BLOOD PRESSURE: 78 MMHG | TEMPERATURE: 97.6 F | HEART RATE: 70 BPM | SYSTOLIC BLOOD PRESSURE: 123 MMHG

## 2021-03-10 DIAGNOSIS — I87.311 CHRONIC VENOUS HYPERTENSION (IDIOPATHIC) WITH ULCER OF RIGHT LOWER EXTREMITY (CODE) (HCC): Primary | ICD-10-CM

## 2021-03-10 DIAGNOSIS — I87.312 CHRONIC VENOUS HYPERTENSION (IDIOPATHIC) WITH ULCER OF LEFT LOWER EXTREMITY (CODE) (HCC): ICD-10-CM

## 2021-03-10 DIAGNOSIS — L97.921 NON-PRESSURE CHRONIC ULCER OF LEFT LOWER LEG, LIMITED TO BREAKDOWN OF SKIN (HCC): ICD-10-CM

## 2021-03-10 DIAGNOSIS — L97.911 NON-PRESSURE CHRONIC ULCER OF RIGHT LOWER LEG, LIMITED TO BREAKDOWN OF SKIN (HCC): ICD-10-CM

## 2021-03-10 PROCEDURE — 99213 OFFICE O/P EST LOW 20 MIN: CPT | Performed by: NURSE PRACTITIONER

## 2021-03-10 NOTE — PATIENT INSTRUCTIONS
Orders Placed This Encounter   Procedures    Wound cleansing and dressings     Moisturize both legs daily     Standing Status:   Future     Standing Expiration Date:   3/10/2022    Wound compression and edema control     Use compression pumps at least one hour a day, preferably once in the morning and once at night     Standing Status:   Future     Standing Expiration Date:   3/10/2022    Wound compression and edema control     Compression stockings ordered at [facility] applied today  Apply in a m  and remove at bedtime     Standing Status:   Future     Standing Expiration Date:   3/10/2022

## 2021-03-10 NOTE — PROGRESS NOTES
Patient ID: Siddhartha Anderson is a 61 y o  female Date of Birth 1960     Chief Complaint  Chief Complaint   Patient presents with    Follow Up Wound Care Visit     Dressings intact,  recieved compression pumps yesterday       Allergies  Nsaids, Sulfate, and Ms contin [morphine]    Assessment:     Diagnoses and all orders for this visit:    Chronic venous hypertension (idiopathic) with ulcer of right lower extremity (CODE) ([facility])  -     Wound cleansing and dressings; Future  -     Wound compression and edema control; Future  -     Wound compression and edema control; Future    Chronic venous hypertension (idiopathic) with ulcer of left lower extremity (CODE) ([facility])  -     Wound cleansing and dressings; Future  -     Wound compression and edema control; Future  -     Wound compression and edema control; Future    Non-pressure chronic ulcer of right lower leg, limited to breakdown of skin ([facility])  -     Wound cleansing and dressings; Future  -     Wound compression and edema control; Future  -     Wound compression and edema control; Future    Non-pressure chronic ulcer of left lower leg, limited to breakdown of skin ([facility])  -     Wound cleansing and dressings; Future  -     Wound compression and edema control; Future  -     Wound compression and edema control; Future          Plan:  1  F/u visit  Wounds are epithelialized  Counseled patient to continue to use her compression pumps BID and to continue to elevate her legs frequently  Also counseled patient to moisturize skin with lotion daily  Will prescribe pair of compression stockings for patient to wear to control her LE edema  Patient is discharged from the wound center today  Can follow up PRN  Wound 03/03/21 Venous Ulcer Leg Right;Posterior (Active)   Wound Image Images linked 03/10/21 0850   Wound Description Dry; Intact 03/10/21 0850   Nanda-wound Assessment Hyperpigmented 03/10/21 0850   Wound Length (cm) 0 cm 03/10/21 0850   Wound Width (cm) 0 cm 03/10/21 0875   Wound Depth (cm) 0 cm 03/10/21 0850   Wound Surface Area (cm^2) 0 cm^2 03/10/21 0850   Wound Volume (cm^3) 0 cm^3 03/10/21 0850   Calculated Wound Volume (cm^3) 0 cm^3 03/10/21 0850   Change in Wound Size % 100 03/10/21 0850   Drainage Amount None 03/10/21 0850       Wound 03/03/21 Venous Ulcer Leg Left;Posterior (Active)   Wound Image Images linked 03/10/21 0850   Wound Description Dry; Intact 03/10/21 0850   Nanda-wound Assessment Hyperpigmented 03/10/21 0850   Wound Length (cm) 0 cm 03/10/21 0850   Wound Width (cm) 0 cm 03/10/21 0850   Wound Depth (cm) 0 cm 03/10/21 0850   Wound Surface Area (cm^2) 0 cm^2 03/10/21 0850   Wound Volume (cm^3) 0 cm^3 03/10/21 0850   Calculated Wound Volume (cm^3) 0 cm^3 03/10/21 0850   Change in Wound Size % 100 03/10/21 0850       Wound 03/03/21 Venous Ulcer Leg Right;Posterior (Active)   Date First Assessed: 03/03/21   Primary Wound Type: Venous Ulcer  Location: Leg  Wound Location Orientation: Right;Posterior       Wound 03/03/21 Venous Ulcer Leg Left;Posterior (Active)   Date First Assessed: 03/03/21   Primary Wound Type: Venous Ulcer  Location: Leg  Wound Location Orientation: Left;Posterior       [REMOVED] Wound 05/29/19 Other (Comment) Abdomen Right;Upper (Removed)   Resolved Date: 03/03/21  Date First Assessed/Time First Assessed: 05/29/19 1414   Pre-Existing Wound: No  Traumatic Wound Type: (c) Other (Comment)  Location: Abdomen  Wound Location Orientation: Right;Upper  Wound Description (Comments): needle inser    Subjective:     F/u visit venous ulcers on bilateral lower extremities  No new complaints  Denies any pain, fevers, or chills  Tolerating CoFlex wraps         The following portions of the patient's history were reviewed and updated as appropriate:   She  has a past medical history of Anxiety, Back pain, Back pain at L4-L5 level, Bariatric surgery status, Chronic pain disorder, Cramping of hands, DDD (degenerative disc disease), lumbar, Depression, Edema of both legs, Fall at home, Full dentures, GERD (gastroesophageal reflux disease), History of gastric ulcer, History of heartburn, Hypothyroid, Morbid obesity ([address] ), Motion sickness, Nausea & vomiting, Numbness and tingling in both hands, Numbness and tingling of both feet, PICC (peripherally inserted central catheter) in place, PONV (postoperative nausea and vomiting), Postgastrectomy malabsorption, Sciatica, Shortness of breath, Skin abnormality, Stress incontinence, Stricture esophagus, Use of cane as ambulatory aid, and Wears glasses  She   Patient Active Problem List    Diagnosis Date Noted    Low vitamin B12 level 2020    Vitamin A deficiency 2020    Other dysphagia 2020    Decreased oral intake 2020    Encounter for surgical aftercare following surgery of digestive system 2020    Postsurgical malabsorption 2020    Common bile duct stricture 2019    Choledocholithiasis 2019    Bariatric surgery status 2019    Anastomotic stricture of gastrojejunostomy 10/28/2017    Paroxysmal atrial fibrillation ([facility] ) 2016    Morbid (severe) obesity due to excess calories ([facility] [facility] ) 2016    Hypothyroidism 2016    Hypercholesterolemia 2016    Lumbar disc disease 2016     She  has a past surgical history that includes  section; Gastric bypass; Panniculectomy; Cholecystectomy; Abdominal surgery; pr lap, donell restrict proc, longitudinal gastrectomy (N/A, 2016); Esophagogastroduodenoscopy (N/A, 2016); Cosmetic surgery; pr egd transoral biopsy single/multiple (N/A, 2017); pr egd transoral biopsy single/multiple (N/A, 10/4/2017); Colonoscopy; pr egd transoral biopsy single/multiple (N/A, 2017); Esophagogastroduodenoscopy (N/A, 2017); Partial gastrectomy (N/A, 2017); Abdominal adhesion surgery (N/A, 2017); and IR biliary tube placement (PTC) (2019)    Her family history includes Alcohol abuse in her family; Alzheimer's disease in her mother; Anxiety disorder in her daughter; Diabetes in her father; Hypertension in her father; No Known Problems in her daughter, daughter, maternal aunt, maternal aunt, maternal aunt, maternal aunt, maternal aunt, maternal grandmother, paternal grandmother, sister, sister, sister, sister, sister, sister, and sister  She  reports that she quit smoking about 19 years ago  Her smoking use included cigarettes  She quit after 5 00 years of use  She has never used smokeless tobacco  She reports that she does not drink alcohol or use drugs    Current Outpatient Medications   Medication Sig Dispense Refill    acetaminophen (TYLENOL) 160 mg/5 mL suspension Take 10 mL by mouth every 4 (four) hours as needed for mild pain, headaches or fever 237 mL 0    BELBUCA 150 MCG FILM   5    betamethasone dipropionate (DIPROSONE) 0 05 % cream Apply topically 2 (two) times a day 30 g 0    busPIRone (BUSPAR) 15 mg tablet Take 15 mg by mouth daily      BUSPIRONE HCL PO Take 20 mg by mouth daily at bedtime      cholecalciferol (VITAMIN D3) 1,000 units tablet Take 1,000 Units by mouth 2 (two) times a day       cyclobenzaprine (FLEXERIL) 5 mg tablet   5    furosemide (LASIX) 20 mg tablet furosemide 20 mg tablet daily      levothyroxine 100 mcg tablet Take 100 mcg by mouth daily On Odd days      levothyroxine 125 mcg tablet Take 125 mcg by mouth daily On Even days      LINZESS 145 MCG CAPS       Melatonin 10 MG CAPS Take 10 mg by mouth daily at bedtime      ondansetron (ZOFRAN) 4 mg tablet Take 4 mg by mouth every 12 (twelve) hours as needed for nausea or vomiting      oxybutynin (DITROPAN-XL) 10 MG 24 hr tablet Take 10 mg by mouth daily      oxyCODONE-acetaminophen (PERCOCET)  mg per tablet Take 1 tablet by mouth every 4 (four) hours      pantoprazole (PROTONIX) 40 mg tablet Take 40 mg by mouth daily   5    sucralfate (CARAFATE) 1 g/10 mL suspension TAKE 10 ML (1 G TOTAL) BY MOUTH 4 (FOUR) TIMES A DAY (Patient not taking: Reported on 3/3/2021) 1200 mL 1     Current Facility-Administered Medications   Medication Dose Route Frequency Provider Last Rate Last Admin    sodium chloride (PF) 0 9 % injection 5 mL  5 mL Intravenous PRN Danuta Gale PA-C         She is allergic to nsaids; sulfate; and ms contin [morphine]       Review of Systems   Constitutional: Negative  HENT: Negative for ear pain and hearing loss  Eyes: Negative for pain  Respiratory: Negative for chest tightness and shortness of breath  Cardiovascular: Positive for leg swelling  Negative for chest pain and palpitations  Gastrointestinal: Negative for diarrhea, nausea and vomiting  Genitourinary: Negative for dysuria  Musculoskeletal: Negative for gait problem  Skin: Positive for wound  Neurological: Negative for tremors and weakness  Psychiatric/Behavioral: Negative for behavioral problems, confusion and suicidal ideas  Objective:       Wound 03/03/21 Venous Ulcer Leg Right;Posterior (Active)   Wound Image Images linked 03/10/21 0850   Wound Description Dry; Intact 03/10/21 0850   Nanda-wound Assessment Hyperpigmented 03/10/21 0850   Wound Length (cm) 0 cm 03/10/21 0850   Wound Width (cm) 0 cm 03/10/21 0850   Wound Depth (cm) 0 cm 03/10/21 0850   Wound Surface Area (cm^2) 0 cm^2 03/10/21 0850   Wound Volume (cm^3) 0 cm^3 03/10/21 0850   Calculated Wound Volume (cm^3) 0 cm^3 03/10/21 0850   Change in Wound Size % 100 03/10/21 0850   Drainage Amount None 03/10/21 0850       Wound 03/03/21 Venous Ulcer Leg Left;Posterior (Active)   Wound Image Images linked 03/10/21 0850   Wound Description Dry; Intact 03/10/21 0850   Nanda-wound Assessment Hyperpigmented 03/10/21 0850   Wound Length (cm) 0 cm 03/10/21 0850   Wound Width (cm) 0 cm 03/10/21 0850   Wound Depth (cm) 0 cm 03/10/21 0850   Wound Surface Area (cm^2) 0 cm^2 03/10/21 0850   Wound Volume (cm^3) 0 cm^3 03/10/21 0850   Calculated Wound Volume (cm^3) 0 cm^3 03/10/21 0850   Change in Wound Size % 100 03/10/21 0850       /78   Pulse 70   Temp 97 6 °F (36 4 °C)   Resp 18     Physical Exam  Vitals signs and nursing note reviewed  Constitutional:       General: She is not in acute distress  Appearance: Normal appearance  She is obese  HENT:      Head: Normocephalic and atraumatic  Eyes:      General:         Right eye: No discharge  Left eye: No discharge  Neck:      Musculoskeletal: Normal range of motion and neck supple  No neck rigidity  Pulmonary:      Effort: Pulmonary effort is normal  No respiratory distress  Musculoskeletal: Normal range of motion  Right lower leg: No edema  Left lower leg: No edema  Skin:     General: Skin is warm and dry  Findings: No erythema or wound  Neurological:      General: No focal deficit present  Mental Status: She is alert and oriented to person, place, and time  Mental status is at baseline  Psychiatric:         Mood and Affect: Mood normal          Behavior: Behavior normal          Thought Content: Thought content normal          Judgment: Judgment normal            Wound Instructions:  Orders Placed This Encounter   Procedures    Wound cleansing and dressings     Moisturize both legs daily     Standing Status:   Future     Standing Expiration Date:   3/10/2022    Wound compression and edema control     Use compression pumps at least one hour a day, preferably once in the morning and once at night     Standing Status:   Future     Standing Expiration Date:   3/10/2022    Wound compression and edema control     Compression stockings ordered at [facility] applied today  Apply in a m  and remove at bedtime     Standing Status:   Future     Standing Expiration Date:   3/10/2022        Diagnosis ICD-10-CM Associated Orders   1   Chronic venous hypertension (idiopathic) with ulcer of right lower extremity (CODE) ([facility])  I87 311 Wound cleansing and dressings     Wound compression and edema control     Wound compression and edema control   2  Chronic venous hypertension (idiopathic) with ulcer of left lower extremity (CODE) ([facility])  I87 312 Wound cleansing and dressings     Wound compression and edema control     Wound compression and edema control   3  Non-pressure chronic ulcer of right lower leg, limited to breakdown of skin ([facility])  L97 911 Wound cleansing and dressings     Wound compression and edema control     Wound compression and edema control   4   Non-pressure chronic ulcer of left lower leg, limited to breakdown of skin ([facility])  L93 [id number] Wound cleansing and dressings     Wound compression and edema control     Wound compression and edema control

## 2021-03-26 ENCOUNTER — TRANSCRIBE ORDERS (OUTPATIENT)
Dept: ADMINISTRATIVE | Facility: HOSPITAL | Age: 61
End: 2021-03-26

## 2021-03-26 DIAGNOSIS — Z12.31 SCREENING MAMMOGRAM FOR HIGH-RISK PATIENT: Primary | ICD-10-CM

## 2021-04-01 ENCOUNTER — HOSPITAL ENCOUNTER (OUTPATIENT)
Dept: MAMMOGRAPHY | Facility: HOSPITAL | Age: 61
Discharge: HOME | End: 2021-04-01
Attending: INTERNAL MEDICINE
Payer: MEDICARE

## 2021-04-01 VITALS — HEIGHT: 62 IN | WEIGHT: 246 LBS | BODY MASS INDEX: 45.27 KG/M2

## 2021-04-01 DIAGNOSIS — Z12.31 SCREENING MAMMOGRAM FOR HIGH-RISK PATIENT: ICD-10-CM

## 2021-04-01 PROCEDURE — 77063 BREAST TOMOSYNTHESIS BI: CPT

## 2021-04-01 PROCEDURE — 77067 SCR MAMMO BI INCL CAD: CPT

## 2021-05-05 ENCOUNTER — OFFICE VISIT (OUTPATIENT)
Dept: WOUND CARE | Facility: CLINIC | Age: 61
End: 2021-05-05
Payer: MEDICARE

## 2021-05-05 VITALS
SYSTOLIC BLOOD PRESSURE: 150 MMHG | HEART RATE: 91 BPM | TEMPERATURE: 97.4 F | HEIGHT: 62 IN | WEIGHT: 240 LBS | RESPIRATION RATE: 20 BRPM | DIASTOLIC BLOOD PRESSURE: 78 MMHG | BODY MASS INDEX: 44.16 KG/M2

## 2021-05-05 DIAGNOSIS — I87.312 CHRONIC VENOUS HYPERTENSION (IDIOPATHIC) WITH ULCER OF LEFT LOWER EXTREMITY (CODE) (HCC): Primary | ICD-10-CM

## 2021-05-05 DIAGNOSIS — L97.921 NON-PRESSURE CHRONIC ULCER OF LEFT LOWER LEG, LIMITED TO BREAKDOWN OF SKIN (HCC): ICD-10-CM

## 2021-05-05 DIAGNOSIS — L25.9 CONTACT DERMATITIS, UNSPECIFIED CONTACT DERMATITIS TYPE, UNSPECIFIED TRIGGER: ICD-10-CM

## 2021-05-05 PROCEDURE — 97597 DBRDMT OPN WND 1ST 20 CM/<: CPT | Performed by: NURSE PRACTITIONER

## 2021-05-05 PROCEDURE — 99214 OFFICE O/P EST MOD 30 MIN: CPT | Performed by: NURSE PRACTITIONER

## 2021-05-05 PROCEDURE — 99213 OFFICE O/P EST LOW 20 MIN: CPT | Performed by: NURSE PRACTITIONER

## 2021-05-05 RX ORDER — BETAMETHASONE DIPROPIONATE 0.5 MG/G
CREAM TOPICAL 2 TIMES DAILY
Qty: 30 G | Refills: 0 | Status: SHIPPED | OUTPATIENT
Start: 2021-05-05

## 2021-05-05 NOTE — PROGRESS NOTES
Cast Application    Date/Time: 5/5/2021 10:39 AM  Performed by: Bulmaro Lu RN  Authorized by: COLTON Winkler   Universal Protocol:  Consent: Verbal consent obtained  Consent given by: patient  Patient identity confirmed: verbally with patient      Pre-procedure details:     Sensation:  Normal  Procedure details:     Laterality:  Left    Location:  Leg    Leg:  L lower legCast type:  Multi-layer compression short leg    Post-procedure details:     Pain:  Improved    Sensation:  Normal    Patient tolerance of procedure: Tolerated well, no immediate complications  Comments:      Multiplayer wrap procedure     Before application, EUNICE and/or TBI determined to be adequate for healing and application of compression  Lower extremity washed prior to application of compression wrap  With the foot in dorsiflexed position, Coflex was applied as per physician orders without complications or complaint of pain  The procedure was tolerated well  Toes warm & pink post application    Patient provided education & reinforced to observe toes for any discoloration, swelling or tingling and instructed when to report to the [address] [facility],[address] or to remove compression

## 2021-05-05 NOTE — LETTER
May 5, 2021     Lewis Moody MD  [address] [place] [address]    Patient: Woody Conner   YOB: 1960   Date of Visit: 5/5/2021       Dear Dr Kori Lacey: Thank you for referring George Hensley to me for evaluation  Below are my notes for this consultation  If you have questions, please do not hesitate to call me  I look forward to following your patient along with you           Sincerely,        COLTON Costello        CC: No Recipients

## 2021-05-05 NOTE — PATIENT INSTRUCTIONS
Orders Placed This Encounter   Procedures    Wound cleansing and dressings     Shower yes may purchase cast cover  Apply Betamethasone/skin remedy cream to lower legs  Apply maxorb ag to the left lower leg wound  Cover with abd  Sponge  Secure with Coflex  Change dressing weekly at [address] [facility]  This was done today  Multiplayer wrap procedure     Before application, EUNICE and/or TBI determined to be adequate for healing and application of compression  Lower extremity washed prior to application of compression wrap  With the foot in dorsiflexed position, Coflex was applied as per physician orders without complications or complaint of pain  The procedure was tolerated well  Toes warm & pink post application  Patient provided education & reinforced to observe toes for any discoloration, swelling or tingling and instructed when to report to the [address] [facility] or to remove compression  Multi-layer compression wrap Instructions    Keep compression wrap/wraps clean and dry  If wraps are too tight and you experience numbness/tingling, call the wound center  If after hours, remove wraps or proceed to nearest E R  and call wound center in AM     Pavithra Lynn will be changed 1 x weekly    Avoid prolonged standing in one place  Elevate leg(s) above the level of the heart when sitting or as much as possible  Compression Stocking to right lower leg  Remove compression stockings every night HS and re-apply first thing qAM  Follow daily skin care as instructed  Avoid prolonged standing in one place  Elevate leg(s) above the level of the heart when sitting or as much as possible  Cont compression pumps daily for one hour  Standing Status:   Future     Standing Expiration Date:   5/5/2022   High Protein Diet   WHAT YOU NEED TO KNOW:   A high-protein diet is a meal plan that includes extra protein  Your body may need extra protein if you have certain health conditions, such as cancer, burns, or injuries  You may also need to follow this diet to get stronger after a surgery or illness  Extra protein helps to heal wounds and form new tissue in the body  Your dietitian will tell you how much protein and how many calories you need each day  DISCHARGE INSTRUCTIONS:   Foods high in protein:  The average amount of protein is listed below in grams (g)  To find the exact amount of protein in a food, read the food labels on packaged items  · Dairy:      ? 1 cup of any type of milk (8 g)    ? ½ cup of evaporated canned milk (9 g)    ? ¼ cup of nonfat dry milk (11 g)    ? 1 ounce of semi-hard or solid cheese (7 g)    ? ¼ cup of parmesan cheese (8 g)    ? ½ cup of cottage cheese (14 g)    ? ½ cup of pudding (4 g)    ? 1 cup of plain or fruit yogurt (8 g)    · Meats and meat substitutes:      ? 3 ounces of cooked freshwater fish (21 g)     ? 3 ounces of cooked shellfish (19 g)    ? ½ cup of canned tuna (14 g)    ? 3 ounces of cooked chicken, turkey, or other poultry (24 g)    ? 3 ounces of cooked beef, pork, lamb, or other red meat (21 g)    ? 1 large egg (6 g)    ? ¼ cup of fat free egg substitute (5 g)    ? ½ cup of tofu or tempeh (10 g)    ? 1 cup of cooked dried beans, such as fong, kidney, or navy (15 g)    · Nuts and seeds:      ? 2 tablespoons of almonds, cashews, sunflower seeds, or walnuts (5 g)    ? 2 tablespoons of peanuts (7 g)    ? 2 tablespoons of peanut butter (8 g)    How to add extra protein:   · Add powdered milk to milk, cereals, scrambled eggs, soups, and casseroles  · Add cheese to sauces, soups, or vegetables  · Add eggs to tuna, salads, sauces, or casseroles  · Add nutrition supplements and breakfast drink mixes to milk or shakes  · Add nuts to foods or eat them as snacks  · Add meat (beef, chicken, and pork) to soups, casseroles, pasta dishes, or vegetables  · Add beans, peas, and other legumes to salads  · Eat cottage cheese or yogurt with fruit      © Copyright [employer] 2020 Information is for End User's use only and may not be sold, redistributed or otherwise used for commercial purposes  All illustrations and images included in CareNotes® are the copyrighted property of A D A M [employer]  or Mitesh Nick  The above information is an  only  It is not intended as medical advice for individual conditions or treatments  Talk to your doctor, nurse or pharmacist before following any medical regimen to see if it is safe and effective for you  Low-Sodium Diet   AMBULATORY CARE:   A low-sodium diet  limits foods that are high in sodium (salt)  You will need to follow a low-sodium diet if you have high blood pressure, kidney disease, or heart failure  You may also need to follow this diet if you have a condition that is causing your body to retain (hold) extra fluid  You may need to limit the amount of sodium you eat in a day to 1,500 to 2,000 mg  Ask your healthcare provider how much sodium you can have each day  How to use food labels to choose foods that are low in sodium:  Read food labels to find the amount of sodium they contain  The amount of sodium is listed in milligrams (mg)  The % Daily Value (DV) column tells you how much of your daily needs are met by 1 serving of the food for each nutrient listed  Choose foods that have less than 5% of the DV of sodium  These foods are considered low in sodium  Foods that have 20% or more of the DV of sodium are considered high in sodium  Some food labels may also list any of the following terms that tell you about the sodium content in the food:  · Sodium-free:  Less than 5 mg in each serving    · Very low sodium:  35 mg of sodium or less in each serving    · Low sodium:  140 mg of sodium or less in each serving    · Reduced sodium:   At least 25% less sodium in each serving than the regular type    · Light in sodium:  50% less sodium in each serving    · Unsalted or no added salt:  No extra salt is added during processing (the food may still contain sodium)     Foods to avoid:  Salty foods are high in sodium  You should avoid the following:  · Processed foods:      ? Mixes for cornbread, biscuits, cake, and pudding     ? Instant foods, such as potatoes, cereals, noodles, and rice     ? Packaged foods, such as bread stuffing, rice and pasta mixes, snack dip mixes, and macaroni and cheese     ? Canned foods, such as canned vegetables, soups, broths, sauces, and vegetable or tomato juice    ? Snack foods, such as salted chips, popcorn, pretzels, pork rinds, salted crackers, and salted nuts    ? Frozen foods, such as dinners, entrees, vegetables with sauces, and breaded meats    ? Sauerkraut, pickled vegetables, and other foods prepared in brine    · Meats and cheeses:      ? Smoked or cured meat, such as corned beef, hutchison, ham, hot dogs, and sausage    ? Canned meats or spreads, such as potted meats, sardines, anchovies, and imitation seafood    ? Deli or lunch meats, such as bologna, ham, turkey, and roast beef    ? Processed cheese, such as American cheese and cheese spreads    · Condiments, sauces, and seasonings:      ? Salt (¼ teaspoon of salt contains 575 mg of sodium)    ? Seasonings made with salt, such as garlic salt, celery salt, onion salt, and seasoned salt    ? Regular soy sauce, barbecue sauce, teriyaki sauce, steak sauce, Worcestershire sauce, and most flavored vinegars    ? Canned gravy and mixes     ? Regular condiments, such as mustard, ketchup, and salad dressings    ? Pickles and olives    ? Meat tenderizers and monosodium glutamate (MSG)    Foods to include:  Read the food label to find the exact amount of sodium in each serving  · Bread and cereal:  Try to choose breads with less than 80 mg of sodium per serving  ? Bread, roll, solange, tortilla, or unsalted crackers  ? Ready-to-eat cereals with less than 5% DV of sodium (examples include shredded wheat and puffed rice)    ?  Pasta    · Vegetables and fruits: ? Unsalted fresh, frozen, or canned vegetables    ? Fresh, frozen, or canned fruits    ? Fruit juice    · Dairy:  One serving has about 150 mg of sodium  ? Milk, all types    ? Yogurt    ? Hard cheese, such as cheddar, Swiss, [employer], or mozzarella    · Meat and other protein foods:  Some raw meats may have added sodium  ? Plain meats, fish, and poultry     ? Eggs    · Other foods:      ? Homemade pudding    ? Unsalted nuts, popcorn, or pretzels    ? Unsalted butter or margarine    Ways to decrease sodium:   · Add spices and herbs to foods instead of salt during cooking  Use salt-free seasonings to add flavor to foods  Examples include onion powder, garlic powder, basil, shipley powder, paprika, and parsley  Try lemon or lime juice or vinegar to give foods a tart flavor  Use hot peppers, pepper, or cayenne pepper to add a spicy flavor  · Do not keep a salt shaker at your kitchen table  This may help keep you from adding salt to food at the table  A teaspoon of salt has 2,300 mg of sodium  It may take time to get used to enjoying the natural flavor of food instead of adding salt  Talk to your healthcare provider before you use salt substitutes  Some salt substitutes have a high amount of potassium and need to be avoided if you have kidney disease  · Choose low-sodium foods at restaurants  Meals from restaurants are often high in sodium  Some restaurants have nutrition information on the menu that tells you the amount of sodium in their foods  If possible, ask for your food to be prepared with less, or no salt  · Shop for unsalted or low-sodium foods and snacks at the grocery store  Examples include unsalted or low-sodium broths, soups, and canned vegetables  Choose fresh or frozen vegetables instead  Choose unsalted nuts or seeds or fresh fruits or vegetables as snacks  Read food labels and choose salt-free, very low-sodium, or low-sodium foods      © [address] Information is for End User's use only and may not be sold, redistributed or otherwise used for commercial purposes  All illustrations and images included in CareNotes® are the copyrighted property of A D A M , Inc  or Mitesh Nick  The above information is an  only  It is not intended as medical advice for individual conditions or treatments  Talk to your doctor, nurse or pharmacist before following any medical regimen to see if it is safe and effective for you

## 2021-05-12 ENCOUNTER — OFFICE VISIT (OUTPATIENT)
Dept: WOUND CARE | Facility: CLINIC | Age: 61
End: 2021-05-12
Payer: MEDICARE

## 2021-05-12 VITALS
HEART RATE: 82 BPM | TEMPERATURE: 97.1 F | SYSTOLIC BLOOD PRESSURE: 123 MMHG | DIASTOLIC BLOOD PRESSURE: 76 MMHG | RESPIRATION RATE: 20 BRPM

## 2021-05-12 DIAGNOSIS — L25.9 CONTACT DERMATITIS, UNSPECIFIED CONTACT DERMATITIS TYPE, UNSPECIFIED TRIGGER: ICD-10-CM

## 2021-05-12 DIAGNOSIS — I87.312 CHRONIC VENOUS HYPERTENSION (IDIOPATHIC) WITH ULCER OF LEFT LOWER EXTREMITY (CODE) (HCC): ICD-10-CM

## 2021-05-12 DIAGNOSIS — L97.921 NON-PRESSURE CHRONIC ULCER OF LEFT LOWER LEG, LIMITED TO BREAKDOWN OF SKIN (HCC): Primary | ICD-10-CM

## 2021-05-12 PROCEDURE — 99212 OFFICE O/P EST SF 10 MIN: CPT | Performed by: NURSE PRACTITIONER

## 2021-05-12 PROCEDURE — 99213 OFFICE O/P EST LOW 20 MIN: CPT | Performed by: NURSE PRACTITIONER

## 2021-05-12 NOTE — PATIENT INSTRUCTIONS
Orders Placed This Encounter   Procedures    Wound cleansing and dressings     May shower  Wash legs daily with soap and water ([employer] unscented)  Apply lotion daily  Apply Betamethasone as needed for itch  Return if wound re-opens  Standing Status:   Future     Standing Expiration Date:   5/12/2022     Cont   To use compression stockings

## 2021-05-12 NOTE — PROGRESS NOTES
Patient ID: Bassam Gee is a 61 y o  female Date of Birth 1960     Chief Complaint  Chief Complaint   Patient presents with    Follow Up Wound Care Visit     Left lower leg wound       Allergies  Nsaids, Sulfate, and Ms contin [morphine]    Assessment:     Diagnoses and all orders for this visit:    Non-pressure chronic ulcer of left lower leg, limited to breakdown of skin (HCC)  -     Wound cleansing and dressings; Future    Chronic venous hypertension (idiopathic) with ulcer of left lower extremity (CODE) (HCC)  -     Wound cleansing and dressings; Future    Contact dermatitis, unspecified contact dermatitis type, unspecified trigger          Plan:  1  F/u visit  Wounds are epithelialized  Counseled patient to moisturize her leg daily with lotion and to resume wearing her compression stocking on her LLE  Counseled patient if rash returns to follow up with a Dermatologist  Patient verbalized agreement and understanding  Patient is discharged from the wound center today  Can follow up PRN        Wound 05/05/21 Venous Ulcer Pretibial Left (Active)   Wound Image Images linked 05/12/21 0846   Wound Description Epithelialization 05/12/21 0846   Nanda-wound Assessment Clean;Dry 05/12/21 0846   Wound Length (cm) 0 cm 05/12/21 0846   Wound Width (cm) 0 cm 05/12/21 0846   Wound Depth (cm) 0 cm 05/12/21 0846   Wound Surface Area (cm^2) 0 cm^2 05/12/21 0846   Wound Volume (cm^3) 0 cm^3 05/12/21 0846   Calculated Wound Volume (cm^3) 0 cm^3 05/12/21 0846   Drainage Amount None 05/12/21 0846   Treatments Cleansed 05/12/21 0846   Patient Tolerance Tolerated well 05/12/21 0846       Wound 05/05/21 Venous Ulcer Pretibial Left (Active)   Date First Assessed/Time First Assessed: 05/05/21 1015   Primary Wound Type: Venous Ulcer  Location: Pretibial  Wound Location Orientation: Left       [REMOVED] Wound 05/29/19 Other (Comment) Abdomen Right;Upper (Removed)   Resolved Date: 03/03/21  Date First Assessed/Time First Assessed: 05/29/19 1414   Pre-Existing Wound: No  Traumatic Wound Type: (c) Other (Comment)  Location: Abdomen  Wound Location Orientation: Right;Upper  Wound Description (Comments): needle inser    [REMOVED] Wound 03/03/21 Venous Ulcer Leg Right;Posterior (Removed)   Resolved Date: 03/10/21  Date First Assessed: 03/03/21   Primary Wound Type: Venous Ulcer  Location: Leg  Wound Location Orientation: Right;Posterior  Wound Outcome: Healed       [REMOVED] Wound 03/03/21 Venous Ulcer Leg Left;Posterior (Removed)   Resolved Date: 03/10/21  Date First Assessed: 03/03/21   Primary Wound Type: Venous Ulcer  Location: Leg  Wound Location Orientation: Left;Posterior  Wound Outcome: Healed       Subjective:     F/u visit venous ulcers of LLE  No new complaints  Patient reports that the betamethasone cream that was prescribed helped with her pruritis and her rash has resolved  She denies any pain, fevers, or chills  The following portions of the patient's history were reviewed and updated as appropriate:   She  has a past medical history of Anxiety, Back pain, Back pain at L4-L5 level, Bariatric surgery status, Chronic pain disorder, Cramping of hands, DDD (degenerative disc disease), lumbar, Depression, Edema of both legs, Fall at home, Full dentures, GERD (gastroesophageal reflux disease), History of gastric ulcer, History of heartburn, Hypothyroid, Morbid obesity ([address] ), Motion sickness, Nausea & vomiting, Numbness and tingling in both hands, Numbness and tingling of both feet, PICC (peripherally inserted central catheter) in place, PONV (postoperative nausea and vomiting), Postgastrectomy malabsorption, Sciatica, Shortness of breath, Skin abnormality, Stress incontinence, Stricture esophagus, Use of cane as ambulatory aid, and Wears glasses    She   Patient Active Problem List    Diagnosis Date Noted    Low vitamin B12 level 12/08/2020    Vitamin A deficiency 12/08/2020    Other dysphagia 07/22/2020    Decreased oral intake 2020    Encounter for surgical aftercare following surgery of digestive system 2020    Postsurgical malabsorption 2020    Common bile duct stricture 2019    Choledocholithiasis 2019    Bariatric surgery status 2019    Anastomotic stricture of gastrojejunostomy 10/28/2017    Paroxysmal atrial fibrillation ([facility] ) 2016    Morbid (severe) obesity due to excess calories ([facility] [address] ) 2016    Hypothyroidism 2016    Hypercholesterolemia 2016    Lumbar disc disease 2016     She  has a past surgical history that includes  section; Gastric bypass; Panniculectomy; Cholecystectomy; Abdominal surgery; pr lap, donell restrict proc, longitudinal gastrectomy (N/A, 2016); Esophagogastroduodenoscopy (N/A, 2016); Cosmetic surgery; pr egd transoral biopsy single/multiple (N/A, 2017); pr egd transoral biopsy single/multiple (N/A, 10/4/2017); Colonoscopy; pr egd transoral biopsy single/multiple (N/A, 2017); Esophagogastroduodenoscopy (N/A, 2017); Partial gastrectomy (N/A, 2017); Abdominal adhesion surgery (N/A, 2017); and IR biliary tube placement (PTC) (2019)  Her family history includes Alcohol abuse in her family; Alzheimer's disease in her mother; Anxiety disorder in her daughter; Diabetes in her father; Hypertension in her father; No Known Problems in her daughter, daughter, maternal aunt, maternal aunt, maternal aunt, maternal aunt, maternal aunt, maternal grandmother, paternal grandmother, sister, sister, sister, sister, sister, sister, and sister  She  reports that she quit smoking about 19 years ago  Her smoking use included cigarettes  She quit after 5 00 years of use  She has never used smokeless tobacco  She reports that she does not drink alcohol or use drugs    Current Outpatient Medications   Medication Sig Dispense Refill    acetaminophen (TYLENOL) 160 mg/5 mL suspension Take 10 mL by mouth every 4 (four) hours as needed for mild pain, headaches or fever 237 mL 0    BELBUCA 150 MCG FILM   5    betamethasone dipropionate (DIPROSONE) 0 05 % cream Apply topically 2 (two) times a day 30 g 0    busPIRone (BUSPAR) 15 mg tablet Take 15 mg by mouth daily      BUSPIRONE HCL PO Take 20 mg by mouth daily at bedtime      cholecalciferol (VITAMIN D3) 1,000 units tablet Take 1,000 Units by mouth 2 (two) times a day       cyclobenzaprine (FLEXERIL) 5 mg tablet   5    furosemide (LASIX) 20 mg tablet furosemide 20 mg tablet daily      levothyroxine 100 mcg tablet Take 100 mcg by mouth daily On Odd days      LINZESS 145 MCG CAPS       Melatonin 10 MG CAPS Take 10 mg by mouth daily at bedtime      ondansetron (ZOFRAN) 4 mg tablet Take 4 mg by mouth every 12 (twelve) hours as needed for nausea or vomiting      oxybutynin (DITROPAN-XL) 10 MG 24 hr tablet Take 10 mg by mouth daily      oxyCODONE-acetaminophen (PERCOCET)  mg per tablet Take 1 tablet by mouth every 4 (four) hours      pantoprazole (PROTONIX) 40 mg tablet Take 40 mg by mouth daily   5    sucralfate (CARAFATE) 1 g/10 mL suspension TAKE 10 ML (1 G TOTAL) BY MOUTH 4 (FOUR) TIMES A DAY 1200 mL 1     Current Facility-Administered Medications   Medication Dose Route Frequency Provider Last Rate Last Admin    sodium chloride (PF) 0 9 % injection 5 mL  5 mL Intravenous PRN Billie Nieves PA-C         She is allergic to nsaids; sulfate; and ms contin [morphine]       Review of Systems   Constitutional: Negative  HENT: Negative for ear pain and hearing loss  Eyes: Negative for pain  Respiratory: Negative for chest tightness and shortness of breath  Cardiovascular: Positive for leg swelling  Negative for chest pain and palpitations  Gastrointestinal: Negative for diarrhea, nausea and vomiting  Genitourinary: Negative for dysuria  Musculoskeletal: Positive for gait problem  Skin: Positive for wound     Neurological: Negative for tremors and weakness  Psychiatric/Behavioral: Negative for behavioral problems, confusion and suicidal ideas  Objective:       Wound 05/05/21 Venous Ulcer Pretibial Left (Active)   Wound Image Images linked 05/12/21 0846   Wound Description Epithelialization 05/12/21 0846   Nanda-wound Assessment Clean;Dry 05/12/21 0846   Wound Length (cm) 0 cm 05/12/21 0846   Wound Width (cm) 0 cm 05/12/21 0846   Wound Depth (cm) 0 cm 05/12/21 0846   Wound Surface Area (cm^2) 0 cm^2 05/12/21 0846   Wound Volume (cm^3) 0 cm^3 05/12/21 0846   Calculated Wound Volume (cm^3) 0 cm^3 05/12/21 0846   Drainage Amount None 05/12/21 0846   Treatments Cleansed 05/12/21 0846   Patient Tolerance Tolerated well 05/12/21 0846       /76   Pulse 82   Temp (!) 97 1 °F (36 2 °C)   Resp 20     Physical Exam  Vitals signs and nursing note reviewed  Constitutional:       General: She is not in acute distress  Appearance: Normal appearance  She is obese  HENT:      Head: Normocephalic and atraumatic  Eyes:      General:         Right eye: No discharge  Left eye: No discharge  Neck:      Musculoskeletal: Normal range of motion and neck supple  No neck rigidity  Pulmonary:      Effort: Pulmonary effort is normal  No respiratory distress  Musculoskeletal: Normal range of motion  Right lower leg: No edema  Left lower leg: No edema  Skin:     General: Skin is warm and dry  Findings: No erythema or wound  Neurological:      General: No focal deficit present  Mental Status: She is alert and oriented to person, place, and time  Mental status is at baseline  Psychiatric:         Mood and Affect: Mood normal          Behavior: Behavior normal          Thought Content: Thought content normal          Judgment: Judgment normal                    Wound Instructions:  Orders Placed This Encounter   Procedures    Wound cleansing and dressings     May shower   Wash legs daily with soap and water [facility] unscented)  Apply lotion daily  Apply Betamethasone as needed for itch  Return if wound re-opens  Standing Status:   Future     Standing Expiration Date:   5/12/2022        Diagnosis ICD-10-CM Associated Orders   1  Non-pressure chronic ulcer of left lower leg, limited to breakdown of skin ([facility])  L97 921 Wound cleansing and dressings   2  Chronic venous hypertension (idiopathic) with ulcer of left lower extremity (CODE) ([facility])  [id number] Wound cleansing and dressings   3   Contact dermatitis, unspecified contact dermatitis type, unspecified trigger  L25 9

## 2021-08-27 ENCOUNTER — APPOINTMENT (OUTPATIENT)
Dept: LAB | Facility: CLINIC | Age: 61
End: 2021-08-27
Payer: MEDICARE

## 2022-01-10 ENCOUNTER — APPOINTMENT (OUTPATIENT)
Dept: LAB | Facility: CLINIC | Age: 62
End: 2022-01-10
Payer: MEDICARE

## 2022-04-05 ENCOUNTER — HOSPITAL ENCOUNTER (OUTPATIENT)
Dept: MAMMOGRAPHY | Facility: HOSPITAL | Age: 62
Discharge: HOME | End: 2022-04-05
Attending: INTERNAL MEDICINE
Payer: MEDICARE

## 2022-04-05 VITALS — BODY MASS INDEX: 52.44 KG/M2 | WEIGHT: 285 LBS | HEIGHT: 62 IN

## 2022-04-05 DIAGNOSIS — Z12.31 ENCOUNTER FOR SCREENING MAMMOGRAM FOR MALIGNANT NEOPLASM OF BREAST: ICD-10-CM

## 2022-04-05 PROCEDURE — 77063 BREAST TOMOSYNTHESIS BI: CPT

## 2022-04-05 PROCEDURE — 77067 SCR MAMMO BI INCL CAD: CPT

## 2022-12-29 ENCOUNTER — TELEPHONE (OUTPATIENT)
Dept: BARIATRICS | Facility: CLINIC | Age: 62
End: 2022-12-29

## 2022-12-29 NOTE — TELEPHONE ENCOUNTER
Patients daughter called to inform Radha Benitez her mother Beth Aguilera (baratric Surgery 089) is currently in [facility] ER to get transferred to [facility] for kidney Stone removal

## 2023-03-03 ENCOUNTER — APPOINTMENT (OUTPATIENT)
Dept: LAB | Facility: CLINIC | Age: 63
End: 2023-03-03

## 2023-03-03 DIAGNOSIS — N18.30 STAGE 3 CHRONIC KIDNEY DISEASE, UNSPECIFIED WHETHER STAGE 3A OR 3B CKD (HCC): ICD-10-CM

## 2023-03-03 DIAGNOSIS — R53.83 OTHER FATIGUE: ICD-10-CM

## 2023-03-03 DIAGNOSIS — E55.9 VITAMIN D DEFICIENCY: ICD-10-CM

## 2023-03-03 LAB
ALBUMIN SERPL BCP-MCNC: 2.8 G/DL (ref 3.5–5)
ALP SERPL-CCNC: 93 U/L (ref 46–116)
ALT SERPL W P-5'-P-CCNC: 11 U/L (ref 12–78)
ANION GAP SERPL CALCULATED.3IONS-SCNC: 4 MMOL/L (ref 4–13)
AST SERPL W P-5'-P-CCNC: 18 U/L (ref 5–45)
BASOPHILS # BLD AUTO: 0.06 THOUSANDS/ÂΜL (ref 0–0.1)
BASOPHILS NFR BLD AUTO: 1 % (ref 0–1)
BILIRUB SERPL-MCNC: 0.67 MG/DL (ref 0.2–1)
BUN SERPL-MCNC: 9 MG/DL (ref 5–25)
CALCIUM ALBUM COR SERPL-MCNC: 9.9 MG/DL (ref 8.3–10.1)
CALCIUM SERPL-MCNC: 8.9 MG/DL (ref 8.3–10.1)
CHLORIDE SERPL-SCNC: 105 MMOL/L (ref 96–108)
CO2 SERPL-SCNC: 27 MMOL/L (ref 21–32)
CREAT SERPL-MCNC: 1.02 MG/DL (ref 0.6–1.3)
EOSINOPHIL # BLD AUTO: 0.64 THOUSAND/ÂΜL (ref 0–0.61)
EOSINOPHIL NFR BLD AUTO: 9 % (ref 0–6)
ERYTHROCYTE [DISTWIDTH] IN BLOOD BY AUTOMATED COUNT: 15.1 % (ref 11.6–15.1)
GFR SERPL CREATININE-BSD FRML MDRD: 59 ML/MIN/1.73SQ M
GLUCOSE P FAST SERPL-MCNC: 67 MG/DL (ref 65–99)
HCT VFR BLD AUTO: 40.7 % (ref 34.8–46.1)
HGB BLD-MCNC: 12.9 G/DL (ref 11.5–15.4)
IMM GRANULOCYTES # BLD AUTO: 0.02 THOUSAND/UL (ref 0–0.2)
IMM GRANULOCYTES NFR BLD AUTO: 0 % (ref 0–2)
LYMPHOCYTES # BLD AUTO: 2.37 THOUSANDS/ÂΜL (ref 0.6–4.47)
LYMPHOCYTES NFR BLD AUTO: 34 % (ref 14–44)
MCH RBC QN AUTO: 30.3 PG (ref 26.8–34.3)
MCHC RBC AUTO-ENTMCNC: 31.7 G/DL (ref 31.4–37.4)
MCV RBC AUTO: 96 FL (ref 82–98)
MONOCYTES # BLD AUTO: 0.63 THOUSAND/ÂΜL (ref 0.17–1.22)
MONOCYTES NFR BLD AUTO: 9 % (ref 4–12)
NEUTROPHILS # BLD AUTO: 3.18 THOUSANDS/ÂΜL (ref 1.85–7.62)
NEUTS SEG NFR BLD AUTO: 47 % (ref 43–75)
NRBC BLD AUTO-RTO: 0 /100 WBCS
PLATELET # BLD AUTO: 320 THOUSANDS/UL (ref 149–390)
PMV BLD AUTO: 11.8 FL (ref 8.9–12.7)
POTASSIUM SERPL-SCNC: 4.8 MMOL/L (ref 3.5–5.3)
PROT SERPL-MCNC: 6.8 G/DL (ref 6.4–8.4)
RBC # BLD AUTO: 4.26 MILLION/UL (ref 3.81–5.12)
SODIUM SERPL-SCNC: 136 MMOL/L (ref 135–147)
T4 FREE SERPL-MCNC: 1.48 NG/DL (ref 0.76–1.46)
TSH SERPL DL<=0.05 MIU/L-ACNC: 1.71 UIU/ML (ref 0.45–4.5)
WBC # BLD AUTO: 6.9 THOUSAND/UL (ref 4.31–10.16)

## 2023-05-05 ENCOUNTER — APPOINTMENT (OUTPATIENT)
Dept: LAB | Facility: CLINIC | Age: 63
End: 2023-05-05

## 2023-05-05 DIAGNOSIS — E03.9 HYPOTHYROIDISM, UNSPECIFIED TYPE: ICD-10-CM

## 2023-05-05 LAB
T4 FREE SERPL-MCNC: 1.09 NG/DL (ref 0.76–1.46)
TSH SERPL DL<=0.05 MIU/L-ACNC: 6.22 UIU/ML (ref 0.45–4.5)

## 2023-07-14 ENCOUNTER — HOSPITAL ENCOUNTER (OUTPATIENT)
Dept: MAMMOGRAPHY | Facility: HOSPITAL | Age: 63
End: 2023-07-14
Attending: INTERNAL MEDICINE
Payer: MEDICARE

## 2023-07-14 VITALS — HEIGHT: 62 IN | BODY MASS INDEX: 44.72 KG/M2 | WEIGHT: 243 LBS

## 2023-07-14 DIAGNOSIS — Z12.31 ENCOUNTER FOR SCREENING MAMMOGRAM FOR MALIGNANT NEOPLASM OF BREAST: ICD-10-CM

## 2023-07-14 PROCEDURE — 77063 BREAST TOMOSYNTHESIS BI: CPT

## 2023-07-14 PROCEDURE — 77067 SCR MAMMO BI INCL CAD: CPT

## 2023-07-27 NOTE — PHYSICIAN ADVISOR
Current patient class: Inpatient  The patient is currently on Hospital Day: 2      The patient was admitted to the hospital at [phone] on 10/27/17 for the following diagnosis:  Malnutrition ([address] ) [E46]       There is documentation in the medical record of an expected length of stay of at least 2 midnights  The patient is therefore expected to satisfy the 2 midnight benchmark and given the 2 midnight presumption is appropriate for INPATIENT ADMISSION  Given this expectation of a satisfying stay, CMS instructs us that the patient is most often appropriate for inpatient admission under part A provided medical necessity is documented in the chart  After review of the relevant documentation, labs, vital signs and test results, the patient is appropriate for INPATIENT ADMISSION  Admission to the hospital as an inpatient is a complex decision making process which requires the practitioner to consider the patients presenting complaint, history and physical examination and all relevant testing  With this in mind, in this case, the patient was deemed appropriate for INPATIENT ADMISSION  After review of the documentation and testing available at the time of the admission I concur with this clinical determination of medical necessity  Rationale is as follows: The patient is a 64 yrs old Female who presented to the ED at 10/27/2017  4:52 PM with a chief complaint of No chief complaint on file      The patients vitals on arrival were ED Triage Vitals   Temperature Pulse Respirations Blood Pressure SpO2   10/27/17 1656 10/27/17 1656 10/27/17 1656 10/27/17 1656 10/27/17 1656   97 8 °F (36 6 °C) 78 18 113/73 96 %      Temp Source Heart Rate Source Patient Position - Orthostatic VS BP Location FiO2 (%)   10/27/17 1656 10/27/17 2253 10/27/17 1656 10/27/17 1656 --   Tympanic Monitor Sitting Left arm       Pain Score       10/27/17 1703       8           Past Medical History:   Diagnosis Date    Anxiety     Back pain  DDD (degenerative disc disease), lumbar     Depression     Edema extremities     Elevated cholesterol     Full dentures     GERD (gastroesophageal reflux disease)     History of gastric ulcer     "Years ago    History of heartburn     Hypothyroid     Hypothyroidism    Morbid obesity ([address] )     Postgastrectomy malabsorption     Skin abnormality     Edema BLE- uses boots at home  Has oozing area back of lower right leg  Was instructed to see PCP prior to surgery for assessment    Stress incontinence     Stricture esophagus     Wears glasses      Past Surgical History:   Procedure Laterality Date    ABDOMINAL SURGERY       SECTION      x3    CHOLECYSTECTOMY      COSMETIC SURGERY      tummy tuck    ESOPHAGOGASTRODUODENOSCOPY N/A 2016    Procedure: ESOPHAGOGASTRODUODENOSCOPY (EGD); Surgeon: Gerhardt Ely, MD;  [facility]; Service:     GASTRIC BYPASS      PANNICULECTOMY      TX EGD TRANSORAL BIOPSY SINGLE/MULTIPLE N/A 2017    Procedure: ESOPHAGOGASTRODUODENOSCOPY (EGD) with biopsy;  Surgeon: Chalmer Apley, MD;  [facility]; Service: Bariatrics    TX EGD TRANSORAL BIOPSY SINGLE/MULTIPLE N/A 10/4/2017    Procedure: ESOPHAGOGASTRODUODENOSCOPY (EGD) with balloon dilatation;  Surgeon: Gerhardt Ely, MD;  [facility];   Service: Bariatrics    TX LAP, SAADIA RESTRICT PROC, LONGITUDINAL GASTRECTOMY N/A 2016    Procedure:   LAPAROSCOPIC GASTROGASTIC FISTULA TAKEDOWN, PARTIAL GASTRECTOMY, GASTRO JEJUNOSTOMY, EXTENSIVE LYSIS OF ADHESIONS;  Surgeon: Gerhardt Ely, MD;  [facility];  Service: Scarlet Springer have been placed to:   IP CONSULT TO NUTRITION SERVICES    Vitals:    10/27/17 1734 10/27/17 2253 10/28/17 0710 10/28/17 1547   BP:  101/51 101/61 119/69   Pulse:  73 64 63   Resp:  17 16 18   Temp:  97 9 °F (36 6 °C) 98 1 °F (36 7 °C) 97 5 °F (36 4 °C)   TempSrc:  Tympanic Tympanic Tympanic   SpO2:  96% 96% 98%   Weight: 85 8 kg (189 lb 2 5 oz)      Height: 5' 2" (1 575 m)          Most recent labs:    Recent Labs      10/28/17   0506   WBC  7 20   HGB  11 8   HCT  34 3*   PLT  227   K  2 1*   NA  145   CALCIUM  7 1*   BUN  6   CREATININE  0 76       Scheduled Meds:  pantoprazole 40 mg Intravenous Q24H [facility]   [START ON 10/29/2017] thiamine 250 mg Intravenous Daily     Continuous Infusions:  Adult TPN (STANDARD BASE/STANDARD ELECTROLYTE)  Last Rate: 47 9 mL/hr at 10/28/17 2104   sodium chloride 100 mL/hr Last Rate: 52 mL/hr (10/28/17 2111)     PRN Meds: diazepam    diphenhydrAMINE    influenza vaccine    metoclopromide    morphine injection    Surgical procedures (if appropriate): via ASU preop handoff tool

## 2023-12-05 ENCOUNTER — APPOINTMENT (OUTPATIENT)
Age: 63
End: 2023-12-05
Payer: MEDICARE

## 2023-12-05 DIAGNOSIS — E03.9 MYXEDEMA HEART DISEASE: ICD-10-CM

## 2023-12-05 DIAGNOSIS — I51.9 MYXEDEMA HEART DISEASE: ICD-10-CM

## 2023-12-05 DIAGNOSIS — N18.30 STAGE 3 CHRONIC KIDNEY DISEASE, UNSPECIFIED WHETHER STAGE 3A OR 3B CKD (HCC): ICD-10-CM

## 2023-12-05 DIAGNOSIS — E78.5 HYPERLIPIDEMIA, UNSPECIFIED HYPERLIPIDEMIA TYPE: ICD-10-CM

## 2023-12-05 DIAGNOSIS — E11.9 TYPE 2 DIABETES MELLITUS WITHOUT COMPLICATION, WITHOUT LONG-TERM CURRENT USE OF INSULIN (HCC): ICD-10-CM

## 2023-12-05 LAB
ANION GAP SERPL CALCULATED.3IONS-SCNC: 7 MMOL/L
BUN SERPL-MCNC: 13 MG/DL (ref 5–25)
CALCIUM SERPL-MCNC: 8.6 MG/DL (ref 8.4–10.2)
CHLORIDE SERPL-SCNC: 103 MMOL/L (ref 96–108)
CHOLEST SERPL-MCNC: 150 MG/DL
CO2 SERPL-SCNC: 29 MMOL/L (ref 21–32)
CREAT SERPL-MCNC: 1.23 MG/DL (ref 0.6–1.3)
GFR SERPL CREATININE-BSD FRML MDRD: 46 ML/MIN/1.73SQ M
GLUCOSE P FAST SERPL-MCNC: 83 MG/DL (ref 65–99)
HDLC SERPL-MCNC: 57 MG/DL
LDLC SERPL CALC-MCNC: 80 MG/DL (ref 0–100)
NONHDLC SERPL-MCNC: 93 MG/DL
POTASSIUM SERPL-SCNC: 4 MMOL/L (ref 3.5–5.3)
SODIUM SERPL-SCNC: 139 MMOL/L (ref 135–147)
T4 FREE SERPL-MCNC: 0.86 NG/DL (ref 0.61–1.12)
TRIGL SERPL-MCNC: 65 MG/DL
TSH SERPL DL<=0.05 MIU/L-ACNC: 7.83 UIU/ML (ref 0.45–4.5)

## 2023-12-05 PROCEDURE — 84443 ASSAY THYROID STIM HORMONE: CPT

## 2023-12-05 PROCEDURE — 36415 COLL VENOUS BLD VENIPUNCTURE: CPT

## 2023-12-05 PROCEDURE — 80048 BASIC METABOLIC PNL TOTAL CA: CPT

## 2023-12-05 PROCEDURE — 80061 LIPID PANEL: CPT

## 2023-12-05 PROCEDURE — 84439 ASSAY OF FREE THYROXINE: CPT

## 2023-12-25 NOTE — ANESTHESIA POSTPROCEDURE EVALUATION
Post-Op Assessment Note      CV Status:  Stable    Mental Status:  Alert and awake    Hydration Status:  Euvolemic    PONV Controlled:  Controlled    Airway Patency:  Patent    Post Op Vitals Reviewed: Yes          Staff: Anesthesiologist           BP      Temp     Pulse     Resp      SpO2
- - -

## 2024-01-01 NOTE — PROGRESS NOTES
-Report redness, swelling or drainage from cord to pediatrician. Active Problems    1  Adjustment disorder with mixed anxiety and depressed mood (309 28) (F43 23)   2  Anxiety disorder (300 00) (F41 9)   3  Arthritis (716 90) (M19 90)   4  Bereavement (V62 82) (Z63 4)   5  Chronic pain (338 29) (G89 29)   6  Depression (311) (F32 9)   7  Hypercholesterolemia (272 0) (E78 0)   8  Hypothyroidism (244 9) (E03 9)   9  Insomnia (780 52) (G47 00)   10  Obesity (278 00) (E66 9)   11  Postgastrectomy malabsorption (579 3) (K91 2,Z90 3)   12  Status post gastric bypass for obesity (V45 86) (Z98 84)   13  Weight gain (783 1) (R63 5)    Surgical History    1  History of Abdominoplasty   2  History of  Section   3  History of Gallbladder Surgery   4  History of Gastric Surgery For Morbid Obesity Bypass With Naheed-en-Y    Family History    1  Family history of Alzheimer's disease (V17 2) (Z82 0)    2  Family history of Anxiety    3  Family history of Mild alcohol abuse in sustained remission in controlled environment    4  No family history of suicide    Social History    · Bereavement (K94 99) (Z63 4)   · Denied: History of Drug use   · Former smoker (V15 82) (Z45 649)   · No alcohol use    Current Meds   1  ALPRAZolam 1 MG Oral Tablet; TAKE 1 TABLET AT BEDTIME; Therapy: 78SYI2419 to (Evaluate:82Rkg5149); Last Rx:2015 Ordered   2  Cyclobenzaprine HCl - 10 MG Oral Tablet; TAKE 1 TABLET AT BEDTIME; Therapy: 99PFE7986 to (Evaluate:78Urj1813); Last Rx:2015 Ordered   3  Furosemide 40 MG Oral Tablet; TAKE 1 TABLET DAILY; Therapy: 02ZFX1333 to (Evaluate:86Jny0307); Last Rx:2015 Ordered   4  Levothyroxine Sodium 125 MCG Oral Tablet; take 2 tablet daily; Therapy: 53IPI5665 to (Last Rx:2015) Ordered   5  Optisource Post Bariatric Surg Oral Tablet Chewable; CHEW AND SWALLOW 1 TABLET   DAILY; Therapy: 78LOB8554 to (Evaluate:49Eoh8634); Last Rx:2015 Ordered   6   OxyCODONE HCl - 30 MG Oral Tablet; TAKE 1 TABLET EVERY 6 HOURS AS NEEDED   FOR PAIN;   Therapy: 16MRF6111 to (Evaluate:10Dec2015); Last Rx:10Nov2015 Ordered   7  Oxycodone-Acetaminophen  MG Oral Tablet (Percocet); 1-2 tablets Q6 hrs PRN   pain; Therapy: 25ZKI5160 to (Last Rx:10Nov2015) Ordered   8  Potassium Chloride ER 20 MEQ Oral Tablet Extended Release; Take 1 tablet daily; Therapy: 27XCV6018 to (Last Rx:10Nov2015) Ordered   9  Pravastatin Sodium 10 MG Oral Tablet; TAKE 1 TABLET DAILY; Therapy: 84IQB9939 to (Evaluate:10Dec2015); Last Rx:10Nov2015 Ordered    Allergies    1  No Known Drug Allergies     Note   Note:   Met with patient to review progress with weight loss ( lost 1lb)  Patient continues to make dietary and behavioral changes;continues with therapist ( 3/25/16) and will have consulation with psychiatrist on 3/29/16  She is getting out more( store, sits outside), prepares her meals and is doing her own dishes  She measures her food, drinks only water and practices 30/60 rule  Patient working on dietary and behavioral changes  I emailed coordinator to reach out to patient once she knows direction provided by Dr Aden Bergeron  [place]      Future Appointments    Date/Time Provider Specialty Site   03/29/2016 03:20 PM Amadou Wang MD Psychiatry [address] [facility]   03/25/2016 01:15 PM Obi Roberson MS, APRN, [facility]  [facility]     Signatures   Electronically signed by : MARIELA CotaLCAZ; Mar  4 2016 12:15PM EST                       (Author)    Electronically signed by :  KIM Dutton ; Mar 17 2016 12:05PM EST

## 2024-02-19 ENCOUNTER — OFFICE VISIT (OUTPATIENT)
Dept: WOUND CARE | Facility: CLINIC | Age: 64
End: 2024-02-19
Payer: MEDICARE

## 2024-02-19 VITALS
TEMPERATURE: 97.2 F | HEIGHT: 61 IN | BODY MASS INDEX: 46.26 KG/M2 | WEIGHT: 245 LBS | SYSTOLIC BLOOD PRESSURE: 141 MMHG | DIASTOLIC BLOOD PRESSURE: 81 MMHG | HEART RATE: 65 BPM

## 2024-02-19 DIAGNOSIS — I83.022 VENOUS STASIS ULCER OF LEFT CALF WITH FAT LAYER EXPOSED, UNSPECIFIED WHETHER VARICOSE VEINS PRESENT (HCC): Primary | ICD-10-CM

## 2024-02-19 DIAGNOSIS — L97.222 VENOUS STASIS ULCER OF LEFT CALF WITH FAT LAYER EXPOSED, UNSPECIFIED WHETHER VARICOSE VEINS PRESENT (HCC): Primary | ICD-10-CM

## 2024-02-19 PROCEDURE — 99213 OFFICE O/P EST LOW 20 MIN: CPT | Performed by: STUDENT IN AN ORGANIZED HEALTH CARE EDUCATION/TRAINING PROGRAM

## 2024-02-19 PROCEDURE — 11042 DBRDMT SUBQ TIS 1ST 20SQCM/<: CPT | Performed by: STUDENT IN AN ORGANIZED HEALTH CARE EDUCATION/TRAINING PROGRAM

## 2024-02-19 PROCEDURE — 99214 OFFICE O/P EST MOD 30 MIN: CPT | Performed by: STUDENT IN AN ORGANIZED HEALTH CARE EDUCATION/TRAINING PROGRAM

## 2024-02-19 RX ORDER — LIDOCAINE 40 MG/G
CREAM TOPICAL ONCE
Status: COMPLETED | OUTPATIENT
Start: 2024-02-19 | End: 2024-02-19

## 2024-02-19 RX ADMIN — LIDOCAINE: 40 CREAM TOPICAL at 10:35

## 2024-02-19 NOTE — PROGRESS NOTES
Patient ID: Sussy Jung is a 63 y.o. female Date of Birth 1960       No chief complaint on file.      Allergies:  Nsaids, Sulfate, and Ms contin [morphine]    Diagnosis:   Diagnosis ICD-10-CM Associated Orders   1. Venous stasis ulcer of left calf with fat layer exposed, unspecified whether varicose veins present ([facility])  I83.022 lidocaine (LMX) 4 % cream    L97.222 Wound cleansing and dressings Venous Ulcer Left;Posterior Leg           Assessment  & Plan:    Initial evaluation of venous ulceration of the left posterior calf.  Layer of slough is present overlying a fibrinous ulcer base. Drainage is small to moderate. No periwound maceration. Mild erythema present on anterior lower leg consistent with appearance of stasis dermatitis. No lymphangitic streaking present.   Surgical debridement, as below.   Endoform am with overlying gelling fiber. Change every three days. Spandagrip for compression. Should there be no improvement by next visit plan to increase compression after obtaining ABIs.   May cleanse area with gentle soap and water on days of dressing changes.  Do not leave open to the air.  Elevate legs when resting throughout the day.  Avoid prolonged standing 1 place for prolonged time with legs in dependent position.  Walking for 30 to 40 minutes each day was encouraged.  Obtain 3-4 servings of protein daily for wound healing.   Instructed to monitor for any changes including redness or swelling surrounding the wound, increased drainage or pain as well as fevers or chills.    F/u in one week. Instructed to call if any questions or concerns arise in meantime.   Phone number for podiatry provided for nail care given overgrown/thick nails on examination.            Subjective:   02/19/24: 62 y/o F with PMHx of hypothyroidism, paroxysmal atrial fibrillation, obesity, chronic low back pain presents for evaluation of open wound on her left posterior calf that has been present for about 2 and half weeks  "now.  Patient does state that she does have a history of venous ulcerations throughout the years.  Does wear compression stockings at home but did not present with compression on today.  Does spend time in a chair when resting with her legs in a dependent position.  Sleeps in a bed at night.  Currently she is using betamethasone on the open area and has been doing so for about 2 and half weeks now.  Is compliant with Lasix.  No longer smokes.  No severe pain, fevers, chills. No hx of DM or difficulty with glucose control.           The following portions of the patient's history were reviewed and updated as appropriate:   Patient Active Problem List   Diagnosis    Morbid (severe) obesity due to excess calories (HCC)    Hypothyroidism    Hypercholesterolemia    Lumbar disc disease    Paroxysmal atrial fibrillation (HCC)    Anastomotic stricture of gastrojejunostomy    Bariatric surgery status    Choledocholithiasis    Common bile duct stricture    Encounter for surgical aftercare following surgery of digestive system    Postsurgical malabsorption    Other dysphagia    Decreased oral intake    Low vitamin B12 level    Vitamin A deficiency     Past Medical History:   Diagnosis Date    Anxiety     Back pain     Back pain at L4-L5 level     Bariatric surgery status     Chronic pain disorder     back    Cramping of hands     and legs    DDD (degenerative disc disease), lumbar     Depression     Edema of both legs     with lasix much better    Fall at home     almost 2 yrs ago    Full dentures     GERD (gastroesophageal reflux disease)     History of gastric ulcer     \"Years ago    History of heartburn     Hypothyroid     Hypothyroidism    Morbid obesity (HCC)     Motion sickness     Nausea & vomiting     Numbness and tingling in both hands     Numbness and tingling of both feet     PICC (peripherally inserted central catheter) in place     Right arm    PONV (postoperative nausea and vomiting)     Postgastrectomy " malabsorption     Sciatica     Shortness of breath     zuñiga    Skin abnormality     Edema BLE- uses boots at home. skin on shins dark and rough    Stress incontinence     Stricture esophagus     Use of cane as ambulatory aid     Wears glasses      Past Surgical History:   Procedure Laterality Date    ABDOMINAL ADHESION SURGERY N/A 2017    Procedure: EXTENSIVE LYSIS ADHESIONS;  Surgeon: Alex Long MD;  [facility];  Service: Bariatrics    ABDOMINAL SURGERY       SECTION      x3    CHOLECYSTECTOMY      COLONOSCOPY      COSMETIC SURGERY      tummy tuck    ESOPHAGOGASTRODUODENOSCOPY N/A 2016    Procedure: ESOPHAGOGASTRODUODENOSCOPY (EGD);  Surgeon: Alex Long MD;  [facility];  Service:     ESOPHAGOGASTRODUODENOSCOPY N/A 2017    Procedure: ESOPHAGOGASTRODUODENOSCOPY (EGD);  Surgeon: Alex Long MD;  [facility];  Service: Bariatrics    GASTRIC BYPASS      IR BILIARY TUBE PLACEMENT (PTC)  2019    PANNICULECTOMY      PARTIAL GASTRECTOMY N/A 2017    Procedure: SUBTOTAL GASTRECTOMY, ESOPHAGEAL JEJUNOSTOMY;  Surgeon: Alex Long MD;  [facility];  Service: Bariatrics    [place] EGD TRANSORAL BIOPSY SINGLE/MULTIPLE N/A 2017    Procedure: ESOPHAGOGASTRODUODENOSCOPY (EGD) with biopsy;  Surgeon: Aidan Perez MD;  [facility];  Service: Bariatrics    NV EGD TRANSORAL BIOPSY SINGLE/MULTIPLE N/A 10/4/2017    Procedure: ESOPHAGOGASTRODUODENOSCOPY (EGD) with balloon dilatation;  Surgeon: Alex Long MD;  [facility];  Service: Bariatrics    NV EGD TRANSORAL BIOPSY SINGLE/MULTIPLE N/A 2017    Procedure: ESOPHAGOGASTRODUODENOSCOPY (EGD) with balloon dilation;  Surgeon: Alex Long MD;  [facility];  Service: Bariatrics    NV LAPS GSTRC RSTRICTIV PX LONGITUDINAL GASTRECTOMY N/A 2016    Procedure:   LAPAROSCOPIC GASTROGASTIC FISTULA TAKEDOWN, PARTIAL GASTRECTOMY, GASTRO JEJUNOSTOMY, EXTENSIVE LYSIS OF  ADHESIONS;  Surgeon: Alex Long MD;  [facility];  Service: Bariatrics     Family History   Problem Relation Age of Onset    Alzheimer's disease Mother     Diabetes Father     Hypertension Father     No Known Problems Daughter     Alcohol abuse Family     No Known Problems Sister     No Known Problems Maternal Grandmother     No Known Problems Paternal Grandmother     No Known Problems Sister     No Known Problems Sister     No Known Problems Sister     No Known Problems Sister     No Known Problems Sister     No Known Problems Sister     Anxiety disorder Daughter     No Known Problems Daughter     No Known Problems Maternal Aunt     No Known Problems Maternal Aunt     No Known Problems Maternal Aunt     No Known Problems Maternal Aunt     No Known Problems Maternal Aunt      Social History     Socioeconomic History    Marital status: Single     Spouse name: None    Number of children: None    Years of education: None    Highest education level: None   Occupational History    None   Tobacco Use    Smoking status: Former     Current packs/day: 0.00     Types: Cigarettes     Start date: 1996     Quit date: 2001     Years since quittin.2    Smokeless tobacco: Never    Tobacco comments:     Quit 15 years ago. 4 cigarettes daily   Vaping Use    Vaping status: Never Used   Substance and Sexual Activity    Alcohol use: No    Drug use: No    Sexual activity: None   Other Topics Concern    None   Social History Narrative    None     Social Determinants of Health     Financial Resource Strain: Low Risk  (2022)    Received from [facility]    Overall Financial Resource Strain (CARDIA)     Difficulty of Paying Living Expenses: Not hard at all   Food Insecurity: No Food Insecurity (2022)    Received from [facility]    Hunger Vital Sign     Worried About Running Out of Food in the Last Year: Never true     Ran Out of Food in the Last Year: Never true    Transportation Needs: No Transportation Needs (12/29/2022)    Received from [facility]    PRAPARE - Transportation     Lack of Transportation (Medical): No     Lack of Transportation (Non-Medical): No   Physical Activity: Not on file   Stress: Not on file   Social Connections: Not on file   Intimate Partner Violence: Not At Risk (12/29/2022)    Received from [facility]    Humiliation, Afraid, Rape, and Kick questionnaire     Fear of Current or Ex-Partner: No     Emotionally Abused: No     Physically Abused: No     Sexually Abused: No   Housing Stability: Unknown (12/29/2022)    Received from [facility]    Housing Stability Vital Sign     Unable to Pay for Housing in the Last Year: No     Number of Places Lived in the Last Year: Not on file     Unstable Housing in the Last Year: Not on file       Current Outpatient Medications:     acetaminophen (TYLENOL) 160 mg/5 mL suspension, Take 10 mL by mouth every 4 (four) hours as needed for mild pain, headaches or fever, Disp: 237 mL, Rfl: 0    BELBUCA 150 MCG FILM, , Disp: , Rfl: 5    betamethasone dipropionate (DIPROSONE) 0.05 % cream, Apply topically 2 (two) times a day, Disp: 30 g, Rfl: 0    busPIRone (BUSPAR) 15 mg tablet, Take 15 mg by mouth daily, Disp: , Rfl:     BUSPIRONE HCL PO, Take 20 mg by mouth daily at bedtime, Disp: , Rfl:     cholecalciferol (VITAMIN D3) 1,000 units tablet, Take 1,000 Units by mouth 2 (two) times a day , Disp: , Rfl:     cyclobenzaprine (FLEXERIL) 5 mg tablet, , Disp: , Rfl: 5    furosemide (LASIX) 20 mg tablet, furosemide 20 mg tablet daily, Disp: , Rfl:     levothyroxine 100 mcg tablet, Take 100 mcg by mouth daily On Odd days, Disp: , Rfl:     LINZESS 145 MCG CAPS, , Disp: , Rfl:     Melatonin 10 MG CAPS, Take 10 mg by mouth daily at bedtime, Disp: , Rfl:     ondansetron (ZOFRAN) 4 mg tablet, Take 4 mg by mouth every 12 (twelve) hours as needed for nausea or vomiting, Disp: , Rfl:      "oxybutynin (DITROPAN-XL) 10 MG 24 hr tablet, Take 10 mg by mouth daily, Disp: , Rfl:     oxyCODONE-acetaminophen (PERCOCET)  mg per tablet, Take 1 tablet by mouth every 4 (four) hours, Disp: , Rfl:     Current Facility-Administered Medications:     sodium chloride (PF) 0.9 % injection 5 mL, 5 mL, Intravenous, PRN, Odette Corral PA-C    Review of Systems   Constitutional:  Negative for chills and fever.   Cardiovascular:  Positive for leg swelling.   Musculoskeletal:  Positive for back pain.   Skin:  Positive for wound.   Psychiatric/Behavioral:  Negative for agitation.          Objective:  /81   Pulse 65   Temp (!) 97.2 °F (36.2 °C)   Ht 5' 1\" (1.549 m)   Wt 111 kg (245 lb)   BMI 46.29 kg/m²   Pain Score:   6     Physical Exam  Vitals reviewed.   Constitutional:       General: She is not in acute distress.     Appearance: She is morbidly obese.   Cardiovascular:      Rate and Rhythm: Normal rate.      Pulses:           Dorsalis pedis pulses are 2+ on the left side.        Posterior tibial pulses are 2+ on the left side.   Pulmonary:      Effort: Pulmonary effort is normal. No respiratory distress.   Musculoskeletal:      Right lower le+ Edema present.      Left lower le+ Edema present.   Feet:      Right foot:      Toenail Condition: Right toenails are abnormally thick.      Left foot:      Toenail Condition: Left toenails are abnormally thick.   Neurological:      Mental Status: She is alert.   Psychiatric:         Mood and Affect: Mood normal.                Wound 24 Venous Ulcer Leg Left;Posterior (Active)   Wound Image   24 1050   Wound Description Granulation tissue;Pink;Slough;Yellow 24 1018   Nanda-wound Assessment Dry;Pink;Edema 24 1018   Wound Length (cm) 2.5 cm 24 1018   Wound Width (cm) 2 cm 24 1018   Wound Depth (cm) 0.4 cm 24 1018   Wound Surface Area (cm^2) 5 cm^2 24 1018   Wound Volume (cm^3) 2 cm^3 24 1018   Calculated " "Wound Volume (cm^3) 2 cm^3 02/19/24 1018   Drainage Amount Moderate 02/19/24 1018   Drainage Description Serosanguineous;Yellow 02/19/24 1018   Non-staged Wound Description Full thickness 02/19/24 1018   Dressing Status Intact;Old drainage 02/19/24 1018                     Debridement   Wound 02/19/24 Venous Ulcer Leg Left;Posterior    Universal Protocol:  Consent: Verbal consent obtained.  Consent given by: patient  Time out: Immediately prior to procedure a \"time out\" was called to verify the correct patient, procedure, equipment, support staff and site/side marked as required.  Patient understanding: patient states understanding of the procedure being performed  Patient identity confirmed: verbally with patient    Debridement Details  Performed by: PA  Debridement type: surgical  Level of debridement: subcutaneous tissue  Pain control: lidocaine 4%      Post-debridement measurements  Length (cm): 2.5  Width (cm): 2  Depth (cm): 0.5  Percent debrided: 100%  Surface Area (cm^2): 5  Area Debrided (cm^2): 5  Volume (cm^3): 2.5    Tissue and other material debrided: dermis and subcutaneous tissue  Devitalized tissue debrided: slough  Instrument(s) utilized: curette  Bleeding: small  Hemostasis obtained with: pressure  Procedural pain (0-10): 0  Post-procedural pain: 0   Response to treatment: procedure was tolerated well                   Wound Instructions:  Orders Placed This Encounter   Procedures    Wound cleansing and dressings Venous Ulcer Left;Posterior Leg     Left leg wound care    **Discontinue Betamethasone steroid cream**    Wash your hands with soap and water.  Remove old dressing, discard into plastic bag and place in trash.  Cleanse the wound with unscented soap (such as plain white Dove soap) and warm water prior to applying a clean dressing. Do not use tissue or cotton balls. Do not scrub the wound. Pat dry using gauze.    Shower yes - Ok to remove old wound dressing, shower hair and body first. Let " "soapy water pass by wound. No scrubbing with loofahs or washcloths. Pat dry with clean gauze and redress the wound as written:    Apply moisturizer to skin surrounding intact wound  Apply Endoform AM (white paper thin dressing) to the left leg wound.  Cover with bordered gauze.   Secure with rolled gauze.   Spandagrip size G  Change dressing every 3 days.    This was done today at the [facility] wound center with Nessa CARR         Please call the [facility] Monday through Friday between the hours of 8:00 AM and 4:30 PM at [phone] if you have any questions, if you experience any major changes in your wound(s) or for any signs or symptoms of infection such as fever; changes in the redness, swelling, drainage, or odor of your wound. After hours, weekends or holidays please contact your primary care physician or go to the hospital emergency room.           Increase your protein intake with each meal. Try meat, poultry, fish, seafood, legumes, lentils, tofu, protein supplements.        Compression Stocking **Left leg - Spandagrip Size G  **    Remove compression stockings every night HS and re-apply first thing qAM. Follow daily skin care as instructed.    Avoid prolonged standing in one place.    Elevate leg(s) above the level of the heart when sitting or as much as possible.     Standing Status:   Future     Standing Expiration Date:   2/19/2025       Nessa Null PA-C    Portions of the record may have been created with voice recognition software. Occasional wrong word or \"sound alike\" substitutions may have occurred due to the inherent limitations of voice recognition software. Read the chart carefully and recognize, using context, where substitutions have occurred.    "

## 2024-02-19 NOTE — PATIENT INSTRUCTIONS
Orders Placed This Encounter   Procedures    Wound cleansing and dressings Venous Ulcer Left;Posterior Leg     Left leg wound care    **Discontinue Betamethasone steroid cream**    Wash your hands with soap and water.  Remove old dressing, discard into plastic bag and place in trash.  Cleanse the wound with unscented soap (such as plain white Dove soap) and warm water prior to applying a clean dressing. Do not use tissue or cotton balls. Do not scrub the wound. Pat dry using gauze.    Shower yes - Ok to remove old wound dressing, shower hair and body first. Let soapy water pass by wound. No scrubbing with loofahs or washcloths. Pat dry with clean gauze and redress the wound as written:    Apply moisturizer to skin surrounding intact wound  Apply Endoform AM (white paper thin dressing) to the left leg wound.  Cover with bordered gauze.   Secure with rolled gauze.   Spandagrip size G  Change dressing every 3 days.    This was done today at the [place] [facility] with Nessa CARR         Please call the [facility] Monday through Friday between the hours of 8:00 AM and 4:30 PM at [phone] if you have any questions, if you experience any major changes in your wound(s) or for any signs or symptoms of infection such as fever; changes in the redness, swelling, drainage, or odor of your wound. After hours, weekends or holidays please contact your primary care physician or go to the hospital emergency room.           Increase your protein intake with each meal. Try meat, poultry, fish, seafood, legumes, lentils, tofu, protein supplements.        Compression Stocking **Left leg - Spandagrip Size G  **    Remove compression stockings every night HS and re-apply first thing qAM. Follow daily skin care as instructed.    Avoid prolonged standing in one place.    Elevate leg(s) above the level of the heart when sitting or as much as possible.     Standing Status:   Future     Standing Expiration Date:   2/19/2025

## 2024-02-26 ENCOUNTER — OFFICE VISIT (OUTPATIENT)
Dept: WOUND CARE | Facility: CLINIC | Age: 64
End: 2024-02-26
Payer: MEDICARE

## 2024-02-26 VITALS
HEART RATE: 64 BPM | SYSTOLIC BLOOD PRESSURE: 126 MMHG | TEMPERATURE: 97.3 F | DIASTOLIC BLOOD PRESSURE: 82 MMHG | RESPIRATION RATE: 18 BRPM

## 2024-02-26 DIAGNOSIS — I83.022 VENOUS STASIS ULCER OF LEFT CALF WITH FAT LAYER EXPOSED, UNSPECIFIED WHETHER VARICOSE VEINS PRESENT (HCC): Primary | ICD-10-CM

## 2024-02-26 DIAGNOSIS — L97.222 VENOUS STASIS ULCER OF LEFT CALF WITH FAT LAYER EXPOSED, UNSPECIFIED WHETHER VARICOSE VEINS PRESENT (HCC): Primary | ICD-10-CM

## 2024-02-26 PROCEDURE — 11042 DBRDMT SUBQ TIS 1ST 20SQCM/<: CPT | Performed by: FAMILY MEDICINE

## 2024-02-26 RX ORDER — LIDOCAINE 40 MG/G
CREAM TOPICAL ONCE
Status: COMPLETED | OUTPATIENT
Start: 2024-02-26 | End: 2024-02-26

## 2024-02-26 RX ADMIN — LIDOCAINE 1 APPLICATION: 40 CREAM TOPICAL at 09:58

## 2024-02-26 NOTE — PATIENT INSTRUCTIONS
Orders Placed This Encounter   Procedures    Wound cleansing and dressings Venous Ulcer Left;Posterior Leg     Left leg wound care     Wash your hands with soap and water.  Remove old dressing, discard into plastic bag and place in trash.    Cleanse the wound with unscented soap (such as plain white Dove soap) and warm water prior to applying a clean dressing.      Shower yes - Do not get the dressings wet--if showering then please cover with a cast cover that you may purchase at the pharmacy    Prophase soak today at the [facility]   Apply moisturizer to skin surrounding intact wound  Applied Endoform AM then Silver Alginate, convamax to the left leg wound.    Secure with Unna Boot, Coban and tubifast yellow  Change weekly at the [facility]    Unna Boot to the LLE    Keep compression wrap/wraps clean and dry. If wraps are too tight and you experience numbness/tingling, call the wound center. If after hours, remove wraps or proceed to nearest E.R. and call wound center in AM.    Wrap will be changed 1 x weekly    Avoid prolonged standing in one place.    Elevate leg(s) above the level of the heart when sitting or as much as possible.         Treatments completed as above today at the [facility]     Standing Status:   Future     Standing Expiration Date:   2/26/2025

## 2024-02-26 NOTE — PROGRESS NOTES
Patient ID: Sussy Jung is a 63 y.o. female Date of Birth 1960       Chief Complaint   Patient presents with   • Follow Up Wound Care Visit     Wound to the posterior LE.  Wearing the Spandagrip upon arrival today.       Allergies:  Nsaids, Sulfate, and Ms contin [morphine]    Diagnosis:      Diagnosis ICD-10-CM Associated Orders   1. Venous stasis ulcer of left calf with fat layer exposed, unspecified whether varicose veins present ([facility])  I83.022 lidocaine (LMX) 4 % cream    L97.222 Wound cleansing and dressings Venous Ulcer Left;Posterior Leg     Debridement Venous Ulcer Left;Posterior Leg              Assessment & Plan:  Venous stasis ulcer of the left lateral calf.  No improvement.  Surgical debridement  Endoform AM, silver alginate and ConvaMax with Unna boot.  Follow-up in 1 week.         Subjective:   02/19/24: 64 y/o F with PMHx of hypothyroidism, paroxysmal atrial fibrillation, obesity, chronic low back pain presents for evaluation of open wound on her left posterior calf that has been present for about 2 and half weeks now.  Patient does state that she does have a history of venous ulcerations throughout the years.  Does wear compression stockings at home but did not present with compression on today.  Does spend time in a chair when resting with her legs in a dependent position.  Sleeps in a bed at night.  Currently she is using betamethasone on the open area and has been doing so for about 2 and half weeks now.  Is compliant with Lasix.  No longer smokes.  No severe pain, fevers, chills. No hx of DM or difficulty with glucose control.     12/26/2024: Patient returns for follow-up of venous stasis ulcer of the left calf.  No specific complaints.  Using Tubigrip with endoform and alginate.  No fever, chills or pain.        The following portions of the patient's history were reviewed and updated as appropriate:   Patient Active Problem List   Diagnosis   • Morbid (severe) obesity due to excess  "calories (HCC)   • Hypothyroidism   • Hypercholesterolemia   • Lumbar disc disease   • Paroxysmal atrial fibrillation (HCC)   • Anastomotic stricture of gastrojejunostomy   • Bariatric surgery status   • Choledocholithiasis   • Common bile duct stricture   • Encounter for surgical aftercare following surgery of digestive system   • Postsurgical malabsorption   • Other dysphagia   • Decreased oral intake   • Low vitamin B12 level   • Vitamin A deficiency     Past Medical History:   Diagnosis Date   • Anxiety    • Back pain    • Back pain at L4-L5 level    • Bariatric surgery status    • Chronic pain disorder     back   • Cramping of hands     and legs   • DDD (degenerative disc disease), lumbar    • Depression    • Edema of both legs     with lasix much better   • Fall at home     almost 2 yrs ago   • Full dentures    • GERD (gastroesophageal reflux disease)    • History of gastric ulcer     \"Years ago   • History of heartburn    • Hypothyroid     Hypothyroidism   • Morbid obesity (HCC)    • Motion sickness    • Nausea & vomiting    • Numbness and tingling in both hands    • Numbness and tingling of both feet    • PICC (peripherally inserted central catheter) in place     Right arm   • PONV (postoperative nausea and vomiting)    • Postgastrectomy malabsorption    • Sciatica    • Shortness of breath     zuñiga   • Skin abnormality     Edema BLE- uses boots at home. skin on shins dark and rough   • Stress incontinence    • Stricture esophagus    • Use of cane as ambulatory aid    • Wears glasses      Past Surgical History:   Procedure Laterality Date   • ABDOMINAL ADHESION SURGERY N/A 2017    Procedure: EXTENSIVE LYSIS ADHESIONS;  Surgeon: Alex Long MD;  Location: [facility] OR;  Service: Bariatrics   • ABDOMINAL SURGERY     •  SECTION      x3   • CHOLECYSTECTOMY     • COLONOSCOPY     • COSMETIC SURGERY      tummy tuck   • ESOPHAGOGASTRODUODENOSCOPY N/A 2016    Procedure: ESOPHAGOGASTRODUODENOSCOPY " (EGD);  Surgeon: Alex Long MD;  [facility]: [facility];  Service:    • ESOPHAGOGASTRODUODENOSCOPY N/A 12/20/2017    Procedure: ESOPHAGOGASTRODUODENOSCOPY (EGD);  Surgeon: Alex Long MD;  [facility];  Service: Bariatrics   • GASTRIC BYPASS     • IR BILIARY TUBE PLACEMENT (PTC)  5/29/2019   • PANNICULECTOMY     • PARTIAL GASTRECTOMY N/A 12/20/2017    Procedure: SUBTOTAL GASTRECTOMY, ESOPHAGEAL JEJUNOSTOMY;  Surgeon: Alex Long MD;  [facility];  Service: Bariatrics   • WA EGD TRANSORAL BIOPSY SINGLE/MULTIPLE N/A 9/20/2017    Procedure: ESOPHAGOGASTRODUODENOSCOPY (EGD) with biopsy;  Surgeon: Aidan Perez MD;  [facility];  Service: Bariatrics   • WA EGD TRANSORAL BIOPSY SINGLE/MULTIPLE N/A 10/4/2017    Procedure: ESOPHAGOGASTRODUODENOSCOPY (EGD) with balloon dilatation;  Surgeon: Alex Long MD;  [facility];  Service: Bariatrics   • WA EGD TRANSORAL BIOPSY SINGLE/MULTIPLE N/A 11/8/2017    Procedure: ESOPHAGOGASTRODUODENOSCOPY (EGD) with balloon dilation;  Surgeon: Alex Long MD;  [facility];  Service: Bariatrics   • WA LAPS GSTRC RSTRICTIV PX LONGITUDINAL GASTRECTOMY N/A 4/20/2016    Procedure:   LAPAROSCOPIC GASTROGASTIC FISTULA TAKEDOWN, PARTIAL GASTRECTOMY, GASTRO JEJUNOSTOMY, EXTENSIVE LYSIS OF ADHESIONS;  Surgeon: Alex Long MD;  Location[facility];  Service: Bariatrics     Family History   Problem Relation Age of Onset   • Alzheimer's disease Mother    • Diabetes Father    • Hypertension Father    • No Known Problems Daughter    • Alcohol abuse Family    • No Known Problems Sister    • No Known Problems Maternal Grandmother    • No Known Problems Paternal Grandmother    • No Known Problems Sister    • No Known Problems Sister    • No Known Problems Sister    • No Known Problems Sister    • No Known Problems Sister    • No Known Problems Sister    • Anxiety disorder Daughter    • No Known Problems Daughter    • No Known Problems Maternal  Aunt    • No Known Problems Maternal Aunt    • No Known Problems Maternal Aunt    • No Known Problems Maternal Aunt    • No Known Problems Maternal Aunt       Social History     Socioeconomic History   • Marital status: Single     Spouse name: Not on file   • Number of children: Not on file   • Years of education: Not on file   • Highest education level: Not on file   Occupational History   • Not on file   Tobacco Use   • Smoking status: Former     Current packs/day: 0.00     Types: Cigarettes     Start date: 1996     Quit date: 2001     Years since quittin.2   • Smokeless tobacco: Never   • Tobacco comments:     Quit 15 years ago. 4 cigarettes daily   Vaping Use   • Vaping status: Never Used   Substance and Sexual Activity   • Alcohol use: No   • Drug use: No   • Sexual activity: Not on file   Other Topics Concern   • Not on file   Social History Narrative   • Not on file     Social Determinants of Health     Financial Resource Strain: Low Risk  (2022)    Received from [facility]    Overall Financial Resource Strain (CARDIA)    • Difficulty of Paying Living Expenses: Not hard at all   Food Insecurity: No Food Insecurity (2022)    Received from [facility]    Hunger Vital Sign    • Worried About Running Out of Food in the Last Year: Never true    • Ran Out of Food in the Last Year: Never true   Transportation Needs: No Transportation Needs (2022)    Received from [facility]    PRAPARE - Transportation    • Lack of Transportation (Medical): No    • Lack of Transportation (Non-Medical): No   Physical Activity: Not on file   Stress: Not on file   Social Connections: Not on file   Intimate Partner Violence: Not At Risk (2022)    Received from [facility]    Humiliation, Afraid, Rape, and Kick questionnaire    • Fear of Current or Ex-Partner: No    • Emotionally Abused: No    • Physically Abused: No    •  Sexually Abused: No   Housing Stability: Unknown (12/29/2022)    Received from [facility]    Housing Stability Vital Sign    • Unable to Pay for Housing in the Last Year: No    • Number of Places Lived in the Last Year: Not on file    • Unstable Housing in the Last Year: Not on file        Current Outpatient Medications:   •  acetaminophen (TYLENOL) 160 mg/5 mL suspension, Take 10 mL by mouth every 4 (four) hours as needed for mild pain, headaches or fever, Disp: 237 mL, Rfl: 0  •  BELBUCA 150 MCG FILM, , Disp: , Rfl: 5  •  betamethasone dipropionate (DIPROSONE) 0.05 % cream, Apply topically 2 (two) times a day, Disp: 30 g, Rfl: 0  •  busPIRone (BUSPAR) 15 mg tablet, Take 15 mg by mouth daily, Disp: , Rfl:   •  BUSPIRONE HCL PO, Take 20 mg by mouth daily at bedtime, Disp: , Rfl:   •  cholecalciferol (VITAMIN D3) 1,000 units tablet, Take 1,000 Units by mouth 2 (two) times a day , Disp: , Rfl:   •  cyclobenzaprine (FLEXERIL) 5 mg tablet, , Disp: , Rfl: 5  •  furosemide (LASIX) 20 mg tablet, furosemide 20 mg tablet daily, Disp: , Rfl:   •  levothyroxine 100 mcg tablet, Take 100 mcg by mouth daily On Odd days, Disp: , Rfl:   •  LINZESS 145 MCG CAPS, , Disp: , Rfl:   •  Melatonin 10 MG CAPS, Take 10 mg by mouth daily at bedtime, Disp: , Rfl:   •  ondansetron (ZOFRAN) 4 mg tablet, Take 4 mg by mouth every 12 (twelve) hours as needed for nausea or vomiting, Disp: , Rfl:   •  oxybutynin (DITROPAN-XL) 10 MG 24 hr tablet, Take 10 mg by mouth daily, Disp: , Rfl:   •  oxyCODONE-acetaminophen (PERCOCET)  mg per tablet, Take 1 tablet by mouth every 4 (four) hours, Disp: , Rfl:     Current Facility-Administered Medications:   •  sodium chloride (PF) 0.9 % injection 5 mL, 5 mL, Intravenous, PRN, Odette Corral PA-C    Review of Systems   Constitutional:  Negative for chills and fever.   Cardiovascular:  Positive for leg swelling.   Musculoskeletal:  Positive for back pain.   Skin:  Positive for wound.    Psychiatric/Behavioral:  Negative for agitation.        Objective:  /82   Pulse 64   Temp (!) 97.3 °F (36.3 °C)   Resp 18   Pain Score:   5     Physical Exam  Vitals reviewed.   Constitutional:       General: She is not in acute distress.     Appearance: She is morbidly obese.   Cardiovascular:      Rate and Rhythm: Normal rate.      Pulses:           Dorsalis pedis pulses are 2+ on the left side.        Posterior tibial pulses are 2+ on the left side.   Pulmonary:      Effort: Pulmonary effort is normal. No respiratory distress.   Musculoskeletal:      Right lower le+ Edema present.      Left lower le+ Edema present.   Feet:      Right foot:      Toenail Condition: Right toenails are abnormally thick.      Left foot:      Toenail Condition: Left toenails are abnormally thick.   Skin:     Findings: Wound present.             Comments: Venous stasis ulcer with slough and fibrin.  No significant change.  No signs of infection.  Minimal surrounding erythema.  No malodor.   Neurological:      Mental Status: She is alert.   Psychiatric:         Mood and Affect: Mood normal.           Wound 24 Venous Ulcer Leg Left;Posterior (Active)   Wound Image   24 1049   Wound Description Granulation tissue;Slough;Yellow 24 09   Nanda-wound Assessment Edema;Erythema;Hyperpigmented 24 0956   Wound Length (cm) 2.2 cm 24 0956   Wound Width (cm) 1.9 cm 24 0956   Wound Depth (cm) 0.2 cm 24 09   Wound Surface Area (cm^2) 4.18 cm^2 24 09   Wound Volume (cm^3) 0.836 cm^3 24 0956   Calculated Wound Volume (cm^3) 0.84 cm^3 24 0956   Change in Wound Size % 58 24 0956   Drainage Amount Small 24 09   Drainage Description Serosanguineous;Yellow;Milky 24 09   Non-staged Wound Description Full thickness 24 09   Dressing Status Intact 24 09                          Debridement   Wound 24 Venous Ulcer Leg Left;Posterior   "  Universal Protocol:  Consent: Verbal consent obtained. Written consent obtained.  Consent given by: patient  Time out: Immediately prior to procedure a \"time out\" was called to verify the correct patient, procedure, equipment, support staff and site/side marked as required.  Patient understanding: patient states understanding of the procedure being performed  Patient identity confirmed: verbally with patient    Debridement Details  Performed by: physician  Debridement type: surgical  Level of debridement: subcutaneous tissue  Pain control: lidocaine 4%      Post-debridement measurements  Length (cm): 2.2  Width (cm): 1.9  Depth (cm): 0.3  Percent debrided: 100%  Surface Area (cm^2): 4.18  Area Debrided (cm^2): 4.18  Volume (cm^3): 1.25    Tissue and other material debrided: dermis and subcutaneous tissue  Devitalized tissue debrided: fibrin, necrotic debris and slough  Instrument(s) utilized: curette  Bleeding: medium  Hemostasis obtained with: pressure  Procedural pain (0-10): 4  Post-procedural pain: 1   Response to treatment: procedure was tolerated well  Debridement Comments: Postdebridement irrigation with prophase.             No results found for: \"HGBA1C\"    Wound Instructions:  Orders Placed This Encounter   Procedures   • Wound cleansing and dressings Venous Ulcer Left;Posterior Leg     Left leg wound care     Wash your hands with soap and water.  Remove old dressing, discard into plastic bag and place in trash.    Cleanse the wound with unscented soap (such as plain white Dove soap) and warm water prior to applying a clean dressing.      Shower yes - Do not get the dressings wet--if showering then please cover with a cast cover that you may purchase at the pharmacy    Prophase soak today at the [facility]   Apply moisturizer to skin surrounding intact wound  Applied Endoform AM then Silver Alginate, convamax to the left leg wound.    Secure with Unna Boot, Coban and tubifast yellow  Change weekly at the " "[facility]    Unna Boot to the LLE    Keep compression wrap/wraps clean and dry. If wraps are too tight and you experience numbness/tingling, call the wound center. If after hours, remove wraps or proceed to nearest E.R. and call wound center in AM.    Wrap will be changed 1 x weekly    Avoid prolonged standing in one place.    Elevate leg(s) above the level of the heart when sitting or as much as possible.         Treatments completed as above today at the [facility]     Standing Status:   Future     Standing Expiration Date:   2/26/2025   • Debridement Venous Ulcer Left;Posterior Leg     This order was created via procedure documentation             Michael Berry MD, CHT, CWS    Portions of the record may have been created with voice recognition software. Occasional wrong word or \"sound alike\" substitutions may have occurred due to the inherent limitations of voice recognition software. Read the chart carefully and recognize, using context, where substitutions have occurred.    "

## 2024-03-04 ENCOUNTER — OFFICE VISIT (OUTPATIENT)
Dept: WOUND CARE | Facility: CLINIC | Age: 64
End: 2024-03-04
Payer: MEDICARE

## 2024-03-04 VITALS
DIASTOLIC BLOOD PRESSURE: 90 MMHG | SYSTOLIC BLOOD PRESSURE: 118 MMHG | HEART RATE: 68 BPM | RESPIRATION RATE: 20 BRPM | TEMPERATURE: 97.1 F

## 2024-03-04 DIAGNOSIS — I89.8 LYMPHORRHEA: ICD-10-CM

## 2024-03-04 DIAGNOSIS — L29.9 PRURITIC CONDITION: ICD-10-CM

## 2024-03-04 DIAGNOSIS — L97.222 VENOUS STASIS ULCER OF LEFT CALF WITH FAT LAYER EXPOSED, UNSPECIFIED WHETHER VARICOSE VEINS PRESENT (HCC): Primary | ICD-10-CM

## 2024-03-04 DIAGNOSIS — T14.8XXA EXCORIATION: ICD-10-CM

## 2024-03-04 DIAGNOSIS — I83.022 VENOUS STASIS ULCER OF LEFT CALF WITH FAT LAYER EXPOSED, UNSPECIFIED WHETHER VARICOSE VEINS PRESENT (HCC): Primary | ICD-10-CM

## 2024-03-04 PROCEDURE — 11042 DBRDMT SUBQ TIS 1ST 20SQCM/<: CPT | Performed by: STUDENT IN AN ORGANIZED HEALTH CARE EDUCATION/TRAINING PROGRAM

## 2024-03-04 PROCEDURE — 99213 OFFICE O/P EST LOW 20 MIN: CPT | Performed by: STUDENT IN AN ORGANIZED HEALTH CARE EDUCATION/TRAINING PROGRAM

## 2024-03-04 RX ORDER — LIDOCAINE 40 MG/G
CREAM TOPICAL ONCE
Status: COMPLETED | OUTPATIENT
Start: 2024-03-04 | End: 2024-03-04

## 2024-03-04 RX ORDER — TRIAMCINOLONE ACETONIDE 1 MG/G
OINTMENT TOPICAL 2 TIMES DAILY
Qty: 80 G | Refills: 0 | Status: SHIPPED | OUTPATIENT
Start: 2024-03-04 | End: 2024-03-18

## 2024-03-04 RX ADMIN — LIDOCAINE: 40 CREAM TOPICAL at 11:10

## 2024-03-04 NOTE — PATIENT INSTRUCTIONS
Orders Placed This Encounter   Procedures    Wound cleansing and dressings Venous Ulcer Left;Posterior Leg     Bilateral lower leg wound care     At wound care center:   Wash your hands with soap and water.  Remove old dressing, discard into plastic bag and place in trash.    Cleanse the wound with unscented soap (such as plain white Dove soap) and warm water prior to applying a clean dressing.      Shower yes - Do not get the dressings wet--if showering then please cover with a cast cover that you may purchase at the pharmacy       Left lower leg ulcer   Applied Endoform AM then Silver Alginate,  cover with ABD pad. Secure with tan and tape.  Secure with Unna Boot, Coban and tubifast blue  Change weekly at the [facility]     Right lower leg wound  Apply Bacitracin to open areas of right leg wound. Cover with ABD pad.   Secure with Unna Boot, Coban and tubifast yellow  Change weekly at the [facility]       Unna Boot to the BLE    Keep compression wrap/wraps clean and dry.  If wraps are too tight and you experience numbness/tingling, call the wound center.   If after hours, remove wraps or proceed to nearest E.R. and call wound center in AM.     Wrap will be changed 1 x weekly     Avoid prolonged standing in one place.     Elevate leg(s) above the level of the heart when sitting or as much as possible.         Treatments completed as above today at the [facility]     prescription for Steroid Cream and use as directed     Standing Status:   Future     Standing Expiration Date:   3/4/2025

## 2024-03-04 NOTE — PROGRESS NOTES
Patient ID: Sussy Jung is a 63 y.o. female Date of Birth 1960       Chief Complaint   Patient presents with    Follow Up Wound Care Visit     Left lower leg ulcer   And patient reports a new wound with drainage on the right lower posterior leg - no dressing on today        Allergies:  Nsaids, Sulfate, and Ms contin [morphine]    Diagnosis:   Diagnosis ICD-10-CM Associated Orders   1. Venous stasis ulcer of left calf with fat layer exposed, unspecified whether varicose veins present ([facility])  I83.022 Wound cleansing and dressings Venous Ulcer Left;Posterior Leg    L97.222 lidocaine (LMX) 4 % cream     Debridement      2. Pruritic condition  L29.9 Wound cleansing and dressings Venous Ulcer Left;Posterior Leg     lidocaine (LMX) 4 % cream     triamcinolone (KENALOG) 0.1 % ointment      3. Excoriation  T14.8XXA       4. Lymphorrhea  I89.8            Assessment  & Plan:    F/u VSU of the L posterior calf. Is measuring the same in size. Slough is present overlying granulation tissue. Drainage is moderate. No erythema or lymphangitic streaking.   Continue with endoform am, alginate and Unna boot for compression. L EUNICE of 1.19. Change weekly.   Elevate legs when resting throughout the day.  Avoid prolonged standing 1 place for prolonged time with legs in dependent position.  Walking for 30 to 40 minutes each day was encouraged.  Obtain 3-4 servings of protein daily for wound healing.   New areas of skin breakdown with lymphorrhea on RLE at site of excoriations.   Layer of Bacitracin with overlying ABD to excoriated areas. Unna boot for compression.  R EUNICE of 1.35  Monitor for pain underneath the wrap, redness extending beyond the wrap, fevers, chills or discomfort of the toes.  Notify wound care center should these occur with use of multilayer compression wrap.  There are excoriations on bilateral wrists, right leg, back however there is no discernible true rash present.  No papules, macules, urticaria, pustules  present.  Will prescribe topical steroid ointment to reduce itch and urged to scratch.  Should patient not experience improvement in pruritic sensation she is encouraged to follow-up with dermatology for further evaluation.         Subjective:   02/19/24: 62 y/o F with PMHx of hypothyroidism, paroxysmal atrial fibrillation, obesity, chronic low back pain presents for evaluation of open wound on her left posterior calf that has been present for about 2 and half weeks now.  Patient does state that she does have a history of venous ulcerations throughout the years.  Does wear compression stockings at home but did not present with compression on today.  Does spend time in a chair when resting with her legs in a dependent position.  Sleeps in a bed at night.  Currently she is using betamethasone on the open area and has been doing so for about 2 and half weeks now.  Is compliant with Lasix.  No longer smokes.  No severe pain, fevers, chills. No hx of DM or difficulty with glucose control.     02/26/2024: Patient returns for follow-up of venous stasis ulcer of the left calf.  No specific complaints.  Using Tubigrip with endoform and alginate.  No fever, chills or pain.    03/04/24: Pt presents for f/u VSU of the L calf.  Currently endoform, alginate and Unna boot is being utilized for compression. Pt reports itching and a rash on her legs, wrists, and back that she has been itching at. Denies systemic symptoms.            The following portions of the patient's history were reviewed and updated as appropriate:   Patient Active Problem List   Diagnosis    Morbid (severe) obesity due to excess calories (HCC)    Hypothyroidism    Hypercholesterolemia    Lumbar disc disease    Paroxysmal atrial fibrillation (HCC)    Anastomotic stricture of gastrojejunostomy    Bariatric surgery status    Choledocholithiasis    Common bile duct stricture    Encounter for surgical aftercare following surgery of digestive system    Postsurgical  "malabsorption    Other dysphagia    Decreased oral intake    Low vitamin B12 level    Vitamin A deficiency     Past Medical History:   Diagnosis Date    Anxiety     Back pain     Back pain at L4-L5 level     Bariatric surgery status     Chronic pain disorder     back    Cramping of hands     and legs    DDD (degenerative disc disease), lumbar     Depression     Edema of both legs     with lasix much better    Fall at home     almost 2 yrs ago    Full dentures     GERD (gastroesophageal reflux disease)     History of gastric ulcer     \"Years ago    History of heartburn     Hypothyroid     Hypothyroidism    Morbid obesity (HCC)     Motion sickness     Nausea & vomiting     Numbness and tingling in both hands     Numbness and tingling of both feet     PICC (peripherally inserted central catheter) in place     Right arm    PONV (postoperative nausea and vomiting)     Postgastrectomy malabsorption     Sciatica     Shortness of breath     zuñiga    Skin abnormality     Edema BLE- uses boots at home. skin on shins dark and rough    Stress incontinence     Stricture esophagus     Use of cane as ambulatory aid     Wears glasses      Past Surgical History:   Procedure Laterality Date    ABDOMINAL ADHESION SURGERY N/A 2017    Procedure: EXTENSIVE LYSIS ADHESIONS;  Surgeon: Alex Long MD;  Location: [facility];  Service: Bariatrics    ABDOMINAL SURGERY       SECTION      x3    CHOLECYSTECTOMY      COLONOSCOPY      COSMETIC SURGERY      tummy tuck    ESOPHAGOGASTRODUODENOSCOPY N/A 2016    Procedure: ESOPHAGOGASTRODUODENOSCOPY (EGD);  Surgeon: Alex Long MD;  Location: [facility];  Service:     ESOPHAGOGASTRODUODENOSCOPY N/A 2017    Procedure: ESOPHAGOGASTRODUODENOSCOPY (EGD);  Surgeon: Alex Long MD;  [facility];  Service: Bariatrics    GASTRIC BYPASS      IR BILIARY TUBE PLACEMENT (PTC)  2019    PANNICULECTOMY      PARTIAL GASTRECTOMY N/A 2017    Procedure: SUBTOTAL " GASTRECTOMY, ESOPHAGEAL JEJUNOSTOMY;  Surgeon: Alex Long MD;  [facility];  Service: Bariatrics    OH EGD TRANSORAL BIOPSY SINGLE/MULTIPLE N/A 9/20/2017    Procedure: ESOPHAGOGASTRODUODENOSCOPY (EGD) with biopsy;  Surgeon: Aidan Perez MD;  Location: [facility];  Service: Bariatrics    OH EGD TRANSORAL BIOPSY SINGLE/MULTIPLE N/A 10/4/2017    Procedure: ESOPHAGOGASTRODUODENOSCOPY (EGD) with balloon dilatation;  Surgeon: Alex Long MD;  [facility];  Service: Bariatrics    OH EGD TRANSORAL BIOPSY SINGLE/MULTIPLE N/A 11/8/2017    Procedure: ESOPHAGOGASTRODUODENOSCOPY (EGD) with balloon dilation;  Surgeon: Alex Long MD;  [facility];  Service: Bariatrics    OH LAPS GSTRC RSTRICTIV PX LONGITUDINAL GASTRECTOMY N/A 4/20/2016    Procedure:   LAPAROSCOPIC GASTROGASTIC FISTULA TAKEDOWN, PARTIAL GASTRECTOMY, GASTRO JEJUNOSTOMY, EXTENSIVE LYSIS OF ADHESIONS;  Surgeon: Alex Long MD;  Location[facility];  Service: Bariatrics     Family History   Problem Relation Age of Onset    Alzheimer's disease Mother     Diabetes Father     Hypertension Father     No Known Problems Daughter     Alcohol abuse Family     No Known Problems Sister     No Known Problems Maternal Grandmother     No Known Problems Paternal Grandmother     No Known Problems Sister     No Known Problems Sister     No Known Problems Sister     No Known Problems Sister     No Known Problems Sister     No Known Problems Sister     Anxiety disorder Daughter     No Known Problems Daughter     No Known Problems Maternal Aunt     No Known Problems Maternal Aunt     No Known Problems Maternal Aunt     No Known Problems Maternal Aunt     No Known Problems Maternal Aunt      Social History     Socioeconomic History    Marital status: Single     Spouse name: None    Number of children: None    Years of education: None    Highest education level: None   Occupational History    None   Tobacco Use    Smoking status: Former      Current packs/day: 0.00     Types: Cigarettes     Start date: 1996     Quit date: 2001     Years since quittin.2    Smokeless tobacco: Never    Tobacco comments:     Quit 15 years ago. 4 cigarettes daily   Vaping Use    Vaping status: Never Used   Substance and Sexual Activity    Alcohol use: No    Drug use: No    Sexual activity: None   Other Topics Concern    None   Social History Narrative    None     Social Determinants of Health     Financial Resource Strain: Low Risk  (2022)    Received from [facility]    Overall Financial Resource Strain (CARDIA)     Difficulty of Paying Living Expenses: Not hard at all   Food Insecurity: No Food Insecurity (2022)    Received from [facility]    Hunger Vital Sign     Worried About Running Out of Food in the Last Year: Never true     Ran Out of Food in the Last Year: Never true   Transportation Needs: No Transportation Needs (2022)    Received from [facility]    PRAPARE - Transportation     Lack of Transportation (Medical): No     Lack of Transportation (Non-Medical): No   Physical Activity: Not on file   Stress: Not on file   Social Connections: Not on file   Intimate Partner Violence: Not At Risk (2022)    Received from [facility]    Humiliation, Afraid, Rape, and Kick questionnaire     Fear of Current or Ex-Partner: No     Emotionally Abused: No     Physically Abused: No     Sexually Abused: No   Housing Stability: Unknown (2022)    Received from [facility]    Housing Stability Vital Sign     Unable to Pay for Housing in the Last Year: No     Number of Places Lived in the Last Year: Not on file     Unstable Housing in the Last Year: Not on file       Current Outpatient Medications:     triamcinolone (KENALOG) 0.1 % ointment, Apply topically 2 (two) times a day for 14 days To itching areas of arms, legs and back, Disp: 80 g, Rfl: 0     acetaminophen (TYLENOL) 160 mg/5 mL suspension, Take 10 mL by mouth every 4 (four) hours as needed for mild pain, headaches or fever, Disp: 237 mL, Rfl: 0    BELBUCA 150 MCG FILM, , Disp: , Rfl: 5    betamethasone dipropionate (DIPROSONE) 0.05 % cream, Apply topically 2 (two) times a day, Disp: 30 g, Rfl: 0    busPIRone (BUSPAR) 15 mg tablet, Take 15 mg by mouth daily, Disp: , Rfl:     BUSPIRONE HCL PO, Take 20 mg by mouth daily at bedtime, Disp: , Rfl:     cholecalciferol (VITAMIN D3) 1,000 units tablet, Take 1,000 Units by mouth 2 (two) times a day , Disp: , Rfl:     cyclobenzaprine (FLEXERIL) 5 mg tablet, , Disp: , Rfl: 5    furosemide (LASIX) 20 mg tablet, furosemide 20 mg tablet daily, Disp: , Rfl:     levothyroxine 100 mcg tablet, Take 100 mcg by mouth daily On Odd days, Disp: , Rfl:     LINZESS 145 MCG CAPS, , Disp: , Rfl:     Melatonin 10 MG CAPS, Take 10 mg by mouth daily at bedtime, Disp: , Rfl:     ondansetron (ZOFRAN) 4 mg tablet, Take 4 mg by mouth every 12 (twelve) hours as needed for nausea or vomiting, Disp: , Rfl:     oxybutynin (DITROPAN-XL) 10 MG 24 hr tablet, Take 10 mg by mouth daily, Disp: , Rfl:     oxyCODONE-acetaminophen (PERCOCET)  mg per tablet, Take 1 tablet by mouth every 4 (four) hours, Disp: , Rfl:     Current Facility-Administered Medications:     sodium chloride (PF) 0.9 % injection 5 mL, 5 mL, Intravenous, PRN, Odette Corral PA-C    Review of Systems   Constitutional:  Negative for chills and fever.   Cardiovascular:  Positive for leg swelling.   Musculoskeletal:  Positive for back pain.   Skin:  Positive for wound.   Psychiatric/Behavioral:  Negative for agitation.          Objective:  /90   Pulse 68   Temp (!) 97.1 °F (36.2 °C)   Resp 20   Pain Score:   6     Physical Exam  Vitals reviewed.   Constitutional:       General: She is not in acute distress.     Appearance: She is morbidly obese.   Cardiovascular:      Rate and Rhythm: Normal rate.      Pulses:            Dorsalis pedis pulses are 2+ on the left side.        Posterior tibial pulses are 2+ on the left side.   Pulmonary:      Effort: Pulmonary effort is normal. No respiratory distress.   Musculoskeletal:      Right lower le+ Edema present.      Left lower le+ Edema present.   Feet:      Right foot:      Toenail Condition: Right toenails are abnormally thick.      Left foot:      Toenail Condition: Left toenails are abnormally thick.   Skin:     Findings: Wound present.             Comments: VSU of the L posterior calf is measuring the same in size. Slough is present overlying granulation tissue. Drainage is moderate. No erythema or lymphangitic streaking.       New areas of skin breakdown with lymphorrhea on RLE at site of excoriations.     There are excoriations on bilateral wrists, right leg, back however there is no discernible true rash present.  No papules, macules, urticaria, pustules present.   Neurological:      Mental Status: She is alert.   Psychiatric:         Mood and Affect: Mood normal.                  Wound 24 Venous Ulcer Leg Left;Posterior (Active)   Wound Image   24 1047   Wound Description Granulation tissue;Slough;Yellow;Pink 24 1030   Nanda-wound Assessment Edema;Pink;Erythema 24 1030   Wound Length (cm) 2.8 cm 24 1030   Wound Width (cm) 1.7 cm 24 1030   Wound Depth (cm) 0.3 cm 24 1030   Wound Surface Area (cm^2) 4.76 cm^2 24 1030   Wound Volume (cm^3) 1.428 cm^3 24 1030   Calculated Wound Volume (cm^3) 1.43 cm^3 24 1030   Change in Wound Size % 28.5 24 1030   Drainage Amount Moderate 24 1030   Drainage Description Serosanguineous;Milky;Brown;Barboza 24 1030   Non-staged Wound Description Full thickness 24 1030   Dressing Status Intact;New drainage 24 1030       Wound 24 Other (comment) Leg Lower;Right;Posterior (Active)   Wound Image   24 1038   Wound Description Pink;Epithelialization  "03/04/24 1033   Nanda-wound Assessment Intact;Dry 03/04/24 1033   Wound Length (cm) 9 cm 03/04/24 1033   Wound Width (cm) 4 cm 03/04/24 1033   Wound Depth (cm) 0.1 cm 03/04/24 1033   Wound Surface Area (cm^2) 36 cm^2 03/04/24 1033   Wound Volume (cm^3) 3.6 cm^3 03/04/24 1033   Calculated Wound Volume (cm^3) 3.6 cm^3 03/04/24 1033   Drainage Amount Small 03/04/24 1033   Drainage Description Serous 03/04/24 1033   Non-staged Wound Description Partial thickness 03/04/24 1033                     Debridement   Wound 02/19/24 Venous Ulcer Leg Left;Posterior    Universal Protocol:  Consent: Verbal consent obtained.  Consent given by: patient  Time out: Immediately prior to procedure a \"time out\" was called to verify the correct patient, procedure, equipment, support staff and site/side marked as required.  Patient understanding: patient states understanding of the procedure being performed  Patient identity confirmed: verbally with patient    Debridement Details  Performed by: PA  Debridement type: surgical  Level of debridement: subcutaneous tissue  Pain control: lidocaine 4%      Post-debridement measurements  Length (cm): 2.8  Width (cm): 1.7  Depth (cm): 0.4  Percent debrided: 90%  Surface Area (cm^2): 4.76  Area Debrided (cm^2): 4.28  Volume (cm^3): 1.9    Tissue and other material debrided: dermis and subcutaneous tissue  Devitalized tissue debrided: slough  Instrument(s) utilized: curette  Bleeding: small  Hemostasis obtained with: pressure  Procedural pain (0-10): 0  Post-procedural pain: 0   Response to treatment: procedure was tolerated well                   Wound Instructions:  Orders Placed This Encounter   Procedures    Wound cleansing and dressings Venous Ulcer Left;Posterior Leg     Bilateral lower leg wound care     At wound care center:   Wash your hands with soap and water.  Remove old dressing, discard into plastic bag and place in trash.    Cleanse the wound with unscented soap (such as plain white Dove " "soap) and warm water prior to applying a clean dressing.      Shower yes - Do not get the dressings wet--if showering then please cover with a cast cover that you may purchase at the pharmacy       Left lower leg ulcer   Applied Endoform AM then Silver Alginate,  cover with ABD pad. Secure with tan and tape.  Secure with Unna Boot, Coban and tubifast blue  Change weekly at the [facility]     Right lower leg wound  Apply Bacitracin to open areas of right leg wound. Cover with ABD pad.   Secure with Unna Boot, Coban and tubifast yellow  Change weekly at the [facility]       Unna Boot to the BLE    Keep compression wrap/wraps clean and dry.  If wraps are too tight and you experience numbness/tingling, call the wound center.   If after hours, remove wraps or proceed to nearest E.R. and call wound center in AM.     Wrap will be changed 1 x weekly     Avoid prolonged standing in one place.     Elevate leg(s) above the level of the heart when sitting or as much as possible.         Treatments completed as above today at the [facility]     prescription for Steroid Cream and use as directed     Standing Status:   Future     Standing Expiration Date:   3/4/2025    Debridement     This order was created via procedure documentation       Cally Null, PA-C    Portions of the record may have been created with voice recognition software. Occasional wrong word or \"sound alike\" substitutions may have occurred due to the inherent limitations of voice recognition software. Read the chart carefully and recognize, using context, where substitutions have occurred.    "

## 2024-03-11 ENCOUNTER — OFFICE VISIT (OUTPATIENT)
Dept: WOUND CARE | Facility: CLINIC | Age: 64
End: 2024-03-11
Payer: MEDICARE

## 2024-03-11 VITALS
HEART RATE: 80 BPM | SYSTOLIC BLOOD PRESSURE: 118 MMHG | TEMPERATURE: 95.6 F | RESPIRATION RATE: 16 BRPM | DIASTOLIC BLOOD PRESSURE: 65 MMHG

## 2024-03-11 DIAGNOSIS — L97.222 VENOUS STASIS ULCER OF LEFT CALF WITH FAT LAYER EXPOSED, UNSPECIFIED WHETHER VARICOSE VEINS PRESENT (HCC): Primary | ICD-10-CM

## 2024-03-11 DIAGNOSIS — I83.022 VENOUS STASIS ULCER OF LEFT CALF WITH FAT LAYER EXPOSED, UNSPECIFIED WHETHER VARICOSE VEINS PRESENT (HCC): Primary | ICD-10-CM

## 2024-03-11 DIAGNOSIS — S81.801D OPEN WOUND OF RIGHT LOWER LEG, SUBSEQUENT ENCOUNTER: ICD-10-CM

## 2024-03-11 PROCEDURE — 29580 STRAPPING UNNA BOOT: CPT

## 2024-03-11 PROCEDURE — 99213 OFFICE O/P EST LOW 20 MIN: CPT | Performed by: FAMILY MEDICINE

## 2024-03-11 PROCEDURE — 97597 DBRDMT OPN WND 1ST 20 CM/<: CPT | Performed by: FAMILY MEDICINE

## 2024-03-11 RX ORDER — LIDOCAINE 40 MG/G
CREAM TOPICAL ONCE
Status: COMPLETED | OUTPATIENT
Start: 2024-03-11 | End: 2024-03-11

## 2024-03-11 RX ADMIN — LIDOCAINE 1 APPLICATION: 40 CREAM TOPICAL at 10:43

## 2024-03-11 NOTE — PROGRESS NOTES
Patient ID: Sussy Jung is a 63 y.o. female Date of Birth 1960       Chief Complaint   Patient presents with   • Follow Up Wound Care Visit     Follow up for bilateral wounds, bilateral unna boots intact        Allergies:  Nsaids, Sulfate, and Ms contin [morphine]    Diagnosis:      Diagnosis ICD-10-CM Associated Orders   1. Venous stasis ulcer of left calf with fat layer exposed, unspecified whether varicose veins present ([facility])  I83.022 lidocaine (LMX) 4 % cream    L97.222 Wound cleansing and dressings     Debridement Venous Ulcer Left;Posterior Leg      2. Open wound of right lower leg, subsequent encounter  S81.801D Wound cleansing and dressings     Cast Application              Assessment & Plan:  F/u VSU LLE posterior calf: Gel-like biofilm layer present with increased edge epithelialization and hyperkeratotic debris.  No acute erythema, malodor, purulent drainage, lymphangitic streaking.   Selective debridement  Moisturize left lower extremity with Lac-Hydrin. Continue with endoform AM, d/c alginate followed by Unna boot changing weekly  Encouraged to continue lower extremity elevation when resting and avoiding prolonged standing in place  Continue regular efforts at walking for 30 to 40 minutes each day  In office ABIs from 2/26/24 reviewed R 1.35 ; L 1.19   RLE scattered small partial-thickness open ulcerations 2/2 to excoriations from unspecified dermatitis in patient with history of venous stasis ulcerations: Dry plaques near lateral ankle on right lower extremity and just below back of knee; generalized excoriated papular rash present on RLE, upper extremities.  Remains pruritic throughout the day though sometimes worse at night. Is not present on palms, soles, and/or in between fingers/toes. Non blanching.  No purulent drainage, malodor.  Moisturize w/ Lac-Hydrin betamethasone mix to entire right lower extremity followed by application of Unna boot  Reviewed extreme importance of avoiding  "excoriation as this increases risk of new wounds and infection.  Follow-up with dermatology for scheduled appointment this coming Wednesday.  ED precautions reviewed with patient, she expressed understanding  Follow-up in 1 week or sooner if needed    Portions of the record may have been created with voice recognition software. Occasional wrong word or \"sound alike\" substitutions may have occurred due to the inherent limitations of voice recognition software. Read the chart carefully and recognize, using context, where substitutions have occurred.    Subjective:   \"02/19/24: 64 y/o F with PMHx of hypothyroidism, paroxysmal atrial fibrillation, obesity, chronic low back pain presents for evaluation of open wound on her left posterior calf that has been present for about 2 and half weeks now.  Patient does state that she does have a history of venous ulcerations throughout the years.  Does wear compression stockings at home but did not present with compression on today.  Does spend time in a chair when resting with her legs in a dependent position.  Sleeps in a bed at night.  Currently she is using betamethasone on the open area and has been doing so for about 2 and half weeks now.  Is compliant with Lasix.  No longer smokes.  No severe pain, fevers, chills. No hx of DM or difficulty with glucose control.     02/26/2024: Patient returns for follow-up of venous stasis ulcer of the left calf.  No specific complaints.  Using Tubigrip with endoform and alginate.  No fever, chills or pain.    03/04/24: Pt presents for f/u VSU of the L calf.  Currently endoform, alginate and Unna boot is being utilized for compression. Pt reports itching and a rash on her legs, wrists, and back that she has been itching at. Denies systemic symptoms.  \"    *Above HPI summary per chart review of prev visits to wound center*    3/11/2024: Sussy presents today for follow-up of venous stasis ulcerations bilateral lower extremities.  Currently " "utilizing endoform/alginate/Unna boot.  At last visit, new areas of skin breakdown on right lower extremity were noted secondary to patient excoriating.  Therefore layer of bacitracin followed by ABD and Unna boot applied.  Patient reports she tolerated the bilateral wraps well though continued with pruritic rash.  Reports that this type of rash has happened to her in the past.  Has an appoint with dermatology on Wednesday of this week.  Other than the pruritic rash, she reports she has no new acute complaints and denies fever, chills, shortness of breath, chest pain, palpitations.       The following portions of the patient's history were reviewed and updated as appropriate:   Patient Active Problem List   Diagnosis   • Morbid (severe) obesity due to excess calories (HCC)   • Hypothyroidism   • Hypercholesterolemia   • Lumbar disc disease   • Paroxysmal atrial fibrillation (HCC)   • Anastomotic stricture of gastrojejunostomy   • Bariatric surgery status   • Choledocholithiasis   • Common bile duct stricture   • Encounter for surgical aftercare following surgery of digestive system   • Postsurgical malabsorption   • Other dysphagia   • Decreased oral intake   • Low vitamin B12 level   • Vitamin A deficiency     Past Medical History:   Diagnosis Date   • Anxiety    • Back pain    • Back pain at L4-L5 level    • Bariatric surgery status    • Chronic pain disorder     back   • Cramping of hands     and legs   • DDD (degenerative disc disease), lumbar    • Depression    • Edema of both legs     with lasix much better   • Fall at home     almost 2 yrs ago   • Full dentures    • GERD (gastroesophageal reflux disease)    • History of gastric ulcer     \"Years ago   • History of heartburn    • Hypothyroid     Hypothyroidism   • Morbid obesity (HCC)    • Motion sickness    • Nausea & vomiting    • Numbness and tingling in both hands    • Numbness and tingling of both feet    • PICC (peripherally inserted central catheter) in " place     Right arm   • PONV (postoperative nausea and vomiting)    • Postgastrectomy malabsorption    • Sciatica    • Shortness of breath     zuñiga   • Skin abnormality     Edema BLE- uses boots at home. skin on shins dark and rough   • Stress incontinence    • Stricture esophagus    • Use of cane as ambulatory aid    • Wears glasses      Past Surgical History:   Procedure Laterality Date   • ABDOMINAL ADHESION SURGERY N/A 2017    Procedure: EXTENSIVE LYSIS ADHESIONS;  Surgeon: Alex Long MD;  Location: [facility];  Service: Bariatrics   • ABDOMINAL SURGERY     •  SECTION      x3   • CHOLECYSTECTOMY     • COLONOSCOPY     • COSMETIC SURGERY      tummy tuck   • ESOPHAGOGASTRODUODENOSCOPY N/A 2016    Procedure: ESOPHAGOGASTRODUODENOSCOPY (EGD);  Surgeon: Alex Long MD;  Location: [facility];  Service:    • ESOPHAGOGASTRODUODENOSCOPY N/A 2017    Procedure: ESOPHAGOGASTRODUODENOSCOPY (EGD);  Surgeon: Alex Long MD;  [facility];  Service: Bariatrics   • GASTRIC BYPASS     • IR BILIARY TUBE PLACEMENT (PTC)  2019   • PANNICULECTOMY     • PARTIAL GASTRECTOMY N/A 2017    Procedure: SUBTOTAL GASTRECTOMY, ESOPHAGEAL JEJUNOSTOMY;  Surgeon: Alex Long MD;  [facility];  Service: Bariatrics   • PA EGD TRANSORAL BIOPSY SINGLE/MULTIPLE N/A 2017    Procedure: ESOPHAGOGASTRODUODENOSCOPY (EGD) with biopsy;  Surgeon: Aidan Perez MD;  [facility]: [facility];  Service: Bariatrics   • PA EGD TRANSORAL BIOPSY SINGLE/MULTIPLE N/A 10/4/2017    Procedure: ESOPHAGOGASTRODUODENOSCOPY (EGD) with balloon dilatation;  Surgeon: Alex Long MD;  [facility];  Service: Bariatrics   • PA EGD TRANSORAL BIOPSY SINGLE/MULTIPLE N/A 2017    Procedure: ESOPHAGOGASTRODUODENOSCOPY (EGD) with balloon dilation;  Surgeon: Alex Long MD;  [facility]: [facility];  Service: Bariatrics   • PA LAPS GSTRC RSTRICTIV PX LONGITUDINAL GASTRECTOMY N/A 2016    Procedure:    LAPAROSCOPIC GASTROGASTIC FISTULA TAKEDOWN, PARTIAL GASTRECTOMY, GASTRO JEJUNOSTOMY, EXTENSIVE LYSIS OF ADHESIONS;  Surgeon: Alex Long MD;  Location: [facility];  Service: Bariatrics     Family History   Problem Relation Age of Onset   • Alzheimer's disease Mother    • Diabetes Father    • Hypertension Father    • No Known Problems Daughter    • Alcohol abuse Family    • No Known Problems Sister    • No Known Problems Maternal Grandmother    • No Known Problems Paternal Grandmother    • No Known Problems Sister    • No Known Problems Sister    • No Known Problems Sister    • No Known Problems Sister    • No Known Problems Sister    • No Known Problems Sister    • Anxiety disorder Daughter    • No Known Problems Daughter    • No Known Problems Maternal Aunt    • No Known Problems Maternal Aunt    • No Known Problems Maternal Aunt    • No Known Problems Maternal Aunt    • No Known Problems Maternal Aunt       Social History     Socioeconomic History   • Marital status: Single     Spouse name: None   • Number of children: None   • Years of education: None   • Highest education level: None   Occupational History   • None   Tobacco Use   • Smoking status: Former     Current packs/day: 0.00     Types: Cigarettes     Start date: 1996     Quit date: 2001     Years since quittin.2   • Smokeless tobacco: Never   • Tobacco comments:     Quit 15 years ago. 4 cigarettes daily   Vaping Use   • Vaping status: Never Used   Substance and Sexual Activity   • Alcohol use: No   • Drug use: No   • Sexual activity: None   Other Topics Concern   • None   Social History Narrative   • None     Social Determinants of Health     Financial Resource Strain: Low Risk  (2022)    Received from [facility]    Overall Financial Resource Strain (CARDIA)    • Difficulty of Paying Living Expenses: Not hard at all   Food Insecurity: No Food Insecurity (2022)    Received from [facility]  [facility]    Hunger Vital Sign    • Worried About Running Out of Food in the Last Year: Never true    • Ran Out of Food in the Last Year: Never true   Transportation Needs: No Transportation Needs (12/29/2022)    Received from [facility]    PRAPARE - Transportation    • Lack of Transportation (Medical): No    • Lack of Transportation (Non-Medical): No   Physical Activity: Not on file   Stress: Not on file   Social Connections: Not on file   Intimate Partner Violence: Not At Risk (12/29/2022)    Received from [facility]    Humiliation, Afraid, Rape, and Kick questionnaire    • Fear of Current or Ex-Partner: No    • Emotionally Abused: No    • Physically Abused: No    • Sexually Abused: No   Housing Stability: Unknown (12/29/2022)    Received from [facility]    Housing Stability Vital Sign    • Unable to Pay for Housing in the Last Year: No    • Number of Places Lived in the Last Year: Not on file    • Unstable Housing in the Last Year: Not on file        Current Outpatient Medications:   •  acetaminophen (TYLENOL) 160 mg/5 mL suspension, Take 10 mL by mouth every 4 (four) hours as needed for mild pain, headaches or fever, Disp: 237 mL, Rfl: 0  •  BELBUCA 150 MCG FILM, , Disp: , Rfl: 5  •  betamethasone dipropionate (DIPROSONE) 0.05 % cream, Apply topically 2 (two) times a day, Disp: 30 g, Rfl: 0  •  busPIRone (BUSPAR) 15 mg tablet, Take 15 mg by mouth daily, Disp: , Rfl:   •  BUSPIRONE HCL PO, Take 20 mg by mouth daily at bedtime, Disp: , Rfl:   •  cholecalciferol (VITAMIN D3) 1,000 units tablet, Take 1,000 Units by mouth 2 (two) times a day , Disp: , Rfl:   •  cyclobenzaprine (FLEXERIL) 5 mg tablet, , Disp: , Rfl: 5  •  furosemide (LASIX) 20 mg tablet, furosemide 20 mg tablet daily, Disp: , Rfl:   •  levothyroxine 100 mcg tablet, Take 100 mcg by mouth daily On Odd days, Disp: , Rfl:   •  LINZESS 145 MCG CAPS, , Disp: , Rfl:   •  Melatonin 10 MG CAPS, Take 10 mg by  mouth daily at bedtime, Disp: , Rfl:   •  ondansetron (ZOFRAN) 4 mg tablet, Take 4 mg by mouth every 12 (twelve) hours as needed for nausea or vomiting, Disp: , Rfl:   •  oxybutynin (DITROPAN-XL) 10 MG 24 hr tablet, Take 10 mg by mouth daily, Disp: , Rfl:   •  oxyCODONE-acetaminophen (PERCOCET)  mg per tablet, Take 1 tablet by mouth every 4 (four) hours, Disp: , Rfl:   •  triamcinolone (KENALOG) 0.1 % ointment, Apply topically 2 (two) times a day for 14 days To itching areas of arms, legs and back, Disp: 80 g, Rfl: 0    Current Facility-Administered Medications:   •  sodium chloride (PF) 0.9 % injection 5 mL, 5 mL, Intravenous, PRN, Odette Corral PA-C    Review of Systems   Constitutional:  Negative for chills and fever.   Respiratory:  Negative for shortness of breath.    Cardiovascular:  Positive for leg swelling. Negative for chest pain and palpitations.   Musculoskeletal:  Positive for gait problem (ambulating w/ cane).   Skin:  Positive for rash (generalized, pruritic) and wound (b/l LE).       Objective:  /65   Pulse 80   Temp (!) 95.6 °F (35.3 °C)   Resp 16   Pain Score: 0-No pain     Physical Exam  Vitals reviewed.   Constitutional:       General: She is not in acute distress.     Appearance: She is morbidly obese. She is not ill-appearing, toxic-appearing or diaphoretic.      Comments: Pleasant, NAD   Eyes:      General: No scleral icterus.  Cardiovascular:      Rate and Rhythm: Normal rate.      Pulses:           Dorsalis pedis pulses are 2+ on the right side and 2+ on the left side.        Posterior tibial pulses are detected w/ Doppler on the right side and 1+ on the left side.      Comments: Though PT faintly palpable on right, confirmed with Doppler w/ strong signal   Bilateral lower extremities are warm cool to palpation absent pedal hair  Pulmonary:      Effort: Pulmonary effort is normal. No respiratory distress.   Musculoskeletal:      Right lower le+ Edema present.       Left lower le+ Edema present.   Feet:      Right foot:      Toenail Condition: Right toenails are abnormally thick.      Left foot:      Toenail Condition: Left toenails are abnormally thick.   Skin:     Findings: Rash and wound present. Rash is papular.             Comments: 1- VSU: Gel-like biofilm layer present with increased edge epithelialization and hyperkeratotic debris.  No acute erythema, malodor, purulent drainage, lymphangitic streaking.    2-  RLE scattered small partial-thickness open ulcerations 2/2 to excoriations from unspecified dermatitis in patient with history of venous stasis ulcerations: Dry plaques near lateral ankle on right lower extremity and just below back of knee; generalized excoriated papular rash present on upper extremities, trunk as well.  Remains pruritic throughout the day though sometimes worse at night. Is not present on palms, soles, and/or in between fingers/toes. Non blanching.  No purulent drainage, malodor.   Neurological:      Mental Status: She is alert.   Psychiatric:         Mood and Affect: Mood normal.           Wound 24 Venous Ulcer Leg Left;Posterior (Active)   Wound Image   24 1027   Wound Description Pink;Epithelialization;Yellow 24 1029   Nanda-wound Assessment Edema;Scaly;Dry;Hyperpigmented 24 1029   Wound Length (cm) 2.6 cm 24 1029   Wound Width (cm) 1.5 cm 24 1029   Wound Depth (cm) 0.2 cm 24 1029   Wound Surface Area (cm^2) 3.9 cm^2 24 1029   Wound Volume (cm^3) 0.78 cm^3 24 1029   Calculated Wound Volume (cm^3) 0.78 cm^3 24 1029   Change in Wound Size % 61 24 1029   Drainage Amount Small 24 1029   Drainage Description Barboza;Yellow 24 1029   Non-staged Wound Description Full thickness 24 1029   Dressing Status Intact 24 1029       Wound 24 Other (comment) Leg Lower;Right;Posterior (Active)   Wound Image    24 1028   Wound Description  "Epithelialization;Yellow;Pink;Slough 03/11/24 1032   Nanda-wound Assessment Edema;Scaly;Dry;Hyperpigmented 03/11/24 1032   Wound Length (cm) 7.5 cm 03/11/24 1032   Wound Width (cm) 16.5 cm 03/11/24 1032   Wound Depth (cm) 0.1 cm 03/11/24 1032   Wound Surface Area (cm^2) 123.75 cm^2 03/11/24 1032   Wound Volume (cm^3) 12.375 cm^3 03/11/24 1032   Calculated Wound Volume (cm^3) 12.38 cm^3 03/11/24 1032   Change in Wound Size % -243.89 03/11/24 1032   Drainage Amount Small 03/11/24 1032   Drainage Description Green 03/11/24 1032   Non-staged Wound Description Partial thickness 03/11/24 1032   Dressing Status Intact 03/11/24 1032     Debridement   Wound 02/19/24 Venous Ulcer Leg Left;Posterior    Universal Protocol:  Consent: Verbal consent obtained. Written consent obtained.  Consent given by: patient  Time out: Immediately prior to procedure a \"time out\" was called to verify the correct patient, procedure, equipment, support staff and site/side marked as required.  Patient understanding: patient states understanding of the procedure being performed  Patient identity confirmed: verbally with patient    Debridement Details  Performed by: physician  Debridement type: selective  Pain control: lidocaine 4%      Post-debridement measurements  Length (cm): 2.6  Width (cm): 1.5  Depth (cm): 0.2  Percent debrided: 100%  Surface Area (cm^2): 3.9  Area Debrided (cm^2): 3.9  Volume (cm^3): 0.78    Devitalized tissue debrided: biofilm  Instrument(s) utilized: curette  Bleeding: small  Hemostasis obtained with: pressure  Procedural pain (0-10): 0  Post-procedural pain: 0   Response to treatment: procedure was tolerated well               No results found for: \"HGBA1C\"    Wound Instructions:  Orders Placed This Encounter   Procedures   • Wound cleansing and dressings     Bilateral lower leg wound care      At wound care center:   Wash your hands with soap and water.  Remove old dressing, discard into plastic bag and place in trash.  "   Cleanse the wound with unscented soap (such as plain white Dove soap) and warm water prior to applying a clean dressing.      Shower yes - Do not get the dressings wet--if showering then please cover with a cast cover that you may purchase at the pharmacy         Left lower leg ulcer   Applied Endoform AM, cover with ABD pad  Secure with Unna Boot, Coban and tubifast blue  Change weekly at the [facility]      Right lower leg wound  Apply Mix of Betamethasone and Lac Hydrin to open areas of right leg wound. Cover with ABD pad.   Secure with Unna Boot, Coban and tubifast yellow  Change weekly at the [facility]         Unna Boot to the BLE     Keep compression wrap/wraps clean and dry.  If wraps are too tight and you experience numbness/tingling, call the wound center.   If after hours, remove wraps or proceed to nearest E.R. and call wound center in AM.      Wrap will be changed 1 x weekly      Avoid prolonged standing in one place.      Elevate leg(s) above the level of the heart when sitting or as much as possible.            Treatments completed as above today at the [facility]     Standing Status:   Future     Standing Expiration Date:   3/11/2025   • Cast Application     This order was created via procedure documentation   • Debridement Venous Ulcer Left;Posterior Leg     This order was created via procedure documentation             Gladis Escobar MD

## 2024-03-11 NOTE — PATIENT INSTRUCTIONS
Orders Placed This Encounter   Procedures    Wound cleansing and dressings     Bilateral lower leg wound care      At wound care center:   Wash your hands with soap and water.  Remove old dressing, discard into plastic bag and place in trash.    Cleanse the wound with unscented soap (such as plain white Dove soap) and warm water prior to applying a clean dressing.      Shower yes - Do not get the dressings wet--if showering then please cover with a cast cover that you may purchase at the pharmacy         Left lower leg ulcer   Applied Endoform AM, cover with ABD pad  Secure with Unna Boot, Coban and tubifast blue  Change weekly at the [facility]      Right lower leg wound  Apply Mix of Betamethasone and Lac Hydrin to open areas of right leg wound. Cover with ABD pad.   Secure with Unna Boot, Coban and tubifast yellow  Change weekly at the [facility]         Unna Boot to the BLE     Keep compression wrap/wraps clean and dry.  If wraps are too tight and you experience numbness/tingling, call the wound center.   If after hours, remove wraps or proceed to nearest E.R. and call wound center in AM.      Wrap will be changed 1 x weekly      Avoid prolonged standing in one place.      Elevate leg(s) above the level of the heart when sitting or as much as possible.            Treatments completed as above today at the [facility]     Standing Status:   Future     Standing Expiration Date:   3/11/2025

## 2024-03-11 NOTE — PROGRESS NOTES
Cast Application    Date/Time: 3/11/2024 10:00 AM    Performed by: Yanira Kong RN  Authorized by: Gladis Escobar MD  Universal Protocol:  Consent: Verbal consent obtained.  Risks and benefits: risks, benefits and alternatives were discussed  Consent given by: patient  Patient understanding: patient states understanding of the procedure being performed  Patient identity confirmed: verbally with patient    Pre-procedure details:     Sensation:  Normal  Procedure details:     Laterality:  Right    Location:  Leg    Leg:  R lower legCast type:  Unna boot        Supplies:  2 layer wrap  Post-procedure details:     Pain:  Unchanged    Sensation:  Normal    Patient tolerance of procedure:  Tolerated well, no immediate complications  Comments:      UNNA BOOT procedure     Before application, EUNICE and/or TBI determined to be adequate for healing and application of compression. Lower extremity washed prior to application of compression wrap. With the foot in dorsiflexed position, Unna boot was applied as per physician orders without complications or complaint of pain.  The procedure was tolerated well. Toes warm & pink post application.  Patient provided education & reinforced to observe toes for any discoloration, swelling or tingling and instructed when to report to the Wound Center or to remove compression

## 2024-03-18 ENCOUNTER — OFFICE VISIT (OUTPATIENT)
Dept: WOUND CARE | Facility: CLINIC | Age: 64
End: 2024-03-18
Payer: MEDICARE

## 2024-03-18 ENCOUNTER — TELEPHONE (OUTPATIENT)
Dept: OBGYN CLINIC | Facility: CLINIC | Age: 64
End: 2024-03-18

## 2024-03-18 VITALS
RESPIRATION RATE: 18 BRPM | DIASTOLIC BLOOD PRESSURE: 78 MMHG | HEART RATE: 60 BPM | TEMPERATURE: 97.2 F | SYSTOLIC BLOOD PRESSURE: 118 MMHG

## 2024-03-18 DIAGNOSIS — L29.9 PRURITIC CONDITION: ICD-10-CM

## 2024-03-18 DIAGNOSIS — I83.022 VENOUS STASIS ULCER OF LEFT CALF WITH FAT LAYER EXPOSED, UNSPECIFIED WHETHER VARICOSE VEINS PRESENT (HCC): ICD-10-CM

## 2024-03-18 DIAGNOSIS — I89.8 LYMPHORRHEA: Primary | ICD-10-CM

## 2024-03-18 DIAGNOSIS — L97.911 NON-PRESSURE CHRONIC ULCER OF LOWER LEG, RIGHT, LIMITED TO BREAKDOWN OF SKIN (HCC): ICD-10-CM

## 2024-03-18 DIAGNOSIS — L97.222 VENOUS STASIS ULCER OF LEFT CALF WITH FAT LAYER EXPOSED, UNSPECIFIED WHETHER VARICOSE VEINS PRESENT (HCC): ICD-10-CM

## 2024-03-18 PROCEDURE — 99214 OFFICE O/P EST MOD 30 MIN: CPT | Performed by: STUDENT IN AN ORGANIZED HEALTH CARE EDUCATION/TRAINING PROGRAM

## 2024-03-18 PROCEDURE — 11042 DBRDMT SUBQ TIS 1ST 20SQCM/<: CPT | Performed by: STUDENT IN AN ORGANIZED HEALTH CARE EDUCATION/TRAINING PROGRAM

## 2024-03-18 PROCEDURE — 99213 OFFICE O/P EST LOW 20 MIN: CPT | Performed by: STUDENT IN AN ORGANIZED HEALTH CARE EDUCATION/TRAINING PROGRAM

## 2024-03-18 PROCEDURE — 29580 STRAPPING UNNA BOOT: CPT

## 2024-03-18 RX ORDER — LIDOCAINE 40 MG/G
CREAM TOPICAL ONCE
Status: COMPLETED | OUTPATIENT
Start: 2024-03-18 | End: 2024-03-18

## 2024-03-18 RX ADMIN — LIDOCAINE 1 APPLICATION: 40 CREAM TOPICAL at 10:55

## 2024-03-18 NOTE — PROGRESS NOTES
"Patient ID: Sussy Jung is a 63 y.o. female Date of Birth 1960       Chief Complaint   Patient presents with    Follow Up Wound Care Visit     Follow up for bilateral lower extremity wounds       Allergies:  Nsaids, Sulfate, and Ms contin [morphine]    Diagnosis:   Diagnosis ICD-10-CM Associated Orders   1. Lymphorrhea  I89.8 lidocaine (LMX) 4 % cream     Wound cleansing and dressings     Cast Application      2. Venous stasis ulcer of left calf with fat layer exposed, unspecified whether varicose veins present ([facility])  I83.022 lidocaine (LMX) 4 % cream    L97.222 Wound cleansing and dressings     Debridement      3. Non-pressure chronic ulcer of lower leg, right, limited to breakdown of skin ([facility])  L97.911       4. Pruritic condition  L29.9            Assessment  & Plan:    F/u VSU of the L posterior calf. Reduced in size. There is dried exudate present. Post debridement there is healthy granulation tissue present with epithelization. Drainage is small. No periwound erythema to indicate soft tissue infection.   Surgical debridement, as below.   Endoform am to ulcer. Unna boot for compression.   F/u RLE. There is an area of the lateral distal leg with lymphorrhea from the skin. No discrete ulceration. Medial leg with dry scaling skin. No diffuse erythema to suggest cellulitis.   Bacitracin with overlying adaptic to weeping areas.   Mix betamethasone with lac hydrin to intact dry skin.   Unna boot for compression.   Elevate legs when resting throughout the day.  Avoid prolonged standing 1 place for prolonged time with legs in dependent position.  Walking for 30 to 40 minutes each day was encouraged.  F/u in one week. Instructed to call if any questions or concerns arise in meantime.            Subjective:   \"02/19/24: 62 y/o F with PMHx of hypothyroidism, paroxysmal atrial fibrillation, obesity, chronic low back pain presents for evaluation of open wound on her left posterior calf that has been present for " "about 2 and half weeks now.  Patient does state that she does have a history of venous ulcerations throughout the years.  Does wear compression stockings at home but did not present with compression on today.  Does spend time in a chair when resting with her legs in a dependent position.  Sleeps in a bed at night.  Currently she is using betamethasone on the open area and has been doing so for about 2 and half weeks now.  Is compliant with Lasix.  No longer smokes.  No severe pain, fevers, chills. No hx of DM or difficulty with glucose control.     02/26/2024: Patient returns for follow-up of venous stasis ulcer of the left calf.  No specific complaints.  Using Tubigrip with endoform and alginate.  No fever, chills or pain.    03/04/24: Pt presents for f/u VSU of the L calf.  Currently endoform, alginate and Unna boot is being utilized for compression. Pt reports itching and a rash on her legs, wrists, and back that she has been itching at. Denies systemic symptoms.  \"    *Above HPI summary per chart review of prev visits to wound center*    3/11/2024: Sussy presents today for follow-up of venous stasis ulcerations bilateral lower extremities.  Currently utilizing endoform/alginate/Unna boot.  At last visit, new areas of skin breakdown on right lower extremity were noted secondary to patient excoriating.  Therefore layer of bacitracin followed by ABD and Unna boot applied.  Patient reports she tolerated the bilateral wraps well though continued with pruritic rash.  Reports that this type of rash has happened to her in the past.  Has an appoint with dermatology on Wednesday of this week.  Other than the pruritic rash, she reports she has no new acute complaints and denies fever, chills, shortness of breath, chest pain, palpitations.     03/18/24: Pt presents for f/u VSU of the left calf and weeping of the RLE. Pt has continued with Unna boots for compression this week. Reports no problems with the wraps. Is " "continuing to have itching so she plans to contact her dermatologist again.           The following portions of the patient's history were reviewed and updated as appropriate:   Patient Active Problem List   Diagnosis    Morbid (severe) obesity due to excess calories (HCC)    Hypothyroidism    Hypercholesterolemia    Lumbar disc disease    Paroxysmal atrial fibrillation (HCC)    Anastomotic stricture of gastrojejunostomy    Bariatric surgery status    Choledocholithiasis    Common bile duct stricture    Encounter for surgical aftercare following surgery of digestive system    Postsurgical malabsorption    Other dysphagia    Decreased oral intake    Low vitamin B12 level    Vitamin A deficiency     Past Medical History:   Diagnosis Date    Anxiety     Back pain     Back pain at L4-L5 level     Bariatric surgery status     Chronic pain disorder     back    Cramping of hands     and legs    DDD (degenerative disc disease), lumbar     Depression     Edema of both legs     with lasix much better    Fall at home     almost 2 yrs ago    Full dentures     GERD (gastroesophageal reflux disease)     History of gastric ulcer     \"Years ago    History of heartburn     Hypothyroid     Hypothyroidism    Morbid obesity (HCC)     Motion sickness     Nausea & vomiting     Numbness and tingling in both hands     Numbness and tingling of both feet     PICC (peripherally inserted central catheter) in place     Right arm    PONV (postoperative nausea and vomiting)     Postgastrectomy malabsorption     Sciatica     Shortness of breath     zuñiga    Skin abnormality     Edema BLE- uses boots at home. skin on shins dark and rough    Stress incontinence     Stricture esophagus     Use of cane as ambulatory aid     Wears glasses      Past Surgical History:   Procedure Laterality Date    ABDOMINAL ADHESION SURGERY N/A 12/20/2017    Procedure: EXTENSIVE LYSIS ADHESIONS;  Surgeon: Alex Long MD;  Location: [facility] OR;  Service: Bariatrics    " ABDOMINAL SURGERY       SECTION      x3    CHOLECYSTECTOMY      COLONOSCOPY      COSMETIC SURGERY      tummy graciela    ESOPHAGOGASTRODUODENOSCOPY N/A 2016    Procedure: ESOPHAGOGASTRODUODENOSCOPY (EGD);  Surgeon: Alex Long MD;  Location: [facility];  Service:     ESOPHAGOGASTRODUODENOSCOPY N/A 2017    Procedure: ESOPHAGOGASTRODUODENOSCOPY (EGD);  Surgeon: Alex Long MD;  [facility];  Service: Bariatrics    GASTRIC BYPASS      IR BILIARY TUBE PLACEMENT (PTC)  2019    PANNICULECTOMY      PARTIAL GASTRECTOMY N/A 2017    Procedure: SUBTOTAL GASTRECTOMY, ESOPHAGEAL JEJUNOSTOMY;  Surgeon: Alex Long MD;  [facility]: [facility];  Service: Bariatrics    SD EGD TRANSORAL BIOPSY SINGLE/MULTIPLE N/A 2017    Procedure: ESOPHAGOGASTRODUODENOSCOPY (EGD) with biopsy;  Surgeon: Aidan Perez MD;  Location: [facility];  Service: Bariatrics    SD EGD TRANSORAL BIOPSY SINGLE/MULTIPLE N/A 10/4/2017    Procedure: ESOPHAGOGASTRODUODENOSCOPY (EGD) with balloon dilatation;  Surgeon: Alex Long MD;  [facility]: [facility];  Service: Bariatrics    SD EGD TRANSORAL BIOPSY SINGLE/MULTIPLE N/A 2017    Procedure: ESOPHAGOGASTRODUODENOSCOPY (EGD) with balloon dilation;  Surgeon: Alex Long MD;  [facility]: [facility];  Service: Bariatrics    SD LAPS GSTRC RSTRICTIV PX LONGITUDINAL GASTRECTOMY N/A 2016    Procedure:   LAPAROSCOPIC GASTROGASTIC FISTULA TAKEDOWN, PARTIAL GASTRECTOMY, GASTRO JEJUNOSTOMY, EXTENSIVE LYSIS OF ADHESIONS;  Surgeon: Alex Long MD;  Location[facility];  Service: Bariatrics     Family History   Problem Relation Age of Onset    Alzheimer's disease Mother     Diabetes Father     Hypertension Father     No Known Problems Daughter     Alcohol abuse Family     No Known Problems Sister     No Known Problems Maternal Grandmother     No Known Problems Paternal Grandmother     No Known Problems Sister     No Known Problems Sister     No Known Problems  Sister     No Known Problems Sister     No Known Problems Sister     No Known Problems Sister     Anxiety disorder Daughter     No Known Problems Daughter     No Known Problems Maternal Aunt     No Known Problems Maternal Aunt     No Known Problems Maternal Aunt     No Known Problems Maternal Aunt     No Known Problems Maternal Aunt      Social History     Socioeconomic History    Marital status: Single     Spouse name: None    Number of children: None    Years of education: None    Highest education level: None   Occupational History    None   Tobacco Use    Smoking status: Former     Current packs/day: 0.00     Types: Cigarettes     Start date: 1996     Quit date: 2001     Years since quittin.3    Smokeless tobacco: Never    Tobacco comments:     Quit 15 years ago. 4 cigarettes daily   Vaping Use    Vaping status: Never Used   Substance and Sexual Activity    Alcohol use: No    Drug use: No    Sexual activity: None   Other Topics Concern    None   Social History Narrative    None     Social Determinants of Health     Financial Resource Strain: Low Risk  (2022)    Received from [facility]    Overall Financial Resource Strain (CARDIA)     Difficulty of Paying Living Expenses: Not hard at all   Food Insecurity: No Food Insecurity (2022)    Received from [facility]    Hunger Vital Sign     Worried About Running Out of Food in the Last Year: Never true     Ran Out of Food in the Last Year: Never true   Transportation Needs: No Transportation Needs (2022)    Received from [facility]    PRAPARE - Transportation     Lack of Transportation (Medical): No     Lack of Transportation (Non-Medical): No   Physical Activity: Not on file   Stress: Not on file   Social Connections: Not on file   Intimate Partner Violence: Not At Risk (2022)    Received from [facility]    Humiliation, Afraid, Rape, and Kick questionnaire      Fear of Current or Ex-Partner: No     Emotionally Abused: No     Physically Abused: No     Sexually Abused: No   Housing Stability: Unknown (12/29/2022)    Received from [facility]    Housing Stability Vital Sign     Unable to Pay for Housing in the Last Year: No     Number of Places Lived in the Last Year: Not on file     Unstable Housing in the Last Year: Not on file       Current Outpatient Medications:     acetaminophen (TYLENOL) 160 mg/5 mL suspension, Take 10 mL by mouth every 4 (four) hours as needed for mild pain, headaches or fever, Disp: 237 mL, Rfl: 0    BELBUCA 150 MCG FILM, , Disp: , Rfl: 5    betamethasone dipropionate (DIPROSONE) 0.05 % cream, Apply topically 2 (two) times a day, Disp: 30 g, Rfl: 0    busPIRone (BUSPAR) 15 mg tablet, Take 15 mg by mouth daily, Disp: , Rfl:     BUSPIRONE HCL PO, Take 20 mg by mouth daily at bedtime, Disp: , Rfl:     cholecalciferol (VITAMIN D3) 1,000 units tablet, Take 1,000 Units by mouth 2 (two) times a day , Disp: , Rfl:     cyclobenzaprine (FLEXERIL) 5 mg tablet, , Disp: , Rfl: 5    furosemide (LASIX) 20 mg tablet, furosemide 20 mg tablet daily, Disp: , Rfl:     levothyroxine 100 mcg tablet, Take 100 mcg by mouth daily On Odd days, Disp: , Rfl:     LINZESS 145 MCG CAPS, , Disp: , Rfl:     Melatonin 10 MG CAPS, Take 10 mg by mouth daily at bedtime, Disp: , Rfl:     ondansetron (ZOFRAN) 4 mg tablet, Take 4 mg by mouth every 12 (twelve) hours as needed for nausea or vomiting, Disp: , Rfl:     oxybutynin (DITROPAN-XL) 10 MG 24 hr tablet, Take 10 mg by mouth daily, Disp: , Rfl:     oxyCODONE-acetaminophen (PERCOCET)  mg per tablet, Take 1 tablet by mouth every 4 (four) hours, Disp: , Rfl:     triamcinolone (KENALOG) 0.1 % ointment, Apply topically 2 (two) times a day for 14 days To itching areas of arms, legs and back, Disp: 80 g, Rfl: 0    Current Facility-Administered Medications:     sodium chloride (PF) 0.9 % injection 5 mL, 5 mL,  Intravenous, PRN, Odette Corral PA-C    Review of Systems   Constitutional:  Negative for chills and fever.   Cardiovascular:  Positive for leg swelling.   Musculoskeletal:  Positive for back pain.   Skin:  Positive for wound.        +pruritus     Psychiatric/Behavioral:  Negative for agitation.          Objective:  /78   Pulse 60   Temp (!) 97.2 °F (36.2 °C)   Resp 18         Physical Exam  Vitals reviewed.   Constitutional:       General: She is not in acute distress.     Appearance: She is morbidly obese. She is not ill-appearing, toxic-appearing or diaphoretic.      Comments: Pleasant, NAD   Eyes:      General: No scleral icterus.  Cardiovascular:      Rate and Rhythm: Normal rate.      Pulses:           Dorsalis pedis pulses are 2+ on the right side and 2+ on the left side.        Posterior tibial pulses are detected w/ Doppler on the right side and 1+ on the left side.      Comments: Though PT faintly palpable on right, confirmed with Doppler w/ strong signal     Pulmonary:      Effort: Pulmonary effort is normal. No respiratory distress.   Musculoskeletal:      Right lower le+ Edema present.      Left lower le+ Edema present.   Feet:      Right foot:      Toenail Condition: Right toenails are abnormally thick.      Left foot:      Toenail Condition: Left toenails are abnormally thick.   Skin:     Findings: Rash and wound present. Rash is papular.             Comments: VSU of the L posterior calf. Reduced in size. There is dried exudate present. Post debridement there is healthy granulation tissue present with epithelization. Drainage is small. No periwound erythema to indicate soft tissue infection.       RLE with an area of the lateral distal leg with lymphorrhea from the skin. No discrete ulceration. Medial leg with dry scaling skin. No diffuse erythema to suggest cellulitis.      Neurological:      Mental Status: She is alert.   Psychiatric:         Mood and Affect: Mood normal.            Wound 02/19/24 Venous Ulcer Leg Left;Posterior (Active)   Wound Image   03/18/24 1122   Wound Description Pink;Epithelialization;Yellow;Slough 03/18/24 1054   Nanda-wound Assessment Edema;Scaly;Dry;Hyperpigmented 03/18/24 1054   Wound Length (cm) 2.5 cm 03/18/24 1054   Wound Width (cm) 1.2 cm 03/18/24 1054   Wound Depth (cm) 0.1 cm 03/18/24 1054   Wound Surface Area (cm^2) 3 cm^2 03/18/24 1054   Wound Volume (cm^3) 0.3 cm^3 03/18/24 1054   Calculated Wound Volume (cm^3) 0.3 cm^3 03/18/24 1054   Change in Wound Size % 85 03/18/24 1054   Drainage Amount Small 03/18/24 1054   Drainage Description Sanguineous 03/18/24 1054   Non-staged Wound Description Full thickness 03/18/24 1054   Dressing Status Intact 03/11/24 1029       Wound 03/04/24 Other (comment) Leg Lower;Right;Posterior (Active)   Wound Image   03/18/24 1050   Wound Description Epithelialization;Yellow;Pink;Slough 03/18/24 1051   Nanda-wound Assessment Edema;Scaly;Dry;Hyperpigmented 03/18/24 1051   Wound Length (cm) 12.2 cm 03/18/24 1051   Wound Width (cm) 6 cm 03/18/24 1051   Wound Depth (cm) 0.1 cm 03/18/24 1051   Wound Surface Area (cm^2) 73.2 cm^2 03/18/24 1051   Wound Volume (cm^3) 7.32 cm^3 03/18/24 1051   Calculated Wound Volume (cm^3) 7.32 cm^3 03/18/24 1051   Change in Wound Size % -103.33 03/18/24 1051   Drainage Amount Scant 03/18/24 1051   Drainage Description Serosanguineous 03/18/24 1051   Non-staged Wound Description Partial thickness 03/18/24 1051   Dressing Status Intact 03/11/24 1032       Wound 03/18/24 Other (comment) Leg Right;Lateral (Active)   Wound Image   03/18/24 1120   Wound Description Pink;Epithelialization 03/18/24 1100   Nanda-wound Assessment Edema 03/18/24 1100   Wound Length (cm) 0.1 cm 03/18/24 1100   Wound Width (cm) 0.1 cm 03/18/24 1100   Wound Depth (cm) 0.1 cm 03/18/24 1100   Wound Surface Area (cm^2) 0.01 cm^2 03/18/24 1100   Wound Volume (cm^3) 0.001 cm^3 03/18/24 1100   Calculated Wound Volume (cm^3) 0 cm^3  "03/18/24 1100   Drainage Amount Scant 03/18/24 1100   Drainage Description Serosanguineous 03/18/24 1100              Debridement   Wound 02/19/24 Venous Ulcer Leg Left;Posterior    Universal Protocol:  Consent: Verbal consent obtained.  Consent given by: patient  Time out: Immediately prior to procedure a \"time out\" was called to verify the correct patient, procedure, equipment, support staff and site/side marked as required.  Patient understanding: patient states understanding of the procedure being performed  Patient identity confirmed: verbally with patient    Debridement Details  Performed by: PA  Debridement type: surgical  Level of debridement: subcutaneous tissue  Pain control: lidocaine 4%      Post-debridement measurements  Length (cm): 2.5  Width (cm): 1.2  Depth (cm): 0.2  Percent debrided: 20%  Surface Area (cm^2): 3  Area Debrided (cm^2): 0.6  Volume (cm^3): 0.6    Tissue and other material debrided: dermis and subcutaneous tissue  Devitalized tissue debrided: exudate  Instrument(s) utilized: curette  Bleeding: small  Hemostasis obtained with: pressure  Procedural pain (0-10): 0  Post-procedural pain: 0   Response to treatment: procedure was tolerated well                   Wound Instructions:  Orders Placed This Encounter   Procedures    Wound cleansing and dressings     Bilateral lower leg wound care      At wound care center:   Wash your hands with soap and water.  Remove old dressing, discard into plastic bag and place in trash.    Cleanse the wound with unscented soap (such as plain white Dove soap) and warm water prior to applying a clean dressing.      Shower yes - Do not get the dressings wet--if showering then please cover with a cast cover that you may purchase at the pharmacy         Left lower leg ulcer   Applied Endoform AM, cover with ABD pad  Secure with Unna Boot, Coban and tubifast blue  Change weekly at the [facility]      Right lower leg wound  Apply bacitracin and adaptic cover with gauze " "to open areas  Apply Mix of Betamethasone and Lac Hydrin to intact skin of right leg wound. Cover with ABD pad.   Secure with Unna Boot, Coban and tubifast yellow  Change weekly at the [facility]         Unna Boot to the BLE     Keep compression wrap/wraps clean and dry.  If wraps are too tight and you experience numbness/tingling, call the wound center.   If after hours, remove wraps or proceed to nearest E.R. and call wound center in AM.      Wrap will be changed 1 x weekly      Avoid prolonged standing in one place.      Elevate leg(s) above the level of the heart when sitting or as much as possible.            Treatments completed as above today at the [facility]     Standing Status:   Future     Standing Expiration Date:   3/18/2025    Cast Application     This order was created via procedure documentation    Debridement     This order was created via procedure documentation       Cally Null, PA-C      Portions of the record may have been created with voice recognition software. Occasional wrong word or \"sound alike\" substitutions may have occurred due to the inherent limitations of voice recognition software. Read the chart carefully and recognize, using context, where substitutions have occurred.      "

## 2024-03-18 NOTE — PROGRESS NOTES
"Cast Application    Date/Time: 3/18/2024 11:30 AM    Performed by: Neelam Pacheco RN  Authorized by: KRISTINE Mast Protocol:  Consent: Verbal consent obtained.  Risks and benefits: risks, benefits and alternatives were discussed  Consent given by: patient  Time out: Immediately prior to procedure a \"time out\" was called to verify the correct patient, procedure, equipment, support staff and site/side marked as required.  Timeout called at: 3/18/2024 11:30 AM.  Patient understanding: patient states understanding of the procedure being performed  Patient identity confirmed: verbally with patient    Pre-procedure details:     Sensation:  Normal    Skin color:  WNL  Procedure details:     Laterality:  Right    Location:  Leg    Leg:  R lower legCast type:  Unna boot        Supplies:  2 layer wrap  Post-procedure details:     Pain:  Unchanged    Sensation:  Normal    Skin color:  Wnl    Patient tolerance of procedure:  Tolerated well, no immediate complications  Comments:      UNNA BOOT wrap procedure     Before application, EUNICE and/or TBI determined to be adequate for healing and application of compression. Lower extremity washed prior to application of compression wrap. With the foot in dorsiflexed position, unna boot was applied as per physician orders without complications or complaint of pain.  The procedure was tolerated well. Toes warm & pink post application.  Patient provided education & reinforced to observe toes for any discoloration, swelling or tingling and instructed when to report to the Wound Center or to remove compression. Wound care as per providers orders, patient tolerated well.        "

## 2024-03-18 NOTE — PATIENT INSTRUCTIONS
Orders Placed This Encounter   Procedures    Wound cleansing and dressings     Bilateral lower leg wound care      At wound care center:   Wash your hands with soap and water.  Remove old dressing, discard into plastic bag and place in trash.    Cleanse the wound with unscented soap (such as plain white Dove soap) and warm water prior to applying a clean dressing.      Shower yes - Do not get the dressings wet--if showering then please cover with a cast cover that you may purchase at the pharmacy         Left lower leg ulcer   Applied Endoform AM, cover with ABD pad  Secure with Unna Boot, Coban and tubifast blue  Change weekly at the [facility]      Right lower leg wound  Apply bacitracin and adaptic cover with gauze to open areas  Apply Mix of Betamethasone and Lac Hydrin to intact skin of right leg wound. Cover with ABD pad.   Secure with Unna Boot, Coban and tubifast yellow  Change weekly at the [facility]         Unna Boot to the BLE     Keep compression wrap/wraps clean and dry.  If wraps are too tight and you experience numbness/tingling, call the wound center.   If after hours, remove wraps or proceed to nearest E.R. and call wound center in AM.      Wrap will be changed 1 x weekly      Avoid prolonged standing in one place.      Elevate leg(s) above the level of the heart when sitting or as much as possible.            Treatments completed as above today at the [facility]     Standing Status:   Future     Standing Expiration Date:   3/18/2025

## 2024-03-25 ENCOUNTER — OFFICE VISIT (OUTPATIENT)
Dept: WOUND CARE | Facility: CLINIC | Age: 64
End: 2024-03-25
Payer: MEDICARE

## 2024-03-25 VITALS
HEART RATE: 60 BPM | TEMPERATURE: 97.4 F | DIASTOLIC BLOOD PRESSURE: 70 MMHG | RESPIRATION RATE: 18 BRPM | SYSTOLIC BLOOD PRESSURE: 124 MMHG

## 2024-03-25 DIAGNOSIS — I83.022 VENOUS STASIS ULCER OF LEFT CALF WITH FAT LAYER EXPOSED, UNSPECIFIED WHETHER VARICOSE VEINS PRESENT (HCC): Primary | ICD-10-CM

## 2024-03-25 DIAGNOSIS — L97.222 VENOUS STASIS ULCER OF LEFT CALF WITH FAT LAYER EXPOSED, UNSPECIFIED WHETHER VARICOSE VEINS PRESENT (HCC): Primary | ICD-10-CM

## 2024-03-25 DIAGNOSIS — L29.9 PRURITIC CONDITION: ICD-10-CM

## 2024-03-25 DIAGNOSIS — I89.8 LYMPHORRHEA: ICD-10-CM

## 2024-03-25 PROCEDURE — 97597 DBRDMT OPN WND 1ST 20 CM/<: CPT | Performed by: STUDENT IN AN ORGANIZED HEALTH CARE EDUCATION/TRAINING PROGRAM

## 2024-03-25 NOTE — PATIENT INSTRUCTIONS
Orders Placed This Encounter   Procedures    Wound cleansing and dressings     Bilateral lower leg wound care      At wound care center:   Wash your hands with soap and water.  Remove old dressing, discard into plastic bag and place in trash.    Cleanse the wound with unscented soap (such as plain white Dove soap) and warm water prior to applying a clean dressing.      Shower yes - Do not get the dressings wet--if showering then please cover with a cast cover that you may purchase at the pharmacy         Left lower leg ulcer   Applied bacitracin, then adaptic, then gauze  Secure with Unna Boot, Coban and tubifast blue  Change weekly at the [facility]      Right lower leg wound  Healed. Apply moisturizing cream daily.  Spandagrip size G given today at wound center. Start wearing compression stockings at home  Compression Stocking    Remove compression stockings every night HS and re-apply first thing qAM. Follow daily skin care as instructed.    Avoid prolonged standing in one place.    Elevate leg(s) above the level of the heart when sitting or as much as possible.     Unna Boot to the left     Keep compression wrap/wraps clean and dry.  If wraps are too tight and you experience numbness/tingling, call the wound center.   If after hours, remove wraps or proceed to nearest E.R. and call wound center in AM.      Wrap will be changed 1 x weekly      Avoid prolonged standing in one place.      Elevate leg(s) above the level of the heart when sitting or as much as possible.      Treatments completed as above today at the [facility]    Follow up in 1 week.     Standing Status:   Future     Standing Expiration Date:   3/25/2025

## 2024-03-25 NOTE — PROGRESS NOTES
"Patient ID: Sussy Jnug is a 63 y.o. female Date of Birth 1960       Chief Complaint   Patient presents with    Follow Up Wound Care Visit     B/L lower leg wounds       Allergies:  Nsaids, Sulfate, and Ms contin [morphine]    Diagnosis:   Diagnosis ICD-10-CM Associated Orders   1. Venous stasis ulcer of left calf with fat layer exposed, unspecified whether varicose veins present ([facility])  I83.022 Wound cleansing and dressings    L97.222 Debridement      2. Lymphorrhea  I89.8       3. Pruritic condition  L29.9            Assessment  & Plan:    F/u weeping of the RLE. No further areas of drainage on examination. Skin appears less dry and irritated. Is now healed. No erythema to suggest cellulitis.   Moisturize skin daily.   Utilize 20 mmHg compression garment at home for maintenance.   Continue with leg elevation when resting.  Avoid prolonged standing 1 place for prolonged time with legs in dependent position.   F/u LLE VSU of the posterior calf. Measuring smaller in size. Rim of dried exudate present. Post debridement there is a small area that remains open with partial thickness skin breakdown and scant serous drainage.   Selective debridement, as below.   Bacitracin and adaptic to site. Continue with Unna boot for additional week.   Has appropriate f/u established with dermatology this week regarding pruritus.   F/u in one week. Instructed to call if any questions or concerns arise in meantime.            Subjective:   \"02/19/24: 64 y/o F with PMHx of hypothyroidism, paroxysmal atrial fibrillation, obesity, chronic low back pain presents for evaluation of open wound on her left posterior calf that has been present for about 2 and half weeks now.  Patient does state that she does have a history of venous ulcerations throughout the years.  Does wear compression stockings at home but did not present with compression on today.  Does spend time in a chair when resting with her legs in a dependent position.  " "Sleeps in a bed at night.  Currently she is using betamethasone on the open area and has been doing so for about 2 and half weeks now.  Is compliant with Lasix.  No longer smokes.  No severe pain, fevers, chills. No hx of DM or difficulty with glucose control.     02/26/2024: Patient returns for follow-up of venous stasis ulcer of the left calf.  No specific complaints.  Using Tubigrip with endoform and alginate.  No fever, chills or pain.    03/04/24: Pt presents for f/u VSU of the L calf.  Currently endoform, alginate and Unna boot is being utilized for compression. Pt reports itching and a rash on her legs, wrists, and back that she has been itching at. Denies systemic symptoms.  \"    *Above HPI summary per chart review of prev visits to wound center*    3/11/2024: Sussy presents today for follow-up of venous stasis ulcerations bilateral lower extremities.  Currently utilizing endoform/alginate/Unna boot.  At last visit, new areas of skin breakdown on right lower extremity were noted secondary to patient excoriating.  Therefore layer of bacitracin followed by ABD and Unna boot applied.  Patient reports she tolerated the bilateral wraps well though continued with pruritic rash.  Reports that this type of rash has happened to her in the past.  Has an appoint with dermatology on Wednesday of this week.  Other than the pruritic rash, she reports she has no new acute complaints and denies fever, chills, shortness of breath, chest pain, palpitations.     03/18/24: Pt presents for f/u VSU of the left calf and weeping of the RLE. Pt has continued with Unna boots for compression this week. Reports no problems with the wraps. Is continuing to have itching so she plans to contact her dermatologist again.     03/25/24: Pt presents for f/u VSU of weeping of the RLE. Unna boots were utilized for compression. She has continued to have itching of her upper extremity, back and legs and will be returning to the dermatologist " "this week. Reports she has 20 mmHg compression stockings at home.           The following portions of the patient's history were reviewed and updated as appropriate:   Patient Active Problem List   Diagnosis    Morbid (severe) obesity due to excess calories (HCC)    Hypothyroidism    Hypercholesterolemia    Lumbar disc disease    Paroxysmal atrial fibrillation (HCC)    Anastomotic stricture of gastrojejunostomy    Bariatric surgery status    Choledocholithiasis    Common bile duct stricture    Encounter for surgical aftercare following surgery of digestive system    Postsurgical malabsorption    Other dysphagia    Decreased oral intake    Low vitamin B12 level    Vitamin A deficiency     Past Medical History:   Diagnosis Date    Anxiety     Back pain     Back pain at L4-L5 level     Bariatric surgery status     Chronic pain disorder     back    Cramping of hands     and legs    DDD (degenerative disc disease), lumbar     Depression     Edema of both legs     with lasix much better    Fall at home     almost 2 yrs ago    Full dentures     GERD (gastroesophageal reflux disease)     History of gastric ulcer     \"Years ago    History of heartburn     Hypothyroid     Hypothyroidism    Morbid obesity (HCC)     Motion sickness     Nausea & vomiting     Numbness and tingling in both hands     Numbness and tingling of both feet     PICC (peripherally inserted central catheter) in place     Right arm    PONV (postoperative nausea and vomiting)     Postgastrectomy malabsorption     Sciatica     Shortness of breath     zuñiga    Skin abnormality     Edema BLE- uses boots at home. skin on shins dark and rough    Stress incontinence     Stricture esophagus     Use of cane as ambulatory aid     Wears glasses      Past Surgical History:   Procedure Laterality Date    ABDOMINAL ADHESION SURGERY N/A 12/20/2017    Procedure: EXTENSIVE LYSIS ADHESIONS;  Surgeon: Alex Long MD;  Location: [facility];  Service: Bariatrics    ABDOMINAL " SURGERY       SECTION      x3    CHOLECYSTECTOMY      COLONOSCOPY      COSMETIC SURGERY      tummy leock    ESOPHAGOGASTRODUODENOSCOPY N/A 2016    Procedure: ESOPHAGOGASTRODUODENOSCOPY (EGD);  Surgeon: Alex Long MD;  Location: [facility];  Service:     ESOPHAGOGASTRODUODENOSCOPY N/A 2017    Procedure: ESOPHAGOGASTRODUODENOSCOPY (EGD);  Surgeon: Alex Long MD;  [facility];  Service: Bariatrics    GASTRIC BYPASS      IR BILIARY TUBE PLACEMENT (PTC)  2019    PANNICULECTOMY      PARTIAL GASTRECTOMY N/A 2017    Procedure: SUBTOTAL GASTRECTOMY, ESOPHAGEAL JEJUNOSTOMY;  Surgeon: Alex Long MD;  [facility];  Service: Bariatrics    AR EGD TRANSORAL BIOPSY SINGLE/MULTIPLE N/A 2017    Procedure: ESOPHAGOGASTRODUODENOSCOPY (EGD) with biopsy;  Surgeon: Aidan Perez MD;  [facility]: [facility];  Service: Bariatrics    AR EGD TRANSORAL BIOPSY SINGLE/MULTIPLE N/A 10/4/2017    Procedure: ESOPHAGOGASTRODUODENOSCOPY (EGD) with balloon dilatation;  Surgeon: Alex Long MD;  [facility];  Service: Bariatrics    AR EGD TRANSORAL BIOPSY SINGLE/MULTIPLE N/A 2017    Procedure: ESOPHAGOGASTRODUODENOSCOPY (EGD) with balloon dilation;  Surgeon: Alex Long MD;  [facility];  Service: Bariatrics    AR LAPS GSTRC RSTRICTIV PX LONGITUDINAL GASTRECTOMY N/A 2016    Procedure:   LAPAROSCOPIC GASTROGASTIC FISTULA TAKEDOWN, PARTIAL GASTRECTOMY, GASTRO JEJUNOSTOMY, EXTENSIVE LYSIS OF ADHESIONS;  Surgeon: Alex Long MD;  Location[facility];  Service: Bariatrics     Family History   Problem Relation Age of Onset    Alzheimer's disease Mother     Diabetes Father     Hypertension Father     No Known Problems Daughter     Alcohol abuse Family     No Known Problems Sister     No Known Problems Maternal Grandmother     No Known Problems Paternal Grandmother     No Known Problems Sister     No Known Problems Sister     No Known Problems Sister     No  Known Problems Sister     No Known Problems Sister     No Known Problems Sister     Anxiety disorder Daughter     No Known Problems Daughter     No Known Problems Maternal Aunt     No Known Problems Maternal Aunt     No Known Problems Maternal Aunt     No Known Problems Maternal Aunt     No Known Problems Maternal Aunt      Social History     Socioeconomic History    Marital status: Single     Spouse name: None    Number of children: None    Years of education: None    Highest education level: None   Occupational History    None   Tobacco Use    Smoking status: Former     Current packs/day: 0.00     Types: Cigarettes     Start date: 1996     Quit date: 2001     Years since quittin.3    Smokeless tobacco: Never    Tobacco comments:     Quit 15 years ago. 4 cigarettes daily   Vaping Use    Vaping status: Never Used   Substance and Sexual Activity    Alcohol use: No    Drug use: No    Sexual activity: None   Other Topics Concern    None   Social History Narrative    None     Social Determinants of Health     Financial Resource Strain: Low Risk  (2022)    Received from [facility]    Overall Financial Resource Strain (CARDIA)     Difficulty of Paying Living Expenses: Not hard at all   Food Insecurity: No Food Insecurity (2022)    Received from [facility]    Hunger Vital Sign     Worried About Running Out of Food in the Last Year: Never true     Ran Out of Food in the Last Year: Never true   Transportation Needs: No Transportation Needs (2022)    Received from [facility]    PRAPARE - Transportation     Lack of Transportation (Medical): No     Lack of Transportation (Non-Medical): No   Physical Activity: Not on file   Stress: Not on file   Social Connections: Not on file   Intimate Partner Violence: Not At Risk (2022)    Received from [facility]    Humiliation, Afraid, Rape, and Kick questionnaire     Fear of  Current or Ex-Partner: No     Emotionally Abused: No     Physically Abused: No     Sexually Abused: No   Housing Stability: Unknown (12/29/2022)    Received from [facility]    Housing Stability Vital Sign     Unable to Pay for Housing in the Last Year: No     Number of Places Lived in the Last Year: Not on file     Unstable Housing in the Last Year: Not on file       Current Outpatient Medications:     acetaminophen (TYLENOL) 160 mg/5 mL suspension, Take 10 mL by mouth every 4 (four) hours as needed for mild pain, headaches or fever, Disp: 237 mL, Rfl: 0    BELBUCA 150 MCG FILM, , Disp: , Rfl: 5    betamethasone dipropionate (DIPROSONE) 0.05 % cream, Apply topically 2 (two) times a day, Disp: 30 g, Rfl: 0    busPIRone (BUSPAR) 15 mg tablet, Take 15 mg by mouth daily, Disp: , Rfl:     BUSPIRONE HCL PO, Take 20 mg by mouth daily at bedtime, Disp: , Rfl:     cholecalciferol (VITAMIN D3) 1,000 units tablet, Take 1,000 Units by mouth 2 (two) times a day , Disp: , Rfl:     cyclobenzaprine (FLEXERIL) 5 mg tablet, , Disp: , Rfl: 5    furosemide (LASIX) 20 mg tablet, furosemide 20 mg tablet daily, Disp: , Rfl:     levothyroxine 100 mcg tablet, Take 100 mcg by mouth daily On Odd days, Disp: , Rfl:     LINZESS 145 MCG CAPS, , Disp: , Rfl:     Melatonin 10 MG CAPS, Take 10 mg by mouth daily at bedtime, Disp: , Rfl:     ondansetron (ZOFRAN) 4 mg tablet, Take 4 mg by mouth every 12 (twelve) hours as needed for nausea or vomiting, Disp: , Rfl:     oxybutynin (DITROPAN-XL) 10 MG 24 hr tablet, Take 10 mg by mouth daily, Disp: , Rfl:     oxyCODONE-acetaminophen (PERCOCET)  mg per tablet, Take 1 tablet by mouth every 4 (four) hours, Disp: , Rfl:     triamcinolone (KENALOG) 0.1 % ointment, Apply topically 2 (two) times a day for 14 days To itching areas of arms, legs and back, Disp: 80 g, Rfl: 0    Current Facility-Administered Medications:     sodium chloride (PF) 0.9 % injection 5 mL, 5 mL, Intravenous, PRN,  Odette Corral PA-C    Review of Systems   Constitutional:  Negative for chills and fever.   Cardiovascular:  Positive for leg swelling.   Musculoskeletal:  Positive for back pain.   Skin:  Positive for wound.        +pruritus     Psychiatric/Behavioral:  Negative for agitation.          Objective:  /70   Pulse 60   Temp (!) 97.4 °F (36.3 °C)   Resp 18   Pain Score: 0-No pain     Physical Exam  Vitals reviewed.   Constitutional:       General: She is not in acute distress.     Appearance: She is morbidly obese. She is not ill-appearing, toxic-appearing or diaphoretic.      Comments: Pleasant, NAD   Eyes:      General: No scleral icterus.  Cardiovascular:      Rate and Rhythm: Normal rate.      Pulses:           Dorsalis pedis pulses are 2+ on the left side.        Posterior tibial pulses are 1+ on the left side.      Comments:     Pulmonary:      Effort: Pulmonary effort is normal. No respiratory distress.   Musculoskeletal:      Right lower le+ Edema present.      Left lower le+ Edema present.   Feet:      Right foot:      Toenail Condition: Right toenails are abnormally thick.      Left foot:      Toenail Condition: Left toenails are abnormally thick.   Skin:     Findings: Wound present.             Comments: VSU of the L posterior calf. Measuring smaller in size. Rim of dried exudate present. Post debridement there is a small area that remains open with partial thickness skin breakdown and scant serous drainage.     F/u weeping of the RLE. No further areas of drainage on examination. Skin appears less dry and irritated. Is now healed. No erythema to suggest cellulitis.      Neurological:      Mental Status: She is alert.   Psychiatric:         Mood and Affect: Mood normal.           Wound 24 Venous Ulcer Leg Left;Posterior (Active)   Wound Image   24 1034   Wound Description Epithelialization;Brown;Eschar 24 1035   Nanda-wound Assessment Edema;Scaly;Dry;Hyperpigmented  03/25/24 1035   Wound Length (cm) 0.1 cm 03/25/24 1035   Wound Width (cm) 0.1 cm 03/25/24 1035   Wound Depth (cm) 0.1 cm 03/25/24 1035   Wound Surface Area (cm^2) 0.01 cm^2 03/25/24 1035   Wound Volume (cm^3) 0.001 cm^3 03/25/24 1035   Calculated Wound Volume (cm^3) 0 cm^3 03/25/24 1035   Change in Wound Size % 100 03/25/24 1035   Drainage Amount None 03/25/24 1035   Drainage Description Sanguineous 03/18/24 1054   Non-staged Wound Description Not applicable 03/25/24 1035   Dressing Status Intact 03/11/24 1029       Wound 03/04/24 Other (comment) Leg Lower;Right;Posterior (Active)   Wound Image   03/18/24 1050   Wound Description Epithelialization 03/25/24 1035   Nanda-wound Assessment Edema;Scaly;Dry;Hyperpigmented 03/25/24 1035   Wound Length (cm) 0 cm 03/25/24 1035   Wound Width (cm) 0 cm 03/25/24 1035   Wound Depth (cm) 0 cm 03/25/24 1035   Wound Surface Area (cm^2) 0 cm^2 03/25/24 1035   Wound Volume (cm^3) 0 cm^3 03/25/24 1035   Calculated Wound Volume (cm^3) 0 cm^3 03/25/24 1035   Change in Wound Size % 100 03/25/24 1035   Drainage Amount None 03/25/24 1035   Drainage Description Serosanguineous 03/18/24 1051   Non-staged Wound Description Not applicable 03/25/24 1035   Dressing Status Intact 03/11/24 1032       Wound 03/18/24 Other (comment) Leg Right;Lateral (Active)   Wound Image    03/25/24 1033   Wound Description Epithelialization 03/25/24 1035   Nadna-wound Assessment Edema 03/25/24 1035   Wound Length (cm) 0 cm 03/25/24 1035   Wound Width (cm) 0 cm 03/25/24 1035   Wound Depth (cm) 0 cm 03/25/24 1035   Wound Surface Area (cm^2) 0 cm^2 03/25/24 1035   Wound Volume (cm^3) 0 cm^3 03/25/24 1035   Calculated Wound Volume (cm^3) 0 cm^3 03/25/24 1035   Drainage Amount None 03/25/24 1035   Drainage Description Serosanguineous 03/18/24 1100   Non-staged Wound Description Not applicable 03/25/24 1035                Debridement   Wound 02/19/24 Venous Ulcer Leg Left;Posterior    Universal Protocol:  Consent: Verbal  "consent obtained.  Consent given by: patient  Time out: Immediately prior to procedure a \"time out\" was called to verify the correct patient, procedure, equipment, support staff and site/side marked as required.  Patient understanding: patient states understanding of the procedure being performed  Patient identity confirmed: verbally with patient    Debridement Details  Performed by: PA  Debridement type: selective  Pain control: lidocaine 4%      Post-debridement measurements  Length (cm): 0.2  Width (cm): 0.2  Depth (cm): 0.1  Percent debrided: 100%  Surface Area (cm^2): 0.04  Area Debrided (cm^2): 0.04  Volume (cm^3): 0    Devitalized tissue debrided: exudate  Instrument(s) utilized: curette  Bleeding: small  Hemostasis obtained with: pressure  Procedural pain (0-10): 0  Post-procedural pain: 0   Response to treatment: procedure was tolerated well                   Wound Instructions:  Orders Placed This Encounter   Procedures    Wound cleansing and dressings     Bilateral lower leg wound care      At wound care center:   Wash your hands with soap and water.  Remove old dressing, discard into plastic bag and place in trash.    Cleanse the wound with unscented soap (such as plain white Dove soap) and warm water prior to applying a clean dressing.      Shower yes - Do not get the dressings wet--if showering then please cover with a cast cover that you may purchase at the pharmacy         Left lower leg ulcer   Applied bacitracin, then adaptic, then gauze  Secure with Unna Boot, Coban and tubifast blue  Change weekly at the [facility]      Right lower leg wound  Healed. Apply moisturizing cream daily.  Spandagrip size G given today at wound center. Start wearing compression stockings at home  Compression Stocking    Remove compression stockings every night HS and re-apply first thing qAM. Follow daily skin care as instructed.    Avoid prolonged standing in one place.    Elevate leg(s) above the level of the heart when " "sitting or as much as possible.     Unna Boot to the left     Keep compression wrap/wraps clean and dry.  If wraps are too tight and you experience numbness/tingling, call the wound center.   If after hours, remove wraps or proceed to nearest E.R. and call wound center in AM.      Wrap will be changed 1 x weekly      Avoid prolonged standing in one place.      Elevate leg(s) above the level of the heart when sitting or as much as possible.      Treatments completed as above today at the [facility]    Follow up in 1 week.     Standing Status:   Future     Standing Expiration Date:   3/25/2025    Debridement     This order was created via procedure documentation       Cally Null, PA-C      Portions of the record may have been created with voice recognition software. Occasional wrong word or \"sound alike\" substitutions may have occurred due to the inherent limitations of voice recognition software. Read the chart carefully and recognize, using context, where substitutions have occurred.    "

## 2024-04-01 ENCOUNTER — APPOINTMENT (OUTPATIENT)
Age: 64
End: 2024-04-01
Payer: MEDICARE

## 2024-04-01 ENCOUNTER — OFFICE VISIT (OUTPATIENT)
Dept: WOUND CARE | Facility: CLINIC | Age: 64
End: 2024-04-01
Payer: MEDICARE

## 2024-04-01 VITALS
SYSTOLIC BLOOD PRESSURE: 122 MMHG | RESPIRATION RATE: 18 BRPM | DIASTOLIC BLOOD PRESSURE: 76 MMHG | HEART RATE: 60 BPM | TEMPERATURE: 96.4 F

## 2024-04-01 DIAGNOSIS — I83.022 VENOUS STASIS ULCER OF LEFT CALF WITH FAT LAYER EXPOSED, UNSPECIFIED WHETHER VARICOSE VEINS PRESENT (HCC): Primary | ICD-10-CM

## 2024-04-01 DIAGNOSIS — R79.89 ELEVATED TSH: ICD-10-CM

## 2024-04-01 DIAGNOSIS — L97.222 VENOUS STASIS ULCER OF LEFT CALF WITH FAT LAYER EXPOSED, UNSPECIFIED WHETHER VARICOSE VEINS PRESENT (HCC): Primary | ICD-10-CM

## 2024-04-01 DIAGNOSIS — L85.3 XEROSIS OF SKIN: ICD-10-CM

## 2024-04-01 LAB
T4 FREE SERPL-MCNC: 0.89 NG/DL (ref 0.61–1.12)
TSH SERPL DL<=0.05 MIU/L-ACNC: 7.94 UIU/ML (ref 0.45–4.5)

## 2024-04-01 PROCEDURE — 36415 COLL VENOUS BLD VENIPUNCTURE: CPT

## 2024-04-01 PROCEDURE — 84439 ASSAY OF FREE THYROXINE: CPT

## 2024-04-01 PROCEDURE — 99212 OFFICE O/P EST SF 10 MIN: CPT | Performed by: STUDENT IN AN ORGANIZED HEALTH CARE EDUCATION/TRAINING PROGRAM

## 2024-04-01 PROCEDURE — 99213 OFFICE O/P EST LOW 20 MIN: CPT | Performed by: STUDENT IN AN ORGANIZED HEALTH CARE EDUCATION/TRAINING PROGRAM

## 2024-04-01 PROCEDURE — 84443 ASSAY THYROID STIM HORMONE: CPT

## 2024-04-01 RX ORDER — AMMONIUM LACTATE 12 G/100G
LOTION TOPICAL DAILY
Qty: 225 G | Refills: 1 | Status: SHIPPED | OUTPATIENT
Start: 2024-04-01 | End: 2024-05-01

## 2024-04-01 NOTE — PATIENT INSTRUCTIONS
Orders Placed This Encounter   Procedures    Wound cleansing and dressings Venous Ulcer Left;Posterior Leg     Your left leg wound is healed!    Nessa CARR prescribed Lac-Hydrin (Ammonium Lactate) moisturizer for your leg. You may use this until the scaly areas are gone. Then switch to regular moisturizer such as Cetaphil or CeraVe.    Wear your blue compression socks daily. Take them off before bed at night.     Standing Status:   Future     Standing Expiration Date:   4/1/2025

## 2024-04-01 NOTE — PROGRESS NOTES
"Patient ID: Sussy Jung is a 63 y.o. female Date of Birth 1960       Chief Complaint   Patient presents with    Follow Up Wound Care Visit     LLE MLW       Allergies:  Nsaids, Sulfate, and Ms contin [morphine]    Diagnosis:   Diagnosis ICD-10-CM Associated Orders   1. Venous stasis ulcer of left calf with fat layer exposed, unspecified whether varicose veins present ([facility])  I83.022 Wound cleansing and dressings Venous Ulcer Left;Posterior Leg    L97.222       2. Xerosis of skin  L85.3 ammonium lactate (LAC-HYDRIN) 12 % lotion           Assessment  & Plan:    F/u LLE VSU. Is now healed. There is no drainage from the site. There is some dried scaling skin at the healed site and posterior lower leg. No erythema to indicate cellulitis.   No further dressing required given pt is now healed.   Would recommend use of Lac Hydrin daily to reduce dry scaling skin. When resolved recommend continued use of daily moisturizer without dyes/perfumes to maintain healthy skin barrier.   Utilize 20 mmHg compression stockings daily. May remove to cleanse and at night given she sleeps in a bed. Apply first thing in the morning.   Continue with leg elevation and 30-40 minutes of ambulation daily to assist with venous return.   Monitor legs for any weeping/new wound formation.   D/c from wound care center. Call in future with any questions/concerns/future wounds.          Subjective:   \"02/19/24: 62 y/o F with PMHx of hypothyroidism, paroxysmal atrial fibrillation, obesity, chronic low back pain presents for evaluation of open wound on her left posterior calf that has been present for about 2 and half weeks now.  Patient does state that she does have a history of venous ulcerations throughout the years.  Does wear compression stockings at home but did not present with compression on today.  Does spend time in a chair when resting with her legs in a dependent position.  Sleeps in a bed at night.  Currently she is using " "betamethasone on the open area and has been doing so for about 2 and half weeks now.  Is compliant with Lasix.  No longer smokes.  No severe pain, fevers, chills. No hx of DM or difficulty with glucose control.     02/26/2024: Patient returns for follow-up of venous stasis ulcer of the left calf.  No specific complaints.  Using Tubigrip with endoform and alginate.  No fever, chills or pain.    03/04/24: Pt presents for f/u VSU of the L calf.  Currently endoform, alginate and Unna boot is being utilized for compression. Pt reports itching and a rash on her legs, wrists, and back that she has been itching at. Denies systemic symptoms.  \"    *Above HPI summary per chart review of prev visits to wound center*    3/11/2024: Sussy presents today for follow-up of venous stasis ulcerations bilateral lower extremities.  Currently utilizing endoform/alginate/Unna boot.  At last visit, new areas of skin breakdown on right lower extremity were noted secondary to patient excoriating.  Therefore layer of bacitracin followed by ABD and Unna boot applied.  Patient reports she tolerated the bilateral wraps well though continued with pruritic rash.  Reports that this type of rash has happened to her in the past.  Has an appoint with dermatology on Wednesday of this week.  Other than the pruritic rash, she reports she has no new acute complaints and denies fever, chills, shortness of breath, chest pain, palpitations.     03/18/24: Pt presents for f/u VSU of the left calf and weeping of the RLE. Pt has continued with Unna boots for compression this week. Reports no problems with the wraps. Is continuing to have itching so she plans to contact her dermatologist again.     03/25/24: Pt presents for f/u VSU of weeping of the RLE. Unna boots were utilized for compression. She has continued to have itching of her upper extremity, back and legs and will be returning to the dermatologist this week. Reports she has 20 mmHg compression " "stockings at home.     04/01/24: Pt presents for f/u VSU of her LLE. Has been utilizing a compression stocking on her RLE and has not noticed any further drainage from the site. Unna boot is currently being used on her LLE. She did see dermatology since prior visit and received a steroid shot to reduce itching, she notes the itching is not completely resolved but is somewhat improved.           The following portions of the patient's history were reviewed and updated as appropriate:   Patient Active Problem List   Diagnosis    Morbid (severe) obesity due to excess calories (HCC)    Hypothyroidism    Hypercholesterolemia    Lumbar disc disease    Paroxysmal atrial fibrillation (HCC)    Anastomotic stricture of gastrojejunostomy    Bariatric surgery status    Choledocholithiasis    Common bile duct stricture    Encounter for surgical aftercare following surgery of digestive system    Postsurgical malabsorption    Other dysphagia    Decreased oral intake    Low vitamin B12 level    Vitamin A deficiency     Past Medical History:   Diagnosis Date    Anxiety     Back pain     Back pain at L4-L5 level     Bariatric surgery status     Chronic pain disorder     back    Cramping of hands     and legs    DDD (degenerative disc disease), lumbar     Depression     Edema of both legs     with lasix much better    Fall at home     almost 2 yrs ago    Full dentures     GERD (gastroesophageal reflux disease)     History of gastric ulcer     \"Years ago    History of heartburn     Hypothyroid     Hypothyroidism    Morbid obesity (HCC)     Motion sickness     Nausea & vomiting     Numbness and tingling in both hands     Numbness and tingling of both feet     PICC (peripherally inserted central catheter) in place     Right arm    PONV (postoperative nausea and vomiting)     Postgastrectomy malabsorption     Sciatica     Shortness of breath     zuñiga    Skin abnormality     Edema BLE- uses boots at home. skin on shins dark and rough    " Stress incontinence     Stricture esophagus     Use of cane as ambulatory aid     Wears glasses      Past Surgical History:   Procedure Laterality Date    ABDOMINAL ADHESION SURGERY N/A 2017    Procedure: EXTENSIVE LYSIS ADHESIONS;  Surgeon: Alex Long MD;  Location: [facility];  Service: Bariatrics    ABDOMINAL SURGERY       SECTION      x3    CHOLECYSTECTOMY      COLONOSCOPY      COSMETIC SURGERY      tummy tuck    ESOPHAGOGASTRODUODENOSCOPY N/A 2016    Procedure: ESOPHAGOGASTRODUODENOSCOPY (EGD);  Surgeon: Alex Long MD;  Location: [facility];  Service:     ESOPHAGOGASTRODUODENOSCOPY N/A 2017    Procedure: ESOPHAGOGASTRODUODENOSCOPY (EGD);  Surgeon: Alex Long MD;  [facility];  Service: Bariatrics    GASTRIC BYPASS      IR BILIARY TUBE PLACEMENT (PTC)  2019    PANNICULECTOMY      PARTIAL GASTRECTOMY N/A 2017    Procedure: SUBTOTAL GASTRECTOMY, ESOPHAGEAL JEJUNOSTOMY;  Surgeon: Alex Long MD;  [facility];  Service: Bariatrics    DC EGD TRANSORAL BIOPSY SINGLE/MULTIPLE N/A 2017    Procedure: ESOPHAGOGASTRODUODENOSCOPY (EGD) with biopsy;  Surgeon: Aidan Perez MD;  Location: [facility];  Service: Bariatrics    DC EGD TRANSORAL BIOPSY SINGLE/MULTIPLE N/A 10/4/2017    Procedure: ESOPHAGOGASTRODUODENOSCOPY (EGD) with balloon dilatation;  Surgeon: Alex Long MD;  [facility]: [facility];  Service: Bariatrics    DC EGD TRANSORAL BIOPSY SINGLE/MULTIPLE N/A 2017    Procedure: ESOPHAGOGASTRODUODENOSCOPY (EGD) with balloon dilation;  Surgeon: Alex Long MD;  [facility]: [facility];  Service: Bariatrics    DC LAPS GSTRC RSTRICTIV PX LONGITUDINAL GASTRECTOMY N/A 2016    Procedure:   LAPAROSCOPIC GASTROGASTIC FISTULA TAKEDOWN, PARTIAL GASTRECTOMY, GASTRO JEJUNOSTOMY, EXTENSIVE LYSIS OF ADHESIONS;  Surgeon: Alex Long MD;  Location: [facility];  Service: Bariatrics     Family History   Problem Relation Age of Onset    Alzheimer's  disease Mother     Diabetes Father     Hypertension Father     No Known Problems Daughter     Alcohol abuse Family     No Known Problems Sister     No Known Problems Maternal Grandmother     No Known Problems Paternal Grandmother     No Known Problems Sister     No Known Problems Sister     No Known Problems Sister     No Known Problems Sister     No Known Problems Sister     No Known Problems Sister     Anxiety disorder Daughter     No Known Problems Daughter     No Known Problems Maternal Aunt     No Known Problems Maternal Aunt     No Known Problems Maternal Aunt     No Known Problems Maternal Aunt     No Known Problems Maternal Aunt      Social History     Socioeconomic History    Marital status: Single     Spouse name: Not on file    Number of children: Not on file    Years of education: Not on file    Highest education level: Not on file   Occupational History    Not on file   Tobacco Use    Smoking status: Former     Current packs/day: 0.00     Types: Cigarettes     Start date: 1996     Quit date: 2001     Years since quittin.3    Smokeless tobacco: Never    Tobacco comments:     Quit 15 years ago. 4 cigarettes daily   Vaping Use    Vaping status: Never Used   Substance and Sexual Activity    Alcohol use: No    Drug use: No    Sexual activity: Not on file   Other Topics Concern    Not on file   Social History Narrative    Not on file     Social Determinants of Health     Financial Resource Strain: Low Risk  (2022)    Received from [facility]    Overall Financial Resource Strain (CARDIA)     Difficulty of Paying Living Expenses: Not hard at all   Food Insecurity: No Food Insecurity (2022)    Received from [facility]    Hunger Vital Sign     Worried About Running Out of Food in the Last Year: Never true     Ran Out of Food in the Last Year: Never true   Transportation Needs: No Transportation Needs (2022)    Received from [facility]  [facility]    PRAPARE - Transportation     Lack of Transportation (Medical): No     Lack of Transportation (Non-Medical): No   Physical Activity: Not on file   Stress: Not on file   Social Connections: Not on file   Intimate Partner Violence: Not At Risk (12/29/2022)    Received from [facility]    Humiliation, Afraid, Rape, and Kick questionnaire     Fear of Current or Ex-Partner: No     Emotionally Abused: No     Physically Abused: No     Sexually Abused: No   Housing Stability: Unknown (12/29/2022)    Received from [facility]    Housing Stability Vital Sign     Unable to Pay for Housing in the Last Year: No     Number of Places Lived in the Last Year: Not on file     Unstable Housing in the Last Year: Not on file       Current Outpatient Medications:     ammonium lactate (LAC-HYDRIN) 12 % lotion, Apply topically daily To dry skin of bilateral lower legs, Disp: 225 g, Rfl: 1    acetaminophen (TYLENOL) 160 mg/5 mL suspension, Take 10 mL by mouth every 4 (four) hours as needed for mild pain, headaches or fever, Disp: 237 mL, Rfl: 0    BELBUCA 150 MCG FILM, , Disp: , Rfl: 5    betamethasone dipropionate (DIPROSONE) 0.05 % cream, Apply topically 2 (two) times a day, Disp: 30 g, Rfl: 0    busPIRone (BUSPAR) 15 mg tablet, Take 15 mg by mouth daily, Disp: , Rfl:     BUSPIRONE HCL PO, Take 20 mg by mouth daily at bedtime, Disp: , Rfl:     cholecalciferol (VITAMIN D3) 1,000 units tablet, Take 1,000 Units by mouth 2 (two) times a day , Disp: , Rfl:     cyclobenzaprine (FLEXERIL) 5 mg tablet, , Disp: , Rfl: 5    furosemide (LASIX) 20 mg tablet, furosemide 20 mg tablet daily, Disp: , Rfl:     levothyroxine 100 mcg tablet, Take 100 mcg by mouth daily On Odd days, Disp: , Rfl:     LINZESS 145 MCG CAPS, , Disp: , Rfl:     Melatonin 10 MG CAPS, Take 10 mg by mouth daily at bedtime, Disp: , Rfl:     ondansetron (ZOFRAN) 4 mg tablet, Take 4 mg by mouth every 12 (twelve) hours as needed for  nausea or vomiting, Disp: , Rfl:     oxybutynin (DITROPAN-XL) 10 MG 24 hr tablet, Take 10 mg by mouth daily, Disp: , Rfl:     oxyCODONE-acetaminophen (PERCOCET)  mg per tablet, Take 1 tablet by mouth every 4 (four) hours, Disp: , Rfl:     triamcinolone (KENALOG) 0.1 % ointment, Apply topically 2 (two) times a day for 14 days To itching areas of arms, legs and back, Disp: 80 g, Rfl: 0    Current Facility-Administered Medications:     sodium chloride (PF) 0.9 % injection 5 mL, 5 mL, Intravenous, PRN, Odette Corral PA-C    Review of Systems   Constitutional:  Negative for chills and fever.   Cardiovascular:  Positive for leg swelling.   Musculoskeletal:  Positive for back pain.   Skin:  Positive for wound.        +pruritus     Psychiatric/Behavioral:  Negative for agitation.          Objective:  /76   Pulse 60   Temp (!) 96.4 °F (35.8 °C)   Resp 18   Pain Score: 0-No pain     Physical Exam  Vitals reviewed.   Constitutional:       General: She is not in acute distress.     Appearance: She is morbidly obese. She is not ill-appearing, toxic-appearing or diaphoretic.      Comments: Pleasant, NAD   Eyes:      General: No scleral icterus.  Cardiovascular:      Rate and Rhythm: Normal rate.      Pulses:           Dorsalis pedis pulses are 2+ on the left side.        Posterior tibial pulses are 1+ on the left side.      Comments:     Pulmonary:      Effort: Pulmonary effort is normal. No respiratory distress.   Musculoskeletal:      Right lower le+ Edema present.      Left lower le+ Edema present.   Feet:      Right foot:      Toenail Condition: Right toenails are abnormally thick.      Left foot:      Toenail Condition: Left toenails are abnormally thick.   Skin:     Findings: No wound.             Comments: VSU of the L posterior calf is now healed without drainage or erythema to indicate acute infection. Dry scaling skin is present.    Neurological:      Mental Status: She is alert.  "  Psychiatric:         Mood and Affect: Mood normal.         Wound 02/19/24 Venous Ulcer Leg Left;Posterior (Active)   Wound Image Images linked 04/01/24 0915   Wound Description Epithelialization;Brown 04/01/24 0917   Nanda-wound Assessment Edema;Scaly;Dry;Hyperpigmented 04/01/24 0917   Wound Length (cm) 0 cm 04/01/24 0917   Wound Width (cm) 0 cm 04/01/24 0917   Wound Depth (cm) 0 cm 04/01/24 0917   Wound Surface Area (cm^2) 0 cm^2 04/01/24 0917   Wound Volume (cm^3) 0 cm^3 04/01/24 0917   Calculated Wound Volume (cm^3) 0 cm^3 04/01/24 0917   Change in Wound Size % 100 04/01/24 0917   Drainage Amount None 04/01/24 0917   Drainage Description Unable to assess 04/01/24 0917   Non-staged Wound Description Not applicable 04/01/24 0917   Dressing Status Intact;Clean;Dry 04/01/24 0917                    Wound Instructions:  Orders Placed This Encounter   Procedures    Wound cleansing and dressings Venous Ulcer Left;Posterior Leg     Your left leg wound is healed!    Nessa PRUITT.JEROME prescribed Lac-Hydrin (Ammonium Lactate) moisturizer for your leg. You may use this until the scaly areas are gone. Then switch to regular moisturizer such as Cetaphil or CeraVe.    Wear your blue compression socks daily. Take them off before bed at night.     Standing Status:   Future     Standing Expiration Date:   4/1/2025       Nessa Null PA-C    Portions of the record may have been created with voice recognition software. Occasional wrong word or \"sound alike\" substitutions may have occurred due to the inherent limitations of voice recognition software. Read the chart carefully and recognize, using context, where substitutions have occurred.    "

## 2024-04-12 ENCOUNTER — OFFICE VISIT (OUTPATIENT)
Dept: PODIATRY | Facility: CLINIC | Age: 64
End: 2024-04-12
Payer: MEDICARE

## 2024-04-12 VITALS
SYSTOLIC BLOOD PRESSURE: 126 MMHG | HEART RATE: 73 BPM | WEIGHT: 245 LBS | HEIGHT: 61 IN | BODY MASS INDEX: 46.26 KG/M2 | DIASTOLIC BLOOD PRESSURE: 80 MMHG

## 2024-04-12 DIAGNOSIS — L84 CALLUS: ICD-10-CM

## 2024-04-12 DIAGNOSIS — M79.674 PAIN IN TOES OF BOTH FEET: ICD-10-CM

## 2024-04-12 DIAGNOSIS — I87.2 VENOUS INSUFFICIENCY: ICD-10-CM

## 2024-04-12 DIAGNOSIS — M79.675 PAIN IN TOES OF BOTH FEET: ICD-10-CM

## 2024-04-12 DIAGNOSIS — B35.1 ONYCHOMYCOSIS: ICD-10-CM

## 2024-04-12 PROCEDURE — 11055 PARING/CUTG B9 HYPRKER LES 1: CPT | Performed by: PODIATRIST

## 2024-04-12 PROCEDURE — 11721 DEBRIDE NAIL 6 OR MORE: CPT | Performed by: PODIATRIST

## 2024-04-12 PROCEDURE — 99203 OFFICE O/P NEW LOW 30 MIN: CPT | Performed by: PODIATRIST

## 2024-04-12 NOTE — PROGRESS NOTES
Assessment/Plan:    No problem-specific Assessment & Plan notes found for this encounter.       Diagnoses and all orders for this visit:    Pain in toes of both feet    Venous insufficiency    Onychomycosis    Callus      - q8 findings for at risk foot care  - provided as below  - We discussed at length lifestyle modifications for venous insufficiency and lymphedema. We recommend to not sleep in a recliner. To use lymphedema pumps on a compliant basis if available. To avoid periods of sitting with legs in a dependent position. To elevate legs and lay flat for periods in the day and at night. To take diuretics as directed. Also recommend weight loss, increased ambulation, increased activity, and monitor diet especially sodium intake. To use compression stockings.       Procedure: All mycotic toenails were reduced and debrided in length, width, and girth using a nail nipper and dremel.  All hyperkeratotic skin lesion(s) were sharply pared with a scalpel with no bleeding or evidence of ulceration.  Patient tolerated procedure(s) well without complications.  [id number], [id number], q8      Subjective:      Patient ID: Sussy Jung is a 63 y.o. female.    Patient presents for E and M for the b/l feet. Complaining of elongated painful nails that she cannot bend down and cut herself. Cannot bend down and cut them herself. Wears compression stockings.         The following portions of the patient's history were reviewed and updated as appropriate: allergies, current medications, past family history, past medical history, past social history, past surgical history, and problem list.    Review of Systems   Constitutional:  Negative for chills and fever.   HENT:  Negative for ear pain and sore throat.    Eyes:  Negative for pain and visual disturbance.   Respiratory:  Negative for cough and shortness of breath.    Cardiovascular:  Negative for chest pain and palpitations.   Gastrointestinal:  Negative for abdominal pain and  "vomiting.   Genitourinary:  Negative for dysuria and hematuria.   Musculoskeletal:  Negative for arthralgias and back pain.   Skin:  Negative for color change and rash.   Neurological:  Negative for seizures and syncope.   All other systems reviewed and are negative.        Objective:      /80 (BP Location: Left arm, Patient Position: Sitting, Cuff Size: Large)   Pulse 73   Ht 5' 1\" (1.549 m)   Wt 111 kg (245 lb)   BMI 46.29 kg/m²          Physical Exam  Vitals reviewed.   Constitutional:       Appearance: Normal appearance. She is obese.   HENT:      Head: Normocephalic and atraumatic.      Nose: Nose normal.      Mouth/Throat:      Mouth: Mucous membranes are moist.   Eyes:      Pupils: Pupils are equal, round, and reactive to light.   Musculoskeletal:      Right lower leg: Edema present.      Left lower leg: Edema present.      Comments: + 0/4 DP and PT pulses. +1/4 pitting edema b/l Le. Varicosities./ Thin and shiny skin    Nails 1-5 b/l thickened elongated with subunugal debris.     Callus with central nidus on the plantar aspect of right heel.   Skin:     Capillary Refill: Capillary refill takes 2 to 3 seconds.   Neurological:      General: No focal deficit present.      Mental Status: She is alert.           "

## 2024-07-29 ENCOUNTER — APPOINTMENT (OUTPATIENT)
Age: 64
End: 2024-07-29
Payer: MEDICARE

## 2024-07-29 ENCOUNTER — TRANSCRIBE ORDERS (OUTPATIENT)
Age: 64
End: 2024-07-29

## 2024-07-29 DIAGNOSIS — E03.9 HYPOTHYROIDISM, UNSPECIFIED TYPE: ICD-10-CM

## 2024-07-29 DIAGNOSIS — E03.9 HYPOTHYROIDISM, UNSPECIFIED TYPE: Primary | ICD-10-CM

## 2024-07-29 DIAGNOSIS — N18.30 STAGE 3 CHRONIC KIDNEY DISEASE, UNSPECIFIED WHETHER STAGE 3A OR 3B CKD (HCC): ICD-10-CM

## 2024-07-29 LAB
ANION GAP SERPL CALCULATED.3IONS-SCNC: 7 MMOL/L (ref 4–13)
BUN SERPL-MCNC: 8 MG/DL (ref 5–25)
CALCIUM SERPL-MCNC: 8.8 MG/DL (ref 8.4–10.2)
CHLORIDE SERPL-SCNC: 102 MMOL/L (ref 96–108)
CO2 SERPL-SCNC: 30 MMOL/L (ref 21–32)
CREAT SERPL-MCNC: 1.17 MG/DL (ref 0.6–1.3)
GFR SERPL CREATININE-BSD FRML MDRD: 49 ML/MIN/1.73SQ M
GLUCOSE P FAST SERPL-MCNC: 85 MG/DL (ref 65–99)
POTASSIUM SERPL-SCNC: 4.4 MMOL/L (ref 3.5–5.3)
SODIUM SERPL-SCNC: 139 MMOL/L (ref 135–147)
TSH SERPL DL<=0.05 MIU/L-ACNC: 0.79 UIU/ML (ref 0.45–4.5)

## 2024-07-29 PROCEDURE — 84443 ASSAY THYROID STIM HORMONE: CPT

## 2024-07-29 PROCEDURE — 80048 BASIC METABOLIC PNL TOTAL CA: CPT

## 2024-07-29 PROCEDURE — 36415 COLL VENOUS BLD VENIPUNCTURE: CPT

## 2024-08-16 ENCOUNTER — HOSPITAL ENCOUNTER (OUTPATIENT)
Dept: MAMMOGRAPHY | Facility: HOSPITAL | Age: 64
End: 2024-08-16
Payer: MEDICARE

## 2024-08-16 DIAGNOSIS — Z12.31 ENCOUNTER FOR SCREENING MAMMOGRAM FOR MALIGNANT NEOPLASM OF BREAST: ICD-10-CM

## 2024-08-16 PROCEDURE — 77063 BREAST TOMOSYNTHESIS BI: CPT

## 2024-08-16 PROCEDURE — 77067 SCR MAMMO BI INCL CAD: CPT

## 2024-08-30 ENCOUNTER — APPOINTMENT (OUTPATIENT)
Age: 64
End: 2024-08-30
Payer: MEDICARE

## 2024-08-30 DIAGNOSIS — Z12.31 ENCOUNTER FOR SCREENING MAMMOGRAM FOR MALIGNANT NEOPLASM OF BREAST: ICD-10-CM

## 2024-08-30 DIAGNOSIS — E11.9 TYPE 2 DIABETES MELLITUS WITHOUT COMPLICATION, WITHOUT LONG-TERM CURRENT USE OF INSULIN (HCC): ICD-10-CM

## 2024-08-30 LAB
ANION GAP SERPL CALCULATED.3IONS-SCNC: 9 MMOL/L (ref 4–13)
BUN SERPL-MCNC: 13 MG/DL (ref 5–25)
CALCIUM SERPL-MCNC: 8.7 MG/DL (ref 8.4–10.2)
CHLORIDE SERPL-SCNC: 102 MMOL/L (ref 96–108)
CO2 SERPL-SCNC: 28 MMOL/L (ref 21–32)
CREAT SERPL-MCNC: 1.23 MG/DL (ref 0.6–1.3)
GFR SERPL CREATININE-BSD FRML MDRD: 46 ML/MIN/1.73SQ M
GLUCOSE P FAST SERPL-MCNC: 91 MG/DL (ref 65–99)
POTASSIUM SERPL-SCNC: 4.1 MMOL/L (ref 3.5–5.3)
SODIUM SERPL-SCNC: 139 MMOL/L (ref 135–147)

## 2024-08-30 PROCEDURE — 36415 COLL VENOUS BLD VENIPUNCTURE: CPT

## 2024-08-30 PROCEDURE — 80048 BASIC METABOLIC PNL TOTAL CA: CPT

## 2024-09-04 ENCOUNTER — OFFICE VISIT (OUTPATIENT)
Dept: PODIATRY | Facility: CLINIC | Age: 64
End: 2024-09-04
Payer: MEDICARE

## 2024-09-04 VITALS
SYSTOLIC BLOOD PRESSURE: 103 MMHG | WEIGHT: 245 LBS | DIASTOLIC BLOOD PRESSURE: 70 MMHG | HEART RATE: 68 BPM | BODY MASS INDEX: 46.29 KG/M2

## 2024-09-04 DIAGNOSIS — B35.1 ONYCHOMYCOSIS: ICD-10-CM

## 2024-09-04 DIAGNOSIS — L84 CALLUS: Primary | ICD-10-CM

## 2024-09-04 DIAGNOSIS — I87.2 VENOUS INSUFFICIENCY: ICD-10-CM

## 2024-09-04 PROCEDURE — RECHECK: Performed by: PODIATRIST

## 2024-09-04 PROCEDURE — 11721 DEBRIDE NAIL 6 OR MORE: CPT | Performed by: PODIATRIST

## 2024-09-04 PROCEDURE — 11055 PARING/CUTG B9 HYPRKER LES 1: CPT | Performed by: PODIATRIST

## 2024-09-04 RX ORDER — BETAMETHASONE DIPROPIONATE 0.5 MG/G
CREAM TOPICAL
COMMUNITY
Start: 2024-08-07

## 2024-09-04 RX ORDER — SCOLOPAMINE TRANSDERMAL SYSTEM 1 MG/1
1 PATCH, EXTENDED RELEASE TRANSDERMAL
COMMUNITY

## 2024-09-04 RX ORDER — NYSTATIN 100000 [USP'U]/G
POWDER TOPICAL 2 TIMES DAILY
COMMUNITY
Start: 2024-06-19

## 2024-09-04 RX ORDER — EMPAGLIFLOZIN 10 MG/1
10 TABLET, FILM COATED ORAL DAILY
COMMUNITY
Start: 2024-08-01

## 2024-09-04 RX ORDER — POTASSIUM CHLORIDE 750 MG/1
10 TABLET, EXTENDED RELEASE ORAL DAILY PRN
COMMUNITY

## 2024-09-04 RX ORDER — PANTOPRAZOLE SODIUM 40 MG/1
40 TABLET, DELAYED RELEASE ORAL DAILY
COMMUNITY
Start: 2024-08-28

## 2024-09-04 NOTE — PROGRESS NOTES
Sussy Sanabriachela  1960  AT RISK FOOT CARE    1. Callus        2. Onychomycosis        3. Venous insufficiency            Patient presents for at-risk foot care.  Patient has no acute concerns today.  Patient has significant lower extremity risk due to diminished pulses in the feet and trophic skin changes to the lower extremity including thick toenail, atrophic skin, and decreased hair growth.      On exam patient has thickened, hypertrophic, discolored, brittle toenails with subungual debris and tenderness x10   Callus: 1  Patient has diminished pedal pulses and decreased perfusion to the lower extremities  Patient has significant trophic changes to the skin including thick toenails, decreased pedal hair and atrophic skin.     Today's treatment includes:  Debridement of toenails. Using nail nipper, derik, and curette, nails were sharply debrided, reduced in thickness and length. Devitalized nail tissue and fungal debris excised and removed. Patient tolerated well.      Discussed proper shoe gear, daily inspections of feet, and general foot health with patient. Patient has Q8  findings and is recommended for at risk foot care every 9-10 weeks.        Procedure: All mycotic toenails were reduced and debrided in length, width, and girth using a nail nipper and dremel.  All hyperkeratotic skin lesion(s) were sharply pared with a scalpel with no bleeding or evidence of ulceration.  Patient tolerated procedure(s) well without complications.

## 2024-12-02 ENCOUNTER — OFFICE VISIT (OUTPATIENT)
Dept: URGENT CARE | Facility: CLINIC | Age: 64
End: 2024-12-02
Payer: MEDICARE

## 2024-12-02 ENCOUNTER — RESULTS FOLLOW-UP (OUTPATIENT)
Dept: URGENT CARE | Facility: CLINIC | Age: 64
End: 2024-12-02

## 2024-12-02 ENCOUNTER — APPOINTMENT (OUTPATIENT)
Dept: RADIOLOGY | Facility: CLINIC | Age: 64
End: 2024-12-02
Payer: MEDICARE

## 2024-12-02 VITALS
RESPIRATION RATE: 20 BRPM | OXYGEN SATURATION: 97 % | SYSTOLIC BLOOD PRESSURE: 138 MMHG | BODY MASS INDEX: 45.08 KG/M2 | HEART RATE: 74 BPM | DIASTOLIC BLOOD PRESSURE: 88 MMHG | WEIGHT: 238.6 LBS | TEMPERATURE: 97.9 F

## 2024-12-02 DIAGNOSIS — S92.901A CLOSED FRACTURE OF RIGHT FOOT, INITIAL ENCOUNTER: Primary | ICD-10-CM

## 2024-12-02 DIAGNOSIS — M79.671 RIGHT FOOT PAIN: ICD-10-CM

## 2024-12-02 PROCEDURE — 73630 X-RAY EXAM OF FOOT: CPT

## 2024-12-02 PROCEDURE — 99213 OFFICE O/P EST LOW 20 MIN: CPT | Performed by: NURSE PRACTITIONER

## 2024-12-02 PROCEDURE — G0463 HOSPITAL OUTPT CLINIC VISIT: HCPCS | Performed by: NURSE PRACTITIONER

## 2024-12-02 NOTE — RESULT ENCOUNTER NOTE
MPRESSION:  right foot xray    Subtle Garcia fracture of the proximal fifth metatarsal. No displacement.    Pt placed in post op shoe and instructed to see her podiatrist.   LM for pt regarding + xray results and to follow up

## 2024-12-02 NOTE — PATIENT INSTRUCTIONS
Your xray was preliminarily read by your provider.  A radiologist will read the xray and you will be notified if it is abnormal.    You are to wear the post op shoe as instructed.  Wear at all times when up walking.    Follow up with your PCP in 3-5 days  Go to the ED if symptoms worsen      You are to follow up with your podiatrist - call for appointment   Continue the pain medication you are currently taking.

## 2024-12-02 NOTE — PROGRESS NOTES
[facility]        NAME: Sussy Jung is a 64 y.o. female  : 1960    MRN: [id number]  DATE: 2024  TIME: 9:10 AM    Assessment and Plan   Closed fracture of right foot, initial encounter [S92.901A]  1. Closed fracture of right foot, initial encounter      base 5th toe      2. Right foot pain  XR foot 3+ vw right            Patient Instructions       Follow up with PCP in 3-5 days.  Proceed to  ER if symptoms worsen.    If tests have been performed at [facility], our office will contact you with results if changes need to be made to the care plan discussed with you at the visit.  You can review your full results on [facility].      Your xray was preliminarily read by your provider.  A radiologist will read the xray and you will be notified if it is abnormal.    You are to wear the post op shoe as instructed.  Wear at all times when up walking.    Follow up with your PCP in 3-5 days  Go to the ED if symptoms worsen      You are to follow up with your podiatrist - call for appointment   Continue the pain medication you are currently taking.     Chief Complaint     Chief Complaint   Patient presents with    Foot Pain     Right foot pain x 4 days , denies trauma         History of Present Illness       This is a 64 year old female who comes to care now with c/o right lateral foot pain since Thursday. She denies any known injury.  She states the pain is just below the little toe and radiates to posterior foot.  She states she has pain medication she takes and it helps at times. Pain is worse with ambulating.  She states she has a podiatrist.  PMH is listed and reviewed.         Review of Systems   Review of Systems   Constitutional: Negative.    HENT: Negative.     Eyes: Negative.    Respiratory: Negative.     Cardiovascular: Negative.    Gastrointestinal: Negative.    Endocrine: Negative.    Genitourinary: Negative.    Musculoskeletal:         Right foot pain    Skin: Negative.     Allergic/Immunologic: Negative.    Neurological: Negative.    Hematological: Negative.    Psychiatric/Behavioral: Negative.           Current Medications       Current Outpatient Medications:     acetaminophen (TYLENOL) 160 mg/5 mL suspension, Take 10 mL by mouth every 4 (four) hours as needed for mild pain, headaches or fever, Disp: 237 mL, Rfl: 0    ammonium lactate (LAC-HYDRIN) 12 % lotion, Apply topically daily To dry skin of bilateral lower legs (Patient not taking: Reported on 9/4/2024), Disp: 225 g, Rfl: 1    BELBUCA 150 MCG FILM, , Disp: , Rfl: 5    betamethasone dipropionate (DIPROSONE) 0.05 % cream, Apply topically 2 (two) times a day, Disp: 30 g, Rfl: 0    betamethasone, augmented, (DIPROLENE-AF) 0.05 % cream, , Disp: , Rfl:     busPIRone (BUSPAR) 15 mg tablet, Take 15 mg by mouth daily, Disp: , Rfl:     BUSPIRONE HCL PO, Take 20 mg by mouth daily at bedtime, Disp: , Rfl:     cholecalciferol (VITAMIN D3) 1,000 units tablet, Take 1,000 Units by mouth 2 (two) times a day , Disp: , Rfl:     cyclobenzaprine (FLEXERIL) 5 mg tablet, , Disp: , Rfl: 5    furosemide (LASIX) 20 mg tablet, furosemide 20 mg tablet daily, Disp: , Rfl:     Jardiance 10 MG TABS tablet, Take 10 mg by mouth Daily, Disp: , Rfl:     levothyroxine 100 mcg tablet, Take 100 mcg by mouth daily On Odd days, Disp: , Rfl:     LINZESS 145 MCG CAPS, , Disp: , Rfl:     Melatonin 10 MG CAPS, Take 10 mg by mouth daily at bedtime, Disp: , Rfl:     nystatin powder, Apply topically 2 (two) times a day, Disp: , Rfl:     ondansetron (ZOFRAN) 4 mg tablet, Take 4 mg by mouth every 12 (twelve) hours as needed for nausea or vomiting, Disp: , Rfl:     oxybutynin (DITROPAN-XL) 10 MG 24 hr tablet, Take 10 mg by mouth daily, Disp: , Rfl:     oxyCODONE-acetaminophen (PERCOCET)  mg per tablet, Take 1 tablet by mouth every 4 (four) hours, Disp: , Rfl:     pantoprazole (PROTONIX) 40 mg tablet, Take 40 mg by mouth daily, Disp: , Rfl:     potassium chloride  "(Klor-Con) 10 mEq tablet, Take 10 mEq by mouth daily as needed, Disp: , Rfl:     scopolamine (TRANSDERM-SCOP) 1 mg/3 days TD 72 hr patch, Place 1 patch on the skin, Disp: , Rfl:     triamcinolone (KENALOG) 0.1 % ointment, Apply topically 2 (two) times a day for 14 days To itching areas of arms, legs and back (Patient not taking: Reported on 9/4/2024), Disp: 80 g, Rfl: 0    Current Facility-Administered Medications:     sodium chloride (PF) 0.9 % injection 5 mL, 5 mL, Intravenous, PRN, Odette Corral PA-C    Current Allergies     Allergies as of 12/02/2024 - Reviewed 12/02/2024   Allergen Reaction Noted    Nsaids  05/26/2019    Sulfate      Ms contin [morphine] Rash 09/20/2017            The following portions of the patient's history were reviewed and updated as appropriate: allergies, current medications, past family history, past medical history, past social history, past surgical history and problem list.     Past Medical History:   Diagnosis Date    Anxiety     Back pain     Back pain at L4-L5 level     Bariatric surgery status     Chronic pain disorder     back    Cramping of hands     and legs    DDD (degenerative disc disease), lumbar     Depression     Edema of both legs     with lasix much better    Fall at home     almost 2 yrs ago    Full dentures     GERD (gastroesophageal reflux disease)     History of gastric ulcer     \"Years ago    History of heartburn     Hypothyroid     Hypothyroidism    Morbid obesity (HCC)     Motion sickness     Nausea & vomiting     Numbness and tingling in both hands     Numbness and tingling of both feet     PICC (peripherally inserted central catheter) in place     Right arm    PONV (postoperative nausea and vomiting)     Postgastrectomy malabsorption     Sciatica     Shortness of breath     zuñiga    Skin abnormality     Edema BLE- uses boots at home. skin on shins dark and rough    Stress incontinence     Stricture esophagus     Use of cane as ambulatory aid     Wears " glasses        Past Surgical History:   Procedure Laterality Date    ABDOMINAL ADHESION SURGERY N/A 2017    Procedure: EXTENSIVE LYSIS ADHESIONS;  Surgeon: Alex Long MD;  Location: [facility];  Service: Bariatrics    ABDOMINAL SURGERY       SECTION      x3    CHOLECYSTECTOMY      COLONOSCOPY      COSMETIC SURGERY      tummy tuck    ESOPHAGOGASTRODUODENOSCOPY N/A 2016    Procedure: ESOPHAGOGASTRODUODENOSCOPY (EGD);  Surgeon: Alex Long MD;  Location: [facility];  Service:     ESOPHAGOGASTRODUODENOSCOPY N/A 2017    Procedure: ESOPHAGOGASTRODUODENOSCOPY (EGD);  Surgeon: Alex Long MD;  [facility];  Service: Bariatrics    GASTRIC BYPASS      IR BILIARY TUBE PLACEMENT (PTC)  2019    PANNICULECTOMY      PARTIAL GASTRECTOMY N/A 2017    Procedure: SUBTOTAL GASTRECTOMY, ESOPHAGEAL JEJUNOSTOMY;  Surgeon: Alex Long MD;  [facility];  Service: Bariatrics    MO EGD TRANSORAL BIOPSY SINGLE/MULTIPLE N/A 2017    Procedure: ESOPHAGOGASTRODUODENOSCOPY (EGD) with biopsy;  Surgeon: Aidan Perez MD;  [facility];  Service: Bariatrics    MO EGD TRANSORAL BIOPSY SINGLE/MULTIPLE N/A 10/4/2017    Procedure: ESOPHAGOGASTRODUODENOSCOPY (EGD) with balloon dilatation;  Surgeon: Alex Long MD;  [facility];  Service: Bariatrics    MO EGD TRANSORAL BIOPSY SINGLE/MULTIPLE N/A 2017    Procedure: ESOPHAGOGASTRODUODENOSCOPY (EGD) with balloon dilation;  Surgeon: Alex Long MD;  [facility];  Service: Bariatrics    MO LAPS GSTRC RSTRICTIV PX LONGITUDINAL GASTRECTOMY N/A 2016    Procedure:   LAPAROSCOPIC GASTROGASTIC FISTULA TAKEDOWN, PARTIAL GASTRECTOMY, GASTRO JEJUNOSTOMY, EXTENSIVE LYSIS OF ADHESIONS;  Surgeon: Alex Long MD;  [facility];  Service: Bariatrics       Family History   Problem Relation Age of Onset    Alzheimer's disease Mother     Diabetes Father     Hypertension Father     No Known Problems  Daughter     Alcohol abuse Family     No Known Problems Sister     No Known Problems Maternal Grandmother     No Known Problems Paternal Grandmother     No Known Problems Sister     No Known Problems Sister     No Known Problems Sister     No Known Problems Sister     No Known Problems Sister     No Known Problems Sister     Anxiety disorder Daughter     No Known Problems Daughter     No Known Problems Maternal Aunt     No Known Problems Maternal Aunt     No Known Problems Maternal Aunt     No Known Problems Maternal Aunt     No Known Problems Maternal Aunt          Medications have been verified.        Objective   /88   Pulse 74   Temp 97.9 °F (36.6 °C)   Resp 20   Wt 108 kg (238 lb 9.6 oz)   SpO2 97%   BMI 45.08 kg/m²   No LMP recorded. Patient is postmenopausal.       Physical Exam     Physical Exam  Vitals and nursing note reviewed.   Constitutional:       General: She is not in acute distress.     Appearance: Normal appearance. She is obese. She is not ill-appearing, toxic-appearing or diaphoretic.   HENT:      Head: Normocephalic and atraumatic.   Eyes:      Extraocular Movements: Extraocular movements intact.   Cardiovascular:      Rate and Rhythm: Normal rate.      Pulses: Normal pulses.   Pulmonary:      Effort: Pulmonary effort is normal.   Musculoskeletal:         General: Tenderness present. No swelling, deformity or signs of injury.      Cervical back: Normal range of motion.        Feet:    Skin:     General: Skin is warm and dry.      Capillary Refill: Capillary refill takes less than 2 seconds.   Neurological:      General: No focal deficit present.      Mental Status: She is alert and oriented to person, place, and time.   Psychiatric:         Mood and Affect: Mood normal.         Behavior: Behavior normal.         Thought Content: Thought content normal.         Judgment: Judgment normal.       Preliminary reading left foot xray  Questionable fracture base of 5th toe - + point  "tenderness  Waiting on final read       Orthopedic injury treatment    Date/Time: 12/2/2024 8:57 AM    Performed by: COLTON Gilmore  Authorized by: COLTON Gilmore    Patient Location:  [facility] Protocol:  Procedure performed by: (Freedom LUCERO)  Consent: The procedure was performed in an emergent situation. Verbal consent obtained. Written consent obtained.  Risks and benefits: risks, benefits and alternatives were discussed  Consent given by: patient  Time out: Immediately prior to procedure a \"time out\" was called to verify the correct patient, procedure, equipment, support staff and site/side marked as required.  Timeout called at: 12/2/2024 8:57 AM.  Patient understanding: patient states understanding of the procedure being performed  Patient consent: the patient's understanding of the procedure matches consent given  Procedure consent: procedure consent matches procedure scheduled  Relevant documents: relevant documents present and verified  Test results: test results available and properly labeled  Site marked: the operative site was marked  Radiology Images displayed and confirmed. If images not available, report reviewed: imaging studies available  Required items: required blood products, implants, devices, and special equipment available  Patient identity confirmed: verbally with patient and hospital-assigned identification number    Injury location:  Foot  Location details:  Right foot  Injury type:  Fracture  Fracture type: fifth metatarsal    Neurovascular status: Neurovascularly intact    Distal perfusion: normal    Neurological function: normal    Range of motion: reduced    Immobilization:  Other (comment) (post op shoe)  Neurovascular status: Neurovascularly intact    Distal perfusion: normal    Neurological function: normal    Range of motion: unchanged    Range of motion comment:  Restricted movement due to post op shoe  Patient tolerance:  Patient tolerated the " procedure well with no immediate complications

## 2024-12-19 ENCOUNTER — APPOINTMENT (OUTPATIENT)
Dept: RADIOLOGY | Facility: CLINIC | Age: 64
End: 2024-12-19
Payer: MEDICARE

## 2024-12-19 ENCOUNTER — PREP FOR PROCEDURE (OUTPATIENT)
Dept: PODIATRY | Facility: CLINIC | Age: 64
End: 2024-12-19

## 2024-12-19 ENCOUNTER — OFFICE VISIT (OUTPATIENT)
Dept: PODIATRY | Facility: CLINIC | Age: 64
End: 2024-12-19
Payer: MEDICARE

## 2024-12-19 VITALS — WEIGHT: 238 LBS | BODY MASS INDEX: 44.97 KG/M2

## 2024-12-19 DIAGNOSIS — S99.191A CLOSED FRACTURE OF BASE OF FIFTH METATARSAL BONE OF RIGHT FOOT AT METAPHYSEAL-DIAPHYSEAL JUNCTION, INITIAL ENCOUNTER: Primary | ICD-10-CM

## 2024-12-19 DIAGNOSIS — S92.901A CLOSED FRACTURE OF RIGHT FOOT, INITIAL ENCOUNTER: ICD-10-CM

## 2024-12-19 DIAGNOSIS — S92.901A CLOSED FRACTURE OF RIGHT FOOT, INITIAL ENCOUNTER: Primary | ICD-10-CM

## 2024-12-19 PROCEDURE — 73630 X-RAY EXAM OF FOOT: CPT

## 2024-12-19 PROCEDURE — 99214 OFFICE O/P EST MOD 30 MIN: CPT | Performed by: PODIATRIST

## 2024-12-19 RX ORDER — MIRABEGRON 25 MG/1
TABLET, FILM COATED, EXTENDED RELEASE ORAL DAILY
COMMUNITY
Start: 2024-09-25

## 2024-12-19 RX ORDER — CHLORHEXIDINE GLUCONATE ORAL RINSE 1.2 MG/ML
15 SOLUTION DENTAL ONCE
OUTPATIENT
Start: 2024-12-19 | End: 2024-12-19

## 2024-12-19 RX ORDER — CHLORHEXIDINE GLUCONATE 40 MG/ML
SOLUTION TOPICAL DAILY PRN
OUTPATIENT
Start: 2024-12-19

## 2024-12-19 NOTE — PROGRESS NOTES
Name: Sussy Jung      : 1960      MRN: [id number]  Encounter Provider: Chip Clinton DPM  Encounter Date: 2024   Encounter department: [facility] [place]  :  Assessment & Plan  Closed fracture of base of fifth metatarsal bone of right foot at metaphyseal-diaphyseal junction, initial encounter    Orders:    XR foot 3+ vw right; Future    -X-rays 3 views weightbearing taken reviewed of the right foot and do show Agrcia fracture in the watershed area.  - Due to location, I would recommend intramedullary screw fixation  - She does ambulate with a cane and I would recommend switching to a walker postop with ambulation in boot  - She will hopefully be able to return to a shoe at 6-week postop with suture removal at 2 weeks  - We will discharge her from the hospital and posterior splint and transition to a cam boot at the first postoperative appointment.  -We did discuss of nonunion if we do not proceed, we also discussed potential sural nerve injury    Patient was counseled and educated on the condition and the diagnosis. The diagnosis, treatment options and prognosis were discussed with the patient.  The patient failed conservative care at this time and wished to proceed with surgical treatment.  Explained surgical details and post-op course.  Discussed all risks and complications related to the patient's condition and surgery.  The benefits of surgery were also discussed.  The patient understood that the surgery would not guarantee desirable outcome.  All questions and concerns were addressed and the consent was signed.            History of Present Illness   HPI  Sussy Jung is a 64 y.o. female who presents evaluation and management of her right foot.  She is here for right foot pain, she was told that she has a fracture and is here to evaluate this fracture and she is ambulating in a surgical shoe and having some significant pain.          Review of Systems    Constitutional:  Negative for chills and fever.   HENT:  Negative for ear pain and sore throat.    Eyes:  Negative for pain and visual disturbance.   Respiratory:  Negative for cough and shortness of breath.    Cardiovascular:  Negative for chest pain and palpitations.   Gastrointestinal:  Negative for abdominal pain and vomiting.   Genitourinary:  Negative for dysuria and hematuria.   Musculoskeletal:  Negative for arthralgias and back pain.   Skin:  Negative for color change and rash.   Neurological:  Negative for seizures and syncope.   All other systems reviewed and are negative.         Objective   Wt 108 kg (238 lb)   BMI 44.97 kg/m²      Physical Exam  Vitals reviewed.   Constitutional:       Appearance: Normal appearance.   HENT:      Head: Normocephalic and atraumatic.      Nose: Nose normal.      Mouth/Throat:      Mouth: Mucous membranes are moist.   Eyes:      Pupils: Pupils are equal, round, and reactive to light.   Pulmonary:      Effort: Pulmonary effort is normal.   Musculoskeletal:         General: Swelling, tenderness and signs of injury present.      Comments: Pain with palpation along the base of the right fifth metatarsal along the Garcia fracture area.  There is some guarding with use of peroneals.  DP and PT pulses are lightly palpable plus 1 out of 4.   Skin:     Capillary Refill: Capillary refill takes less than 2 seconds.   Neurological:      General: No focal deficit present.      Mental Status: She is alert and oriented to person, place, and time. Mental status is at baseline.   Psychiatric:         Mood and Affect: Mood normal.

## 2024-12-20 ENCOUNTER — TELEPHONE (OUTPATIENT)
Dept: OBGYN CLINIC | Facility: CLINIC | Age: 64
End: 2024-12-20

## 2024-12-20 NOTE — TELEPHONE ENCOUNTER
Called pt to discuss available sx dates, go over sx details and answer any questions pt may have. Patient expressed verbal understanding and I will call pt on Monday with her pre-op appt time. Nothing further at this time thank you.

## 2024-12-23 NOTE — TELEPHONE ENCOUNTER
Caller: Self    Doctor: Da    Reason for call: Patient returning call, advised of appt on 12/31 at 930, no additional questions     Call back#: [phone]

## 2024-12-24 RX ORDER — TACROLIMUS 0.3 MG/G
OINTMENT TOPICAL 2 TIMES DAILY
COMMUNITY

## 2024-12-24 NOTE — PRE-PROCEDURE INSTRUCTIONS
Pre-Surgery Instructions:   Medication Instructions    acetaminophen (TYLENOL) 160 mg/5 mL suspension Uses PRN- OK to take day of surgery    BELBUCA 150 MCG FILM Msg'd soc for instruction. Pt was instructed to take DOS unless different from soc    betamethasone dipropionate (DIPROSONE) 0.05 % cream Hold after surgical showers    busPIRone (BUSPAR) 15 mg tablet Take day of surgery.    BUSPIRONE HCL PO Take night before surgery    cholecalciferol (VITAMIN D3) 1,000 units tablet Stop taking 7 days prior to surgery.    cyclobenzaprine (FLEXERIL) 5 mg tablet Uses PRN- OK to take day of surgery    furosemide (LASIX) 20 mg tablet Hold day of surgery.    Jardiance 10 MG TABS tablet Stop taking 4 days prior to surgery.    levothyroxine 100 mcg tablet Take day of surgery.    LINZESS 145 MCG CAPS Uses PRN- OK to take day of surgery    Melatonin 10 MG CAPS Take night before surgery    Myrbetriq 25 MG TB24 Hold day of surgery.    ondansetron (ZOFRAN) 4 mg tablet Uses PRN- OK to take day of surgery    oxybutynin (DITROPAN-XL) 10 MG 24 hr tablet Hold day of surgery.    oxyCODONE-acetaminophen (PERCOCET)  mg per tablet Take day of surgery.    pantoprazole (PROTONIX) 40 mg tablet Take day of surgery.    potassium chloride (Klor-Con) 10 mEq tablet Hold day of surgery.    tacrolimus (PROTOPIC) 0.03 % ointment Hold after surgical showers      Medication instructions for day surgery reviewed. Please use only a sip of water to take your instructed medications. Avoid all over the counter vitamins, supplements and NSAIDS for one week prior to surgery per anesthesia guidelines. Tylenol is ok to take as needed.     You will receive a call one business day prior to surgery with an arrival time and hospital directions. If your surgery is scheduled on a Monday, the hospital will be calling you on the Friday prior to your surgery. If you have not heard from anyone by 8pm, please call the hospital supervisor through the hospital  at  [phone]. ([place] [phone] or [place] [phone]).    Do not eat or drink anything after midnight the night before your surgery, including candy, mints, lifesavers, or chewing gum. Do not drink alcohol 24hrs before your surgery. Try not to smoke at least 24hrs before your surgery.       Follow the pre surgery showering instructions as listed in the “My Surgical Experience Booklet” or otherwise provided by your surgeon's office. Do not use a blade to shave the surgical area 1 week before surgery. It is okay to use a clean electric clippers up to 24 hours before surgery. Do not apply any lotions, creams, including makeup, cologne, deodorant, or perfumes after showering on the day of your surgery. Do not use dry shampoo, hair spray, hair gel, or any type of hair products.     No contact lenses, eye make-up, or artificial eyelashes. Remove nail polish, including gel polish, and any artificial, gel, or acrylic nails if possible. Remove all jewelry including rings and body piercing jewelry.     Wear causal clothing that is easy to take on and off. Consider your type of surgery.    Keep any valuables, jewelry, piercings at home. Please bring any specially ordered equipment (sling, braces) if indicated.    Arrange for a responsible person to drive you to and from the hospital on the day of your surgery. Please confirm the visitor policy for the day of your procedure when you receive your phone call with an arrival time.     Call the surgeon's office with any new illnesses, exposures, or additional questions prior to surgery.    Please reference your “My Surgical Experience Booklet” for additional information to prepare for your upcoming surgery.

## 2024-12-26 ENCOUNTER — APPOINTMENT (OUTPATIENT)
Age: 64
End: 2024-12-26
Payer: MEDICARE

## 2024-12-26 DIAGNOSIS — N18.30 STAGE 3 CHRONIC KIDNEY DISEASE, UNSPECIFIED WHETHER STAGE 3A OR 3B CKD (HCC): ICD-10-CM

## 2024-12-26 DIAGNOSIS — Z79.899 NEED FOR PROPHYLACTIC CHEMOTHERAPY: ICD-10-CM

## 2024-12-26 DIAGNOSIS — I51.9 MYXEDEMA HEART DISEASE: ICD-10-CM

## 2024-12-26 DIAGNOSIS — E03.9 MYXEDEMA HEART DISEASE: ICD-10-CM

## 2024-12-26 LAB
ALBUMIN SERPL BCG-MCNC: 3.7 G/DL (ref 3.5–5)
ALP SERPL-CCNC: 92 U/L (ref 34–104)
ALT SERPL W P-5'-P-CCNC: 8 U/L (ref 7–52)
ANION GAP SERPL CALCULATED.3IONS-SCNC: 7 MMOL/L (ref 4–13)
AST SERPL W P-5'-P-CCNC: 16 U/L (ref 13–39)
BILIRUB SERPL-MCNC: 0.74 MG/DL (ref 0.2–1)
BUN SERPL-MCNC: 16 MG/DL (ref 5–25)
CALCIUM SERPL-MCNC: 8.7 MG/DL (ref 8.4–10.2)
CHLORIDE SERPL-SCNC: 100 MMOL/L (ref 96–108)
CO2 SERPL-SCNC: 30 MMOL/L (ref 21–32)
CREAT SERPL-MCNC: 1.15 MG/DL (ref 0.6–1.3)
ERYTHROCYTE [DISTWIDTH] IN BLOOD BY AUTOMATED COUNT: 14.6 % (ref 11.6–15.1)
GFR SERPL CREATININE-BSD FRML MDRD: 50 ML/MIN/1.73SQ M
GLUCOSE P FAST SERPL-MCNC: 80 MG/DL (ref 65–99)
HCT VFR BLD AUTO: 39.5 % (ref 34.8–46.1)
HGB BLD-MCNC: 12.3 G/DL (ref 11.5–15.4)
MCH RBC QN AUTO: 29.3 PG (ref 26.8–34.3)
MCHC RBC AUTO-ENTMCNC: 31.1 G/DL (ref 31.4–37.4)
MCV RBC AUTO: 94 FL (ref 82–98)
PLATELET # BLD AUTO: 286 THOUSANDS/UL (ref 149–390)
PMV BLD AUTO: 11.8 FL (ref 8.9–12.7)
POTASSIUM SERPL-SCNC: 3.8 MMOL/L (ref 3.5–5.3)
PROT SERPL-MCNC: 6.9 G/DL (ref 6.4–8.4)
RBC # BLD AUTO: 4.2 MILLION/UL (ref 3.81–5.12)
SODIUM SERPL-SCNC: 137 MMOL/L (ref 135–147)
T4 FREE SERPL-MCNC: 1.07 NG/DL (ref 0.61–1.12)
TSH SERPL DL<=0.05 MIU/L-ACNC: 2.41 UIU/ML (ref 0.45–4.5)
WBC # BLD AUTO: 4.34 THOUSAND/UL (ref 4.31–10.16)

## 2024-12-26 PROCEDURE — 80053 COMPREHEN METABOLIC PANEL: CPT

## 2024-12-26 PROCEDURE — 84439 ASSAY OF FREE THYROXINE: CPT

## 2024-12-26 PROCEDURE — 84443 ASSAY THYROID STIM HORMONE: CPT

## 2024-12-26 PROCEDURE — 36415 COLL VENOUS BLD VENIPUNCTURE: CPT

## 2024-12-26 PROCEDURE — 85027 COMPLETE CBC AUTOMATED: CPT

## 2025-01-02 ENCOUNTER — ANESTHESIA EVENT (OUTPATIENT)
Dept: PERIOP | Facility: HOSPITAL | Age: 65
End: 2025-01-02
Payer: MEDICARE

## 2025-01-03 ENCOUNTER — ANESTHESIA (OUTPATIENT)
Dept: PERIOP | Facility: HOSPITAL | Age: 65
End: 2025-01-03
Payer: MEDICARE

## 2025-01-03 ENCOUNTER — HOSPITAL ENCOUNTER (OUTPATIENT)
Facility: HOSPITAL | Age: 65
Setting detail: OUTPATIENT SURGERY
Discharge: HOME | End: 2025-01-03
Attending: PODIATRIST | Admitting: PODIATRIST
Payer: MEDICARE

## 2025-01-03 ENCOUNTER — APPOINTMENT (OUTPATIENT)
Dept: RADIOLOGY | Facility: HOSPITAL | Age: 65
End: 2025-01-03
Payer: MEDICARE

## 2025-01-03 VITALS
RESPIRATION RATE: 17 BRPM | DIASTOLIC BLOOD PRESSURE: 75 MMHG | TEMPERATURE: 98.6 F | HEART RATE: 54 BPM | SYSTOLIC BLOOD PRESSURE: 122 MMHG | BODY MASS INDEX: 44.18 KG/M2 | OXYGEN SATURATION: 99 % | WEIGHT: 234 LBS | HEIGHT: 61 IN

## 2025-01-03 DIAGNOSIS — S99.191A CLOSED FRACTURE OF BASE OF FIFTH METATARSAL BONE OF RIGHT FOOT AT METAPHYSEAL-DIAPHYSEAL JUNCTION, INITIAL ENCOUNTER: Primary | ICD-10-CM

## 2025-01-03 PROCEDURE — 28485 OPTX METATARSAL FX EACH: CPT | Performed by: PODIATRIST

## 2025-01-03 PROCEDURE — 73620 X-RAY EXAM OF FOOT: CPT

## 2025-01-03 PROCEDURE — C1769 GUIDE WIRE: HCPCS | Performed by: PODIATRIST

## 2025-01-03 PROCEDURE — 99024 POSTOP FOLLOW-UP VISIT: CPT | Performed by: PODIATRIST

## 2025-01-03 PROCEDURE — C1713 ANCHOR/SCREW BN/BN,TIS/BN: HCPCS | Performed by: PODIATRIST

## 2025-01-03 DEVICE — IMPLANTABLE DEVICE: Type: IMPLANTABLE DEVICE | Status: FUNCTIONAL

## 2025-01-03 RX ORDER — CHLORHEXIDINE GLUCONATE 40 MG/ML
SOLUTION TOPICAL DAILY PRN
Status: DISCONTINUED | OUTPATIENT
Start: 2025-01-03 | End: 2025-01-03 | Stop reason: HOSPADM

## 2025-01-03 RX ORDER — METOCLOPRAMIDE HYDROCHLORIDE 5 MG/ML
10 INJECTION INTRAMUSCULAR; INTRAVENOUS ONCE AS NEEDED
Status: DISCONTINUED | OUTPATIENT
Start: 2025-01-03 | End: 2025-01-03 | Stop reason: HOSPADM

## 2025-01-03 RX ORDER — EPHEDRINE SULFATE 50 MG/ML
INJECTION INTRAVENOUS AS NEEDED
Status: DISCONTINUED | OUTPATIENT
Start: 2025-01-03 | End: 2025-01-03

## 2025-01-03 RX ORDER — FENTANYL CITRATE 50 UG/ML
INJECTION, SOLUTION INTRAMUSCULAR; INTRAVENOUS AS NEEDED
Status: DISCONTINUED | OUTPATIENT
Start: 2025-01-03 | End: 2025-01-03

## 2025-01-03 RX ORDER — SCOLOPAMINE TRANSDERMAL SYSTEM 1 MG/1
1 PATCH, EXTENDED RELEASE TRANSDERMAL
Status: DISCONTINUED | OUTPATIENT
Start: 2025-01-03 | End: 2025-01-03 | Stop reason: HOSPADM

## 2025-01-03 RX ORDER — MIDAZOLAM HYDROCHLORIDE 2 MG/2ML
INJECTION, SOLUTION INTRAMUSCULAR; INTRAVENOUS AS NEEDED
Status: DISCONTINUED | OUTPATIENT
Start: 2025-01-03 | End: 2025-01-03

## 2025-01-03 RX ORDER — FENTANYL CITRATE/PF 50 MCG/ML
25 SYRINGE (ML) INJECTION
Status: DISCONTINUED | OUTPATIENT
Start: 2025-01-03 | End: 2025-01-03 | Stop reason: HOSPADM

## 2025-01-03 RX ORDER — ONDANSETRON 2 MG/ML
4 INJECTION INTRAMUSCULAR; INTRAVENOUS ONCE AS NEEDED
Status: DISCONTINUED | OUTPATIENT
Start: 2025-01-03 | End: 2025-01-03 | Stop reason: HOSPADM

## 2025-01-03 RX ORDER — PHENYLEPHRINE HCL IN 0.9% NACL 1 MG/10 ML
SYRINGE (ML) INTRAVENOUS AS NEEDED
Status: DISCONTINUED | OUTPATIENT
Start: 2025-01-03 | End: 2025-01-03

## 2025-01-03 RX ORDER — SODIUM CHLORIDE, SODIUM LACTATE, POTASSIUM CHLORIDE, CALCIUM CHLORIDE 600; 310; 30; 20 MG/100ML; MG/100ML; MG/100ML; MG/100ML
INJECTION, SOLUTION INTRAVENOUS CONTINUOUS PRN
Status: DISCONTINUED | OUTPATIENT
Start: 2025-01-03 | End: 2025-01-03

## 2025-01-03 RX ORDER — CHLORHEXIDINE GLUCONATE ORAL RINSE 1.2 MG/ML
15 SOLUTION DENTAL ONCE
Status: COMPLETED | OUTPATIENT
Start: 2025-01-03 | End: 2025-01-03

## 2025-01-03 RX ORDER — CEFAZOLIN SODIUM 2 G/50ML
2000 SOLUTION INTRAVENOUS ONCE
Status: COMPLETED | OUTPATIENT
Start: 2025-01-03 | End: 2025-01-03

## 2025-01-03 RX ORDER — DEXAMETHASONE SODIUM PHOSPHATE 10 MG/ML
INJECTION, SOLUTION INTRAMUSCULAR; INTRAVENOUS AS NEEDED
Status: DISCONTINUED | OUTPATIENT
Start: 2025-01-03 | End: 2025-01-03

## 2025-01-03 RX ORDER — OXYCODONE AND ACETAMINOPHEN 5; 325 MG/1; MG/1
1 TABLET ORAL EVERY 4 HOURS PRN
Qty: 20 TABLET | Refills: 0 | Status: SHIPPED | OUTPATIENT
Start: 2025-01-03

## 2025-01-03 RX ORDER — ROPIVACAINE HYDROCHLORIDE 5 MG/ML
INJECTION, SOLUTION EPIDURAL; INFILTRATION; PERINEURAL
Status: DISCONTINUED | OUTPATIENT
Start: 2025-01-03 | End: 2025-01-03

## 2025-01-03 RX ORDER — PROPOFOL 10 MG/ML
INJECTION, EMULSION INTRAVENOUS AS NEEDED
Status: DISCONTINUED | OUTPATIENT
Start: 2025-01-03 | End: 2025-01-03

## 2025-01-03 RX ORDER — GLYCOPYRROLATE 0.2 MG/ML
INJECTION INTRAMUSCULAR; INTRAVENOUS AS NEEDED
Status: DISCONTINUED | OUTPATIENT
Start: 2025-01-03 | End: 2025-01-03

## 2025-01-03 RX ORDER — SODIUM CHLORIDE, SODIUM LACTATE, POTASSIUM CHLORIDE, CALCIUM CHLORIDE 600; 310; 30; 20 MG/100ML; MG/100ML; MG/100ML; MG/100ML
100 INJECTION, SOLUTION INTRAVENOUS CONTINUOUS
Status: CANCELLED | OUTPATIENT
Start: 2025-01-03 | End: 2025-01-03

## 2025-01-03 RX ORDER — LIDOCAINE HYDROCHLORIDE 10 MG/ML
INJECTION, SOLUTION EPIDURAL; INFILTRATION; INTRACAUDAL; PERINEURAL AS NEEDED
Status: DISCONTINUED | OUTPATIENT
Start: 2025-01-03 | End: 2025-01-03

## 2025-01-03 RX ORDER — ONDANSETRON 2 MG/ML
INJECTION INTRAMUSCULAR; INTRAVENOUS AS NEEDED
Status: DISCONTINUED | OUTPATIENT
Start: 2025-01-03 | End: 2025-01-03

## 2025-01-03 RX ADMIN — ONDANSETRON 4 MG: 2 INJECTION INTRAMUSCULAR; INTRAVENOUS at 08:37

## 2025-01-03 RX ADMIN — EPHEDRINE SULFATE 10 MG: 50 INJECTION, SOLUTION INTRAVENOUS at 08:30

## 2025-01-03 RX ADMIN — MIDAZOLAM 2 MG: 1 INJECTION INTRAMUSCULAR; INTRAVENOUS at 07:38

## 2025-01-03 RX ADMIN — SCOPOLAMINE 1 PATCH: 1 SYSTEM TRANSDERMAL at 07:42

## 2025-01-03 RX ADMIN — ROPIVACAINE HYDROCHLORIDE 20 ML: 5 INJECTION, SOLUTION EPIDURAL; INFILTRATION; PERINEURAL at 07:41

## 2025-01-03 RX ADMIN — LIDOCAINE HYDROCHLORIDE 50 MG: 10 INJECTION, SOLUTION EPIDURAL; INFILTRATION; INTRACAUDAL; PERINEURAL at 07:56

## 2025-01-03 RX ADMIN — CEFAZOLIN SODIUM 2000 MG: 2 SOLUTION INTRAVENOUS at 07:42

## 2025-01-03 RX ADMIN — PROPOFOL 100 MCG/KG/MIN: 10 INJECTION, EMULSION INTRAVENOUS at 07:59

## 2025-01-03 RX ADMIN — GLYCOPYRROLATE 0.2 MG: 0.2 INJECTION, SOLUTION INTRAMUSCULAR; INTRAVENOUS at 08:13

## 2025-01-03 RX ADMIN — CEFAZOLIN SODIUM 2000 MG: 2 SOLUTION INTRAVENOUS at 08:01

## 2025-01-03 RX ADMIN — DEXAMETHASONE SODIUM PHOSPHATE 10 MG: 10 INJECTION, SOLUTION INTRAMUSCULAR; INTRAVENOUS at 08:12

## 2025-01-03 RX ADMIN — Medication 100 MCG: at 08:17

## 2025-01-03 RX ADMIN — PROPOFOL 150 MG: 10 INJECTION, EMULSION INTRAVENOUS at 07:56

## 2025-01-03 RX ADMIN — FENTANYL CITRATE 25 MCG: 50 INJECTION INTRAMUSCULAR; INTRAVENOUS at 09:06

## 2025-01-03 RX ADMIN — FENTANYL CITRATE 25 MCG: 50 INJECTION INTRAMUSCULAR; INTRAVENOUS at 09:15

## 2025-01-03 RX ADMIN — CHLORHEXIDINE GLUCONATE 15 ML: 1.2 SOLUTION ORAL at 07:05

## 2025-01-03 RX ADMIN — FENTANYL CITRATE 50 MCG: 50 INJECTION, SOLUTION INTRAMUSCULAR; INTRAVENOUS at 07:54

## 2025-01-03 RX ADMIN — SODIUM CHLORIDE, SODIUM LACTATE, POTASSIUM CHLORIDE, AND CALCIUM CHLORIDE: .6; .31; .03; .02 INJECTION, SOLUTION INTRAVENOUS at 07:18

## 2025-01-03 NOTE — INTERVAL H&P NOTE
H&P reviewed. After examining the patient I find no changes in the patients condition since the H&P had been written.    Vitals:    01/03/25 0654   BP: 112/56   Pulse: 65   Resp: 17   Temp: 98.5 °F (36.9 °C)   SpO2: 99%

## 2025-01-03 NOTE — DISCHARGE SUMMARY
Discharge Summary Outpatient Procedure Podiatry -   Sussy Jung 64 y.o. female MRN: [id number]  Unit/Bed#: OR POOL Encounter: [id number]    Admission Date: 1/3/2025     Admitting Diagnosis: Closed fracture of right foot, initial encounter [S92.901A]    Discharge Diagnosis: same    Procedures Performed: OPEN REDUCTION W/ INTERNAL FIXATION (ORIF) FOOT, R Garcia fracture: 90794 (CPT®)    Complications: none    Condition at Discharge: stable    Discharge instructions/Information to patient and family:   See after visit summary for information provided to patient and family.      Provisions for Follow-Up Care/Important appointments:  See after visit summary for information related to follow-up care and any pertinent home health orders.      Discharge Medications:  See after visit summary for reconciled discharge medications provided to patient and family.

## 2025-01-03 NOTE — ANESTHESIA PROCEDURE NOTES
Peripheral Block    Patient location during procedure: holding area  Start time: 1/3/2025 7:41 AM  Reason for block: at surgeon's request and post-op pain management  Staffing  Performed by: Eriberto Bass MD  Authorized by: Eriberto Bass MD    Preanesthetic Checklist  Completed: patient identified, IV checked, site marked, risks and benefits discussed, surgical consent, monitors and equipment checked, pre-op evaluation and timeout performed  Peripheral Block  Patient position: left lateral  Prep: ChloraPrep  Patient monitoring: frequent blood pressure checks, continuous pulse oximetry and heart rate  Block type: Popliteal  Laterality: right  Injection technique: single-shot  Procedures: ultrasound guided, Ultrasound guidance required for the procedure to increase accuracy and safety of medication placement and decrease risk of complications.  Ultrasound permanent image saved  ropivacaine (NAROPIN) 0.5 % injection 20 mL - Perineural   20 mL - 1/3/2025 7:41:00 AM  Needle  Needle type: Stimuplex   Needle gauge: 20 G  Needle length: 6 in  Needle localization: anatomical landmarks and ultrasound guidance  Assessment  Injection assessment: incremental injection, frequent aspiration, injected with ease, negative aspiration, negative for heart rate change, no paresthesia on injection, no symptoms of intraneural/intravenous injection and needle tip visualized at all times  Paresthesia pain: none  Post-procedure:  site cleaned  patient tolerated the procedure well with no immediate complications

## 2025-01-03 NOTE — DISCHARGE INSTR - AVS FIRST PAGE
[facility]  Dr. Clinton  Post-Operative Instructions    1. Take your prescribed medication as needed. You can take ibuprofen in between doses of the narcotic if needed.   2. Upon arrival at home, lie down and elevate your surgical foot on 2 pillows.  3. Remain quiet, off your feet as much as possible, for the first 24-48 hours. This is when your feet first swell and may become painful. After 48 hours you may begin limited walking following these restrictions: weight bearing as tolerated to your right foot. Please use your walking boot and rolling walker to help protect your surgical foot      4. Drink large quantities of water. Consume no alcohol. Continue a well-balanced diet.  5. Report any unusual discomfort or fever to this office.  6. A limited amount of discomfort and swelling is to be expected. In some cases the skin may take on a bruised appearance. The surgical solution that was applied to your foot prior to the operation is dark in color and the operation site may appear to be oozing when it actually is not.  7. A slight amount of blood is to be expected, and is no cause for alarm. Do not remove the dressings. If there is active bleeding and if the bleeding persists, add additional gauze to the bandage, apply direct pressure, elevate your feet and call this office.  8. Do not get the dressings wet. As regular bathing may be inconvenient, sponge baths are recommended. If you shower, make sure the dressing stays dry.   9. When anesthesia wears off and if any discomfort should be present, apply an ice pack directly over the operated area for 15 minute intervals for several hours or until the pain leaves. (USE IN EXCESS OF 15 MINUTES COULD CAUSE FROSTBITE). Do not use hot water bags or electric pads. A convenient icepack can be made by placing ice cubes in a plastic bag and covering this with a towel.  10. If necessary, take a mild laxative before retiring.  11. Wear your special open shoes anytime  you put weight on your foot, even if it is just to walk to the bathroom and back. It will probably be 2 or 3 weeks before you will be permitted to try regular shoes.  12. Having performed the operation, we are interested in a prompt recovery. Please cooperate by following the above instructions.  13. Please call to confirm your post-op appointment or call with any other questions.

## 2025-01-03 NOTE — ANESTHESIA PREPROCEDURE EVALUATION
Procedure:  OPEN REDUCTION W/ INTERNAL FIXATION (ORIF) FOOT, R Garcia fracture (Right: Foot)    Relevant Problems   ANESTHESIA (within normal limits)      CARDIO   (+) Hypercholesterolemia   (+) Paroxysmal atrial fibrillation (HCC)      ENDO   (+) Hypothyroidism      GI/HEPATIC  NPO confirmed  BMI 44.2   (+) GERD (gastroesophageal reflux disease)   (+) Other dysphagia      /RENAL (within normal limits)      HEMATOLOGY (within normal limits)      NEURO/PSYCH (within normal limits)   (-) CVA (cerebral vascular accident) (HCC)   (-) Seizures (HCC)      PULMONARY (within normal limits)   (-) Asthma   (-) Sleep apnea   (-) URI (upper respiratory infection)      Allergies   Allergen Reactions    Nsaids      Due to gastric bypass    Sulfate     Ms Contin [Morphine] Rash     Social History     Tobacco Use    Smoking status: Former     Current packs/day: 0.00     Types: Cigarettes     Start date: 1996     Quit date: 2001     Years since quittin.1    Smokeless tobacco: Never    Tobacco comments:     Quit 15 years ago. 4 cigarettes daily   Vaping Use    Vaping status: Never Used   Substance Use Topics    Alcohol use: No    Drug use: No     Current Outpatient Medications   Medication Instructions    acetaminophen (TYLENOL) 320 mg, Oral, Every 4 hours PRN    ammonium lactate (LAC-HYDRIN) 12 % lotion Topical, Daily, To dry skin of bilateral lower legs    BELBUCA 150 MCG FILM BID SL    betamethasone dipropionate (DIPROSONE) 0.05 % cream Topical, 2 times daily    betamethasone, augmented, (DIPROLENE-AF) 0.05 % cream     busPIRone (BUSPAR) 15 mg, Daily    BUSPIRONE HCL PO 20 mg, Daily at bedtime    cholecalciferol (VITAMIN D3) 1,000 Units, 2 times daily    cyclobenzaprine (FLEXERIL) 5 mg, 3 times daily PRN    furosemide (LASIX) 20 mg tablet furosemide 20 mg tablet daily    Jardiance 10 mg, Daily    levothyroxine 100 mcg, Daily    LINZESS 145 MCG CAPS No dose, route, or frequency recorded.    Melatonin 10 mg, Daily  at bedtime    Myrbetriq 25 MG TB24 Daily    nystatin powder 2 times daily    ondansetron (ZOFRAN) 4 mg, Every 12 hours PRN    oxybutynin (DITROPAN-XL) 10 mg, Daily    oxyCODONE-acetaminophen (Percocet) 5-325 mg per tablet 1 tablet, Oral, Every 4 hours PRN    pantoprazole (PROTONIX) 40 mg, Daily    potassium chloride (Klor-Con) 10 mEq tablet 10 mEq, Daily PRN    scopolamine (TRANSDERM-SCOP) 1 mg/3 days TD 72 hr patch 1 patch    tacrolimus (PROTOPIC) 0.03 % ointment 2 times daily     Lab Results   Component Value Date    WBC 4.34 12/26/2024    HGB 12.3 12/26/2024    HCT 39.5 12/26/2024     12/26/2024    SODIUM 137 12/26/2024    K 3.8 12/26/2024     12/26/2024    CO2 30 12/26/2024    BUN 16 12/26/2024    CREATININE 1.15 12/26/2024    GLUC 81 07/01/2024    HGBA1C 5.5 05/19/2024    AST 16 12/26/2024    ALT 8 12/26/2024    ALKPHOS 92 12/26/2024    TBILI 0.74 12/26/2024    ALB 3.7 12/26/2024    PROTIME 12.0 05/27/2019    PTT 33 05/27/2019    INR 1.1 05/20/2024       Physical Exam    Airway    Mallampati score: II  TM Distance: >3 FB  Neck ROM: full     Dental    upper dentures and lower dentures    Cardiovascular  Rhythm: regular, Rate: normal, Cardiovascular exam normal    Pulmonary  Pulmonary exam normal Breath sounds clear to auscultation    Other Findings  post-pubertal.      Anesthesia Plan  ASA Score- 3     Anesthesia Type- general with ASA Monitors.         Additional Monitors:     Airway Plan: LMA.    Comment: Consent obtained for single shot Right popliteal block for post-op pain management. Discussed risks including bleeding, infection, nerve damage, local anesthetic systemic toxicity and failure..       Plan Factors-    Chart reviewed.   Existing labs reviewed. Patient summary reviewed.    Patient is not a current smoker. Patient not instructed to abstain from smoking on day of procedure. Patient did not smoke on day of surgery.            Induction- intravenous.    Postoperative Plan- Plan for  postoperative opioid use.     Perioperative Resuscitation Plan - Level 1 - Full Code.       Informed Consent- Anesthetic plan and risks discussed with patient.  I personally reviewed this patient with the CRNA. Discussed and agreed on the Anesthesia Plan with the CRNA..

## 2025-01-03 NOTE — ANESTHESIA POSTPROCEDURE EVALUATION
Post-Op Assessment Note    CV Status:  Stable  Pain Score: 0    Pain management: adequate       Mental Status:  Arousable   PONV Controlled:  None   Airway Patency:  Patent     Post Op Vitals Reviewed: Yes    No anethesia notable event occurred.    Staff: CRNA       Last Filed PACU Vitals:  Vitals Value Taken Time   Temp 98.6 °F (37 °C) 01/03/25 0848   Pulse 67 01/03/25 0849   /57 01/03/25 0848   Resp 18    SpO2 100 % 01/03/25 0849   Vitals shown include unfiled device data.

## 2025-01-03 NOTE — OP NOTE
OPERATIVE REPORT  PATIENT NAME: Sussy Jung    :  1960  MRN: [id number]  Pt Location: [facility] OR ROOM 03    SURGERY DATE: 1/3/2025    Surgeons and Role:     * Chip Clinton, YULI - Primary     * Zain Snyder DPM - Assisting    Preop Diagnosis:  Closed fracture of right foot, initial encounter [S92.350P]    Post-Op Diagnosis Codes:     * Closed fracture of right foot, initial encounter [S92.222S]    Procedure(s):  Right - OPEN REDUCTION W/ INTERNAL FIXATION (ORIF) FOOT. R Garcia fracture    Specimen(s):  * No specimens in log *    Estimated Blood Loss:   Minimal    Drains:  * No LDAs found *    Anesthesia Type:   Conscious Sedation     Operative Indications:  Closed fracture of right foot, initial encounter [B12.670X]      Operative Findings:  Consistent with above      Complications:   None    Procedure and Technique:    Under mild sedation, the patient was brought into the operating room and placed on the operating room table in the supine position. A pneumatic calf tourniquet was then placed around the patient's right calf with ample webril padding but never inflated. A time out was performed to confirm the correct patient, procedure and site with all parties in agreement. The foot was then scrubbed, prepped and draped in the usual aseptic manner.     Attention was directed to the right foot where a smooth guide pin was placed along the lateral foot in line with long axis of the fifth metatarsal, and following percutaneous insertion proximal to the metatarsal base was tapped and advanced with a mallet under image using lateral and AP views, then once alignment noted was driven under power across the fracture site distally under C-arm guidance.  Once adequate positioning and alignment of guide wire was noted a stab incision was made with 15 blade and soft tissue was reflected from the metatarsal base and then measurement was obtained.  Under standard  technique drilling was  made using the guide pin, and then tapped.  The pin was removed and a solid screw was utilized as fixation across fracture site.  Final position was confirmed via multiple views on C-arm and was noted to be adequate with good compression and fixation appreciated.     Surgical wound was irrigated with copious amounts of normal sterile saline.  Skin was closed with 3-0 nylon in simple interrupted technique.  Incision site was dressed with Xeroform, 4 x 4 gauze, Rosey, Webril, Ace bandage.  Patient tolerated procedure anesthesia well and was transported to PACU with all vitals stable and neurovascular status at preop baseline.     Dr. Clinton was present for the entire procedure.    Patient Disposition:  PACU              SIGNATURE: Zain Snyder DPM  DATE: January 3, 2025  TIME: 8:50 AM

## 2025-01-06 NOTE — ANESTHESIA POSTPROCEDURE EVALUATION
Post-Op Assessment Note    CV Status:  Stable  Pain Score: 0    Pain management: adequate       Mental Status:  Arousable   PONV Controlled:  None   Airway Patency:  Patent     Post Op Vitals Reviewed: Yes    No anethesia notable event occurred.    Staff: CRNA         Last Filed PACU Vitals:  Vitals Value Taken Time   Temp 98.6 °F (37 °C) 01/03/25 0848   Pulse 67 01/03/25 0849   /57 01/03/25 0848   Resp 18    SpO2 100 % 01/03/25 0849   Vitals shown include unfiled device data.    Modified Kevon:     Vitals Value Taken Time   Activity 2 01/03/25 0925   Respiration 2 01/03/25 0925   Circulation 2 01/03/25 0925   Consciousness 2 01/03/25 0925   Oxygen Saturation 2 01/03/25 0925     Modified Kevon Score: 10

## 2025-01-16 ENCOUNTER — OFFICE VISIT (OUTPATIENT)
Age: 65
End: 2025-01-16

## 2025-01-16 VITALS — BODY MASS INDEX: 44.18 KG/M2 | WEIGHT: 234 LBS | HEIGHT: 61 IN

## 2025-01-16 DIAGNOSIS — S99.191A CLOSED FRACTURE OF BASE OF FIFTH METATARSAL BONE OF RIGHT FOOT AT METAPHYSEAL-DIAPHYSEAL JUNCTION, INITIAL ENCOUNTER: Primary | ICD-10-CM

## 2025-01-16 PROCEDURE — 99024 POSTOP FOLLOW-UP VISIT: CPT | Performed by: PODIATRIST

## 2025-01-16 NOTE — PROGRESS NOTES
Assessment/Plan:      Diagnoses and all orders for this visit:    Closed fracture of base of fifth metatarsal bone of right foot at metaphyseal-diaphyseal junction, initial encounter        -Doing very well 2-week status post surgical intervention, sutures removed today  - She may cancel her appointment next week and be reappointed at 5 weeks  - She is to be weightbearing as tolerated in cam boot at all times  - She is to return in 5 weeks for final x-rays and hopeful transition to a shoe  - She is to also use her cane to offload this area  - I did discuss that this is a little bit more risky postoperative course and keeping her nonweightbearing but we did discuss that she most likely cannot be nonweightbearing due to a stability standpoint      Subjective:     Patient ID: Sussy Jung is a 64 y.o. female.    Presents for evaluation and management of her right foot ambulating in surgical shoe.  She has no lancing at the [employer] but wore this to come to the appointment today.  Has no new pedal complaints was very happy with her surgical experience and is having no pain she did keep this area covered no other issues.        Review of Systems   Constitutional:  Negative for chills and fever.   HENT:  Negative for ear pain and sore throat.    Eyes:  Negative for pain and visual disturbance.   Respiratory:  Negative for cough and shortness of breath.    Cardiovascular:  Negative for chest pain and palpitations.   Gastrointestinal:  Negative for abdominal pain and vomiting.   Genitourinary:  Negative for dysuria and hematuria.   Musculoskeletal:  Negative for arthralgias and back pain.   Skin:  Negative for color change and rash.   Neurological:  Negative for seizures and syncope.   All other systems reviewed and are negative.        Objective:     Physical Exam  Skin:     Comments: No evidence of dehiscence following suture removal today, normal postoperative parents, minimal ecchymosis.  Some slight soft tissue  swelling, no pain.  Range of motion intact

## 2025-01-20 ENCOUNTER — TELEPHONE (OUTPATIENT)
Dept: PODIATRY | Facility: CLINIC | Age: 65
End: 2025-01-20

## 2025-02-14 ENCOUNTER — OFFICE VISIT (OUTPATIENT)
Dept: PODIATRY | Facility: CLINIC | Age: 65
End: 2025-02-14
Payer: MEDICARE

## 2025-02-14 ENCOUNTER — APPOINTMENT (OUTPATIENT)
Dept: RADIOLOGY | Facility: CLINIC | Age: 65
End: 2025-02-14
Payer: MEDICARE

## 2025-02-14 VITALS — HEIGHT: 61 IN | WEIGHT: 234 LBS | BODY MASS INDEX: 44.18 KG/M2

## 2025-02-14 DIAGNOSIS — I87.2 VENOUS INSUFFICIENCY: ICD-10-CM

## 2025-02-14 DIAGNOSIS — S92.901A CLOSED FRACTURE OF RIGHT FOOT, INITIAL ENCOUNTER: Primary | ICD-10-CM

## 2025-02-14 DIAGNOSIS — L84 CALLUS: ICD-10-CM

## 2025-02-14 DIAGNOSIS — S92.901A CLOSED FRACTURE OF RIGHT FOOT, INITIAL ENCOUNTER: ICD-10-CM

## 2025-02-14 DIAGNOSIS — B35.1 ONYCHOMYCOSIS: ICD-10-CM

## 2025-02-14 PROCEDURE — 11721 DEBRIDE NAIL 6 OR MORE: CPT | Performed by: PODIATRIST

## 2025-02-14 PROCEDURE — 99024 POSTOP FOLLOW-UP VISIT: CPT | Performed by: PODIATRIST

## 2025-02-14 PROCEDURE — 11056 PARNG/CUTG B9 HYPRKR LES 2-4: CPT | Performed by: PODIATRIST

## 2025-02-14 PROCEDURE — 73630 X-RAY EXAM OF FOOT: CPT

## 2025-02-14 NOTE — PROGRESS NOTES
Name: Sussy Jugn      : 1960      MRN: [id number]  Encounter Provider: Chip Clinton DPM  Encounter Date: 2025   Encounter department: [facility]  :  Assessment & Plan  Closed fracture of right foot, initial encounter    Orders:    XR foot 3+ vw right; Future    Callus         Onychomycosis         -May return to normal shoes  - X-rays 3 views weightbearing taken view of the right foot and show increased osseous bridging across the fracture site, fracture site still visible, normal postoperative appearance, no evidence of loosening  - Nail care provided as below  - She has Q9 findings for at risk footcare was provided as below  - Return every 3 months for nail and callus care      Procedure: All mycotic toenails were reduced and debrided in length, width, and girth using a nail nipper and dremel.  All hyperkeratotic skin lesion(s) were sharply pared with a scalpel with no bleeding or evidence of ulceration.  Patient tolerated procedure(s) well without complications.  [id number], [id number]      History of Present Illness   HPI  Sussy Jung is a 64 y.o. female who presents evaluation management of her bilateral feet, complaining of long and painful, she got down and cut her self.  Also here for Garcia fracture check.  Ambulating in boot and doing well reports no changes      Review of Systems   Constitutional:  Negative for chills and fever.   HENT:  Negative for ear pain and sore throat.    Eyes:  Negative for pain and visual disturbance.   Respiratory:  Negative for cough and shortness of breath.    Cardiovascular:  Negative for chest pain and palpitations.   Gastrointestinal:  Negative for abdominal pain and vomiting.   Genitourinary:  Negative for dysuria and hematuria.   Musculoskeletal:  Negative for arthralgias and back pain.   Skin:  Negative for color change and rash.   Neurological:  Negative for seizures and syncope.   All other systems reviewed and are  "negative.         Objective   Ht 5' 1\" (1.549 m)   Wt 106 kg (234 lb)   BMI 44.21 kg/m²      Physical Exam  Vitals reviewed.   Constitutional:       Appearance: Normal appearance.   HENT:      Head: Normocephalic and atraumatic.      Mouth/Throat:      Mouth: Mucous membranes are moist.      Pharynx: Oropharynx is clear.   Eyes:      Pupils: Pupils are equal, round, and reactive to light.   Skin:     Capillary Refill: Capillary refill takes less than 2 seconds.      Comments: There is minimal soft tissue swelling of the right foot, no pain with attempted range of motion, normal postop appearance.  +1 of 4 pitting in bilateral lower extremities.  Nails x 10 thickened elongated with subungual debris's, callosity bilateral fifth metatarsal heads and also right heel   Neurological:      General: No focal deficit present.      Mental Status: She is alert and oriented to person, place, and time. Mental status is at baseline.           "

## 2025-02-17 NOTE — RESULT NOTES
Verified Results  (1) VITAMIN B1, WHOLE BLOOD 26Oct2017 01:01PM Nataly Munoz     Test Name Result Flag Reference   VITAMIN B1, WHOLE BLOOD 120 8 nmol/L  66 5 - 200 0   This test was developed and its performance characteristics  determined by [facility]  It has not been cleared or approved  by the Food and Drug Administration    Performed at:  [address]  [id number]  : Ronald Freitas MD, Phone:  [phone]
22w

## 2025-05-29 ENCOUNTER — PROCEDURE VISIT (OUTPATIENT)
Dept: PODIATRY | Facility: CLINIC | Age: 65
End: 2025-05-29
Payer: MEDICARE

## 2025-05-29 VITALS — WEIGHT: 234 LBS | BODY MASS INDEX: 44.18 KG/M2 | HEIGHT: 61 IN

## 2025-05-29 DIAGNOSIS — B35.1 ONYCHOMYCOSIS: ICD-10-CM

## 2025-05-29 DIAGNOSIS — I87.2 VENOUS INSUFFICIENCY: Primary | ICD-10-CM

## 2025-05-29 PROCEDURE — 11721 DEBRIDE NAIL 6 OR MORE: CPT | Performed by: PODIATRIST

## 2025-05-29 NOTE — PROGRESS NOTES
Sussy IRIZARRY Erich  1960  AT RISK FOOT CARE    1. Venous insufficiency        2. Onychomycosis            Patient presents for at-risk foot care.  Patient has no acute concerns today.  Patient has significant lower extremity risk due to diminished pulses in the feet and trophic skin changes to the lower extremity including thick toenail, atrophic skin, and decreased hair growth.      On exam patient has thickened, hypertrophic, discolored, brittle toenails with subungual debris and tenderness x10   Callus: 0  Patient has diminished pedal pulses and decreased perfusion to the lower extremities  Patient has significant trophic changes to the skin including thick toenails, decreased pedal hair and atrophic skin.     Today's treatment includes:  Debridement of toenails. Using nail nipper, derik, and curette, nails were sharply debrided, reduced in thickness and length. Devitalized nail tissue and fungal debris excised and removed. Patient tolerated well.      Discussed proper shoe gear, daily inspections of feet, and general foot health with patient. Patient has Q8  findings and is recommended for at risk foot care every 9-10 weeks.      Procedure: All mycotic toenails were reduced and debrided in length, width, and girth using a nail nipper and dremel.  All hyperkeratotic skin lesion(s) were sharply pared with a scalpel with no bleeding or evidence of ulceration.  Patient tolerated procedure(s) well without complications.

## 2025-08-22 ENCOUNTER — PROCEDURE VISIT (OUTPATIENT)
Dept: PODIATRY | Facility: CLINIC | Age: 65
End: 2025-08-22
Payer: MEDICARE

## 2025-08-22 VITALS — BODY MASS INDEX: 44.37 KG/M2 | HEIGHT: 61 IN | WEIGHT: 235 LBS

## 2025-08-22 DIAGNOSIS — I87.2 VENOUS INSUFFICIENCY: Primary | ICD-10-CM

## 2025-08-22 DIAGNOSIS — B35.1 ONYCHOMYCOSIS: ICD-10-CM

## 2025-08-22 PROCEDURE — 11721 DEBRIDE NAIL 6 OR MORE: CPT | Performed by: PODIATRIST

## (undated) DEVICE — STRL UNIVERSAL MINOR GENERAL: Brand: CARDINAL HEALTH

## (undated) DEVICE — ESOPHAGEAL/PYLORIC/COLONIC/BILIARY WIREGUIDED BALLOON DILATATION CATHETER: Brand: CRE™ PRO

## (undated) DEVICE — ANTI-FOG SOLUTION WITH FOAM PAD: Brand: DEVON

## (undated) DEVICE — ZIMMER® STERILE DISPOSABLE TOURNIQUET CUFF, DUAL PORT, SINGLE BLADDER, 18 IN. (46 CM)

## (undated) DEVICE — GLOVE SRG BIOGEL 7

## (undated) DEVICE — 10FR FRAZIER SUCTION HANDLE: Brand: CARDINAL HEALTH

## (undated) DEVICE — REM POLYHESIVE ADULT PATIENT RETURN ELECTRODE: Brand: VALLEYLAB

## (undated) DEVICE — Device

## (undated) DEVICE — TRAVELKIT CONTAINS FIRST STEP KIT (200ML EP-4 KIT) AND SOILED SCOPE BAG - 1 KIT: Brand: TRAVELKIT CONTAINS FIRST STEP KIT AND SOILED SCOPE BAG

## (undated) DEVICE — BLADE SAG OSCILLATING 18 X 5.5 MM

## (undated) DEVICE — CURITY NON-ADHERENT STRIPS: Brand: CURITY

## (undated) DEVICE — [HIGH FLOW INSUFFLATOR,  DO NOT USE IF PACKAGE IS DAMAGED,  KEEP DRY,  KEEP AWAY FROM SUNLIGHT,  PROTECT FROM HEAT AND RADIOACTIVE SOURCES.]: Brand: PNEUMOSURE

## (undated) DEVICE — AIRSEAL TUBE SMOKE EVAC LUMENX3 FILTERED

## (undated) DEVICE — SPRAY SET ENDO 360DEG GAS ASSIST FLEX APPLICATOR DUPLOSPRAY

## (undated) DEVICE — ADHESIVE SKIN CLSR DERMABOND NX

## (undated) DEVICE — ENDOPATH XCEL BLADELESS TROCARS WITH STABILITY SLEEVES: Brand: ENDOPATH XCEL

## (undated) DEVICE — STAPLER GIA HANDLE STAND 4MM

## (undated) DEVICE — URETERAL CATHETER ADAPTOR TIP

## (undated) DEVICE — IRRIG ENDO FLO TUBING

## (undated) DEVICE — SUT ETHIBOND 0 CT-1 30 IN X424H

## (undated) DEVICE — JACKSON-PRATT 100CC BULB RESERVOIR: Brand: CARDINAL HEALTH

## (undated) DEVICE — PMI DISPOSABLE PUNCTURE CLOSURE DEVICE / SUTURE GRASPER: Brand: PMI

## (undated) DEVICE — CHLORAPREP HI-LITE 26ML ORANGE

## (undated) DEVICE — DRAPE TOWEL: Brand: CONVERTORS

## (undated) DEVICE — NEEDLE SPINAL 22G X 3.5IN  QUINCKE

## (undated) DEVICE — Device: Brand: DEFENDO AIR/WATER/SUCTION AND BIOPSY VALVE

## (undated) DEVICE — SINGLE-USE BIOPSY FORCEPS: Brand: RADIAL JAW 4

## (undated) DEVICE — ACE WRAP 6 IN UNSTERILE

## (undated) DEVICE — 10 MM BABCOCKS WITH RATCHET HANDLES: Brand: ENDOPATH

## (undated) DEVICE — ENDOPATH XCEL UNIVERSAL TROCAR STABLILITY SLEEVES: Brand: ENDOPATH XCEL

## (undated) DEVICE — ENDOSCOPIC CURVED INTRALUMINAL STAPLER (ILS) 16 TITANIUM ADJUSTABLE HEIGHT STAPLES

## (undated) DEVICE — SCD SEQUENTIAL COMPRESSION COMFORT SLEEVE MEDIUM KNEE LENGTH: Brand: KENDALL SCD

## (undated) DEVICE — TISSEEL FIBRIN 4ML FROZEN

## (undated) DEVICE — BLUE HEAT SCOPE WARMER

## (undated) DEVICE — WEBRIL 6 IN UNSTERILE

## (undated) DEVICE — NEPTUNE E-SEP SMOKE EVACUATION PENCIL, COATED, 70MM BLADE, PUSH BUTTON SWITCH: Brand: NEPTUNE E-SEP

## (undated) DEVICE — VIOLET BRAIDED (POLYGLACTIN 910), SYNTHETIC ABSORBABLE SUTURE: Brand: COATED VICRYL

## (undated) DEVICE — 3M™ DURAPORE™ SURGICAL TAPE 1538-3, 3 INCH X 10 YARD (7,5CM X 9,1M), 4 ROLLS/BOX: Brand: 3M™ DURAPORE™

## (undated) DEVICE — HARMONIC ACE +7 LAPAROSCOPIC SHEARS ADVANCED HEMOSTASIS 5MM DIAMETER 36CM SHAFT LENGTH  FOR USE WITH GRAY HAND PIECE ONLY: Brand: HARMONIC ACE

## (undated) DEVICE — COVIDIEN ENDO GIA PURPLE (MED) RELOAD 45MM

## (undated) DEVICE — SURGICAL GOWN, XL SMARTSLEEVE: Brand: CONVERTORS

## (undated) DEVICE — GLOVE SRG BIOGEL 7.5

## (undated) DEVICE — TIBURON LAPAROSCOPIC ABDOMINAL DRAPE: Brand: CONVERTORS

## (undated) DEVICE — ENDOPATH 5MM CURVED SCISSORS WITH MONOPOLAR CAUTERY: Brand: ENDOPATH

## (undated) DEVICE — INTENDED FOR TISSUE SEPARATION, AND OTHER PROCEDURES THAT REQUIRE A SHARP SURGICAL BLADE TO PUNCTURE OR CUT.: Brand: BARD-PARKER SAFETY BLADES SIZE 15, STERILE

## (undated) DEVICE — SINGLE PORT MANIFOLD: Brand: NEPTUNE 2

## (undated) DEVICE — 3000CC GUARDIAN II: Brand: GUARDIAN

## (undated) DEVICE — SUT MONOCRYL 4-0 PS-2 27 IN Y426H

## (undated) DEVICE — BETHLEHEM UNIVERSAL  MIONR EXT: Brand: CARDINAL HEALTH

## (undated) DEVICE — ADHESIVE SKN CLSR HISTOACRYL FLEX 0.5ML LF

## (undated) DEVICE — GUIDEWIRE 2 X 200MM W/TROCAR TIP

## (undated) DEVICE — ACE WRAP 4 IN UNSTERILE

## (undated) DEVICE — TROCAR PORT ACCESS 12 X120MM W/BLDLS OPTICAL TIP AIRSEAL

## (undated) DEVICE — JP CHAN DRN SIL HUBLESS 19FR W/TRO: Brand: CARDINAL HEALTH

## (undated) DEVICE — GAUZE SPONGES,16 PLY: Brand: CURITY

## (undated) DEVICE — COVIDIEN ENDO GIA PURPLE (MED) RELOAD 60MM

## (undated) DEVICE — CYSTO TUBING SINGLE IRRIGATION

## (undated) DEVICE — DISPOSABLE OR TOWEL: Brand: CARDINAL HEALTH

## (undated) DEVICE — TUBING SMOKE EVAC W/FILTRATION DEVICE PLUMEPORT ACTIV

## (undated) DEVICE — POV-IOD SOLUTION 4OZ BT

## (undated) DEVICE — POWER SHELL SIGNIA

## (undated) DEVICE — CURITY STRETCH BANDAGE: Brand: CURITY

## (undated) DEVICE — 2000CC GUARDIAN II: Brand: GUARDIAN

## (undated) DEVICE — GLOVE SRG LF STRL BGL SKNSNS 7 PF

## (undated) DEVICE — PREMIUM DRY TRAY LF: Brand: MEDLINE INDUSTRIES, INC.

## (undated) DEVICE — TUBING SUCTION 5MM X 12 FT

## (undated) DEVICE — C-ARM: Brand: UNBRANDED

## (undated) DEVICE — ENDOPATH PNEUMONEEDLE INSUFFLATION NEEDLES WITH LUER LOCK CONNECTORS 120MM: Brand: ENDOPATH

## (undated) DEVICE — ENDOPOUCH RETRIEVER SPECIMEN RETRIEVAL BAGS: Brand: ENDOPOUCH RETRIEVER

## (undated) DEVICE — GLOVE SRG BIOGEL 8

## (undated) DEVICE — GLOVE INDICATOR PI UNDERGLOVE SZ 7 BLUE

## (undated) DEVICE — NEEDLE 25G X 1 1/2

## (undated) DEVICE — ABSORBABLE WOUND CLOSURE DEVICE: Brand: V-LOC 90

## (undated) DEVICE — SYRINGE 30ML LL